# Patient Record
Sex: FEMALE | Race: WHITE | NOT HISPANIC OR LATINO | Employment: OTHER | URBAN - METROPOLITAN AREA
[De-identification: names, ages, dates, MRNs, and addresses within clinical notes are randomized per-mention and may not be internally consistent; named-entity substitution may affect disease eponyms.]

---

## 2017-05-22 ENCOUNTER — HOSPITAL ENCOUNTER (EMERGENCY)
Facility: HOSPITAL | Age: 77
Discharge: HOME | End: 2017-05-22
Attending: EMERGENCY MEDICINE
Payer: COMMERCIAL

## 2017-05-22 VITALS
DIASTOLIC BLOOD PRESSURE: 59 MMHG | TEMPERATURE: 98 F | BODY MASS INDEX: 18.4 KG/M2 | HEART RATE: 87 BPM | SYSTOLIC BLOOD PRESSURE: 136 MMHG | OXYGEN SATURATION: 95 % | HEIGHT: 62 IN | WEIGHT: 100 LBS | RESPIRATION RATE: 20 BRPM

## 2017-05-22 DIAGNOSIS — H54.61 VISION LOSS OF RIGHT EYE: Primary | ICD-10-CM

## 2017-05-22 LAB
ALBUMIN SERPL BCP-MCNC: 3.5 G/DL
ALP SERPL-CCNC: 68 U/L
ALT SERPL W/O P-5'-P-CCNC: 14 U/L
ANION GAP SERPL CALC-SCNC: 8 MMOL/L
AST SERPL-CCNC: 16 U/L
BASOPHILS # BLD AUTO: 0.03 K/UL
BASOPHILS NFR BLD: 0.4 %
BILIRUB SERPL-MCNC: 0.3 MG/DL
BUN SERPL-MCNC: 18 MG/DL
CALCIUM SERPL-MCNC: 9.4 MG/DL
CHLORIDE SERPL-SCNC: 106 MMOL/L
CHOLEST/HDLC SERPL: 3.1 {RATIO}
CO2 SERPL-SCNC: 25 MMOL/L
CREAT SERPL-MCNC: 1.1 MG/DL
CRP SERPL-MCNC: 1.73 MG/L
DIFFERENTIAL METHOD: ABNORMAL
EOSINOPHIL # BLD AUTO: 0.2 K/UL
EOSINOPHIL NFR BLD: 2.3 %
ERYTHROCYTE [DISTWIDTH] IN BLOOD BY AUTOMATED COUNT: 16.3 %
ERYTHROCYTE [SEDIMENTATION RATE] IN BLOOD BY WESTERGREN METHOD: 50 MM/HR
EST. GFR  (AFRICAN AMERICAN): 55.6 ML/MIN/1.73 M^2
EST. GFR  (NON AFRICAN AMERICAN): 48.2 ML/MIN/1.73 M^2
GLUCOSE SERPL-MCNC: 95 MG/DL
HCT VFR BLD AUTO: 30.3 %
HDL/CHOLESTEROL RATIO: 32.4 %
HDLC SERPL-MCNC: 210 MG/DL
HDLC SERPL-MCNC: 68 MG/DL
HGB BLD-MCNC: 9.9 G/DL
INR PPP: 4.7
LDLC SERPL CALC-MCNC: 95.8 MG/DL
LYMPHOCYTES # BLD AUTO: 2.1 K/UL
LYMPHOCYTES NFR BLD: 30.5 %
MCH RBC QN AUTO: 24.4 PG
MCHC RBC AUTO-ENTMCNC: 32.7 %
MCV RBC AUTO: 75 FL
MONOCYTES # BLD AUTO: 0.4 K/UL
MONOCYTES NFR BLD: 6.4 %
NEUTROPHILS # BLD AUTO: 4.2 K/UL
NEUTROPHILS NFR BLD: 60.3 %
NONHDLC SERPL-MCNC: 142 MG/DL
PLATELET # BLD AUTO: 308 K/UL
PMV BLD AUTO: 8.6 FL
POTASSIUM SERPL-SCNC: 5.1 MMOL/L
PROT SERPL-MCNC: 7.2 G/DL
PROTHROMBIN TIME: 48.6 SEC
RBC # BLD AUTO: 4.06 M/UL
SODIUM SERPL-SCNC: 139 MMOL/L
TRIGL SERPL-MCNC: 231 MG/DL
TSH SERPL DL<=0.005 MIU/L-ACNC: 2.35 UIU/ML
WBC # BLD AUTO: 6.92 K/UL

## 2017-05-22 PROCEDURE — 99285 EMERGENCY DEPT VISIT HI MDM: CPT | Mod: 25

## 2017-05-22 PROCEDURE — 93005 ELECTROCARDIOGRAM TRACING: CPT

## 2017-05-22 PROCEDURE — 93010 ELECTROCARDIOGRAM REPORT: CPT | Mod: ,,, | Performed by: INTERNAL MEDICINE

## 2017-05-22 PROCEDURE — 25000003 PHARM REV CODE 250: Performed by: STUDENT IN AN ORGANIZED HEALTH CARE EDUCATION/TRAINING PROGRAM

## 2017-05-22 PROCEDURE — 84443 ASSAY THYROID STIM HORMONE: CPT

## 2017-05-22 PROCEDURE — 85651 RBC SED RATE NONAUTOMATED: CPT

## 2017-05-22 PROCEDURE — 99284 EMERGENCY DEPT VISIT MOD MDM: CPT | Mod: ,,, | Performed by: EMERGENCY MEDICINE

## 2017-05-22 PROCEDURE — 80061 LIPID PANEL: CPT

## 2017-05-22 PROCEDURE — 85025 COMPLETE CBC W/AUTO DIFF WBC: CPT

## 2017-05-22 PROCEDURE — 86141 C-REACTIVE PROTEIN HS: CPT

## 2017-05-22 PROCEDURE — 85610 PROTHROMBIN TIME: CPT

## 2017-05-22 PROCEDURE — 82962 GLUCOSE BLOOD TEST: CPT

## 2017-05-22 PROCEDURE — 80053 COMPREHEN METABOLIC PANEL: CPT

## 2017-05-22 RX ORDER — PROPARACAINE HYDROCHLORIDE 5 MG/ML
1 SOLUTION/ DROPS OPHTHALMIC
Status: COMPLETED | OUTPATIENT
Start: 2017-05-22 | End: 2017-05-22

## 2017-05-22 RX ADMIN — PROPARACAINE HYDROCHLORIDE 1 DROP: 5 SOLUTION/ DROPS OPHTHALMIC at 03:05

## 2017-05-22 NOTE — ED PROVIDER NOTES
Encounter Date: 5/22/2017       History     Chief Complaint   Patient presents with    Loss of Vision     Pt states that she lost vision in her right eye, states that her vision is grey. Pt has slight facial droop on the right side.      Review of patient's allergies indicates:  No Known Allergies  HPI   76y F with h/o brain aneurysm, on coumadin, brought in by EMS for sudden onset vision loss once she woke from her sleep. She does not see black or color, it is all white. 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 She however was able to see blurry images prior to this event tonight. She denies any numbness, tingling, or loss of movement upon awakening. She denies headaches, pain, chest pain or shortness of breath.     Past Medical History:   Diagnosis Date    Anticoagulant long-term use     Apocrine adenocarcinoma     Arthritis     Back pain     lower back    Brain aneurysm     4 times    Chronic kidney disease (CKD), stage III (moderate)     COPD (chronic obstructive pulmonary disease)     Coronary artery disease     Encounter for blood transfusion     Hypertension     Polycystic kidney disease     Polycythemia vera     Polycythemia vera     congential     Past Surgical History:   Procedure Laterality Date    ABDOMINAL SURGERY      APPENDECTOMY      BRAIN SURGERY      relieve hemmorhages/had 4 surgeries    CARDIAC CATHETERIZATION      CORONARY ANGIOPLASTY      FRACTURE SURGERY      GALLBLADDER SURGERY      HYSTERECTOMY      illiac stent      JOINT REPLACEMENT      SKIN BIOPSY      skin repair      apocrine cancer    tibial repair      steel plate inserted    TONSILLECTOMY      TOTAL KNEE ARTHROPLASTY      VEIN BYPASS SURGERY       Family History   Problem Relation Age of Onset    Heart disease Brother      Social History   Substance Use Topics    Smoking status: Current Every Day Smoker     Packs/day: 2.00     Years: 60.00     Smokeless tobacco: Not on file    Alcohol use No     Review of Systems   Constitutional: Negative for activity change, chills and fever.   HENT: Negative for congestion and sore throat.    Eyes: Positive for visual disturbance. Negative for photophobia, pain, discharge, redness and itching.   Genitourinary: Negative for dysuria and flank pain.   Musculoskeletal: Negative for back pain and neck stiffness.   Skin: Negative for rash and wound.   Neurological: Negative for seizures and facial asymmetry.   Psychiatric/Behavioral: Negative for agitation and hallucinations.       Physical Exam     Initial Vitals [05/22/17 0208]   BP Pulse Resp Temp SpO2   (!) 136/59 92 18 98.2 °F (36.8 °C) 98 %     Physical Exam    Nursing note and vitals reviewed.  Constitutional: She appears well-developed and well-nourished. She is not diaphoretic. No distress.   Pleasant Cauc female with a symmetric face and smile. Nasolabial folds present bilaterally.   HENT:   Head: Normocephalic and atraumatic.   Nose: Nose normal.   Mouth/Throat: Oropharynx is clear and moist. No oropharyngeal exudate.   Eyes: Conjunctivae and EOM are normal. Right eye exhibits no chemosis, no discharge and no exudate. Left eye exhibits no chemosis, no discharge and no exudate. Right conjunctiva is not injected. Right conjunctiva has no hemorrhage. Left conjunctiva is not injected. Left conjunctiva has no hemorrhage. No scleral icterus. Right eye exhibits normal extraocular motion and no nystagmus. Left eye exhibits normal extraocular motion and no nystagmus. Right pupil is not reactive.   R eye not reactive to light with no consensual L response. L eye reactive to light with a symmetric L-sided consensual response. Pupils 3mm. IOP 14 bilaterally.   Neck: Normal range of motion. Neck supple. No thyromegaly present. No tracheal deviation present. No JVD present.   Cardiovascular: Normal rate, regular rhythm, normal heart sounds and intact distal pulses. Exam  reveals no gallop and no friction rub.    No murmur heard.  Pulmonary/Chest: Breath sounds normal. No stridor. No respiratory distress. She has no wheezes. She has no rhonchi. She has no rales. She exhibits no tenderness.   Abdominal: Soft. Bowel sounds are normal. She exhibits no distension and no mass. There is no tenderness. There is no rebound and no guarding.   Musculoskeletal: Normal range of motion. She exhibits no edema or tenderness.   Lymphadenopathy:     She has no cervical adenopathy.   Neurological: She is alert and oriented to person, place, and time. She has normal strength. No cranial nerve deficit or sensory deficit.   Current NIH Stroke Score: 0   1a. Level Of Consciousness  0-->Alert: keenly responsive   1b. LOC Questions  0-->Answers both questions correctly   1c. LOC Commands  0-->Performs both tasks correctly   2. Best Gaze  0-->Normal   3. Visual  1-->Partial hemianopia   4. Facial Palsy  0-->Normal symmetrical movements   5a. Motor Arm, Left  0-->No drift: limb holds 90 (or 45) degrees for full   10 secs   5b. Motor Arm, Right  0-->No drift: limb holds 90 (or 45) degrees for full   10 secs   6a. Motor Leg, Left  0-->No drift: leg holds 30 degree position for full 5   secs   6b. Motor Leg, Right  0-->No drift: leg holds 30 degree position for full   5 secs   7. Limb Ataxia  0-->Absent   8. Sensory  0-->Normal: no sensory loss   9. Best Language  0-->No aphasia: normal   10. Dysarthria  0-->Normal    Skin: Skin is warm and dry. No rash and no abscess noted. No erythema. No pallor.   Psychiatric: She has a normal mood and affect. Thought content normal.         ED Course   Procedures  Labs Reviewed   CBC W/ AUTO DIFFERENTIAL - Abnormal; Notable for the following:        Result Value    Hemoglobin 9.9 (*)     Hematocrit 30.3 (*)     MCV 75 (*)     MCH 24.4 (*)     RDW 16.3 (*)     MPV 8.6 (*)     All other components within normal limits   COMPREHENSIVE METABOLIC PANEL - Abnormal; Notable for the  "following:     eGFR if  55.6 (*)     eGFR if non  48.2 (*)     All other components within normal limits   PROTIME-INR - Abnormal; Notable for the following:     Prothrombin Time 48.6 (*)     INR 4.7 (*)     All other components within normal limits   LIPID PANEL - Abnormal; Notable for the following:     Cholesterol 210 (*)     Triglycerides 231 (*)     All other components within normal limits   SEDIMENTATION RATE, MANUAL - Abnormal; Notable for the following:     Sed Rate 50 (*)     All other components within normal limits    Narrative:     added crpus esr orders [id number] per Dr. Luis Murdock    TSH   SEDIMENTATION RATE, MANUAL   HIGH SENSITIVITY CRP   HIGH SENSITIVITY CRP (CARDIAC CRP)    Narrative:     added crpus esr orders [id number] per Dr. Luis Murdock                    APC / Resident Notes:   MDM: 76F with sudden onset unilateral painless vision loss  Initial ddx included but was not limited to: TIA/amaurosis fugax, CRVO, CRAO, optic neuritis, GCA, retinal detachment  Eye exam most consistent with optic nerve pathology. Give h/o of anticoagulant use, cannot rule out cardiovascular etiology.  CT head with no evidence of acute intracranial pathology  CBC wnl  CMP wnl  PT-INR elevated 4.7   Lipid profile reveals elevated cholesterol and HDL and TG  TSH wnl  Ophtho emergently consulted. They suspect NAION and will see the pt in Neuro-ophthalmology clinic this morning at 8am. Will have pt f/u with PCP re: coumadin use. Pt aware of plan.   Ji Orozco MD  PGY-1 [employer] EM               Attending Attestation:   Physician Attestation Statement for Resident:  As the supervising MD   Physician Attestation Statement: I have personally seen and examined this patient.   I agree with the above history. -: No ha, eye pain, temple pain or jaw pain assoc with this.  Vision had been at her usual reduced baseline until tonight when the R eye went "white".  No trauma " to eye/head.  Feels well o/w.     As the supervising MD I agree with the above PE.    As the supervising MD I agree with the above treatment, course, plan, and disposition.                    ED Course     Clinical Impression:   The encounter diagnosis was Vision loss of right eye.          Luis Murdock MD  05/23/17 1210

## 2017-05-22 NOTE — CONSULTS
"CC: decreased vision OD    HPI: Sunita Zimmer is a 76 y.o. female sudden onset of vision decrease in her right eye tonight at midnight. She states her vision is "just white".  He reports a chronic history of poor vision in her right eye, but this visual decrease is new. She denies pain, photophobia, flashes, floaters, curtainlike vision loss, pain with EOM, numbness, tingling, scalp tenderness, jaw claudication, proximal muscle weakness.     PMHx  Polycystic kidney disease  Polycythemia vera  CKD III  "intestinal stenosis?"  HTN  Tobacco abuse 61 years  Subclavian stenosis, right  DVT  COPD  S/p angioplasties x4       POH:  12 eskimo   "legally blind in her right eye since childhood"  Denies Surgieries, trauma  Wore glasses in youth    FMHX  Denies blindness, glaucoma    Allergies bactriom        Past Medical History:   Diagnosis Date    Anticoagulant long-term use     Apocrine adenocarcinoma     Arthritis     Back pain     lower back    Brain aneurysm     4 times    Chronic kidney disease (CKD), stage III (moderate)     COPD (chronic obstructive pulmonary disease)     Coronary artery disease     Encounter for blood transfusion     Hypertension     Polycystic kidney disease     Polycythemia vera     Polycythemia vera     congential         Family History   Problem Relation Age of Onset    Heart disease Brother            Current Facility-Administered Medications:     proparacaine 0.5 % ophthalmic solution 1 drop, 1 drop, Both Eyes, ED 1 Time, Ji Orozco MD    Current Outpatient Prescriptions:     acetaminophen-codeine 300-30mg (TYLENOL-CODEINE #3) 300-30 mg Tab, Take by mouth., Disp: , Rfl:     aspirin (ECOTRIN) 81 MG EC tablet, Take 1 tablet (81 mg total) by mouth once daily., Disp: 30 tablet, Rfl: 5    clonidine (CATAPRES) 0.1 MG tablet, Take 0.1 mg by mouth 2 (two) times daily. , Disp: , Rfl:     cyclobenzaprine (FLEXERIL) 10 MG tablet, Take 10 mg by mouth 3 (three) times daily as " needed for Muscle spasms., Disp: , Rfl:     diazepam (VALIUM) 5 MG tablet, Take 5 mg by mouth every 6 (six) hours as needed for Anxiety., Disp: , Rfl:     hydrocodone-acetaminophen 7.5-500 mg (LORTAB) 7.5-500 mg per tablet, Take 1 tablet by mouth every 6 (six) hours as needed for Pain., Disp: , Rfl:     nitroGLYCERIN (NITROSTAT) 0.4 MG SL tablet, Place 0.4 mg under the tongue every 5 (five) minutes as needed for Chest pain., Disp: , Rfl:     ramipril (ALTACE) 10 MG capsule, Take 10 mg by mouth. , Disp: , Rfl:     terbutaline (BRETHINE) 5 mg Tab, 5 mg 2 (two) times daily. , Disp: , Rfl:     warfarin (COUMADIN) 3 MG tablet, Take 3 mg by mouth. , Disp: , Rfl:       Review of patient's allergies indicates:  No Known Allergies      Social History   Substance Use Topics    Smoking status: Current Every Day Smoker     Packs/day: 2.00     Years: 60.00    Smokeless tobacco: Not on file    Alcohol use No          Not recorded        VA  Sc near card  OD: HM  OS: 20/100    TP 14//15    Pupils:   OD: minimally reactive, +3 APD  OS: sluggish, no APD    CVF  OD: unable  OS: full      SLE:  L/L:, wnl OU  C/S: white and quiet OU  K: endopigment OU  AC: deep and quiet OU  Iris: round and reactive OU  Lens: NSC +3 OU  Vitreous: clear OU     DFE  Nerve: slight pallor? OD //wnl OS  CDR 0.3//0.3  Macula: flat OU, Faint drusen OS  Vessels: severe attenuation OD with slowed venous circulation // notable attenuation with SVP OS  Periphery: wnl OU    CT negative for CVA    Plan   A/P: Sunita Zimmer is a 76 y.o. female    1) Decreased vision OD  - per patient, chronic poor vision in her right eye since childhood but with new onset vision decrease starting at midnight  - patient with multiple vasculopathic risk factors: HTN, DVT's, multiple stents, polycythemia vera, PKD, tobacco abuse. On coumadin and supratherapeutic.  - no GCA symptoms such as jaw claudication, proximal muscle weakness, scalp tenderness, hx of PMR. Will check  ESR/CRP  - Suspect NAION, Will plan to have patient see Neuro-ophthalmology this AM in clinic

## 2017-05-22 NOTE — ED TRIAGE NOTES
76 year old female pt presents to the ed with complaints of loss of vision to the right around midnight.pt denies any chest pain sob or nausea. Pt also denies any headache. Pt is awake alert and oriented x 3. Pt presents to the ed with .

## 2017-05-24 LAB — POCT GLUCOSE: 100 MG/DL (ref 70–110)

## 2017-05-26 ENCOUNTER — OFFICE VISIT (OUTPATIENT)
Dept: OPHTHALMOLOGY | Facility: CLINIC | Age: 77
End: 2017-05-26
Payer: COMMERCIAL

## 2017-05-26 ENCOUNTER — CLINICAL SUPPORT (OUTPATIENT)
Dept: OPHTHALMOLOGY | Facility: CLINIC | Age: 77
End: 2017-05-26
Payer: COMMERCIAL

## 2017-05-26 DIAGNOSIS — H47.011 NAION (NON-ARTERITIC ANTERIOR ISCHEMIC OPTIC NEUROPATHY), RIGHT EYE: ICD-10-CM

## 2017-05-26 DIAGNOSIS — H53.411 CENTRAL SCOTOMA, RIGHT EYE: ICD-10-CM

## 2017-05-26 PROCEDURE — 92083 EXTENDED VISUAL FIELD XM: CPT | Mod: S$GLB,,, | Performed by: OPHTHALMOLOGY

## 2017-05-26 PROCEDURE — 99999 PR PBB SHADOW E&M-EST. PATIENT-LVL III: CPT | Mod: PBBFAC,,, | Performed by: OPHTHALMOLOGY

## 2017-05-26 PROCEDURE — 99999 PR PBB SHADOW E&M-EST. PATIENT-LVL I: CPT | Mod: PBBFAC,,,

## 2017-05-26 PROCEDURE — 92014 COMPRE OPH EXAM EST PT 1/>: CPT | Mod: S$GLB,,, | Performed by: OPHTHALMOLOGY

## 2017-05-26 RX ORDER — ALBUTEROL SULFATE 4 MG/1
TABLET ORAL
Status: ON HOLD | COMMUNITY
Start: 2017-05-23 | End: 2018-05-20 | Stop reason: HOSPADM

## 2017-05-26 RX ORDER — BUDESONIDE AND FORMOTEROL FUMARATE DIHYDRATE 160; 4.5 UG/1; UG/1
2 AEROSOL RESPIRATORY (INHALATION)
Status: ON HOLD | COMMUNITY
End: 2018-05-04

## 2017-05-26 RX ORDER — ALBUTEROL SULFATE 90 UG/1
AEROSOL, METERED RESPIRATORY (INHALATION)
Status: ON HOLD | COMMUNITY
Start: 2017-04-28 | End: 2018-05-20

## 2017-05-26 RX ORDER — PREDNISONE 5 MG/1
TABLET ORAL
COMMUNITY
Start: 2017-05-09 | End: 2017-11-20

## 2017-05-26 RX ORDER — TEMAZEPAM 15 MG/1
CAPSULE ORAL
COMMUNITY
Start: 2017-05-03 | End: 2017-05-26

## 2017-05-26 RX ORDER — POLYETHYLENE GLYCOL 3350 17 G/17G
17 POWDER, FOR SOLUTION ORAL
COMMUNITY

## 2017-05-26 NOTE — PROGRESS NOTES
HPI     Follow-up    Additional comments: RIZWAN           Comments   Patient here for ED follow up NAION.  Pt here w/daughter. Pt states OD loss vision x 4 days ago which states   only see splash of colors or shadows.  Patient feels she seeing better since the ED visit.  No pain.    I have personally interviewed the patient, reviewed the history and   examined the patient and agree with the technician's exam.    CRP and platelet count normal. ESR slightly elevated at 50.        Last edited by Lito Lagunas MD on 5/26/2017 10:26 AM. (History)        ROS     Positive for: Neurological, Genitourinary, Cardiovascular, Eyes,   Heme/Lymph    Negative for: Constitutional, Gastrointestinal, Skin, Musculoskeletal,   HENT, Endocrine, Respiratory, Psychiatric, Allergic/Imm    Last edited by Lito Lagunas MD on 5/26/2017 10:22 AM. (History)        Assessment /Plan     For exam results, see Encounter Report.    Central scotoma, right eye  -     Mendoza Visual Field - OU - Extended - Both Eyes    NAION (non-arteritic anterior ischemic optic neuropathy), right eye      I discussed the Ischemic Optic Neuropathy Decompression Trial in detail. I will repeat her examination in two months.

## 2017-05-26 NOTE — PROGRESS NOTES
Reliable-------Good---OU  Fixation-------Good---OU  Coop----------Good---OU    24-2 SS Done OU--S

## 2017-07-27 ENCOUNTER — OFFICE VISIT (OUTPATIENT)
Dept: OPHTHALMOLOGY | Facility: CLINIC | Age: 77
End: 2017-07-27
Payer: COMMERCIAL

## 2017-07-27 ENCOUNTER — CLINICAL SUPPORT (OUTPATIENT)
Dept: OPHTHALMOLOGY | Facility: CLINIC | Age: 77
End: 2017-07-27
Payer: COMMERCIAL

## 2017-07-27 DIAGNOSIS — H47.011 NAION (NON-ARTERITIC ANTERIOR ISCHEMIC OPTIC NEUROPATHY), RIGHT EYE: ICD-10-CM

## 2017-07-27 DIAGNOSIS — H53.411 CENTRAL SCOTOMA, RIGHT EYE: Primary | ICD-10-CM

## 2017-07-27 PROCEDURE — 92083 EXTENDED VISUAL FIELD XM: CPT | Mod: S$GLB,,, | Performed by: OPHTHALMOLOGY

## 2017-07-27 PROCEDURE — 92012 INTRM OPH EXAM EST PATIENT: CPT | Mod: S$GLB,,, | Performed by: OPHTHALMOLOGY

## 2017-07-27 PROCEDURE — 99999 PR PBB SHADOW E&M-EST. PATIENT-LVL II: CPT | Mod: PBBFAC,,, | Performed by: OPHTHALMOLOGY

## 2017-07-27 RX ORDER — METHYLPREDNISOLONE 4 MG/1
TABLET ORAL
COMMUNITY
Start: 2017-04-28 | End: 2017-11-20

## 2017-07-27 RX ORDER — ISOSORBIDE MONONITRATE 30 MG/1
30 TABLET, EXTENDED RELEASE ORAL
Status: ON HOLD | COMMUNITY
Start: 2017-07-19 | End: 2017-11-21

## 2017-07-27 RX ORDER — ATORVASTATIN CALCIUM 40 MG/1
TABLET, FILM COATED ORAL
COMMUNITY
Start: 2017-07-19 | End: 2017-11-20

## 2017-07-27 RX ORDER — ALPRAZOLAM 0.5 MG/1
TABLET ORAL
Status: ON HOLD | COMMUNITY
Start: 2017-06-19 | End: 2018-05-16

## 2017-07-27 RX ORDER — FUROSEMIDE 20 MG/1
TABLET ORAL
COMMUNITY
Start: 2017-05-21 | End: 2017-11-20

## 2017-07-27 RX ORDER — PANTOPRAZOLE SODIUM 40 MG/1
TABLET, DELAYED RELEASE ORAL
COMMUNITY
Start: 2017-07-19 | End: 2017-11-20

## 2017-07-27 NOTE — PROGRESS NOTES
HPI     Concerns About Ocular Health    Additional comments: NAION           Comments   DLS:05/26/2017 [place]  Patient here for 2 month follow up Central Scotoma OD.  Pt states no change since last visit.  No pain.    I have personally interviewed the patient, reviewed the history and   examined the patient and agree with the technician's exam.       Last edited by Lito Lagunas MD on 7/27/2017 10:45 AM. (History)            Assessment /Plan     For exam results, see Encounter Report.    Central scotoma, right eye  -     Cancel: Mendoza Visual Field - OU - Extended - Both Eyes  -     Mendoza Visual Field - OU - Extended - Both Eyes    NAION (non-arteritic anterior ischemic optic neuropathy), right eye  -     Cancel: Mendoza Visual Field - OU - Extended - Both Eyes  -     Mendoza Visual Field - OU - Extended - Both Eyes      Ms. Dueñas visual function is stable in her right eye. The edema has resolved and she shows the residual features of NAION. I went over risk factors again. She has cataracts which could be the cause of the visual loss in her left eye. She will think about seeing a cataract surgeon. I will repeat her exam in one year.

## 2017-10-03 ENCOUNTER — OFFICE VISIT (OUTPATIENT)
Dept: OPHTHALMOLOGY | Facility: CLINIC | Age: 77
End: 2017-10-03
Payer: COMMERCIAL

## 2017-10-03 DIAGNOSIS — H53.411 CENTRAL SCOTOMA, RIGHT EYE: ICD-10-CM

## 2017-10-03 DIAGNOSIS — H47.011 NAION (NON-ARTERITIC ANTERIOR ISCHEMIC OPTIC NEUROPATHY), RIGHT EYE: ICD-10-CM

## 2017-10-03 DIAGNOSIS — H25.11 NUCLEAR SCLEROTIC CATARACT OF RIGHT EYE: Primary | ICD-10-CM

## 2017-10-03 DIAGNOSIS — H25.12 NUCLEAR SCLEROTIC CATARACT OF LEFT EYE: ICD-10-CM

## 2017-10-03 PROCEDURE — 99999 PR PBB SHADOW E&M-EST. PATIENT-LVL III: CPT | Mod: PBBFAC,,, | Performed by: OPHTHALMOLOGY

## 2017-10-03 PROCEDURE — 92014 COMPRE OPH EXAM EST PT 1/>: CPT | Mod: S$GLB,,, | Performed by: OPHTHALMOLOGY

## 2017-10-03 PROCEDURE — 92136 OPHTHALMIC BIOMETRY: CPT | Mod: RT,S$GLB,, | Performed by: OPHTHALMOLOGY

## 2017-10-03 RX ORDER — PREDNISOLONE ACETATE 10 MG/ML
1 SUSPENSION/ DROPS OPHTHALMIC 3 TIMES DAILY
Qty: 5 ML | Refills: 1 | Status: SHIPPED | OUTPATIENT
Start: 2017-10-03 | End: 2017-11-02

## 2017-10-03 RX ORDER — KETOROLAC TROMETHAMINE 5 MG/ML
1 SOLUTION OPHTHALMIC 3 TIMES DAILY
Qty: 5 ML | Refills: 1 | Status: ON HOLD | OUTPATIENT
Start: 2017-10-03 | End: 2018-05-20 | Stop reason: HOSPADM

## 2017-10-03 RX ORDER — OFLOXACIN 3 MG/ML
1 SOLUTION/ DROPS OPHTHALMIC 3 TIMES DAILY
Qty: 5 ML | Refills: 0 | Status: SHIPPED | OUTPATIENT
Start: 2017-10-03 | End: 2017-10-13

## 2017-10-03 NOTE — PROGRESS NOTES
HPI     Referred by Dr Lagunas    H/o NAION OD    Pt referred by Dr Lagunas for cataract eval.  Pt states blurry VA OU.    Scotoma OD    No eyedrops  No eye surgery     Last edited by Ileana Nascimento MD on 10/3/2017 11:20 AM. (History)            Assessment /Plan     For exam results, see Encounter Report.    Nuclear sclerotic cataract of right eye    Nuclear sclerotic cataract of left eye  -     IOL Master - OD - Right Eye    Central scotoma, right eye    NAION (non-arteritic anterior ischemic optic neuropathy), right eye    Other orders  -     prednisoLONE acetate (PRED FORTE) 1 % DrpS; Place 1 drop into the right eye 3 (three) times daily.  Dispense: 5 mL; Refill: 1  -     ofloxacin (OCUFLOX) 0.3 % ophthalmic solution; Place 1 drop into the right eye 3 (three) times daily.  Dispense: 5 mL; Refill: 0  -     ketorolac 0.5% (ACULAR) 0.5 % Drop; Place 1 drop into the right eye 3 (three) times daily.  Dispense: 5 mL; Refill: 1      Visually significant nuclear sclerotic cataract   - Interfering with activities of daily living.  Pt desires cataract surgery for Va rehabilitation.   - R/B/A discussed and pt agrees to proceed with surgery.   - IOL options discussed according to patient's goals and concomitant ocular pathology; and pt content with monofocal lens.    - Target: plano.    pcboo 18.5 OS  *VB  * anxiety    (pcboo 14.5 OD --> myopic shift with lenticular astig; limited Va potential)    H/o NAION OD    Monocular precautions - full time polycarb lenses to be worn at all times.

## 2017-10-05 ENCOUNTER — PATIENT MESSAGE (OUTPATIENT)
Dept: OPHTHALMOLOGY | Facility: CLINIC | Age: 77
End: 2017-10-05

## 2017-10-09 ENCOUNTER — TELEPHONE (OUTPATIENT)
Dept: OPHTHALMOLOGY | Facility: CLINIC | Age: 77
End: 2017-10-09

## 2017-10-09 NOTE — TELEPHONE ENCOUNTER
----- Message from Anai Ramsey sent at 10/9/2017  9:42 AM CDT -----  Contact: Sunita       ----- Message -----  From: Ileana Nascimento MD  Sent: 10/5/2017  11:00 AM  To: Anai Ramsey, Cheryle Quintana, #    We are planning on doing OS. Not OD. pls let her know    ----- Message -----  From: Shelley Blanco MA  Sent: 10/5/2017  10:37 AM  To: Ileana Nascimento MD    Please advise  ----- Message -----  From: Reina Cheney  Sent: 10/5/2017  10:13 AM  To: [facility] Staff    Pt calling to say that she is blind in her OD and she is unsure as to why she is having to get cataract surgery on her OD instead of her OS.  She would like to make sure that we are taking care of the correct eye. She states that she is also losing her hearing and would like to have any response sent to her by mail where she can read what is going on.  If you need to speak to her please contact her at [phone]

## 2017-10-09 NOTE — TELEPHONE ENCOUNTER
Called pt and assured her that Dr Nascimento is doing surgery on her left eye and she should use the drops in her left eye not her right. Also went over her surgery instructions and explained when to start her drops and how to use them. I told her I would send the instruction sheet and also will send surgery forms to Dr Von Mora in [place].

## 2017-10-18 ENCOUNTER — TELEPHONE (OUTPATIENT)
Dept: OPHTHALMOLOGY | Facility: CLINIC | Age: 77
End: 2017-10-18

## 2017-10-18 NOTE — TELEPHONE ENCOUNTER
----- Message from Lucia Squires sent at 10/18/2017  9:58 AM CDT -----  Contact: Pt  Pt would like to speak with the nurse ASAP regarding her upcoming procedure. She states that the instructions are confusing her because it's speaking of her right eye which is her blind eye. She can be reached at [phone]

## 2017-10-18 NOTE — TELEPHONE ENCOUNTER
Pt called and was nervous and wanted to make sure Dr Nascimento would be performing surgery on her left eye.  Reassured pt that Dr Nascimento would be doing surgery on the left eye and that there was a mistake at the beginning when the right eye was mentioned and that was cleared up and it was clarified per Dr Nascimento that surgery was going to be performed on the left eye. Pt was still nervous and wanted to speak with Dr Nascimento herself and make sure that the lens that was being ordered was for the left eye.  Advised pt that Dr Nascimento would call her as soon as possible.

## 2017-11-02 ENCOUNTER — TELEPHONE (OUTPATIENT)
Dept: OPHTHALMOLOGY | Facility: CLINIC | Age: 77
End: 2017-11-02

## 2017-11-02 DIAGNOSIS — H25.11 NUCLEAR SCLEROTIC CATARACT OF RIGHT EYE: Primary | ICD-10-CM

## 2017-11-18 ENCOUNTER — PATIENT MESSAGE (OUTPATIENT)
Dept: SURGERY | Facility: HOSPITAL | Age: 77
End: 2017-11-18

## 2017-11-20 ENCOUNTER — ANESTHESIA EVENT (OUTPATIENT)
Dept: SURGERY | Facility: HOSPITAL | Age: 77
End: 2017-11-20
Payer: COMMERCIAL

## 2017-11-20 NOTE — H&P
History    Chief complaint:  Painless progressive vision loss    Present Ilness/Diagnosis: Nuclear sclerotic Cataract    Past Medical History:  has a past medical history of Anticoagulant long-term use; Apocrine adenocarcinoma; Arthritis; Back pain; Brain aneurysm; Chronic kidney disease (CKD), stage III (moderate); COPD (chronic obstructive pulmonary disease); Coronary artery disease; Encounter for blood transfusion; Hypertension; Polycystic kidney disease; Polycythemia vera; and Polycythemia vera.    Family History/Social History: refer to chart    Allergies:   Review of patient's allergies indicates:   Allergen Reactions    Doxycycline Hives and Itching    Bacitracin     Iodinated contrast- oral and iv dye     Penicillins Other (See Comments)     Unknown    Sulfamethoxazole-trimethoprim        Current Medications: No current facility-administered medications for this encounter.     Current Outpatient Prescriptions:     acetaminophen-codeine 300-30mg (TYLENOL-CODEINE #3) 300-30 mg Tab, Take by mouth., Disp: , Rfl:     albuterol (PROVENTIL) 4 MG Tab, , Disp: , Rfl:     alprazolam (XANAX) 0.5 MG tablet, , Disp: , Rfl:     atorvastatin (LIPITOR) 40 MG tablet, , Disp: , Rfl:     budesonide-formoterol 160-4.5 mcg (SYMBICORT) 160-4.5 mcg/actuation HFAA, Inhale 2 puffs into the lungs., Disp: , Rfl:     clonidine (CATAPRES) 0.1 MG tablet, Take 0.1 mg by mouth 2 (two) times daily. , Disp: , Rfl:     cyclobenzaprine (FLEXERIL) 10 MG tablet, Take 10 mg by mouth 3 (three) times daily as needed for Muscle spasms., Disp: , Rfl:     diazepam (VALIUM) 5 MG tablet, Take 5 mg by mouth every 6 (six) hours as needed for Anxiety., Disp: , Rfl:     furosemide (LASIX) 20 MG tablet, , Disp: , Rfl:     hydrocodone-acetaminophen 7.5-500 mg (LORTAB) 7.5-500 mg per tablet, Take 1 tablet by mouth every 6 (six) hours as needed for Pain., Disp: , Rfl:     isosorbide mononitrate (IMDUR) 30 MG 24 hr tablet, Take 30 mg by mouth.,  Disp: , Rfl:     ketorolac 0.5% (ACULAR) 0.5 % Drop, Place 1 drop into the right eye 3 (three) times daily., Disp: 5 mL, Rfl: 1    methylPREDNISolone (MEDROL DOSEPACK) 4 mg tablet, , Disp: , Rfl:     nitroGLYCERIN (NITROSTAT) 0.4 MG SL tablet, Place 0.4 mg under the tongue every 5 (five) minutes as needed for Chest pain., Disp: , Rfl:     pantoprazole (PROTONIX) 40 MG tablet, , Disp: , Rfl:     polyethylene glycol (GLYCOLAX) 17 gram/dose powder, Take 17 g by mouth., Disp: , Rfl:     predniSONE (DELTASONE) 5 MG tablet, , Disp: , Rfl:     ramipril (ALTACE) 10 MG capsule, Take 10 mg by mouth. , Disp: , Rfl:     terbutaline (BRETHINE) 5 mg Tab, 5 mg 2 (two) times daily. , Disp: , Rfl:     umeclidinium 62.5 mcg/actuation DsDv, Inhale 1 puff into the lungs., Disp: , Rfl:     VENTOLIN HFA 90 mcg/actuation inhaler, , Disp: , Rfl:     warfarin (COUMADIN) 3 MG tablet, Take 3 mg by mouth. , Disp: , Rfl:     Physical Exam    BP: Vital signs stable  General: No apparent distress  HEENT: nuclear sclerotic cataract  Lungs: adequate respirations  Heart: + pulses  Abdomen: soft  Rectal/pelvic: deferred    Impression: Visually significant Cataract    Plan: Phacoemulsification with implantation of Intraocular lens

## 2017-11-21 ENCOUNTER — ANESTHESIA (OUTPATIENT)
Dept: SURGERY | Facility: HOSPITAL | Age: 77
End: 2017-11-21
Payer: COMMERCIAL

## 2017-11-21 ENCOUNTER — HOSPITAL ENCOUNTER (OUTPATIENT)
Facility: HOSPITAL | Age: 77
Discharge: HOME | End: 2017-11-21
Attending: OPHTHALMOLOGY | Admitting: OPHTHALMOLOGY
Payer: COMMERCIAL

## 2017-11-21 ENCOUNTER — SURGERY (OUTPATIENT)
Age: 77
End: 2017-11-21

## 2017-11-21 VITALS
SYSTOLIC BLOOD PRESSURE: 168 MMHG | BODY MASS INDEX: 16.56 KG/M2 | TEMPERATURE: 98 F | WEIGHT: 90 LBS | RESPIRATION RATE: 18 BRPM | HEIGHT: 62 IN | DIASTOLIC BLOOD PRESSURE: 72 MMHG | OXYGEN SATURATION: 94 % | HEART RATE: 82 BPM

## 2017-11-21 DIAGNOSIS — H25.11 NUCLEAR SCLEROTIC CATARACT OF RIGHT EYE: Primary | ICD-10-CM

## 2017-11-21 DIAGNOSIS — H25.10 SENILE NUCLEAR SCLEROSIS: ICD-10-CM

## 2017-11-21 PROCEDURE — 25000003 PHARM REV CODE 250: Mod: PO | Performed by: OPHTHALMOLOGY

## 2017-11-21 PROCEDURE — D9220A PRA ANESTHESIA: Mod: ANES,,, | Performed by: ANESTHESIOLOGY

## 2017-11-21 PROCEDURE — 37000009 HC ANESTHESIA EA ADD 15 MINS: Mod: PO | Performed by: OPHTHALMOLOGY

## 2017-11-21 PROCEDURE — 63600175 PHARM REV CODE 636 W HCPCS: Mod: PO | Performed by: NURSE ANESTHETIST, CERTIFIED REGISTERED

## 2017-11-21 PROCEDURE — 71000033 HC RECOVERY, INTIAL HOUR: Mod: PO | Performed by: OPHTHALMOLOGY

## 2017-11-21 PROCEDURE — D9220A PRA ANESTHESIA: Mod: CRNA,,, | Performed by: NURSE ANESTHETIST, CERTIFIED REGISTERED

## 2017-11-21 PROCEDURE — 63600175 PHARM REV CODE 636 W HCPCS: Mod: PO | Performed by: OPHTHALMOLOGY

## 2017-11-21 PROCEDURE — V2632 POST CHMBR INTRAOCULAR LENS: HCPCS | Mod: PO | Performed by: OPHTHALMOLOGY

## 2017-11-21 PROCEDURE — 66982 XCAPSL CTRC RMVL CPLX WO ECP: CPT | Mod: LT,,, | Performed by: OPHTHALMOLOGY

## 2017-11-21 PROCEDURE — 36000706: Mod: PO | Performed by: OPHTHALMOLOGY

## 2017-11-21 PROCEDURE — 36000707: Mod: PO | Performed by: OPHTHALMOLOGY

## 2017-11-21 PROCEDURE — C9447 INJ, PHENYLEPHRINE KETOROLAC: HCPCS | Mod: PO | Performed by: OPHTHALMOLOGY

## 2017-11-21 PROCEDURE — 37000008 HC ANESTHESIA 1ST 15 MINUTES: Mod: PO | Performed by: OPHTHALMOLOGY

## 2017-11-21 DEVICE — LENS IOL ITEC PRELOAD 18.5D: Type: IMPLANTABLE DEVICE | Site: EYE | Status: FUNCTIONAL

## 2017-11-21 RX ORDER — OFLOXACIN 3 MG/ML
1 SOLUTION/ DROPS OPHTHALMIC
Status: COMPLETED | OUTPATIENT
Start: 2017-11-21 | End: 2017-11-21

## 2017-11-21 RX ORDER — MOXIFLOXACIN 5 MG/ML
SOLUTION/ DROPS OPHTHALMIC
Status: DISCONTINUED | OUTPATIENT
Start: 2017-11-21 | End: 2017-11-21 | Stop reason: HOSPADM

## 2017-11-21 RX ORDER — PHENYLEPHRINE HYDROCHLORIDE 25 MG/ML
1 SOLUTION/ DROPS OPHTHALMIC
Status: DISCONTINUED | OUTPATIENT
Start: 2017-11-21 | End: 2017-11-21 | Stop reason: HOSPADM

## 2017-11-21 RX ORDER — ONDANSETRON 2 MG/ML
INJECTION INTRAMUSCULAR; INTRAVENOUS
Status: DISCONTINUED | OUTPATIENT
Start: 2017-11-21 | End: 2017-11-21

## 2017-11-21 RX ORDER — KETOROLAC TROMETHAMINE 5 MG/ML
1 SOLUTION OPHTHALMIC ONCE
Status: COMPLETED | OUTPATIENT
Start: 2017-11-21 | End: 2017-11-21

## 2017-11-21 RX ORDER — SODIUM CHLORIDE, SODIUM LACTATE, POTASSIUM CHLORIDE, CALCIUM CHLORIDE 600; 310; 30; 20 MG/100ML; MG/100ML; MG/100ML; MG/100ML
INJECTION, SOLUTION INTRAVENOUS CONTINUOUS
Status: DISCONTINUED | OUTPATIENT
Start: 2017-11-21 | End: 2017-11-21 | Stop reason: HOSPADM

## 2017-11-21 RX ORDER — ACETAMINOPHEN 325 MG/1
650 TABLET ORAL EVERY 4 HOURS PRN
Status: DISCONTINUED | OUTPATIENT
Start: 2017-11-21 | End: 2017-11-21 | Stop reason: HOSPADM

## 2017-11-21 RX ORDER — TROPICAMIDE 10 MG/ML
1 SOLUTION/ DROPS OPHTHALMIC
Status: DISCONTINUED | OUTPATIENT
Start: 2017-11-21 | End: 2017-11-21 | Stop reason: HOSPADM

## 2017-11-21 RX ORDER — MOXIFLOXACIN 5 MG/ML
1 SOLUTION/ DROPS OPHTHALMIC
Status: ACTIVE | OUTPATIENT
Start: 2017-11-21 | End: 2017-11-21

## 2017-11-21 RX ORDER — LIDOCAINE HYDROCHLORIDE 10 MG/ML
INJECTION, SOLUTION EPIDURAL; INFILTRATION; INTRACAUDAL; PERINEURAL
Status: DISCONTINUED | OUTPATIENT
Start: 2017-11-21 | End: 2017-11-21 | Stop reason: HOSPADM

## 2017-11-21 RX ORDER — LIDOCAINE HYDROCHLORIDE 40 MG/ML
1 INJECTION, SOLUTION RETROBULBAR
Status: COMPLETED | OUTPATIENT
Start: 2017-11-21 | End: 2017-11-21

## 2017-11-21 RX ORDER — PROPARACAINE HYDROCHLORIDE 5 MG/ML
1 SOLUTION/ DROPS OPHTHALMIC
Status: DISCONTINUED | OUTPATIENT
Start: 2017-11-21 | End: 2017-11-21 | Stop reason: HOSPADM

## 2017-11-21 RX ORDER — MIDAZOLAM HYDROCHLORIDE 1 MG/ML
INJECTION, SOLUTION INTRAMUSCULAR; INTRAVENOUS
Status: DISCONTINUED | OUTPATIENT
Start: 2017-11-21 | End: 2017-11-21

## 2017-11-21 RX ORDER — LIDOCAINE HYDROCHLORIDE 10 MG/ML
1 INJECTION, SOLUTION EPIDURAL; INFILTRATION; INTRACAUDAL; PERINEURAL ONCE
Status: DISCONTINUED | OUTPATIENT
Start: 2017-11-21 | End: 2017-11-21 | Stop reason: HOSPADM

## 2017-11-21 RX ORDER — PREDNISOLONE ACETATE 10 MG/ML
SUSPENSION/ DROPS OPHTHALMIC
Status: DISCONTINUED | OUTPATIENT
Start: 2017-11-21 | End: 2017-11-21 | Stop reason: HOSPADM

## 2017-11-21 RX ADMIN — OFLOXACIN 1 DROP: 3 SOLUTION/ DROPS OPHTHALMIC at 10:11

## 2017-11-21 RX ADMIN — LIDOCAINE HYDROCHLORIDE 4 MG: 40 INJECTION, SOLUTION RETROBULBAR; TOPICAL at 11:11

## 2017-11-21 RX ADMIN — TROPICAMIDE 1 DROP: 10 SOLUTION/ DROPS OPHTHALMIC at 11:11

## 2017-11-21 RX ADMIN — SODIUM CHLORIDE, SODIUM LACTATE, POTASSIUM CHLORIDE, CALCIUM CHLORIDE: 600; 310; 30; 20 INJECTION, SOLUTION INTRAVENOUS at 10:11

## 2017-11-21 RX ADMIN — PREDNISOLONE ACETATE 1 DROP: 10 SUSPENSION OPHTHALMIC at 10:11

## 2017-11-21 RX ADMIN — PHENYLEPHRINE AND KETOROLAC 1 EACH: 10.16; 2.88 INJECTION, SOLUTION, CONCENTRATE INTRAOCULAR at 10:11

## 2017-11-21 RX ADMIN — OFLOXACIN 1 DROP: 3 SOLUTION/ DROPS OPHTHALMIC at 11:11

## 2017-11-21 RX ADMIN — TROPICAMIDE 1 DROP: 10 SOLUTION/ DROPS OPHTHALMIC at 10:11

## 2017-11-21 RX ADMIN — ONDANSETRON 4 MG: 2 INJECTION, SOLUTION INTRAMUSCULAR; INTRAVENOUS at 11:11

## 2017-11-21 RX ADMIN — MOXIFLOXACIN HYDROCHLORIDE 1 DROP: 5 SOLUTION/ DROPS OPHTHALMIC at 10:11

## 2017-11-21 RX ADMIN — Medication 0.5 ML: at 10:11

## 2017-11-21 RX ADMIN — MIDAZOLAM HYDROCHLORIDE 1 MG: 1 INJECTION, SOLUTION INTRAMUSCULAR; INTRAVENOUS at 11:11

## 2017-11-21 RX ADMIN — PHENYLEPHRINE HYDROCHLORIDE 1 DROP: 25 SOLUTION/ DROPS OPHTHALMIC at 10:11

## 2017-11-21 RX ADMIN — PROPARACAINE HYDROCHLORIDE 1 DROP: 5 SOLUTION/ DROPS OPHTHALMIC at 11:11

## 2017-11-21 RX ADMIN — BALANCED SALT SOLUTION 500 ML: 6.4; .75; .48; .3; 3.9; 1.7 SOLUTION OPHTHALMIC at 10:11

## 2017-11-21 RX ADMIN — PROPARACAINE HYDROCHLORIDE 1 DROP: 5 SOLUTION/ DROPS OPHTHALMIC at 10:11

## 2017-11-21 RX ADMIN — PHENYLEPHRINE HYDROCHLORIDE 1 DROP: 25 SOLUTION/ DROPS OPHTHALMIC at 11:11

## 2017-11-21 RX ADMIN — SODIUM HYALURONATE 10 MG: 10 INJECTION INTRAOCULAR at 10:11

## 2017-11-21 RX ADMIN — KETOROLAC TROMETHAMINE 1 DROP: 5 SOLUTION OPHTHALMIC at 10:11

## 2017-11-21 RX ADMIN — LIDOCAINE HYDROCHLORIDE 1 ML: 10 INJECTION, SOLUTION EPIDURAL; INFILTRATION; INTRACAUDAL; PERINEURAL at 10:11

## 2017-11-21 NOTE — DISCHARGE INSTRUCTIONS
Recovery After Procedural Sedation (Adult)  You have been given medicine by vein to make you sleep during your surgery. This may have included both a pain medicine and sleeping medicine. Most of the effects have worn off. But you may still have some drowsiness for the next 6 to 8 hours.  Home care  Follow these guidelines when you get home:  · For the next 8 hours, you should be watched by a responsible adult. This person should make sure your condition is not getting worse.  · Don't drink any alcohol for the next 24 hours.  · Don't drive, operate dangerous machinery, or make important business or personal decisions during the next 24 hours.  Note: Your healthcare provider may tell you not to take any medicine by mouth for pain or sleep in the next 4 hours. These medicines may react with the medicines you were given in the hospital. This could cause a much stronger response than usual.  Follow-up care  Follow up with your healthcare provider if you are not alert and back to your usual level of activity within 12 hours.  When to seek medical advice  Call your healthcare provider right away if any of these occur:  · Drowsiness gets worse  · Weakness or dizziness gets worse  · Repeated vomiting  · You can't be awakened   Date Last Reviewed: 10/18/2016  © 9566-8159 [employer]. [address] [place] [address], [place] [address]. All rights reserved. This information is not intended as a substitute for professional medical care. Always follow your healthcare professional's instructions.

## 2017-11-21 NOTE — DISCHARGE SUMMARY
BRIEF DISCHARGE NOTE:    Reason for hospitalization -  Cataract surgery     Final Diagnosis - Visually significant Cataract    Procedures and treatment provided - Status post phacoemulsification with placement of intraocular lens     Diet - Advance to regular as tolerated    Activity - as tolerated    Disposition at the end of the case - Good.    Discharge: to home    The patient tolerated the procedure well and knows to follow up with me tomorrow morning in the eye clinic, sooner if needed.    Patient and family instructions (as appropriate) - Given to patient on discharge    Ileana Nascimento MD

## 2017-11-21 NOTE — ANESTHESIA PREPROCEDURE EVALUATION
11/21/2017  Sunita Zimmer is a 77 y.o., female.    Anesthesia Evaluation    I have reviewed the Patient Summary Reports.    I have reviewed the Nursing Notes.      Review of Systems  Anesthesia Hx:  No problems with previous Anesthesia    Cardiovascular:   Hypertension, well controlled CAD asymptomatic     Pulmonary:   COPD, mild        Physical Exam  General:  Well nourished                 Anesthesia Plan  Type of Anesthesia, risks & benefits discussed:  Anesthesia Type:  MAC  Patient's Preference:   Intra-op Monitoring Plan:   Intra-op Monitoring Plan Comments:   Post Op Pain Control Plan:   Post Op Pain Control Plan Comments:   Induction:   IV  Beta Blocker:  Patient is not currently on a Beta-Blocker (No further documentation required).       Informed Consent: Patient understands risks and agrees with Anesthesia plan.  Questions answered. Anesthesia consent signed with patient.  ASA Score: 3     Day of Surgery Review of History & Physical:    H&P update referred to the surgeon.         Ready For Surgery From Anesthesia Perspective.

## 2017-11-21 NOTE — ANESTHESIA POSTPROCEDURE EVALUATION
"Anesthesia Post Evaluation    Patient: Sunita Zimmer    Procedure(s) Performed: Procedure(s) (LRB):  PHACOEMULSIFICATION-ASPIRATION-CATARACT (Left)  INSERTION-INTRAOCULAR LENS (IOL) (Left)    Final Anesthesia Type: MAC  Patient location during evaluation: PACU  Patient participation: Yes- Able to Participate  Level of consciousness: awake and alert  Post-procedure vital signs: reviewed and stable  Pain management: adequate  Airway patency: patent  PONV status at discharge: No PONV  Anesthetic complications: no      Cardiovascular status: blood pressure returned to baseline and hemodynamically stable  Respiratory status: unassisted  Hydration status: euvolemic  Follow-up not needed.        Visit Vitals  BP (!) 161/71 (Patient Position: Lying)   Pulse 80   Temp 36.6 °C (97.8 °F) (Tympanic)   Resp 18   Ht 5' 2" (1.575 m)   Wt 40.8 kg (90 lb)   SpO2 96%   Breastfeeding? No   BMI 16.46 kg/m²       Pain/True Score: Pain Assessment Performed: Yes (11/21/2017 10:49 AM)  Presence of Pain: complains of pain/discomfort (11/21/2017 10:49 AM)      "

## 2017-11-21 NOTE — OP NOTE
DATE OF PROCEDURE: 11/21/2017    SURGEON: ANTOINE BENNETT MD    PREOPERATIVE DIAGNOSIS:  Mature brunescent senile nuclear sclerotic cataract left eye.     POSTOPERATIVE DIAGNOSIS: Mature brunescent senile nuclear sclerotic cataract left eye.     PROCEDURE PERFORMED:  Complex phacoemulsification with placement of intraocular lens, left eye, with trypan blue    IMPLANT:  PCBOO 18.5    ANESTHESIA:  Topical and MAC    COMPLICATIONS: none    ESTIMATED BLOOD LOSS: <1cc    SPECIMENS: none    INDICATIONS FOR PROCEDURE:  This patient presented to the clinic with decreased vision in the left eye and was found to have a cataract.  The risks, benefits, and alternatives were discussed and the patient agreed to proceed with phacoemulsification and implantation of a lens in the left eye.     PROCEDURE IN DETAIL:  The patient was met in the preop holding area.  Consent was confirmed to be signed.  The operative site was marked.  The patient was brought into the operating room by the anesthesia team and placed under monitored anesthesia care.  The left eye was prepped and draped in a sterile ophthalmic fashion.  A Patrick speculum was placed into the left eye.   A paracentesis site was made and 1% preservative-free lidocaine was injected into the anterior chamber.  Trypan blue was then injected and allowed to sit for 1 minute.  Then BSS was used to wash out the trypan blue. Viscoelastic material was injected into the anterior chamber.  A keratome blade was used to make a clear corneal incision.  A cystotome was used to initiate the continuous curvilinear capsulorrhexis which was completed with Utrata forceps.  BSS on a jaeger cannula was used to perform hydrodissection.  The phacoemulsification tip was introduced into the eye and the nucleus was removed in a standard divide-and-conquer fashion.  Remaining cortical material was removed from the eye using irrigation-aspiration.  The capsular bag was filled with viscoelastic material  and the intraocular lens was injected and positioned into place. Remaining viscoelastic material was removed from the eye using irrigation and aspiration.  The corneal wounds were hydrated.  The eye was filled to physiologic pressure. The wounds were found to be watertight. Drops of Vigamox and prednisilone were placed into the eye.  The eye was washed, dried, and shielded.  The patient tolerated the procedure well and knows to follow up with me tomorrow morning, sooner if needed.

## 2017-11-21 NOTE — TRANSFER OF CARE
"Anesthesia Transfer of Care Note    Patient: Sunita Zimmer    Procedure(s) Performed: Procedure(s) (LRB):  PHACOEMULSIFICATION-ASPIRATION-CATARACT (Left)  INSERTION-INTRAOCULAR LENS (IOL) (Left)    Patient location: PACU    Anesthesia Type: MAC    Transport from OR: Transported from OR on room air with adequate spontaneous ventilation    Post pain: adequate analgesia    Post assessment: no apparent anesthetic complications    Post vital signs: stable    Level of consciousness: awake    Nausea/Vomiting: no nausea/vomiting    Transfer of care protocol was followed      Last vitals:   Visit Vitals  /80 (BP Location: Right arm, Patient Position: Lying)   Pulse 84   Temp 36.2 °C (97.2 °F) (Skin)   Resp 18   Ht 5' 2" (1.575 m)   Wt 40.8 kg (90 lb)   SpO2 99%   Breastfeeding? No   BMI 16.46 kg/m²     "

## 2017-11-22 ENCOUNTER — OFFICE VISIT (OUTPATIENT)
Dept: OPHTHALMOLOGY | Facility: CLINIC | Age: 77
End: 2017-11-22
Payer: COMMERCIAL

## 2017-11-22 DIAGNOSIS — Z98.42 STATUS POST CATARACT EXTRACTION AND INSERTION OF INTRAOCULAR LENS, LEFT: Primary | ICD-10-CM

## 2017-11-22 DIAGNOSIS — H47.011 NAION (NON-ARTERITIC ANTERIOR ISCHEMIC OPTIC NEUROPATHY), RIGHT EYE: ICD-10-CM

## 2017-11-22 DIAGNOSIS — Z96.1 STATUS POST CATARACT EXTRACTION AND INSERTION OF INTRAOCULAR LENS, LEFT: Primary | ICD-10-CM

## 2017-11-22 DIAGNOSIS — H25.11 NUCLEAR SCLEROTIC CATARACT OF RIGHT EYE: ICD-10-CM

## 2017-11-22 PROCEDURE — 99024 POSTOP FOLLOW-UP VISIT: CPT | Mod: S$GLB,,, | Performed by: OPHTHALMOLOGY

## 2017-11-22 PROCEDURE — 99999 PR PBB SHADOW E&M-EST. PATIENT-LVL I: CPT | Mod: PBBFAC,,, | Performed by: OPHTHALMOLOGY

## 2017-11-22 RX ORDER — OFLOXACIN 3 MG/ML
1 SOLUTION/ DROPS OPHTHALMIC 4 TIMES DAILY
Status: ON HOLD | COMMUNITY
End: 2018-05-20 | Stop reason: HOSPADM

## 2017-11-22 RX ORDER — PREDNISOLONE ACETATE 10 MG/ML
1 SUSPENSION/ DROPS OPHTHALMIC 3 TIMES DAILY
Status: ON HOLD | COMMUNITY
End: 2018-05-20 | Stop reason: HOSPADM

## 2017-11-22 NOTE — PROGRESS NOTES
"HPI     Referred by Dr Lagunas     1.H/o NAION OD   2.Scotoma OD   3. S/p phaco iol OS 11/20/17    Pt complains of blurred vision-"cannot see" pt states using gtts. Denies   any eye pain.    gtts- ofloxacin, PF, ketorolac TID OD    Last edited by Rebecca Mansfield on 11/22/2017 11:18 AM. (History)            Assessment /Plan     For exam results, see Encounter Report.    Status post cataract extraction and insertion of intraocular lens, left    NAION (non-arteritic anterior ischemic optic neuropathy), right eye    Nuclear sclerotic cataract of right eye      Slit Lamp Exam  L/L - normal  C/s - quiet  Cornea - central edema  A/C - 1+ cell  Lens - PCIOL    POD #1 s/p phaco/IOL  - doing well  - continue the following drops:    vigamox or ocuflox TID x 1 wk then stop  Pred forte or durezol or dexamethasone TID x  4 wks  Ketorolac TID until runs out    Appropriate precautions and post op medications reviewed.  Patient instructed to call or come in if symptoms of redness, decreased vision, or pain are experienced.    -f/up 1-2wks, sooner PRN.                      "

## 2017-11-29 ENCOUNTER — TELEPHONE (OUTPATIENT)
Dept: OPHTHALMOLOGY | Facility: CLINIC | Age: 77
End: 2017-11-29

## 2017-11-29 NOTE — TELEPHONE ENCOUNTER
----- Message from Ileana Nascimento MD sent at 11/22/2017  2:10 PM CST -----  pls call pt end of next wk to see how OS doing. KRISTINE thanks

## 2017-11-29 NOTE — TELEPHONE ENCOUNTER
Called pt per Dr Nascimento request to inquire on the status of her of her left eye post surgery. Pt states still can not see and sometimes feels like something in OS. Has stopped the ofloxacin per Dr Nascimento but continuing the ketorolac, which burns, and the prednisolone. Let pt know I will contact Dr Nascimento and call her back.

## 2017-11-30 ENCOUNTER — PATIENT MESSAGE (OUTPATIENT)
Dept: OPHTHALMOLOGY | Facility: CLINIC | Age: 77
End: 2017-11-30

## 2017-12-01 ENCOUNTER — OFFICE VISIT (OUTPATIENT)
Dept: OPHTHALMOLOGY | Facility: CLINIC | Age: 77
End: 2017-12-01
Payer: COMMERCIAL

## 2017-12-01 DIAGNOSIS — Z98.42 STATUS POST CATARACT EXTRACTION AND INSERTION OF INTRAOCULAR LENS, LEFT: Primary | ICD-10-CM

## 2017-12-01 DIAGNOSIS — Z96.1 STATUS POST CATARACT EXTRACTION AND INSERTION OF INTRAOCULAR LENS, LEFT: Primary | ICD-10-CM

## 2017-12-01 DIAGNOSIS — S05.02XA CORNEA ABRASION, LEFT, INITIAL ENCOUNTER: ICD-10-CM

## 2017-12-01 PROCEDURE — 99999 PR PBB SHADOW E&M-EST. PATIENT-LVL III: CPT | Mod: PBBFAC,,, | Performed by: OPHTHALMOLOGY

## 2017-12-01 PROCEDURE — 99024 POSTOP FOLLOW-UP VISIT: CPT | Mod: S$GLB,,, | Performed by: OPHTHALMOLOGY

## 2017-12-01 RX ORDER — TEMAZEPAM 15 MG/1
15 CAPSULE ORAL
Status: ON HOLD | COMMUNITY
End: 2018-05-16

## 2017-12-01 NOTE — PROGRESS NOTES
HPI     Post-op Evaluation    Additional comments: Phaco w/IOL OS 11/21/2017           Comments   Referred by Dr Lagunas      1.H/o NAION OD   2.Scotoma OD   3. S/P Phaco w/IOL OS 11/21/17    PF TID OS    Patient states she has FB sensation, eye pain (scale 10), and tearing.        Last edited by Jazmin Pradhan, PCT on 12/1/2017  2:38 PM. (History)            Assessment /Plan     For exam results, see Encounter Report.    Status post cataract extraction and insertion of intraocular lens, left    Cornea abrasion, left, initial encounter      S/P Phaco w/IOL OS 11/21/17  - doing well    K abrasion OS  - BCL placed today    Drops as below, will call pt next wk to ensure feeling better. If doing okay, will keep appt on NS for VA OS only and GK to remove BCL    LEFT EYE:    PRED FORTE - SHAKE WELL - 3 TIMES A DAY    OCUFLOX - 3 TIMES A DAY    ARTIFICIAL TEARS - AS NEEDED

## 2017-12-01 NOTE — PATIENT INSTRUCTIONS
LEFT EYE:    PRED FORTE - SHAKE WELL - 3 TIMES A DAY    OCUFLOX - 3 TIMES A DAY    ARTIFICIAL TEARS - AS NEEDED

## 2017-12-02 ENCOUNTER — PATIENT MESSAGE (OUTPATIENT)
Dept: OPHTHALMOLOGY | Facility: CLINIC | Age: 77
End: 2017-12-02

## 2017-12-13 ENCOUNTER — OFFICE VISIT (OUTPATIENT)
Dept: OPHTHALMOLOGY | Facility: CLINIC | Age: 77
End: 2017-12-13
Payer: COMMERCIAL

## 2017-12-13 DIAGNOSIS — Z96.1 STATUS POST CATARACT EXTRACTION AND INSERTION OF INTRAOCULAR LENS, LEFT: ICD-10-CM

## 2017-12-13 DIAGNOSIS — H47.011 NAION (NON-ARTERITIC ANTERIOR ISCHEMIC OPTIC NEUROPATHY), RIGHT EYE: ICD-10-CM

## 2017-12-13 DIAGNOSIS — Z98.42 STATUS POST CATARACT EXTRACTION AND INSERTION OF INTRAOCULAR LENS, LEFT: ICD-10-CM

## 2017-12-13 DIAGNOSIS — S05.02XA CORNEA ABRASION, LEFT, INITIAL ENCOUNTER: Primary | ICD-10-CM

## 2017-12-13 PROCEDURE — 99024 POSTOP FOLLOW-UP VISIT: CPT | Mod: S$GLB,,, | Performed by: OPHTHALMOLOGY

## 2017-12-13 PROCEDURE — 99999 PR PBB SHADOW E&M-EST. PATIENT-LVL II: CPT | Mod: PBBFAC,,, | Performed by: OPHTHALMOLOGY

## 2017-12-13 RX ORDER — OFLOXACIN 3 MG/ML
1 SOLUTION/ DROPS OPHTHALMIC 2 TIMES DAILY
Qty: 5 ML | Refills: 1 | Status: SHIPPED | OUTPATIENT
Start: 2017-12-13 | End: 2017-12-13 | Stop reason: SDUPTHER

## 2017-12-13 RX ORDER — OFLOXACIN 3 MG/ML
1 SOLUTION/ DROPS OPHTHALMIC 2 TIMES DAILY
Qty: 5 ML | Refills: 1 | Status: SHIPPED | OUTPATIENT
Start: 2017-12-13 | End: 2018-01-12

## 2017-12-13 NOTE — PATIENT INSTRUCTIONS
LEFT EYE:    PRED FORTE - SHAKE WELL - 2 TIMES A DAY    OCUFLOX - 2 TIMES A DAY    ARTIFICIAL TEARS - AS NEEDED

## 2017-12-13 NOTE — PROGRESS NOTES
HPI     Referred by Dr Lagunas       1.H/o NAION OD   2.Scotoma OD   3. S/P Phaco w/IOL OS 11/21/17     Pt states eye feels better since bcl, using gtts as directed    gtts-pf,ocuflox TID    Last edited by Rebecca Mansfield on 12/13/2017  3:36 PM. (History)            Assessment /Plan     For exam results, see Encounter Report.    Cornea abrasion, left, initial encounter    Status post cataract extraction and insertion of intraocular lens, left    NAION (non-arteritic anterior ischemic optic neuropathy), right eye    Other orders  -     Discontinue: ofloxacin (OCUFLOX) 0.3 % ophthalmic solution; Place 1 drop into the left eye 2 (two) times daily.  Dispense: 5 mL; Refill: 1  -     Discontinue: ofloxacin (OCUFLOX) 0.3 % ophthalmic solution; Place 1 drop into the left eye 2 (two) times daily.  Dispense: 5 mL; Refill: 1  -     ofloxacin (OCUFLOX) 0.3 % ophthalmic solution; Place 1 drop into the left eye 2 (two) times daily.  Dispense: 5 mL; Refill: 1      BCL removed and new BCL replaced today in light of irreg epithlium - pt very nervous to have recurrent pain assoc with abrasion.    Drops as below, f/up 3 wks, VA OS only. GK to remove BCL.    LEFT EYE:    PRED FORTE - SHAKE WELL - 2 TIMES A DAY    OCUFLOX - 2 TIMES A DAY    ARTIFICIAL TEARS - AS NEEDED

## 2017-12-15 ENCOUNTER — TELEPHONE (OUTPATIENT)
Dept: OPHTHALMOLOGY | Facility: CLINIC | Age: 77
End: 2017-12-15

## 2017-12-15 NOTE — TELEPHONE ENCOUNTER
----- Message from Ileana Nascimento MD sent at 12/14/2017  8:43 AM CST -----  pls call pt Friday to ensure OS feeling okay. KRISTINE thanks

## 2018-01-10 ENCOUNTER — OFFICE VISIT (OUTPATIENT)
Dept: OPHTHALMOLOGY | Facility: CLINIC | Age: 78
End: 2018-01-10
Payer: COMMERCIAL

## 2018-01-10 DIAGNOSIS — H47.011 NAION (NON-ARTERITIC ANTERIOR ISCHEMIC OPTIC NEUROPATHY), RIGHT EYE: ICD-10-CM

## 2018-01-10 DIAGNOSIS — H53.411 CENTRAL SCOTOMA, RIGHT EYE: ICD-10-CM

## 2018-01-10 DIAGNOSIS — Z96.1 STATUS POST CATARACT EXTRACTION AND INSERTION OF INTRAOCULAR LENS, LEFT: Primary | ICD-10-CM

## 2018-01-10 DIAGNOSIS — H54.50 LOW VISION, ONE EYE: ICD-10-CM

## 2018-01-10 DIAGNOSIS — Z98.42 STATUS POST CATARACT EXTRACTION AND INSERTION OF INTRAOCULAR LENS, LEFT: Primary | ICD-10-CM

## 2018-01-10 PROCEDURE — 99024 POSTOP FOLLOW-UP VISIT: CPT | Mod: S$GLB,,, | Performed by: OPHTHALMOLOGY

## 2018-01-10 PROCEDURE — 99999 PR PBB SHADOW E&M-EST. PATIENT-LVL III: CPT | Mod: PBBFAC,,, | Performed by: OPHTHALMOLOGY

## 2018-01-10 NOTE — PROGRESS NOTES
HPI     Referred by Dr Lagunas       1.H/o NAION OD   2.Scotoma OD   3. S/P Phaco w/IOL OS 11/21/17     Pt states eye is feeling better since last visit. Using PF and ocuflox BID   OS and artificial tears prn. Pt complains of some floaters/flashes OU.     Last edited by Rebecca Mansfield on 1/10/2018  1:58 PM. (History)            Assessment /Plan     For exam results, see Encounter Report.    Status post cataract extraction and insertion of intraocular lens, left    NAION (non-arteritic anterior ischemic optic neuropathy), right eye    Central scotoma, right eye    Low vision, one eye      1.H/o NAION OD     2.Scotoma OD     3. S/P Phaco w/IOL OS 11/21/17   - c/b abrasion healed with bcl - epi healed. Okay to d/c bcl  - okay to stop abx / gtts -- start ATs.    CAT OD  - not VS, observe for now.    Monocular precautions - full time polycarb lenses to be worn at all times.

## 2018-01-25 ENCOUNTER — PATIENT MESSAGE (OUTPATIENT)
Dept: OPHTHALMOLOGY | Facility: CLINIC | Age: 78
End: 2018-01-25

## 2018-01-29 ENCOUNTER — TELEPHONE (OUTPATIENT)
Dept: OPHTHALMOLOGY | Facility: CLINIC | Age: 78
End: 2018-01-29

## 2018-01-29 NOTE — TELEPHONE ENCOUNTER
----- Message from Ileana Nascimento MD sent at 1/29/2018  8:31 AM CST -----  I can see her again as PO and can check Mrx and cornea.    ----- Message -----  From: Cheryle Quintana  Sent: 1/26/2018   3:18 PM  To: Ileana Nascimento MD    Ms Zimmer does not want to pay another co-pay as she has too many doctors bills at this time. Very very unhappy with outcome and treatment which I am sure you are aware of. Complained that she had to hold her head up to read and I suggested she ck with optical shop to have gls adjusted. What do you suggest about co-pay for mrx?     ----- Message -----  From: Ilaena Nascimento MD  Sent: 1/26/2018   2:32 PM  To: Cheryle Quintana    Can you make appt for this pt with sangita or molly - mrx.     Thank you - I told daughter via email to expect to hear from you re: appt.

## 2018-05-03 ENCOUNTER — HOSPITAL ENCOUNTER (INPATIENT)
Facility: HOSPITAL | Age: 78
LOS: 7 days | Discharge: NURSING, REHAB OR LONG-TERM CARE | DRG: 040 | End: 2018-05-11
Attending: EMERGENCY MEDICINE | Admitting: NEUROLOGICAL SURGERY
Payer: COMMERCIAL

## 2018-05-03 DIAGNOSIS — M54.9 BACK PAIN: ICD-10-CM

## 2018-05-03 DIAGNOSIS — Z86.718 PERSONAL HISTORY OF DVT (DEEP VEIN THROMBOSIS): ICD-10-CM

## 2018-05-03 DIAGNOSIS — K59.03 DRUG-INDUCED CONSTIPATION: ICD-10-CM

## 2018-05-03 DIAGNOSIS — J43.2 CENTRILOBULAR EMPHYSEMA: ICD-10-CM

## 2018-05-03 DIAGNOSIS — G95.89 INTRADURAL MASS: ICD-10-CM

## 2018-05-03 DIAGNOSIS — G83.4 CAUDA EQUINA SYNDROME: Primary | ICD-10-CM

## 2018-05-03 DIAGNOSIS — I10 ESSENTIAL HYPERTENSION: ICD-10-CM

## 2018-05-03 DIAGNOSIS — R63.6 UNDERWEIGHT: ICD-10-CM

## 2018-05-03 DIAGNOSIS — Z79.01 CURRENT USE OF LONG TERM ANTICOAGULATION: ICD-10-CM

## 2018-05-03 DIAGNOSIS — J96.01 ACUTE RESPIRATORY FAILURE WITH HYPOXIA: ICD-10-CM

## 2018-05-03 PROCEDURE — 99285 EMERGENCY DEPT VISIT HI MDM: CPT | Mod: ,,, | Performed by: EMERGENCY MEDICINE

## 2018-05-03 PROCEDURE — 25500020 PHARM REV CODE 255: Performed by: EMERGENCY MEDICINE

## 2018-05-03 PROCEDURE — A9585 GADOBUTROL INJECTION: HCPCS | Performed by: EMERGENCY MEDICINE

## 2018-05-03 PROCEDURE — 93005 ELECTROCARDIOGRAM TRACING: CPT | Mod: 59

## 2018-05-03 PROCEDURE — 63600175 PHARM REV CODE 636 W HCPCS: Performed by: PHYSICIAN ASSISTANT

## 2018-05-03 PROCEDURE — 96365 THER/PROPH/DIAG IV INF INIT: CPT

## 2018-05-03 PROCEDURE — 96361 HYDRATE IV INFUSION ADD-ON: CPT

## 2018-05-03 PROCEDURE — 99285 EMERGENCY DEPT VISIT HI MDM: CPT | Mod: 25

## 2018-05-03 PROCEDURE — 96375 TX/PRO/DX INJ NEW DRUG ADDON: CPT

## 2018-05-03 RX ORDER — METHYLPREDNISOLONE SOD SUCC 125 MG
125 VIAL (EA) INJECTION
Status: COMPLETED | OUTPATIENT
Start: 2018-05-03 | End: 2018-05-03

## 2018-05-03 RX ORDER — GADOBUTROL 604.72 MG/ML
5 INJECTION INTRAVENOUS
Status: COMPLETED | OUTPATIENT
Start: 2018-05-03 | End: 2018-05-03

## 2018-05-03 RX ORDER — ORPHENADRINE CITRATE 30 MG/ML
30 INJECTION INTRAMUSCULAR; INTRAVENOUS
Status: COMPLETED | OUTPATIENT
Start: 2018-05-03 | End: 2018-05-03

## 2018-05-03 RX ORDER — MORPHINE SULFATE 4 MG/ML
4 INJECTION, SOLUTION INTRAMUSCULAR; INTRAVENOUS
Status: COMPLETED | OUTPATIENT
Start: 2018-05-03 | End: 2018-05-03

## 2018-05-03 RX ADMIN — METHYLPREDNISOLONE SODIUM SUCCINATE 125 MG: 125 INJECTION, POWDER, FOR SOLUTION INTRAMUSCULAR; INTRAVENOUS at 06:05

## 2018-05-03 RX ADMIN — ORPHENADRINE CITRATE 30 MG: 30 INJECTION INTRAMUSCULAR; INTRAVENOUS at 06:05

## 2018-05-03 RX ADMIN — MORPHINE SULFATE 4 MG: 4 INJECTION INTRAVENOUS at 11:05

## 2018-05-03 RX ADMIN — GADOBUTROL 5 ML: 604.72 INJECTION INTRAVENOUS at 10:05

## 2018-05-03 NOTE — ED NOTES
LOC: The patient is awake, alert and aware of environment with an anxious affect, the patient is oriented x 3 and speaking appropriately.  APPEARANCE: Patient is in acute distress, patient is clean and well groomed.  SKIN: The skin is warm and dry, patient has normal skin turgor and moist mucus membranes, skin intact, skin is thin, no breakdown or brusing noted.  MUSKULOSKELETAL: Patient moving all extremities well, pt is having severe lower back pain radiating down the back of both legs. Pt cannot stand erect.   RESPIRATORY: Airway is open and patent, respirations are spontaneous, patient has a normal effort and rate. Breath sounds are clear and equal bilaterally.  CARDIAC: Normal heart sounds. No peripheral edema.  ABDOMEN: Soft and non tender to palpation, no distention noted. Bowel sounds present. Pt reports one episode of urinary incontinence today.   NEURO: No neuro deficits, hand grasp equal, no drift noted, no facial droop noted. Speech is clear.

## 2018-05-03 NOTE — ED PROVIDER NOTES
"Encounter Date: 5/3/2018    SCRIBE #1 NOTE: I, Julissa Davidson, am scribing for, and in the presence of,  Dr. Brock . I have scribed the following portions of the note - the EKG reading.       History     Chief Complaint   Patient presents with    Back Pain     Pt c/o back pain.  Reports hx of back problems. Denies recent injury.  States she has "lumbar fractures" for the past 6yrs.      A 77-year-old female with medical comorbidities significant for HTN, CAD, polycythemia vera, COPD, polycystic kidney disease, CKD stage 3 presents to the ED with a chief complaint of back pain. Pain began 3 days ago after lifting a full gallon of milk. Pt hurt her back while twisting away from the refrigerator.  Patient reports pain that she describes as a fist in the middle of my low back".  Patient has a history of chronic low back pain after a fall resulting in multiple lumbar fractures 6 years ago.  She reports that the pain occasionally radiates down bilateral lower extremities, however this is her baseline and not worse over the past 3 days after this new injury. Today, patient reports an episode of urinary incontinence which is not typical for her.  Patient reports noticing that she had urinated on herself and did not feel any sensation.  She denies any lower extremity weakness, numbness, saddle anesthesia, loss of bowel function.          Review of patient's allergies indicates:   Allergen Reactions    Bacitracin Other (See Comments)     "can not take because of Warfarin"    Bactrim [sulfamethoxazole-trimethoprim] Other (See Comments)     "can not take because of Warfarin"     Past Medical History:   Diagnosis Date    Anticoagulant long-term use     Apocrine adenocarcinoma     Arthritis     Back pain     lower back    Brain aneurysm     4 times    Chronic kidney disease (CKD), stage III (moderate)     COPD (chronic obstructive pulmonary disease)     Coronary artery disease     Encounter for blood transfusion  "    Hypertension     Polycystic kidney disease     Polycythemia vera     Polycythemia vera     congential     Past Surgical History:   Procedure Laterality Date    ABDOMINAL SURGERY      APPENDECTOMY      BRAIN SURGERY      relieve hemmorhages/had 4 surgeries    CARDIAC CATHETERIZATION      CORONARY ANGIOPLASTY      FRACTURE SURGERY      GALLBLADDER SURGERY      HYSTERECTOMY      illiac stent      JOINT REPLACEMENT      SKIN BIOPSY      skin repair      apocrine cancer    tibial repair      steel plate inserted    TONSILLECTOMY      TOTAL KNEE ARTHROPLASTY      VEIN BYPASS SURGERY       Family History   Problem Relation Age of Onset    Heart disease Brother     Blindness Neg Hx     Glaucoma Neg Hx     Macular degeneration Neg Hx     Retinal detachment Neg Hx      Social History   Substance Use Topics    Smoking status: Current Every Day Smoker     Packs/day: 1.00     Years: 60.00    Smokeless tobacco: Never Used    Alcohol use No     Review of Systems   Constitutional: Negative for fever.   HENT: Negative for sore throat.    Respiratory: Negative for shortness of breath.    Cardiovascular: Negative for chest pain.   Gastrointestinal: Negative for nausea.   Genitourinary: Negative for dysuria.        +Urinary incontinence   (-) perineal anesthesia, bowel incontinence   Musculoskeletal: Positive for back pain.   Skin: Negative for rash.   Neurological: Negative for weakness and numbness.   Hematological: Does not bruise/bleed easily.       Physical Exam     Initial Vitals [05/03/18 1629]   BP Pulse Resp Temp SpO2   (!) 171/74 (!) 115 18 98.1 °F (36.7 °C) --      MAP       106.33         Physical Exam    Nursing note and vitals reviewed.  Constitutional: She appears well-developed and well-nourished. She is not diaphoretic.  Non-toxic appearance. She does not appear ill. No distress.   HENT:   Head: Normocephalic and atraumatic.   Neck: Neck supple.   Cardiovascular: Normal rate and regular  rhythm. Exam reveals no gallop and no friction rub.    No murmur heard.  Pulmonary/Chest: Effort normal and breath sounds normal. No accessory muscle usage. No tachypnea. No respiratory distress. She has no decreased breath sounds. She has no wheezes. She has no rhonchi. She has no rales.   Abdominal: She exhibits no distension.   Genitourinary:   Genitourinary Comments: Normal rectal tone. Normal perineal and perianal sensation.   Musculoskeletal: Normal range of motion.   No significant TTP of the low back in area of pt's reported pain. No erythema, rashes, skin lesions.    Neurological: She is alert. She has normal strength. No sensory deficit.   Skin: Skin is warm and dry. No rash noted. No pallor.   Psychiatric: She has a normal mood and affect. Her behavior is normal.         ED Course   Procedures  Labs Reviewed   CBC W/ AUTO DIFFERENTIAL - Abnormal; Notable for the following:        Result Value    MPV 9.1 (*)     Lymph # 0.6 (*)     Mono # 0.1 (*)     Gran% 88.7 (*)     Lymph% 9.1 (*)     Mono% 1.1 (*)     All other components within normal limits   PROTIME-INR - Abnormal; Notable for the following:     Prothrombin Time 13.8 (*)     INR 1.4 (*)     All other components within normal limits   APTT   BASIC METABOLIC PANEL   COMPREHENSIVE METABOLIC PANEL   LIPASE   URINALYSIS, REFLEX TO URINE CULTURE   TYPE & SCREEN        EKG: Sinus tachycardia at 104, no KUNAL's or STD's, non-specific twave pattern, no STEMI       Medical Decision Making:   History:   Old Medical Records: I decided to obtain old medical records.  Differential Diagnosis:   My differential diagnosis includes but is not limited to:  Muscle strain, herniated disc, lumbar fracture, cauda equina syndrome        APC / Resident Notes:   77-year-old female presents for evaluation of back pain and urinary incontinence.  She is tachycardic on exam and appears uncomfortable secondary to pain. Patient has no focal neurologic deficits on exam.  No saddle  anesthesia.  Normal rectal tone. Patient was found to be in urinary retention with > 600 cc noted on bladder scan.  Patient was also unable to immediately urinate.     MRI revealed 1.6 cm intradural cystic lesion at the level of L4 with associated thickening of the cauda equina nerve roots.  There is severe narrowing of the spinal canal at L4-L5 secondary to the above lesion with distention of the urinary bladder consistent with cauda equina type syndrome.  Neurosurgery was consulted emergently.      Prior to MRI results, patient and daughter expressed the desire to leave.  I discussed the dangers of permanent dysfunction if patient was to leave the ER at this time.  Patient ultimately decided to stay.        Scribe Attestation:   Scribe #1: I performed the above scribed service and the documentation accurately describes the services I performed. I attest to the accuracy of the note.    Attending Attestation:     Physician Attestation Statement for NP/PA:   I discussed this assessment and plan of this patient with the NP/PA, but I did not personally examine the patient. The face to face encounter was performed by the NP/PA.                     Clinical Impression:   The encounter diagnosis was Back pain.

## 2018-05-03 NOTE — ED NOTES
Pt is awake, alert and oriented x 4. Respirations are spontaneous with normal rate and effort. Pt continues to have severe, intermittent lower back pain.

## 2018-05-03 NOTE — ED TRIAGE NOTES
"Pt presents with lower back pain described as "hard" that began 3 days ago after lifting a gallon of milk. Pain radiates down the back of both lower extremities described as a shooting pain.  Pt reports one episode of urinary incontinence today.   "

## 2018-05-04 ENCOUNTER — ANESTHESIA (OUTPATIENT)
Dept: SURGERY | Facility: HOSPITAL | Age: 78
DRG: 040 | End: 2018-05-04
Payer: COMMERCIAL

## 2018-05-04 ENCOUNTER — SURGERY (OUTPATIENT)
Age: 78
End: 2018-05-04

## 2018-05-04 ENCOUNTER — ANESTHESIA EVENT (OUTPATIENT)
Dept: SURGERY | Facility: HOSPITAL | Age: 78
DRG: 040 | End: 2018-05-04
Payer: COMMERCIAL

## 2018-05-04 PROBLEM — M54.9 BACK PAIN: Status: ACTIVE | Noted: 2018-05-04

## 2018-05-04 PROBLEM — G83.4 CAUDA EQUINA SYNDROME: Status: ACTIVE | Noted: 2018-05-04

## 2018-05-04 PROBLEM — Z01.818 PREOPERATIVE CLEARANCE: Status: ACTIVE | Noted: 2018-05-04

## 2018-05-04 LAB
ABO + RH BLD: NORMAL
ALBUMIN SERPL BCP-MCNC: 3.7 G/DL
ALP SERPL-CCNC: 108 U/L
ALT SERPL W/O P-5'-P-CCNC: 26 U/L
ANION GAP SERPL CALC-SCNC: 10 MMOL/L
ANION GAP SERPL CALC-SCNC: 16 MMOL/L
ANION GAP SERPL CALC-SCNC: 9 MMOL/L
APTT BLDCRRT: 25.7 SEC
AST SERPL-CCNC: 26 U/L
BASOPHILS # BLD AUTO: 0.01 K/UL
BASOPHILS # BLD AUTO: 0.03 K/UL
BASOPHILS # BLD AUTO: 0.04 K/UL
BASOPHILS NFR BLD: 0.2 %
BASOPHILS NFR BLD: 0.4 %
BASOPHILS NFR BLD: 0.6 %
BILIRUB SERPL-MCNC: 0.6 MG/DL
BILIRUB UR QL STRIP: NEGATIVE
BLD GP AB SCN CELLS X3 SERPL QL: NORMAL
BLD PROD TYP BPU: NORMAL
BLD PROD TYP BPU: NORMAL
BLOOD UNIT EXPIRATION DATE: NORMAL
BLOOD UNIT EXPIRATION DATE: NORMAL
BLOOD UNIT TYPE CODE: 6200
BLOOD UNIT TYPE CODE: 6200
BLOOD UNIT TYPE: NORMAL
BLOOD UNIT TYPE: NORMAL
BUN SERPL-MCNC: 30 MG/DL
BUN SERPL-MCNC: 30 MG/DL
BUN SERPL-MCNC: 32 MG/DL
CALCIUM SERPL-MCNC: 8.1 MG/DL
CALCIUM SERPL-MCNC: 9.3 MG/DL
CALCIUM SERPL-MCNC: 9.5 MG/DL
CHLORIDE SERPL-SCNC: 105 MMOL/L
CHLORIDE SERPL-SCNC: 107 MMOL/L
CHLORIDE SERPL-SCNC: 109 MMOL/L
CLARITY UR REFRACT.AUTO: CLEAR
CO2 SERPL-SCNC: 20 MMOL/L
CO2 SERPL-SCNC: 23 MMOL/L
CO2 SERPL-SCNC: 24 MMOL/L
CODING SYSTEM: NORMAL
CODING SYSTEM: NORMAL
COLOR UR AUTO: YELLOW
CREAT SERPL-MCNC: 1 MG/DL
CREAT SERPL-MCNC: 1.1 MG/DL
CREAT SERPL-MCNC: 1.2 MG/DL
CRP SERPL-MCNC: 54.07 MG/L
DIFFERENTIAL METHOD: ABNORMAL
DISPENSE STATUS: NORMAL
DISPENSE STATUS: NORMAL
EOSINOPHIL # BLD AUTO: 0 K/UL
EOSINOPHIL NFR BLD: 0 %
EOSINOPHIL NFR BLD: 0 %
EOSINOPHIL NFR BLD: 0.6 %
ERYTHROCYTE [DISTWIDTH] IN BLOOD BY AUTOMATED COUNT: 12.9 %
ERYTHROCYTE [DISTWIDTH] IN BLOOD BY AUTOMATED COUNT: 13 %
ERYTHROCYTE [DISTWIDTH] IN BLOOD BY AUTOMATED COUNT: 13.1 %
EST. GFR  (AFRICAN AMERICAN): 49.7 ML/MIN/1.73 M^2
EST. GFR  (AFRICAN AMERICAN): 55.2 ML/MIN/1.73 M^2
EST. GFR  (AFRICAN AMERICAN): >60 ML/MIN/1.73 M^2
EST. GFR  (NON AFRICAN AMERICAN): 43.1 ML/MIN/1.73 M^2
EST. GFR  (NON AFRICAN AMERICAN): 47.9 ML/MIN/1.73 M^2
EST. GFR  (NON AFRICAN AMERICAN): 53.7 ML/MIN/1.73 M^2
GLUCOSE SERPL-MCNC: 116 MG/DL
GLUCOSE SERPL-MCNC: 127 MG/DL
GLUCOSE SERPL-MCNC: 94 MG/DL
GLUCOSE UR QL STRIP: NEGATIVE
HCT VFR BLD AUTO: 27.4 %
HCT VFR BLD AUTO: 36.4 %
HCT VFR BLD AUTO: 39.2 %
HGB BLD-MCNC: 11.9 G/DL
HGB BLD-MCNC: 12.7 G/DL
HGB BLD-MCNC: 9.1 G/DL
HGB UR QL STRIP: NEGATIVE
IMM GRANULOCYTES # BLD AUTO: 0.03 K/UL
IMM GRANULOCYTES # BLD AUTO: 0.03 K/UL
IMM GRANULOCYTES # BLD AUTO: 0.04 K/UL
IMM GRANULOCYTES NFR BLD AUTO: 0.5 %
IMM GRANULOCYTES NFR BLD AUTO: 0.6 %
IMM GRANULOCYTES NFR BLD AUTO: 0.6 %
INR PPP: 1.4
INR PPP: 1.4
KETONES UR QL STRIP: NEGATIVE
LEUKOCYTE ESTERASE UR QL STRIP: NEGATIVE
LIPASE SERPL-CCNC: 13 U/L
LYMPHOCYTES # BLD AUTO: 0.5 K/UL
LYMPHOCYTES # BLD AUTO: 0.6 K/UL
LYMPHOCYTES # BLD AUTO: 1.2 K/UL
LYMPHOCYTES NFR BLD: 10.6 %
LYMPHOCYTES NFR BLD: 17.7 %
LYMPHOCYTES NFR BLD: 9.1 %
MCH RBC QN AUTO: 28.2 PG
MCH RBC QN AUTO: 28.3 PG
MCH RBC QN AUTO: 28.9 PG
MCHC RBC AUTO-ENTMCNC: 32.4 G/DL
MCHC RBC AUTO-ENTMCNC: 32.7 G/DL
MCHC RBC AUTO-ENTMCNC: 33.2 G/DL
MCV RBC AUTO: 87 FL
MONOCYTES # BLD AUTO: 0.1 K/UL
MONOCYTES # BLD AUTO: 0.1 K/UL
MONOCYTES # BLD AUTO: 0.7 K/UL
MONOCYTES NFR BLD: 1.1 %
MONOCYTES NFR BLD: 1.1 %
MONOCYTES NFR BLD: 9.4 %
NEUTROPHILS # BLD AUTO: 4.1 K/UL
NEUTROPHILS # BLD AUTO: 5 K/UL
NEUTROPHILS # BLD AUTO: 5.6 K/UL
NEUTROPHILS NFR BLD: 71.3 %
NEUTROPHILS NFR BLD: 87.5 %
NEUTROPHILS NFR BLD: 88.7 %
NITRITE UR QL STRIP: NEGATIVE
NRBC BLD-RTO: 0 /100 WBC
NUM UNITS TRANS FFP: NORMAL
NUM UNITS TRANS FFP: NORMAL
PH UR STRIP: 6 [PH] (ref 5–8)
PLATELET # BLD AUTO: 201 K/UL
PLATELET # BLD AUTO: 242 K/UL
PLATELET # BLD AUTO: 259 K/UL
PMV BLD AUTO: 8.9 FL
PMV BLD AUTO: 9.1 FL
PMV BLD AUTO: 9.6 FL
POTASSIUM SERPL-SCNC: 3.9 MMOL/L
POTASSIUM SERPL-SCNC: 4.7 MMOL/L
POTASSIUM SERPL-SCNC: 5.1 MMOL/L
PROCALCITONIN SERPL IA-MCNC: 0.17 NG/ML
PROT SERPL-MCNC: 8.1 G/DL
PROT UR QL STRIP: NEGATIVE
PROTHROMBIN TIME: 13.8 SEC
PROTHROMBIN TIME: 13.8 SEC
RBC # BLD AUTO: 3.15 M/UL
RBC # BLD AUTO: 4.2 M/UL
RBC # BLD AUTO: 4.51 M/UL
SODIUM SERPL-SCNC: 140 MMOL/L
SODIUM SERPL-SCNC: 141 MMOL/L
SODIUM SERPL-SCNC: 142 MMOL/L
SP GR UR STRIP: 1.01 (ref 1–1.03)
URN SPEC COLLECT METH UR: NORMAL
UROBILINOGEN UR STRIP-ACNC: NEGATIVE EU/DL
WBC # BLD AUTO: 4.72 K/UL
WBC # BLD AUTO: 6.35 K/UL
WBC # BLD AUTO: 6.94 K/UL

## 2018-05-04 PROCEDURE — 4A1104G MONITORING OF PERIPHERAL NERVOUS ELECTRICAL ACTIVITY, INTRAOPERATIVE, OPEN APPROACH: ICD-10-PCS | Performed by: NEUROLOGICAL SURGERY

## 2018-05-04 PROCEDURE — 01BB0ZZ EXCISION OF LUMBAR NERVE, OPEN APPROACH: ICD-10-PCS | Performed by: NEUROLOGICAL SURGERY

## 2018-05-04 PROCEDURE — 27000221 HC OXYGEN, UP TO 24 HOURS

## 2018-05-04 PROCEDURE — 63600175 PHARM REV CODE 636 W HCPCS: Performed by: NURSE ANESTHETIST, CERTIFIED REGISTERED

## 2018-05-04 PROCEDURE — 80048 BASIC METABOLIC PNL TOTAL CA: CPT

## 2018-05-04 PROCEDURE — 93010 ELECTROCARDIOGRAM REPORT: CPT | Mod: ,,, | Performed by: INTERNAL MEDICINE

## 2018-05-04 PROCEDURE — 63600175 PHARM REV CODE 636 W HCPCS: Performed by: STUDENT IN AN ORGANIZED HEALTH CARE EDUCATION/TRAINING PROGRAM

## 2018-05-04 PROCEDURE — 63282 BX/EXC IDRL SPINE LESN LMBR: CPT | Mod: 62,,, | Performed by: NEUROLOGICAL SURGERY

## 2018-05-04 PROCEDURE — 87040 BLOOD CULTURE FOR BACTERIA: CPT

## 2018-05-04 PROCEDURE — 01NB0ZZ RELEASE LUMBAR NERVE, OPEN APPROACH: ICD-10-PCS | Performed by: NEUROLOGICAL SURGERY

## 2018-05-04 PROCEDURE — P9017 PLASMA 1 DONOR FRZ W/IN 8 HR: HCPCS

## 2018-05-04 PROCEDURE — 11000001 HC ACUTE MED/SURG PRIVATE ROOM

## 2018-05-04 PROCEDURE — 86920 COMPATIBILITY TEST SPIN: CPT

## 2018-05-04 PROCEDURE — 71000033 HC RECOVERY, INTIAL HOUR: Performed by: NEUROLOGICAL SURGERY

## 2018-05-04 PROCEDURE — 25000003 PHARM REV CODE 250: Performed by: NEUROLOGICAL SURGERY

## 2018-05-04 PROCEDURE — 37000009 HC ANESTHESIA EA ADD 15 MINS: Performed by: NEUROLOGICAL SURGERY

## 2018-05-04 PROCEDURE — 69990 MICROSURGERY ADD-ON: CPT | Mod: ,,, | Performed by: NEUROLOGICAL SURGERY

## 2018-05-04 PROCEDURE — 99499 UNLISTED E&M SERVICE: CPT | Mod: ,,, | Performed by: PHYSICIAN ASSISTANT

## 2018-05-04 PROCEDURE — 63600175 PHARM REV CODE 636 W HCPCS: Performed by: ANESTHESIOLOGY

## 2018-05-04 PROCEDURE — 12000002 HC ACUTE/MED SURGE SEMI-PRIVATE ROOM

## 2018-05-04 PROCEDURE — D9220A PRA ANESTHESIA: Mod: ANES,,, | Performed by: ANESTHESIOLOGY

## 2018-05-04 PROCEDURE — 85610 PROTHROMBIN TIME: CPT

## 2018-05-04 PROCEDURE — 88305 TISSUE EXAM BY PATHOLOGIST: CPT | Performed by: PATHOLOGY

## 2018-05-04 PROCEDURE — 25000003 PHARM REV CODE 250: Performed by: PHYSICIAN ASSISTANT

## 2018-05-04 PROCEDURE — 86850 RBC ANTIBODY SCREEN: CPT

## 2018-05-04 PROCEDURE — 27201423 OPTIME MED/SURG SUP & DEVICES STERILE SUPPLY: Performed by: NEUROLOGICAL SURGERY

## 2018-05-04 PROCEDURE — S0028 INJECTION, FAMOTIDINE, 20 MG: HCPCS | Performed by: NURSE ANESTHETIST, CERTIFIED REGISTERED

## 2018-05-04 PROCEDURE — 36000711: Performed by: NEUROLOGICAL SURGERY

## 2018-05-04 PROCEDURE — 83690 ASSAY OF LIPASE: CPT

## 2018-05-04 PROCEDURE — 81003 URINALYSIS AUTO W/O SCOPE: CPT

## 2018-05-04 PROCEDURE — 27201037 HC PRESSURE MONITORING SET UP

## 2018-05-04 PROCEDURE — 88305 TISSUE EXAM BY PATHOLOGIST: CPT | Mod: 26,,, | Performed by: PATHOLOGY

## 2018-05-04 PROCEDURE — 25000003 PHARM REV CODE 250: Performed by: EMERGENCY MEDICINE

## 2018-05-04 PROCEDURE — 25000003 PHARM REV CODE 250: Performed by: STUDENT IN AN ORGANIZED HEALTH CARE EDUCATION/TRAINING PROGRAM

## 2018-05-04 PROCEDURE — 85025 COMPLETE CBC W/AUTO DIFF WBC: CPT | Mod: 91

## 2018-05-04 PROCEDURE — 84145 PROCALCITONIN (PCT): CPT

## 2018-05-04 PROCEDURE — D9220A PRA ANESTHESIA: Mod: CRNA,,, | Performed by: NURSE ANESTHETIST, CERTIFIED REGISTERED

## 2018-05-04 PROCEDURE — 25000003 PHARM REV CODE 250: Performed by: NURSE ANESTHETIST, CERTIFIED REGISTERED

## 2018-05-04 PROCEDURE — 85610 PROTHROMBIN TIME: CPT | Mod: 91

## 2018-05-04 PROCEDURE — C9290 INJ, BUPIVACAINE LIPOSOME: HCPCS | Performed by: NEUROLOGICAL SURGERY

## 2018-05-04 PROCEDURE — 86141 C-REACTIVE PROTEIN HS: CPT

## 2018-05-04 PROCEDURE — 99223 1ST HOSP IP/OBS HIGH 75: CPT | Mod: ,,, | Performed by: NEUROLOGICAL SURGERY

## 2018-05-04 PROCEDURE — 94761 N-INVAS EAR/PLS OXIMETRY MLT: CPT

## 2018-05-04 PROCEDURE — 87086 URINE CULTURE/COLONY COUNT: CPT

## 2018-05-04 PROCEDURE — 37000008 HC ANESTHESIA 1ST 15 MINUTES: Performed by: NEUROLOGICAL SURGERY

## 2018-05-04 PROCEDURE — 85730 THROMBOPLASTIN TIME PARTIAL: CPT

## 2018-05-04 PROCEDURE — 71000039 HC RECOVERY, EACH ADD'L HOUR: Performed by: NEUROLOGICAL SURGERY

## 2018-05-04 PROCEDURE — 99232 SBSQ HOSP IP/OBS MODERATE 35: CPT | Mod: ,,, | Performed by: HOSPITALIST

## 2018-05-04 PROCEDURE — P9021 RED BLOOD CELLS UNIT: HCPCS

## 2018-05-04 PROCEDURE — 80048 BASIC METABOLIC PNL TOTAL CA: CPT | Mod: 91

## 2018-05-04 PROCEDURE — 36000710: Performed by: NEUROLOGICAL SURGERY

## 2018-05-04 PROCEDURE — 80053 COMPREHEN METABOLIC PANEL: CPT

## 2018-05-04 PROCEDURE — 63600175 PHARM REV CODE 636 W HCPCS: Performed by: PHYSICIAN ASSISTANT

## 2018-05-04 PROCEDURE — 63600175 PHARM REV CODE 636 W HCPCS: Performed by: NEUROLOGICAL SURGERY

## 2018-05-04 RX ORDER — DOCUSATE SODIUM 100 MG/1
100 CAPSULE, LIQUID FILLED ORAL 2 TIMES DAILY
Status: DISCONTINUED | OUTPATIENT
Start: 2018-05-04 | End: 2018-05-11 | Stop reason: HOSPADM

## 2018-05-04 RX ORDER — SODIUM CHLORIDE 9 MG/ML
INJECTION, SOLUTION INTRAVENOUS CONTINUOUS PRN
Status: DISCONTINUED | OUTPATIENT
Start: 2018-05-04 | End: 2018-05-04

## 2018-05-04 RX ORDER — SODIUM CHLORIDE 9 MG/ML
INJECTION, SOLUTION INTRAVENOUS CONTINUOUS
Status: DISCONTINUED | OUTPATIENT
Start: 2018-05-04 | End: 2018-05-04

## 2018-05-04 RX ORDER — CEFAZOLIN SODIUM 1 G/3ML
2 INJECTION, POWDER, FOR SOLUTION INTRAMUSCULAR; INTRAVENOUS
Status: COMPLETED | OUTPATIENT
Start: 2018-05-04 | End: 2018-05-05

## 2018-05-04 RX ORDER — ONDANSETRON 8 MG/1
8 TABLET, ORALLY DISINTEGRATING ORAL EVERY 6 HOURS PRN
Status: DISCONTINUED | OUTPATIENT
Start: 2018-05-04 | End: 2018-05-04

## 2018-05-04 RX ORDER — MUPIROCIN 20 MG/G
1 OINTMENT TOPICAL 2 TIMES DAILY
Status: DISCONTINUED | OUTPATIENT
Start: 2018-05-04 | End: 2018-05-09

## 2018-05-04 RX ORDER — DEXAMETHASONE SODIUM PHOSPHATE 4 MG/ML
INJECTION, SOLUTION INTRA-ARTICULAR; INTRALESIONAL; INTRAMUSCULAR; INTRAVENOUS; SOFT TISSUE
Status: DISCONTINUED | OUTPATIENT
Start: 2018-05-04 | End: 2018-05-04

## 2018-05-04 RX ORDER — LIDOCAINE HCL/PF 100 MG/5ML
SYRINGE (ML) INTRAVENOUS
Status: DISCONTINUED | OUTPATIENT
Start: 2018-05-04 | End: 2018-05-04

## 2018-05-04 RX ORDER — SODIUM CHLORIDE 0.9 % (FLUSH) 0.9 %
3 SYRINGE (ML) INJECTION
Status: DISCONTINUED | OUTPATIENT
Start: 2018-05-04 | End: 2018-05-04 | Stop reason: HOSPADM

## 2018-05-04 RX ORDER — NITROGLYCERIN 0.4 MG/1
0.4 TABLET SUBLINGUAL EVERY 5 MIN PRN
Status: DISCONTINUED | OUTPATIENT
Start: 2018-05-04 | End: 2018-05-11 | Stop reason: HOSPADM

## 2018-05-04 RX ORDER — CEFAZOLIN SODIUM 1 G/3ML
INJECTION, POWDER, FOR SOLUTION INTRAMUSCULAR; INTRAVENOUS
Status: DISCONTINUED | OUTPATIENT
Start: 2018-05-04 | End: 2018-05-04

## 2018-05-04 RX ORDER — ROCURONIUM BROMIDE 10 MG/ML
INJECTION, SOLUTION INTRAVENOUS
Status: DISCONTINUED | OUTPATIENT
Start: 2018-05-04 | End: 2018-05-04

## 2018-05-04 RX ORDER — ETOMIDATE 2 MG/ML
10 INJECTION INTRAVENOUS
Status: DISCONTINUED | OUTPATIENT
Start: 2018-05-04 | End: 2018-05-04

## 2018-05-04 RX ORDER — PROPOFOL 10 MG/ML
VIAL (ML) INTRAVENOUS CONTINUOUS PRN
Status: DISCONTINUED | OUTPATIENT
Start: 2018-05-04 | End: 2018-05-04

## 2018-05-04 RX ORDER — INSULIN ASPART 100 [IU]/ML
1-10 INJECTION, SOLUTION INTRAVENOUS; SUBCUTANEOUS
Status: DISCONTINUED | OUTPATIENT
Start: 2018-05-04 | End: 2018-05-09

## 2018-05-04 RX ORDER — AMOXICILLIN 250 MG
2 CAPSULE ORAL NIGHTLY PRN
Status: DISCONTINUED | OUTPATIENT
Start: 2018-05-04 | End: 2018-05-11 | Stop reason: HOSPADM

## 2018-05-04 RX ORDER — VANCOMYCIN HYDROCHLORIDE 1 G/20ML
INJECTION, POWDER, LYOPHILIZED, FOR SOLUTION INTRAVENOUS
Status: DISCONTINUED | OUTPATIENT
Start: 2018-05-04 | End: 2018-05-04 | Stop reason: HOSPADM

## 2018-05-04 RX ORDER — SODIUM CHLORIDE 9 MG/ML
INJECTION, SOLUTION INTRAVENOUS CONTINUOUS
Status: DISCONTINUED | OUTPATIENT
Start: 2018-05-04 | End: 2018-05-05

## 2018-05-04 RX ORDER — MIDAZOLAM HYDROCHLORIDE 1 MG/ML
INJECTION, SOLUTION INTRAMUSCULAR; INTRAVENOUS
Status: DISCONTINUED | OUTPATIENT
Start: 2018-05-04 | End: 2018-05-04

## 2018-05-04 RX ORDER — DIAZEPAM 5 MG/1
5 TABLET ORAL EVERY 6 HOURS PRN
Status: DISCONTINUED | OUTPATIENT
Start: 2018-05-04 | End: 2018-05-06

## 2018-05-04 RX ORDER — HYDROMORPHONE HYDROCHLORIDE 1 MG/ML
0.2 INJECTION, SOLUTION INTRAMUSCULAR; INTRAVENOUS; SUBCUTANEOUS EVERY 5 MIN PRN
Status: DISCONTINUED | OUTPATIENT
Start: 2018-05-04 | End: 2018-05-04 | Stop reason: HOSPADM

## 2018-05-04 RX ORDER — GLUCAGON 1 MG
1 KIT INJECTION
Status: DISCONTINUED | OUTPATIENT
Start: 2018-05-04 | End: 2018-05-11 | Stop reason: HOSPADM

## 2018-05-04 RX ORDER — IBUPROFEN 200 MG
16 TABLET ORAL
Status: DISCONTINUED | OUTPATIENT
Start: 2018-05-04 | End: 2018-05-11 | Stop reason: HOSPADM

## 2018-05-04 RX ORDER — MAG HYDROX/ALUMINUM HYD/SIMETH 200-200-20
30 SUSPENSION, ORAL (FINAL DOSE FORM) ORAL EVERY 4 HOURS PRN
Status: DISCONTINUED | OUTPATIENT
Start: 2018-05-04 | End: 2018-05-11 | Stop reason: HOSPADM

## 2018-05-04 RX ORDER — ACETAMINOPHEN 10 MG/ML
INJECTION, SOLUTION INTRAVENOUS
Status: DISCONTINUED | OUTPATIENT
Start: 2018-05-04 | End: 2018-05-04

## 2018-05-04 RX ORDER — IBUPROFEN 200 MG
24 TABLET ORAL
Status: DISCONTINUED | OUTPATIENT
Start: 2018-05-04 | End: 2018-05-11 | Stop reason: HOSPADM

## 2018-05-04 RX ORDER — OXYCODONE AND ACETAMINOPHEN 7.5; 325 MG/1; MG/1
1 TABLET ORAL EVERY 4 HOURS PRN
Status: DISCONTINUED | OUTPATIENT
Start: 2018-05-04 | End: 2018-05-06

## 2018-05-04 RX ORDER — NEOSTIGMINE METHYLSULFATE 1 MG/ML
INJECTION, SOLUTION INTRAVENOUS
Status: DISCONTINUED | OUTPATIENT
Start: 2018-05-04 | End: 2018-05-04

## 2018-05-04 RX ORDER — GLYCOPYRROLATE 0.2 MG/ML
INJECTION INTRAMUSCULAR; INTRAVENOUS
Status: DISCONTINUED | OUTPATIENT
Start: 2018-05-04 | End: 2018-05-04

## 2018-05-04 RX ORDER — PROPOFOL 10 MG/ML
VIAL (ML) INTRAVENOUS
Status: DISCONTINUED | OUTPATIENT
Start: 2018-05-04 | End: 2018-05-04

## 2018-05-04 RX ORDER — HYDROCODONE BITARTRATE AND ACETAMINOPHEN 500; 5 MG/1; MG/1
TABLET ORAL
Status: DISCONTINUED | OUTPATIENT
Start: 2018-05-04 | End: 2018-05-09

## 2018-05-04 RX ORDER — RAMIPRIL 10 MG/1
10 CAPSULE ORAL DAILY
Status: DISCONTINUED | OUTPATIENT
Start: 2018-05-04 | End: 2018-05-11 | Stop reason: HOSPADM

## 2018-05-04 RX ORDER — FAMOTIDINE 10 MG/ML
INJECTION INTRAVENOUS
Status: DISCONTINUED | OUTPATIENT
Start: 2018-05-04 | End: 2018-05-04

## 2018-05-04 RX ORDER — NALOXONE HYDROCHLORIDE 0.4 MG/ML
0.04 INJECTION, SOLUTION INTRAMUSCULAR; INTRAVENOUS; SUBCUTANEOUS ONCE
Status: COMPLETED | OUTPATIENT
Start: 2018-05-04 | End: 2018-05-04

## 2018-05-04 RX ORDER — BUPIVACAINE HYDROCHLORIDE AND EPINEPHRINE 2.5; 5 MG/ML; UG/ML
INJECTION, SOLUTION EPIDURAL; INFILTRATION; INTRACAUDAL; PERINEURAL
Status: DISCONTINUED | OUTPATIENT
Start: 2018-05-04 | End: 2018-05-04 | Stop reason: HOSPADM

## 2018-05-04 RX ORDER — HYDRALAZINE HYDROCHLORIDE 20 MG/ML
INJECTION INTRAMUSCULAR; INTRAVENOUS
Status: DISCONTINUED | OUTPATIENT
Start: 2018-05-04 | End: 2018-05-04

## 2018-05-04 RX ORDER — BISACODYL 10 MG
10 SUPPOSITORY, RECTAL RECTAL DAILY
Status: DISCONTINUED | OUTPATIENT
Start: 2018-05-05 | End: 2018-05-09

## 2018-05-04 RX ORDER — FENTANYL CITRATE 50 UG/ML
INJECTION, SOLUTION INTRAMUSCULAR; INTRAVENOUS
Status: DISCONTINUED | OUTPATIENT
Start: 2018-05-04 | End: 2018-05-04

## 2018-05-04 RX ORDER — OXYCODONE AND ACETAMINOPHEN 5; 325 MG/1; MG/1
1 TABLET ORAL EVERY 4 HOURS PRN
Status: DISCONTINUED | OUTPATIENT
Start: 2018-05-04 | End: 2018-05-06

## 2018-05-04 RX ORDER — ONDANSETRON 8 MG/1
8 TABLET, ORALLY DISINTEGRATING ORAL EVERY 6 HOURS PRN
Status: DISCONTINUED | OUTPATIENT
Start: 2018-05-04 | End: 2018-05-11 | Stop reason: HOSPADM

## 2018-05-04 RX ORDER — LIDOCAINE HYDROCHLORIDE AND EPINEPHRINE 10; 10 MG/ML; UG/ML
INJECTION, SOLUTION INFILTRATION; PERINEURAL
Status: DISCONTINUED | OUTPATIENT
Start: 2018-05-04 | End: 2018-05-04 | Stop reason: HOSPADM

## 2018-05-04 RX ORDER — CYCLOBENZAPRINE HCL 5 MG
10 TABLET ORAL 3 TIMES DAILY PRN
Status: DISCONTINUED | OUTPATIENT
Start: 2018-05-04 | End: 2018-05-06

## 2018-05-04 RX ORDER — PROPOFOL 10 MG/ML
5 INJECTION, EMULSION INTRAVENOUS CONTINUOUS
Status: DISCONTINUED | OUTPATIENT
Start: 2018-05-04 | End: 2018-05-04

## 2018-05-04 RX ORDER — GABAPENTIN 300 MG/1
300 CAPSULE ORAL 3 TIMES DAILY
Status: DISCONTINUED | OUTPATIENT
Start: 2018-05-04 | End: 2018-05-06

## 2018-05-04 RX ORDER — ROCURONIUM BROMIDE 10 MG/ML
1 INJECTION, SOLUTION INTRAVENOUS ONCE
Status: DISCONTINUED | OUTPATIENT
Start: 2018-05-04 | End: 2018-05-04

## 2018-05-04 RX ADMIN — SODIUM CHLORIDE: 0.9 INJECTION, SOLUTION INTRAVENOUS at 10:05

## 2018-05-04 RX ADMIN — PROPOFOL 50 MG: 10 INJECTION, EMULSION INTRAVENOUS at 01:05

## 2018-05-04 RX ADMIN — SODIUM CHLORIDE: 0.9 INJECTION, SOLUTION INTRAVENOUS at 12:05

## 2018-05-04 RX ADMIN — MIDAZOLAM HYDROCHLORIDE 1.5 MG: 1 INJECTION, SOLUTION INTRAMUSCULAR; INTRAVENOUS at 01:05

## 2018-05-04 RX ADMIN — GABAPENTIN 300 MG: 300 CAPSULE ORAL at 08:05

## 2018-05-04 RX ADMIN — SODIUM CHLORIDE 500 ML: 9 INJECTION, SOLUTION INTRAVENOUS at 01:05

## 2018-05-04 RX ADMIN — FENTANYL CITRATE 25 MCG: 50 INJECTION, SOLUTION INTRAMUSCULAR; INTRAVENOUS at 01:05

## 2018-05-04 RX ADMIN — BUPIVACAINE 20 ML: 13.3 INJECTION, SUSPENSION, LIPOSOMAL INFILTRATION at 04:05

## 2018-05-04 RX ADMIN — RAMIPRIL 10 MG: 10 CAPSULE ORAL at 09:05

## 2018-05-04 RX ADMIN — OXYCODONE HYDROCHLORIDE AND ACETAMINOPHEN 1 TABLET: 5; 325 TABLET ORAL at 07:05

## 2018-05-04 RX ADMIN — SODIUM CHLORIDE: 0.9 INJECTION, SOLUTION INTRAVENOUS at 05:05

## 2018-05-04 RX ADMIN — ROCURONIUM BROMIDE 40 MG: 10 INJECTION, SOLUTION INTRAVENOUS at 01:05

## 2018-05-04 RX ADMIN — REMIFENTANIL HYDROCHLORIDE 0.05 MCG/KG/MIN: 1 INJECTION, POWDER, LYOPHILIZED, FOR SOLUTION INTRAVENOUS at 01:05

## 2018-05-04 RX ADMIN — CEFAZOLIN SODIUM 2 G: 1 INJECTION, POWDER, FOR SOLUTION INTRAMUSCULAR; INTRAVENOUS at 06:05

## 2018-05-04 RX ADMIN — GLYCOPYRROLATE 0.4 MG: 0.2 INJECTION, SOLUTION INTRAMUSCULAR; INTRAVENOUS at 04:05

## 2018-05-04 RX ADMIN — VANCOMYCIN HYDROCHLORIDE 1 G: 1 INJECTION, POWDER, LYOPHILIZED, FOR SOLUTION INTRAVENOUS at 04:05

## 2018-05-04 RX ADMIN — LIDOCAINE HYDROCHLORIDE 50 MG: 20 INJECTION, SOLUTION INTRAVENOUS at 01:05

## 2018-05-04 RX ADMIN — MIDAZOLAM HYDROCHLORIDE 0.5 MG: 1 INJECTION, SOLUTION INTRAMUSCULAR; INTRAVENOUS at 01:05

## 2018-05-04 RX ADMIN — CEFAZOLIN 1 G: 330 INJECTION, POWDER, FOR SOLUTION INTRAMUSCULAR; INTRAVENOUS at 02:05

## 2018-05-04 RX ADMIN — PHYTONADIONE 10 MG: 10 INJECTION, EMULSION INTRAMUSCULAR; INTRAVENOUS; SUBCUTANEOUS at 04:05

## 2018-05-04 RX ADMIN — LIDOCAINE HYDROCHLORIDE AND EPINEPHRINE 10 ML: 10; 10 INJECTION, SOLUTION INFILTRATION; PERINEURAL at 03:05

## 2018-05-04 RX ADMIN — FENTANYL CITRATE 25 MCG: 50 INJECTION, SOLUTION INTRAMUSCULAR; INTRAVENOUS at 04:05

## 2018-05-04 RX ADMIN — NALOXONE HYDROCHLORIDE 0.04 MG: 0.4 INJECTION, SOLUTION INTRAMUSCULAR; INTRAVENOUS; SUBCUTANEOUS at 10:05

## 2018-05-04 RX ADMIN — DEXAMETHASONE SODIUM PHOSPHATE 4 MG: 4 INJECTION, SOLUTION INTRAMUSCULAR; INTRAVENOUS at 04:05

## 2018-05-04 RX ADMIN — MUPIROCIN 1 G: 20 OINTMENT TOPICAL at 08:05

## 2018-05-04 RX ADMIN — SODIUM CHLORIDE 0.25 MCG/KG/MIN: 9 INJECTION, SOLUTION INTRAVENOUS at 03:05

## 2018-05-04 RX ADMIN — THROMBIN, TOPICAL (BOVINE) 5000 UNITS: KIT at 03:05

## 2018-05-04 RX ADMIN — Medication 1 G: at 03:05

## 2018-05-04 RX ADMIN — DOCUSATE SODIUM 100 MG: 100 CAPSULE, LIQUID FILLED ORAL at 09:05

## 2018-05-04 RX ADMIN — Medication 0.2 MG: at 08:05

## 2018-05-04 RX ADMIN — PROPOFOL 50 MCG/KG/MIN: 10 INJECTION, EMULSION INTRAVENOUS at 01:05

## 2018-05-04 RX ADMIN — SODIUM CHLORIDE: 0.9 INJECTION, SOLUTION INTRAVENOUS at 07:05

## 2018-05-04 RX ADMIN — HYDRALAZINE HYDROCHLORIDE 5 MG: 20 INJECTION INTRAMUSCULAR; INTRAVENOUS at 02:05

## 2018-05-04 RX ADMIN — FAMOTIDINE 20 MG: 10 INJECTION, SOLUTION INTRAVENOUS at 04:05

## 2018-05-04 RX ADMIN — CLONIDINE HYDROCHLORIDE 0.3 MG: 0.2 TABLET ORAL at 09:05

## 2018-05-04 RX ADMIN — GELATIN ABSORBABLE SPONGE SIZE 100 1 APPLICATOR: MISC at 03:05

## 2018-05-04 RX ADMIN — BUPIVACAINE HYDROCHLORIDE AND EPINEPHRINE BITARTRATE 20 ML: 2.5; .0091 INJECTION, SOLUTION EPIDURAL; INFILTRATION; INTRACAUDAL; PERINEURAL at 04:05

## 2018-05-04 RX ADMIN — DOCUSATE SODIUM 100 MG: 100 CAPSULE, LIQUID FILLED ORAL at 08:05

## 2018-05-04 RX ADMIN — GABAPENTIN 300 MG: 300 CAPSULE ORAL at 09:05

## 2018-05-04 RX ADMIN — ACETAMINOPHEN 1000 MG: 10 INJECTION, SOLUTION INTRAVENOUS at 04:05

## 2018-05-04 RX ADMIN — OXYCODONE HYDROCHLORIDE AND ACETAMINOPHEN 1 TABLET: 7.5; 325 TABLET ORAL at 08:05

## 2018-05-04 RX ADMIN — SODIUM CHLORIDE, SODIUM GLUCONATE, SODIUM ACETATE, POTASSIUM CHLORIDE, MAGNESIUM CHLORIDE, SODIUM PHOSPHATE, DIBASIC, AND POTASSIUM PHOSPHATE: .53; .5; .37; .037; .03; .012; .00082 INJECTION, SOLUTION INTRAVENOUS at 02:05

## 2018-05-04 RX ADMIN — NEOSTIGMINE METHYLSULFATE 3 MG: 1 INJECTION INTRAVENOUS at 04:05

## 2018-05-04 RX ADMIN — CLONIDINE HYDROCHLORIDE 0.3 MG: 0.2 TABLET ORAL at 08:05

## 2018-05-04 RX ADMIN — SODIUM CHLORIDE: 0.9 INJECTION, SOLUTION INTRAVENOUS at 01:05

## 2018-05-04 NOTE — PLAN OF CARE
Problem: Patient Care Overview  Goal: Plan of Care Review  Outcome: Ongoing (interventions implemented as appropriate)  Pt free from falls and injury. Pt provided with room orientation. Pt stated cane was missing, stated she had cane in ER. This Rn called ER but they did not have, perhaps daughter took it home. Pt in bed low and locked, Call light within reach.

## 2018-05-04 NOTE — PLAN OF CARE
CM met with patient this am to obtain discharge planning assessment. Patient is scheduled to go to surgery today. Planned discharge is home with family - Plan (A) or home with family and home health - Plan (B).    PCP:  Von Mccullough MD     Payor:  Payor: BLUE CROSS BLUE SHIELD / Plan: [facility] FEDERAL / Product Type: PPO /      Pharmacy:    [id number] - [place], [place] - [address]  [address] [address]  Phone: [phone] Fax: [phone]    [id number] - [place], [place] - [address]  [address]  [place] [address]  Phone: [phone] Fax: [phone]    [facility] [place], [place] - [address] [facility]  [address] [place] [address]  Phone: [phone] Fax: [phone]       05/04/18 1325   Discharge Assessment   Assessment Type Discharge Planning Assessment   Confirmed/corrected address and phone number on facesheet? Yes   Assessment information obtained from? Patient   Communicated expected length of stay with patient/caregiver no   Prior to hospitilization cognitive status: Alert/Oriented   Prior to hospitalization functional status: Assistive Equipment   Current cognitive status: Alert/Oriented   Current Functional Status: Assistive Equipment   Lives With spouse   Able to Return to Prior Arrangements yes   Is patient able to care for self after discharge? Yes   Who are your caregiver(s) and their phone number(s)? Sushant Zimmer - [phone]   Patient's perception of discharge disposition home or selfcare   Readmission Within The Last 30 Days no previous admission in last 30 days   Patient currently being followed by outpatient case management? No   Patient currently receives any other outside agency services? No   Equipment Currently Used at Home cane, straight;walker, rolling;wheelchair   Do you have any problems affording any of your prescribed medications? No   Is the patient taking medications as prescribed?  yes   Does the patient have transportation home? Yes   Transportation Available family or friend will provide   Does the patient receive services at the Coumadin Clinic? No  (patient does take Coumadin and is managed by her PCP.)   Discharge Plan A Home with family   Discharge Plan B Home with family;Home Health   Patient/Family In Agreement With Plan yes

## 2018-05-04 NOTE — CONSULTS
"[facility] Hospital Medicine  Consult Note    Patient Name: Sunita Zimmer  MRN: [id number]  Admission Date: 5/3/2018  Hospital Length of Stay: 0 days  Attending Physician: Stanley Prater MD   Primary Care Provider: Von Mccullough MD     [facility] Medicine Team: Networked reference to record PCT  Rayna Joseph MD      Patient information was obtained from patient, past medical records and ER records.     Inpatient consult to Hospital Medicine-General  Consult performed by: RAYNA JOSEPH  Consult ordered by: DEDE LEON  Reason for consult: preop clearance        Subjective:     Principal Problem: <principal problem not specified>    Chief Complaint:   Chief Complaint   Patient presents with    Back Pain     Pt c/o back pain.  Reports hx of back problems. Denies recent injury.  States she has "lumbar fractures" for the past 6yrs.         HPI: Mrs. Zimmer is 77-year-old lady with HTN, CAD, PVD (s/p bilateral subclavian and iliofemoral PCI), polycythemia vera with DVT on chronic warfarin, COPD, polycystic kidney disease, CKD stage 3 who was admitted for caudia equina syndrome. Hospital Medicine consulted for preop clearance for urgent laminectomy. Patient endorses dyspnea with minimal exertion (unable to complete simple household chores). She endorses a 90 pack year tobacco history and currently smokes 1PPD. Patient denies any recent history of CP (at rest or exertion), palpitations, orthopnea, PND.     Past Medical History:   Diagnosis Date    Anticoagulant long-term use     Apocrine adenocarcinoma     Arthritis     Back pain     lower back    Brain aneurysm     4 times    Chronic kidney disease (CKD), stage III (moderate)     COPD (chronic obstructive pulmonary disease)     Coronary artery disease     Encounter for blood transfusion     Hypertension     Polycystic kidney disease     Polycythemia vera     Polycythemia vera     congential       Past Surgical " "History:   Procedure Laterality Date    ABDOMINAL SURGERY      APPENDECTOMY      BRAIN SURGERY      relieve hemmorhages/had 4 surgeries    CARDIAC CATHETERIZATION      CORONARY ANGIOPLASTY      FRACTURE SURGERY      GALLBLADDER SURGERY      HYSTERECTOMY      illiac stent      JOINT REPLACEMENT      SKIN BIOPSY      skin repair      apocrine cancer    tibial repair      steel plate inserted    TONSILLECTOMY      TOTAL KNEE ARTHROPLASTY      VEIN BYPASS SURGERY         Review of patient's allergies indicates:   Allergen Reactions    Bacitracin Other (See Comments)     "can not take because of Warfarin"    Bactrim [sulfamethoxazole-trimethoprim] Other (See Comments)     "can not take because of Warfarin"       No current facility-administered medications on file prior to encounter.      Current Outpatient Prescriptions on File Prior to Encounter   Medication Sig    albuterol (PROVENTIL) 4 MG Tab     alprazolam (XANAX) 0.5 MG tablet     clonidine (CATAPRES) 0.1 MG tablet Take 0.3 mg by mouth 2 (two) times daily.     cyclobenzaprine (FLEXERIL) 10 MG tablet Take 10 mg by mouth 3 (three) times daily as needed for Muscle spasms.    diazepam (VALIUM) 5 MG tablet Take 5 mg by mouth every 6 (six) hours as needed for Anxiety.    hydrocodone-acetaminophen 7.5-500 mg (LORTAB) 7.5-500 mg per tablet Take 1 tablet by mouth every 6 (six) hours as needed for Pain.    ramipril (ALTACE) 10 MG capsule Take 10 mg by mouth.     temazepam (RESTORIL) 15 mg Cap Take 15 mg by mouth.    umeclidinium 62.5 mcg/actuation DsDv Inhale 1 puff into the lungs.    warfarin (COUMADIN) 3 MG tablet Take 2 mg by mouth Daily.     acetaminophen-codeine 300-30mg (TYLENOL-CODEINE #3) 300-30 mg Tab Take by mouth.    budesonide-formoterol 160-4.5 mcg (SYMBICORT) 160-4.5 mcg/actuation HFAA Inhale 2 puffs into the lungs.    FLUZONE HIGH-DOSE 2017-18, PF, 180 mcg/0.5 mL vaccine     ketorolac 0.5% (ACULAR) 0.5 % Drop Place 1 drop into " the right eye 3 (three) times daily.    nitroGLYCERIN (NITROSTAT) 0.4 MG SL tablet Place 0.4 mg under the tongue every 5 (five) minutes as needed for Chest pain.    ofloxacin (OCUFLOX) 0.3 % ophthalmic solution 1 drop 4 (four) times daily.    polyethylene glycol (GLYCOLAX) 17 gram/dose powder Take 17 g by mouth.    prednisoLONE acetate (PRED FORTE) 1 % DrpS 1 drop 3 (three) times daily.    VENTOLIN HFA 90 mcg/actuation inhaler      Family History     Problem Relation (Age of Onset)    Heart disease Brother        Social History Main Topics    Smoking status: Current Every Day Smoker     Packs/day: 1.00     Years: 60.00    Smokeless tobacco: Never Used    Alcohol use No    Drug use: No    Sexual activity: Not on file     Review of Systems   Constitutional: Negative for chills and fever.   HENT: Negative for congestion and sore throat.    Eyes: Negative for photophobia and visual disturbance.   Respiratory: Positive for shortness of breath. Negative for cough, chest tightness and wheezing.    Cardiovascular: Negative for chest pain, palpitations and leg swelling.   Gastrointestinal: Negative for abdominal pain, diarrhea, nausea and vomiting.   Genitourinary: Negative for dysuria and hematuria.   Musculoskeletal: Negative for arthralgias and myalgias.   Skin: Negative for color change and rash.   Neurological: Negative for dizziness, syncope, light-headedness and headaches.     Objective:     Vital Signs (Most Recent):  Temp: 98.4 °F (36.9 °C) (05/04/18 0756)  Pulse: 93 (05/04/18 1030)  Resp: 20 (05/04/18 0756)  BP: 130/84 (05/04/18 0756)  SpO2: 97 % (05/04/18 0756) Vital Signs (24h Range):  Temp:  [98.1 °F (36.7 °C)-98.4 °F (36.9 °C)] 98.4 °F (36.9 °C)  Pulse:  [] 93  Resp:  [18-20] 20  SpO2:  [93 %-100 %] 97 %  BP: (130-179)/(67-84) 130/84     Weight: 47 kg (103 lb 9.9 oz)  Body mass index is 18.95 kg/m².    Physical Exam   Constitutional: She is oriented to person, place, and time. No distress.    Frail appearing elderly lady.    HENT:   Head: Normocephalic and atraumatic.   Mouth/Throat: Oropharynx is clear and moist.   Eyes: Conjunctivae and EOM are normal. Pupils are equal, round, and reactive to light.   Neck: Normal range of motion. Neck supple. No JVD present.   Cardiovascular: Normal rate, regular rhythm and intact distal pulses.    Murmur (Harsh sytolic ejection murmur (III/VI) radiating to carotids. ) heard.  Pulmonary/Chest: Effort normal. No respiratory distress. She has wheezes (faint RLL). She has no rales. She exhibits no tenderness.   Abdominal: Soft. Bowel sounds are normal. She exhibits no distension. There is no tenderness.   Musculoskeletal: Normal range of motion. She exhibits no edema.   Neurological: She is alert and oriented to person, place, and time.   Skin: Skin is warm and dry. Capillary refill takes less than 2 seconds. She is not diaphoretic. No erythema.       Significant Labs:   Recent Results (from the past 24 hour(s))   CBC auto differential    Collection Time: 05/04/18  1:11 AM   Result Value Ref Range    WBC 6.35 3.90 - 12.70 K/uL    RBC 4.51 4.00 - 5.40 M/uL    Hemoglobin 12.7 12.0 - 16.0 g/dL    Hematocrit 39.2 37.0 - 48.5 %    MCV 87 82 - 98 fL    MCH 28.2 27.0 - 31.0 pg    MCHC 32.4 32.0 - 36.0 g/dL    RDW 12.9 11.5 - 14.5 %    Platelets 259 150 - 350 K/uL    MPV 9.1 (L) 9.2 - 12.9 fL    Immature Granulocytes 0.5 0.0 - 0.5 %    Gran # (ANC) 5.6 1.8 - 7.7 K/uL    Immature Grans (Abs) 0.03 0.00 - 0.04 K/uL    Lymph # 0.6 (L) 1.0 - 4.8 K/uL    Mono # 0.1 (L) 0.3 - 1.0 K/uL    Eos # 0.0 0.0 - 0.5 K/uL    Baso # 0.04 0.00 - 0.20 K/uL    nRBC 0 0 /100 WBC    Gran% 88.7 (H) 38.0 - 73.0 %    Lymph% 9.1 (L) 18.0 - 48.0 %    Mono% 1.1 (L) 4.0 - 15.0 %    Eosinophil% 0.0 0.0 - 8.0 %    Basophil% 0.6 0.0 - 1.9 %    Differential Method Automated    Comprehensive metabolic panel    Collection Time: 05/04/18  1:11 AM   Result Value Ref Range    Sodium 141 136 - 145 mmol/L     Potassium 4.7 3.5 - 5.1 mmol/L    Chloride 105 95 - 110 mmol/L    CO2 20 (L) 23 - 29 mmol/L    Glucose 127 (H) 70 - 110 mg/dL    BUN, Bld 30 (H) 8 - 23 mg/dL    Creatinine 1.2 0.5 - 1.4 mg/dL    Calcium 9.3 8.7 - 10.5 mg/dL    Total Protein 8.1 6.0 - 8.4 g/dL    Albumin 3.7 3.5 - 5.2 g/dL    Total Bilirubin 0.6 0.1 - 1.0 mg/dL    Alkaline Phosphatase 108 55 - 135 U/L    AST 26 10 - 40 U/L    ALT 26 10 - 44 U/L    Anion Gap 16 8 - 16 mmol/L    eGFR if African American 49.7 (A) >60 mL/min/1.73 m^2    eGFR if non  43.1 (A) >60 mL/min/1.73 m^2   APTT    Collection Time: 05/04/18  1:11 AM   Result Value Ref Range    aPTT 25.7 21.0 - 32.0 sec   Protime-INR    Collection Time: 05/04/18  1:11 AM   Result Value Ref Range    Prothrombin Time 13.8 (H) 9.0 - 12.5 sec    INR 1.4 (H) 0.8 - 1.2   Lipase    Collection Time: 05/04/18  1:11 AM   Result Value Ref Range    Lipase 13 4 - 60 U/L   Type & Screen    Collection Time: 05/04/18  1:11 AM   Result Value Ref Range    Group & Rh A POS     Indirect Shira NEG    High sensitivity CRP (Cardiac CRP)    Collection Time: 05/04/18  1:11 AM   Result Value Ref Range    CRP, High Sensitivity 54.07 (H) 0.00 - 3.19 mg/L   Procalcitonin    Collection Time: 05/04/18  1:11 AM   Result Value Ref Range    Procalcitonin 0.17 <0.25 ng/mL   Urinalysis, Reflex to Urine Culture Urine, Clean Catch    Collection Time: 05/04/18  1:29 AM   Result Value Ref Range    Specimen UA Urine, Catheterized     Color, UA Yellow Yellow, Straw, Mary    Appearance, UA Clear Clear    pH, UA 6.0 5.0 - 8.0    Specific Gravity, UA 1.010 1.005 - 1.030    Protein, UA Negative Negative    Glucose, UA Negative Negative    Ketones, UA Negative Negative    Bilirubin (UA) Negative Negative    Occult Blood UA Negative Negative    Nitrite, UA Negative Negative    Urobilinogen, UA Negative <2.0 EU/dL    Leukocytes, UA Negative Negative   CBC auto differential    Collection Time: 05/04/18  4:57 AM   Result Value Ref  Range    WBC 4.72 3.90 - 12.70 K/uL    RBC 4.20 4.00 - 5.40 M/uL    Hemoglobin 11.9 (L) 12.0 - 16.0 g/dL    Hematocrit 36.4 (L) 37.0 - 48.5 %    MCV 87 82 - 98 fL    MCH 28.3 27.0 - 31.0 pg    MCHC 32.7 32.0 - 36.0 g/dL    RDW 13.1 11.5 - 14.5 %    Platelets 242 150 - 350 K/uL    MPV 9.6 9.2 - 12.9 fL    Immature Granulocytes 0.6 (H) 0.0 - 0.5 %    Gran # (ANC) 4.1 1.8 - 7.7 K/uL    Immature Grans (Abs) 0.03 0.00 - 0.04 K/uL    Lymph # 0.5 (L) 1.0 - 4.8 K/uL    Mono # 0.1 (L) 0.3 - 1.0 K/uL    Eos # 0.0 0.0 - 0.5 K/uL    Baso # 0.01 0.00 - 0.20 K/uL    nRBC 0 0 /100 WBC    Gran% 87.5 (H) 38.0 - 73.0 %    Lymph% 10.6 (L) 18.0 - 48.0 %    Mono% 1.1 (L) 4.0 - 15.0 %    Eosinophil% 0.0 0.0 - 8.0 %    Basophil% 0.2 0.0 - 1.9 %    Differential Method Automated    Basic metabolic panel    Collection Time: 05/04/18  4:57 AM   Result Value Ref Range    Sodium 140 136 - 145 mmol/L    Potassium 5.1 3.5 - 5.1 mmol/L    Chloride 107 95 - 110 mmol/L    CO2 23 23 - 29 mmol/L    Glucose 116 (H) 70 - 110 mg/dL    BUN, Bld 30 (H) 8 - 23 mg/dL    Creatinine 1.1 0.5 - 1.4 mg/dL    Calcium 9.5 8.7 - 10.5 mg/dL    Anion Gap 10 8 - 16 mmol/L    eGFR if African American 55.2 (A) >60 mL/min/1.73 m^2    eGFR if non  47.9 (A) >60 mL/min/1.73 m^2   Protime-INR    Collection Time: 05/04/18  4:57 AM   Result Value Ref Range    Prothrombin Time 13.8 (H) 9.0 - 12.5 sec    INR 1.4 (H) 0.8 - 1.2         Significant Imaging:   Imaging Results          MRI Lumbar Spine W WO Cont (Final result)  Result time 05/04/18 00:09:44   Procedure changed from MRI Lumbar Spine Without Contrast     Final result by Trent Ponce MD (05/04/18 00:09:44)                 Impression:      1.6 cm intradural periphery enhancing rounded cystic lesion at the level of L4 displacing the nerve roots laterally.  There is associated thickening of the cauda equina nerve roots proximal to the lesion.  Differential considerations include an epidermoid cyst or spinal  arachnoid cyst.  A synovial cyst is thought less likely given the centric location of the lesion.    Severe narrowing of the spinal canal at the L4-L5 level secondary to the intradural lesion with distention of the urinary bladder, consistent of cauda equina type syndrome.  Neurosurgical consultation is recommended.    Acute Schmorl's node at the inferior endplate of L2 and superior endplate of L4 to the right.    Numerous cystic lesions within both kidneys.  Further evaluation with nonemergent ultrasound is suggested.    Findings were discussed with Dr. Olson on behalf of Dr. Ponce at 11:52 p.m on 05/03/2018.    Electronically signed by resident: Russell Moya  Date:    05/03/2018  Time:    22:44    Electronically signed by: Trent Ponce MD  Date:    05/04/2018  Time:    00:09             Narrative:    EXAMINATION:  MRI LUMBAR SPINE W WO CONTRAST    CLINICAL HISTORY:  low back pain, urine retention;  Dorsalgia, unspecified    TECHNIQUE:  Multi sequence multiplanar MRI examination of the lumbar spine obtained before and after the administration of intravenous contrast 5 mL gadolinium.    COMPARISON:  X-rays of the lumbar spine dated 06/16 /15    FINDINGS:  Normal sagittal alignment is seen in the lumbar spine.  There are old Schmorl's node at T12 and L1.  There are more acute Schmorl's node at the inferior endplate of L2 and superior endplate of L4 to the right noting protrusions of the intravertebral disc through the vertebral body endplates with sclerotic changes and peripheral enhancement.  No evidence of acute fractures, osseous destructive process, or subluxations.  Bone marrow signal intensity is normal.    The spinal cord ends in satisfactory position above L2.  There is thickening of the cauda equina nerve roots.  There is prominence of the epidural fat in the lower lumbar spine.  There is an intradural peripherally enhancing 1.3 x 1.3 x 1.6 cm rounded cystic structure within the spinal canal at the level  of L4.  This lesion displaces the nerve roots laterally.  This lesion is notably T1 hypointense and T2/STIR hyperintense.  No evidence of perilesional edema.    T12-L1: No spinal canal stenosis or neural foraminal narrowing.    L1-L2: No spinal canal stenosis or neural foraminal narrowing.    L2-L3: No spinal canal stenosis or neural foraminal narrowing.    L3-L4: No spinal canal stenosis or neural foraminal narrowing.    L4-L5: There is severe narrowing of the spinal canal secondary to the intradural lesion.  The neural foramina is within normal limits.    L5-S1: No spinal canal stenosis or neural foraminal narrowing.    No paraspinous mass is identified. The visualized paraspinous soft tissue structures appear within normal limits.  There are numerous cystic lesions within both kidneys.  The urinary bladder is distended.                                  Assessment/Plan:     Preoperative clearance        Surgical Risk Assessment   Active cardiac issues:  Active decompensated heart failure? No   Unstable angina?  No   Significant uncontrolled arrhythmias? No   Severe valvular heart disease-Aortic or Mitral Stenosis? Echo 2014 was normal. Exam today positive for murmur consistent with aortic stenosis   Recent MI or coronary revascularization < 30 days? No     Cardiac Risk Factors  History of CAD/ischemic heart disease? Yes   History of cerebrovascular disease? No   History of compensated heart failure? No   Type 2 diabetes requiring insulin? No   Serum Creatinine > 2? No   Total cardiac risk factors 1     Functional mets <4  < 4* METs -unable to walk > 2 blocks on level ground without stopping due to symptoms  - eating, dressing, toileting, walking indoors, light housework. POOR   > 4* METs -climbing > 1 flight of stairs without stopping  -walking up hill > 1-2 blocks  -scrubbing floors  -moving furniture  - golf, bowling, dancing or tennis  -running short distance MODERATE to EXCELLENT   * performance of any one of  the activities     Assessment/Plan:   Cardiovascular Risk Assessment:  Emergent Surgery  No active cardiac problems (such as unstable angina, decompensated heart failure, significant uncontrolled arrhythmias or severe valvular disease).  Surgery is Moderate risk  Functional Status: <4 Mets 2/2 dyspnea with minimal exertion    Revised Cardiac Risk Index   1. History of ischemic heart disease   2. History of congestive heart failure   3. History of cerebrovascular disease (stroke or transient ischemic attack)   4. History of diabetes requiring preoperative insulin use 5. Chronic kidney disease (creatinine > 2 mg/dL)   5. Undergoing suprainguinal vascular, intraperitoneal, or intrathoracic surgery Risk for cardiac death, nonfatal myocardial infarction, and nonfatal cardiac arrest     0 predictors = 0.4%, 1 predictor = 0.9%, 2 predictors = 6.6%, ?3 predictors = >11%    RCRI Calculator Class and Risk percentage: 0.9%    Other Issues:       Anticoagulation:  On Chronic coumadin for DVT and polycythemia vera. Coumadin stopped yesterday and Vitamin K given in ED          Recommendation:  1. Proceed to Surgery with caution. Patient is at increased risk for post-op pulmonary complications 2/2 to COPD, active smoking status, and poor functional status with inability to achieve 4 mets 2/2 to dyspnea. Recommend aggressive post-operative pulmonary toilet, duo-nebs, and incentive spirometry. Hospital Medicine with follow along with patient post-operatively.                 VTE Risk Mitigation     None              Thank you for your consult. I will follow-up with patient. Please contact us if you have any additional questions.    Andrade Roman MD  Department of Hospital Medicine   [facility]                      05/04/2018                             STAFF PHYSICIAN NOTE                                   Attending Attestation for Rounds with Resident  I have reviewed and concur with the resident's history,  physical, assessment, and plan.  I have personally interviewed and examined the patient at bedside and agree with the resident's findings.                                  ________________________________________                                     REASON FOR ADMISSION:     Patient is 77 y.o.female    Body mass index is 18.29 kg/m².,

## 2018-05-04 NOTE — ANESTHESIA PREPROCEDURE EVALUATION
Vitals - 1 value per visit 7/22/2014 9/2/2014 9/4/2014 5/22/2017 5/26/2017   SYSTOLIC 174 154 164 136    DIASTOLIC 70 52 74 59    PULSE 98 96 94 87    TEMPERATURE   97.3 98.2      Vitals - 1 value per visit 7/27/2017 10/3/2017 11/21/2017 11/22/2017   SYSTOLIC   168    DIASTOLIC   72    PULSE   82    TEMPERATURE   97.8      Vitals - 1 value per visit 12/1/2017 12/13/2017 1/10/2018 5/4/2018   SYSTOLIC    130   DIASTOLIC    84   PULSE    93   TEMPERATURE    98.4     VASCULAR Ultrasound  IMPRESSION:  1.  There is greater than 70% stenosis of the right subclavian artery at the origin of the vertebral artery.  2.  High grade ostial vertebral artery stenosis associated with partially calcified subclavian artery plaque.  3.  Patent proximal left subclavian artery stent.  4.  Dominant left vertebral artery system.    This document has been electronically    SIGNED BY: Christophe Sheth MD On: 08/05/2014 16:53    CATH: ANGIOGRAPHIC RESULTS:  DIAGNOSTIC:     - Common Carotid Artery:             The right CCA is normal.     - Vertebral Artery:            The right ostial vertebral has a 60% stenosis.     - Right Subclavian Artery:             The ostial RSUBCL has a 60% stenosis.                     Lesion Details:  This is a Type C lesion.  <10 mm gradient               The proximal RSUBCL has a 80% stenosis.                     Lesion Details: Moderate to heavy calcification. > 50 mm gradient       - Right Brachial Artery:             The Right Brachial Artery is normal.       - Right Radial Artery:             The right radial artery is normal.               The right radial artery was not studied. The digital arteries, excluding the first digit are small and atretic, consistent with Buerger's disease.    INTERVENTION:       Proximal RSUBCL:              The lesion was successfully intervened. Post-stenosis of 0%. The vessel was accessed natively.  The following items were used: [id number] Flexstar Stent, Balloon Powerflex  Pro [id number] and Balloon Powerflex Pro [id number].    C. SUMMARY:    1. Successful PTAS.    D. RECOMMENDATIONS:    1. Routine post PCI care.  2. Risk factor reductions.  3. ASA 81mg.  4. Tobacco cessation counseling.  5. I have extensively counseled the patient about the causative relationship between smoking and PAD.      Performing providers present during procedure: Bryan Padilla MD (Primary Physician), Ry Limon MD (Fellow), Sushant Colon  (Fellow)  Past Medical History:   Diagnosis Date    Anticoagulant long-term use     Apocrine adenocarcinoma     Arthritis     Back pain     lower back    Brain aneurysm     4 times    Chronic kidney disease (CKD), stage III (moderate)     COPD (chronic obstructive pulmonary disease)     Coronary artery disease     Encounter for blood transfusion     Hypertension     Polycystic kidney disease     Polycythemia vera     Polycythemia vera     congential     Past Surgical History:   Procedure Laterality Date    ABDOMINAL SURGERY      APPENDECTOMY      BRAIN SURGERY      relieve hemmorhages/had 4 surgeries    CARDIAC CATHETERIZATION      CORONARY ANGIOPLASTY      FRACTURE SURGERY      GALLBLADDER SURGERY      HYSTERECTOMY      illiac stent      JOINT REPLACEMENT      SKIN BIOPSY      skin repair      apocrine cancer    tibial repair      steel plate inserted    TONSILLECTOMY      TOTAL KNEE ARTHROPLASTY      VEIN BYPASS SURGERY       Patient Active Problem List   Diagnosis    Hypertension    Coronary artery disease    Personal history of DVT (deep vein thrombosis)    Peripheral vascular disease    Active smoker    Polycythemia    Polycystic kidney disease    Chronic kidney disease (CKD), stage III (moderate)    Chronic back pain    Claudication    Subclavian artery stenosis, right    Central scotoma, right eye    NAION (non-arteritic anterior ischemic optic neuropathy), right eye    Senile nuclear sclerosis    Back pain        DSE  CONCLUSIONS     1 - Normal left ventricular systolic function (EF 60-65%).     2 - Concentric remodeling.     3 - Left ventricular diastolic dysfunction.     4 - Normal right ventricular systolic function .     5 - Trivial to mild tricuspid regurgitation.     No evidence of stress induced myocardial ischemia by echo despite the abnormal ECG described.     This document has been electronically    SIGNED BY: Arsalan Ghosh MD On: 06/13/2014 11:17      Specimen Collected: 06/13/14 00:00 Last Resulted: 06/13/14 11:24            2D Echo:  No results found for this or any previous visit.    Please See ROS/PMH and Active Problem List above                                                                                                               05/04/2018  Sunita Zimmer is a 77 y.o., female.    Anesthesia Evaluation    I have reviewed the Patient Summary Reports.    I have reviewed the Nursing Notes.   I have reviewed the Medications.     Review of Systems  Anesthesia Hx:  No problems with previous Anesthesia    Social:  Smoker    Cardiovascular:   Exercise tolerance: poor Hypertension, well controlled CAD asymptomatic  PVD    Pulmonary:   COPD, mild        Physical Exam  General:  Well nourished      Dental:  Dental Findings: Upper Dentures, Lower Dentures   Chest/Lungs:  Chest/Lungs Findings: Normal Respiratory Rate, Rhonchi     Heart/Vascular:  Heart Findings: Rate: Normal  Rhythm: Regular Rhythm             Anesthesia Plan  Type of Anesthesia, risks & benefits discussed:  Anesthesia Type:  general  Patient's Preference: gen  Intra-op Monitoring Plan: arterial line and standard ASA monitors  Intra-op Monitoring Plan Comments:   Post Op Pain Control Plan:   Post Op Pain Control Plan Comments: Iv>po  Induction:   IV  Beta Blocker:  Patient is not currently on a Beta-Blocker (No further documentation required).       Informed Consent: Patient understands risks and agrees with Anesthesia plan.  Questions  answered. Anesthesia consent signed with patient.  ASA Score: 3  emergent   Day of Surgery Review of History & Physical:    H&P update referred to the surgeon.         Ready For Surgery From Anesthesia Perspective.

## 2018-05-04 NOTE — ED PROVIDER NOTES
Pt signed out to me by Moraima Urena  Neurosurgery will admit the pt     Simba Golden PA-C  05/04/18 0251

## 2018-05-04 NOTE — PLAN OF CARE
SW following for DC needs. SW in communication with CM.    Plan to be determined.     Ana Maria Gomez, MARILYN  [id number]

## 2018-05-04 NOTE — SUBJECTIVE & OBJECTIVE
"Past Medical History:   Diagnosis Date    Anticoagulant long-term use     Apocrine adenocarcinoma     Arthritis     Back pain     lower back    Brain aneurysm     4 times    Chronic kidney disease (CKD), stage III (moderate)     COPD (chronic obstructive pulmonary disease)     Coronary artery disease     Encounter for blood transfusion     Hypertension     Polycystic kidney disease     Polycythemia vera     Polycythemia vera     congential       Past Surgical History:   Procedure Laterality Date    ABDOMINAL SURGERY      APPENDECTOMY      BRAIN SURGERY      relieve hemmorhages/had 4 surgeries    CARDIAC CATHETERIZATION      CORONARY ANGIOPLASTY      FRACTURE SURGERY      GALLBLADDER SURGERY      HYSTERECTOMY      illiac stent      JOINT REPLACEMENT      SKIN BIOPSY      skin repair      apocrine cancer    tibial repair      steel plate inserted    TONSILLECTOMY      TOTAL KNEE ARTHROPLASTY      VEIN BYPASS SURGERY         Review of patient's allergies indicates:   Allergen Reactions    Bacitracin Other (See Comments)     "can not take because of Warfarin"    Bactrim [sulfamethoxazole-trimethoprim] Other (See Comments)     "can not take because of Warfarin"       No current facility-administered medications on file prior to encounter.      Current Outpatient Prescriptions on File Prior to Encounter   Medication Sig    albuterol (PROVENTIL) 4 MG Tab     alprazolam (XANAX) 0.5 MG tablet     clonidine (CATAPRES) 0.1 MG tablet Take 0.3 mg by mouth 2 (two) times daily.     cyclobenzaprine (FLEXERIL) 10 MG tablet Take 10 mg by mouth 3 (three) times daily as needed for Muscle spasms.    diazepam (VALIUM) 5 MG tablet Take 5 mg by mouth every 6 (six) hours as needed for Anxiety.    hydrocodone-acetaminophen 7.5-500 mg (LORTAB) 7.5-500 mg per tablet Take 1 tablet by mouth every 6 (six) hours as needed for Pain.    ramipril (ALTACE) 10 MG capsule Take 10 mg by mouth.     temazepam " (RESTORIL) 15 mg Cap Take 15 mg by mouth.    umeclidinium 62.5 mcg/actuation DsDv Inhale 1 puff into the lungs.    warfarin (COUMADIN) 3 MG tablet Take 2 mg by mouth Daily.     acetaminophen-codeine 300-30mg (TYLENOL-CODEINE #3) 300-30 mg Tab Take by mouth.    budesonide-formoterol 160-4.5 mcg (SYMBICORT) 160-4.5 mcg/actuation HFAA Inhale 2 puffs into the lungs.    FLUZONE HIGH-DOSE 2017-18, PF, 180 mcg/0.5 mL vaccine     ketorolac 0.5% (ACULAR) 0.5 % Drop Place 1 drop into the right eye 3 (three) times daily.    nitroGLYCERIN (NITROSTAT) 0.4 MG SL tablet Place 0.4 mg under the tongue every 5 (five) minutes as needed for Chest pain.    ofloxacin (OCUFLOX) 0.3 % ophthalmic solution 1 drop 4 (four) times daily.    polyethylene glycol (GLYCOLAX) 17 gram/dose powder Take 17 g by mouth.    prednisoLONE acetate (PRED FORTE) 1 % DrpS 1 drop 3 (three) times daily.    VENTOLIN HFA 90 mcg/actuation inhaler      Family History     Problem Relation (Age of Onset)    Heart disease Brother        Social History Main Topics    Smoking status: Current Every Day Smoker     Packs/day: 1.00     Years: 60.00    Smokeless tobacco: Never Used    Alcohol use No    Drug use: No    Sexual activity: Not on file     Review of Systems   Constitutional: Negative for chills and fever.   HENT: Negative for congestion and sore throat.    Eyes: Negative for photophobia and visual disturbance.   Respiratory: Positive for shortness of breath. Negative for cough, chest tightness and wheezing.    Cardiovascular: Negative for chest pain, palpitations and leg swelling.   Gastrointestinal: Negative for abdominal pain, diarrhea, nausea and vomiting.   Genitourinary: Negative for dysuria and hematuria.   Musculoskeletal: Negative for arthralgias and myalgias.   Skin: Negative for color change and rash.   Neurological: Negative for dizziness, syncope, light-headedness and headaches.     Objective:     Vital Signs (Most Recent):  Temp: 98.4 °F  (36.9 °C) (05/04/18 0756)  Pulse: 93 (05/04/18 1030)  Resp: 20 (05/04/18 0756)  BP: 130/84 (05/04/18 0756)  SpO2: 97 % (05/04/18 0756) Vital Signs (24h Range):  Temp:  [98.1 °F (36.7 °C)-98.4 °F (36.9 °C)] 98.4 °F (36.9 °C)  Pulse:  [] 93  Resp:  [18-20] 20  SpO2:  [93 %-100 %] 97 %  BP: (130-179)/(67-84) 130/84     Weight: 47 kg (103 lb 9.9 oz)  Body mass index is 18.95 kg/m².    Physical Exam   Constitutional: She is oriented to person, place, and time. No distress.   Frail appearing elderly lady.    HENT:   Head: Normocephalic and atraumatic.   Mouth/Throat: Oropharynx is clear and moist.   Eyes: Conjunctivae and EOM are normal. Pupils are equal, round, and reactive to light.   Neck: Normal range of motion. Neck supple. No JVD present.   Cardiovascular: Normal rate, regular rhythm and intact distal pulses.    Murmur (Harsh sytolic ejection murmur (III/VI) radiating to carotids. ) heard.  Pulmonary/Chest: Effort normal. No respiratory distress. She has wheezes (faint RLL). She has no rales. She exhibits no tenderness.   Abdominal: Soft. Bowel sounds are normal. She exhibits no distension. There is no tenderness.   Musculoskeletal: Normal range of motion. She exhibits no edema.   Neurological: She is alert and oriented to person, place, and time.   Skin: Skin is warm and dry. Capillary refill takes less than 2 seconds. She is not diaphoretic. No erythema.       Significant Labs:   Recent Results (from the past 24 hour(s))   CBC auto differential    Collection Time: 05/04/18  1:11 AM   Result Value Ref Range    WBC 6.35 3.90 - 12.70 K/uL    RBC 4.51 4.00 - 5.40 M/uL    Hemoglobin 12.7 12.0 - 16.0 g/dL    Hematocrit 39.2 37.0 - 48.5 %    MCV 87 82 - 98 fL    MCH 28.2 27.0 - 31.0 pg    MCHC 32.4 32.0 - 36.0 g/dL    RDW 12.9 11.5 - 14.5 %    Platelets 259 150 - 350 K/uL    MPV 9.1 (L) 9.2 - 12.9 fL    Immature Granulocytes 0.5 0.0 - 0.5 %    Gran # (ANC) 5.6 1.8 - 7.7 K/uL    Immature Grans (Abs) 0.03 0.00 - 0.04  K/uL    Lymph # 0.6 (L) 1.0 - 4.8 K/uL    Mono # 0.1 (L) 0.3 - 1.0 K/uL    Eos # 0.0 0.0 - 0.5 K/uL    Baso # 0.04 0.00 - 0.20 K/uL    nRBC 0 0 /100 WBC    Gran% 88.7 (H) 38.0 - 73.0 %    Lymph% 9.1 (L) 18.0 - 48.0 %    Mono% 1.1 (L) 4.0 - 15.0 %    Eosinophil% 0.0 0.0 - 8.0 %    Basophil% 0.6 0.0 - 1.9 %    Differential Method Automated    Comprehensive metabolic panel    Collection Time: 05/04/18  1:11 AM   Result Value Ref Range    Sodium 141 136 - 145 mmol/L    Potassium 4.7 3.5 - 5.1 mmol/L    Chloride 105 95 - 110 mmol/L    CO2 20 (L) 23 - 29 mmol/L    Glucose 127 (H) 70 - 110 mg/dL    BUN, Bld 30 (H) 8 - 23 mg/dL    Creatinine 1.2 0.5 - 1.4 mg/dL    Calcium 9.3 8.7 - 10.5 mg/dL    Total Protein 8.1 6.0 - 8.4 g/dL    Albumin 3.7 3.5 - 5.2 g/dL    Total Bilirubin 0.6 0.1 - 1.0 mg/dL    Alkaline Phosphatase 108 55 - 135 U/L    AST 26 10 - 40 U/L    ALT 26 10 - 44 U/L    Anion Gap 16 8 - 16 mmol/L    eGFR if African American 49.7 (A) >60 mL/min/1.73 m^2    eGFR if non  43.1 (A) >60 mL/min/1.73 m^2   APTT    Collection Time: 05/04/18  1:11 AM   Result Value Ref Range    aPTT 25.7 21.0 - 32.0 sec   Protime-INR    Collection Time: 05/04/18  1:11 AM   Result Value Ref Range    Prothrombin Time 13.8 (H) 9.0 - 12.5 sec    INR 1.4 (H) 0.8 - 1.2   Lipase    Collection Time: 05/04/18  1:11 AM   Result Value Ref Range    Lipase 13 4 - 60 U/L   Type & Screen    Collection Time: 05/04/18  1:11 AM   Result Value Ref Range    Group & Rh A POS     Indirect Shira NEG    High sensitivity CRP (Cardiac CRP)    Collection Time: 05/04/18  1:11 AM   Result Value Ref Range    CRP, High Sensitivity 54.07 (H) 0.00 - 3.19 mg/L   Procalcitonin    Collection Time: 05/04/18  1:11 AM   Result Value Ref Range    Procalcitonin 0.17 <0.25 ng/mL   Urinalysis, Reflex to Urine Culture Urine, Clean Catch    Collection Time: 05/04/18  1:29 AM   Result Value Ref Range    Specimen UA Urine, Catheterized     Color, UA Yellow Yellow,  Straw, Mary    Appearance, UA Clear Clear    pH, UA 6.0 5.0 - 8.0    Specific Gravity, UA 1.010 1.005 - 1.030    Protein, UA Negative Negative    Glucose, UA Negative Negative    Ketones, UA Negative Negative    Bilirubin (UA) Negative Negative    Occult Blood UA Negative Negative    Nitrite, UA Negative Negative    Urobilinogen, UA Negative <2.0 EU/dL    Leukocytes, UA Negative Negative   CBC auto differential    Collection Time: 05/04/18  4:57 AM   Result Value Ref Range    WBC 4.72 3.90 - 12.70 K/uL    RBC 4.20 4.00 - 5.40 M/uL    Hemoglobin 11.9 (L) 12.0 - 16.0 g/dL    Hematocrit 36.4 (L) 37.0 - 48.5 %    MCV 87 82 - 98 fL    MCH 28.3 27.0 - 31.0 pg    MCHC 32.7 32.0 - 36.0 g/dL    RDW 13.1 11.5 - 14.5 %    Platelets 242 150 - 350 K/uL    MPV 9.6 9.2 - 12.9 fL    Immature Granulocytes 0.6 (H) 0.0 - 0.5 %    Gran # (ANC) 4.1 1.8 - 7.7 K/uL    Immature Grans (Abs) 0.03 0.00 - 0.04 K/uL    Lymph # 0.5 (L) 1.0 - 4.8 K/uL    Mono # 0.1 (L) 0.3 - 1.0 K/uL    Eos # 0.0 0.0 - 0.5 K/uL    Baso # 0.01 0.00 - 0.20 K/uL    nRBC 0 0 /100 WBC    Gran% 87.5 (H) 38.0 - 73.0 %    Lymph% 10.6 (L) 18.0 - 48.0 %    Mono% 1.1 (L) 4.0 - 15.0 %    Eosinophil% 0.0 0.0 - 8.0 %    Basophil% 0.2 0.0 - 1.9 %    Differential Method Automated    Basic metabolic panel    Collection Time: 05/04/18  4:57 AM   Result Value Ref Range    Sodium 140 136 - 145 mmol/L    Potassium 5.1 3.5 - 5.1 mmol/L    Chloride 107 95 - 110 mmol/L    CO2 23 23 - 29 mmol/L    Glucose 116 (H) 70 - 110 mg/dL    BUN, Bld 30 (H) 8 - 23 mg/dL    Creatinine 1.1 0.5 - 1.4 mg/dL    Calcium 9.5 8.7 - 10.5 mg/dL    Anion Gap 10 8 - 16 mmol/L    eGFR if African American 55.2 (A) >60 mL/min/1.73 m^2    eGFR if non  47.9 (A) >60 mL/min/1.73 m^2   Protime-INR    Collection Time: 05/04/18  4:57 AM   Result Value Ref Range    Prothrombin Time 13.8 (H) 9.0 - 12.5 sec    INR 1.4 (H) 0.8 - 1.2         Significant Imaging:   Imaging Results          MRI Lumbar Spine W  WO Cont (Final result)  Result time 05/04/18 00:09:44   Procedure changed from MRI Lumbar Spine Without Contrast     Final result by Trent Ponce MD (05/04/18 00:09:44)                 Impression:      1.6 cm intradural periphery enhancing rounded cystic lesion at the level of L4 displacing the nerve roots laterally.  There is associated thickening of the cauda equina nerve roots proximal to the lesion.  Differential considerations include an epidermoid cyst or spinal arachnoid cyst.  A synovial cyst is thought less likely given the centric location of the lesion.    Severe narrowing of the spinal canal at the L4-L5 level secondary to the intradural lesion with distention of the urinary bladder, consistent of cauda equina type syndrome.  Neurosurgical consultation is recommended.    Acute Schmorl's node at the inferior endplate of L2 and superior endplate of L4 to the right.    Numerous cystic lesions within both kidneys.  Further evaluation with nonemergent ultrasound is suggested.    Findings were discussed with Dr. Olson on behalf of Dr. Ponce at 11:52 p.m on 05/03/2018.    Electronically signed by resident: Russell Moya  Date:    05/03/2018  Time:    22:44    Electronically signed by: Trent Ponce MD  Date:    05/04/2018  Time:    00:09             Narrative:    EXAMINATION:  MRI LUMBAR SPINE W WO CONTRAST    CLINICAL HISTORY:  low back pain, urine retention;  Dorsalgia, unspecified    TECHNIQUE:  Multi sequence multiplanar MRI examination of the lumbar spine obtained before and after the administration of intravenous contrast 5 mL gadolinium.    COMPARISON:  X-rays of the lumbar spine dated 06/16 /15    FINDINGS:  Normal sagittal alignment is seen in the lumbar spine.  There are old Schmorl's node at T12 and L1.  There are more acute Schmorl's node at the inferior endplate of L2 and superior endplate of L4 to the right noting protrusions of the intravertebral disc through the vertebral body endplates with  sclerotic changes and peripheral enhancement.  No evidence of acute fractures, osseous destructive process, or subluxations.  Bone marrow signal intensity is normal.    The spinal cord ends in satisfactory position above L2.  There is thickening of the cauda equina nerve roots.  There is prominence of the epidural fat in the lower lumbar spine.  There is an intradural peripherally enhancing 1.3 x 1.3 x 1.6 cm rounded cystic structure within the spinal canal at the level of L4.  This lesion displaces the nerve roots laterally.  This lesion is notably T1 hypointense and T2/STIR hyperintense.  No evidence of perilesional edema.    T12-L1: No spinal canal stenosis or neural foraminal narrowing.    L1-L2: No spinal canal stenosis or neural foraminal narrowing.    L2-L3: No spinal canal stenosis or neural foraminal narrowing.    L3-L4: No spinal canal stenosis or neural foraminal narrowing.    L4-L5: There is severe narrowing of the spinal canal secondary to the intradural lesion.  The neural foramina is within normal limits.    L5-S1: No spinal canal stenosis or neural foraminal narrowing.    No paraspinous mass is identified. The visualized paraspinous soft tissue structures appear within normal limits.  There are numerous cystic lesions within both kidneys.  The urinary bladder is distended.

## 2018-05-04 NOTE — PROVIDER PROGRESS NOTES - EMERGENCY DEPT.
Encounter Date: 5/3/2018    ED Physician Progress Notes         SCRIBE NOTE: I, Julissa Davidson, am scribing for, and in the presence of, Dr. Brock.    I received this patient in sign out from Dr. Bai. Patient presents with back pain and urinary incontinence. MRI lumbar spine demonstrates an intradural cyst at L4 displacing the nerve roots with thickening cauda equina nerve roots proximal to the lesion. Concerning for cauda equina syndrome, neurosurgery consult recommendation pending. Labs and urine added. Will place frye catheter for treatment of urinary retention.     Neurosurgery will admit the patient.

## 2018-05-04 NOTE — ED NOTES
Pt's daughter attempting to take patient home due to lengthy stay in ED. Charge nurse , kimani houser notified and at bedside also VIKKI humphrey at bedside .

## 2018-05-04 NOTE — OP NOTE
DATE OF PROCEDURE:  05/04/2018.    PREOPERATIVE DIAGNOSES:  1.  Cauda equina syndrome.  2.  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  3.  Low back pain with bilateral S1 radiculopathy.    POSTOPERATIVE DIAGNOSES:  1.  Cauda equina syndrome.  2.  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  3.  Low back pain with bilateral S1 radiculopathy.    OPERATIONS PERFORMED:  1.  L3-L5 left unilateral approach for bilateral laminectomies.  2.  Intradural resection of nerve root tumor.  3.  Use of intraoperative fluoroscopy.  4.  Use of intraoperative microscope with microscopic dissection.  5.  Use of intraoperative neuromonitoring.    SURGEON:  Jude Ramos D.O.    CO-SURGEON:  Placido Rob M.D.    ASSISTANT:  EMILIANO Steward    INDICATIONS FOR PHYSICIAN ASSISTANT:  There were no residents available to   Scrub for the case.  The PA scrubbed for the beginning opening and closing portions of the case.  The assistance of a P.A. helps greatly in patient positioning and   assistance with simple maneuvers during the surgery which leads to shorter case   length and greater safety.    INDICATIONS FOR CO-SURGEON:  Co-surgeon was indicated for assistance in   resection of intradural tumor to provide greater safety and to expedite the   procedure, which results in shorter case length and better   patient safety.    INDICATIONS FOR SURGERY:  A 77-year-old female with a history of low back pain,   who presented with acute exacerbation of low back pain and bilateral S1   radiculopathies as well as episodes of urinary incontinence.  The patient was   found to have an intradural rim-enhancing spherical mass of unclear etiology   involved with the cauda equina nerve roots behind the L4 vertebral body.  Given   the patient's neurologic exam and lack of diagnosis, decision was made to take   the patient for intradural resection of the tumor to establish a diagnosis and   to alleviate pressure on the nerve roots.  Consents were obtained.  All risks,    benefits, and alternatives were discussed with the patient.  The patient and her   family members were in agreement.    PROCEDURE IN DETAIL:  The patient was correctly identified and taken to the   Operating Room where the Anesthesia team administered general endotracheal   anesthesia.  The Neuromonitoring Tech placed neuromonitoring electrodes in   various muscle groups to be used for intraoperative neuromonitoring.  A Corbett   catheter was placed.  Prophylactic IV antibiotics were given and the patient was   flipped into the prone position on a Duane spine table and all pressure   points were padded.  Using fluoroscopy, the incision was planned from L3-L5.    The incision was then prepped and draped in typical sterile fashion.    Incision was infiltrated with local anesthetic and incised with a 10-blade   scalpel, followed by dissection using Bovie cautery down to the spinous process   and the left-sided lamina, exposing the L3, L4, and L5 laminae and medial facet   joints.  This was confirmed using intraoperative fluoroscopy.  Next, using a   high-speed drill, laminectomies were performed from L3-L5.  The spinous   processes were undercut and lateralized without disrupting the posterior tension   band and paraspinal musculature on the right side.  The laminectomies were   completed in piecemeal fashion using Kerrison rongeurs.  The ligamentum flavum   was also removed using Kerrison rongeurs, exposing the underlying thecal sac.    Hemostasis was obtained with FloSeal.  Next, the microscope was brought into the   field and the dura was incised using a 15-blade scalpel, exposing the cauda   equina nerve roots with a grayish pink mass attached to one of the nerve roots.    It was clear that the nerve root was entering the tumor and it was heavily   invested in the tumor, entering from the top and exiting from the bottom.  Next,   using microdissection and microdissector instruments, the nerve roots were   freed  from the tumor, isolating it to its attached nerve root.  Attempts were   made to free the tumor from the nerve root, which was impossible as it was   blending in with the tumor.  Therefore, decision was made to cut the nerve root   at its attachment points.  Using neuromonitoring Prass probe, it was tested and   produced some signals in the left lateral vastus medialis and calf, but there   were no EMG changes or sustained EMG or neuromonitoring changes.  Hemostasis was   obtained with bipolar cautery and the area was irrigated with copious amounts   of solution.  Next, the dura was then closed with a running 4-0 Nurolon in   watertight fashion.  A Valsalva maneuver was performed which did not reveal any   leakage of spinal fluid.  Next, the incision was covered with DuraSeal and then   closed in layered fashion, first with 0 Vicryls in the musculofascial layer,   followed by a running 1-0 Stratafix in the fascial layer.  Next, the dermal   layer was closed with 2-0 Vicryls and finally a subcuticular 4-0 Stratafix   suture was used, followed by Dermabond on the skin.  The patient was extubated   and gently rolled back into the supine position on the hospital bed without incident.    COMPLICATIONS:  None.    ESTIMATED BLOOD LOSS:  50 mL.    DRAINS:  None.    SPECIMENS:  Permanent intradural tumor.    INCISION:  Lumbar.    WOUND CLASSIFICATION:  Clean.    CONDITION:  Stable.    PROGNOSIS:  Good.      CESAR  dd: 05/04/2018 17:30:25 (CD)  td: 05/04/2018 18:42:00 ([facility])  Doc ID   #[id number]  Job ID #[id number]    CC:

## 2018-05-04 NOTE — PLAN OF CARE
Problem: Fall Risk (Adult)  Goal: Identify Related Risk Factors and Signs and Symptoms  Related risk factors and signs and symptoms are identified upon initiation of Human Response Clinical Practice Guideline (CPG)   Outcome: Ongoing (interventions implemented as appropriate)   05/04/18 1242   Fall Risk   Related Risk Factors (Fall Risk) age-related changes;fear of falling;inadequate lighting;slipper/uneven surfaces;objects hard to reach;environment unfamiliar   Signs and Symptoms (Fall Risk) presence of risk factors

## 2018-05-04 NOTE — HPI
Mrs. 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 Hospital Medicine was consulted for preop clearance for urgent laminectomy. Patient endorses dyspnea with minimal exertion (unable to complete simple household chores). She endorses a 90 pack year tobacco history and currently smokes 1PPD. Patient denies any recent history of CP (at rest or exertion), palpitations, orthopnea, PND.

## 2018-05-04 NOTE — PLAN OF CARE
Problem: Patient Care Overview  Goal: Plan of Care Review  Outcome: Ongoing (interventions implemented as appropriate)   05/04/18 8075   Coping/Psychosocial   Plan Of Care Reviewed With patient

## 2018-05-04 NOTE — PLAN OF CARE
Pt denies any metal implants, body piercings or jewelry, dentures, contacts or hearing aids. Preop questions answered. Instructed pt to call with any questions. Call light within reach. Family at bedside. Projected surgery start time given to pt. Pt. Verbalized understanding.

## 2018-05-04 NOTE — ASSESSMENT & PLAN NOTE
Surgical Risk Assessment   Active cardiac issues:  Active decompensated heart failure? No   Unstable angina?  No   Significant uncontrolled arrhythmias? No   Severe valvular heart disease-Aortic or Mitral Stenosis? Echo 2014 was normal. Exam today positive for murmur consistent with aortic stenosis   Recent MI or coronary revascularization < 30 days? No     Cardiac Risk Factors  History of CAD/ischemic heart disease? Yes   History of cerebrovascular disease? No   History of compensated heart failure? No   Type 2 diabetes requiring insulin? No   Serum Creatinine > 2? No   Total cardiac risk factors 1     Functional mets <4  < 4* METs -unable to walk > 2 blocks on level ground without stopping due to symptoms  - eating, dressing, toileting, walking indoors, light housework. POOR   > 4* METs -climbing > 1 flight of stairs without stopping  -walking up hill > 1-2 blocks  -scrubbing floors  -moving furniture  - golf, bowling, dancing or tennis  -running short distance MODERATE to EXCELLENT   * performance of any one of the activities     Assessment/Plan:   Cardiovascular Risk Assessment:  Emergent Surgery  No active cardiac problems (such as unstable angina, decompensated heart failure, significant uncontrolled arrhythmias or severe valvular disease).  Surgery is Moderate risk  Functional Status: <4 Mets 2/2 dyspnea with minimal exertion    Revised Cardiac Risk Index   1. History of ischemic heart disease   2. History of congestive heart failure   3. History of cerebrovascular disease (stroke or transient ischemic attack)   4. History of diabetes requiring preoperative insulin use 5. Chronic kidney disease (creatinine > 2 mg/dL)   5. Undergoing suprainguinal vascular, intraperitoneal, or intrathoracic surgery Risk for cardiac death, nonfatal myocardial infarction, and nonfatal cardiac arrest     0 predictors = 0.4%, 1 predictor = 0.9%, 2 predictors = 6.6%, ?3 predictors = >11%    RCRI Calculator Class and Risk  percentage: 0.9%    Other Issues:       Anticoagulation:  On Chronic coumadin for DVT and polycythemia vera. Coumadin stopped yesterday and Vitamin K given in ED          Recommendation:  1. Proceed to Surgery with caution. Patient is at increased risk for post-op pulmonary complications 2/2 to COPD, active smoking status, and poor functional status with inability to achieve 4 mets 2/2 to dyspnea. Recommend aggressive post-operative pulmonary toilet, duo-nebs, and incentive spirometry. Hospital Medicine with follow along with patient post-operatively.

## 2018-05-04 NOTE — CONSULTS
"Consult Note  Neurosurgery    Admit Date: 5/3/2018  LOS: 0    Code Status: Full Code     CC: <principal problem not specified>    SUBJECTIVE:     History of Present Illness: A 77-year-old female with medical comorbidities significant for HTN, CAD, polycythemia vera, COPD, polycystic kidney disease, CKD stage 3 presents to the ED with a chief complaint of back pain. Pain began 3 days ago after lifting a full gallon of milk. Pt hurt her back while twisting away from the refrigerator.  Patient reports pain that she describes as a fist in the middle of my low back".  Patient has a history of chronic low back pain after a fall resulting in multiple lumbar fractures 6 years ago.  She reports that the pain occasionally radiates down bilateral lower extremities, however this is her baseline and not worse over the past 3 days after this new injury. Today, patient reports an episode of urinary incontinence which is not typical for her.  Patient reports noticing that she had urinated on herself and did not feel any sensation.  She denies any lower extremity weakness, numbness, saddle anesthesia, loss of bowel function.    MRI shows ring enhancing intradural lesion at level of cauda equina.    On exam pt with bilateral S1 dermatome radiculopathy. Otherwise intact. Rectal tone present. Corbett catheter in place.           OBJECTIVE:   Vital Signs (Most Recent):   Temp: 98.1 °F (36.7 °C) (05/03/18 1629)  Pulse: 96 (05/04/18 0301)  Resp: 20 (05/04/18 0301)  BP: (!) 163/72 (05/04/18 0301)  SpO2: (!) 93 % (05/04/18 0301)    Vital Signs (24h Range):   Temp:  [98.1 °F (36.7 °C)] 98.1 °F (36.7 °C)  Pulse:  [] 96  Resp:  [18-20] 20  SpO2:  [93 %-100 %] 93 %  BP: (158-179)/(67-81) 163/72      I & O (Last 24h):  No intake or output data in the 24 hours ending 05/04/18 0321    Physical Exam:  General: well developed, well nourished, no distress.   Head: normocephalic, atraumatic  Cervical Spine: No midline tenderness to " palpation.  Thoracolumbosacral Spine: No midline tenderness to palpation.  GCS: Motor: 6/Verbal: 5/Eyes: 4 GCS Total: 15  Mental Status: Awake, Alert, Oriented x 4  Language: No aphasia  Speech: No dysarthria  Facial Droop: None   Cranial nerves: CN III-XII grossly intact.  Visual Fields: Intact.   Eyes: Pupils equal and reactive to light. Intact Conjugate horizontal and vertical pursuit. No nystagmus. No gaze deviation.   Pulmonary: No distress.  Sensory: Bilateral S1 radiculopathy.  Propioception: No deficit in 1st digit of toe bilaterally.  Rectal Tone: Present.  Drift: None.  Upper Extremity Ataxia: No Dysmetria Bilaterally  Lower Extremity Ataxia: Not tested.  Dysdiadochokinesia: Not tested.  Reflexes: 2+ patellar bilaterally.  Vázquez: Absent  Clonus: Absent  Babinski: Absent  Romberg: Not Tested  Pulses: Brisk and symmetric radial, DP and tibial pulses.  Motor Strength:    Strength  Shoulder Abduction Elbow Extension Elbow Flexion Wrist Extension Wrist Flexion Finger Opposition Finger Add Finger Abd   Upper: R 5/5 5/5 5/5 5/5 5/5 5/5 5/5 5/5    L 5/5 5/5 5/5 5/5 5/5 5/5 5/5 5/5     Hip Flexion Knee Extension Knee  Flexion Ankle Dflexion Ankle Pflexion EHL     Lower: R 5/5 5/5 5/5 5/5 5/5 5/5      L 5/5 5/5 5/5 5/5 5/5 5/5             Lines/Drains/Airway:              Urethral Catheter 05/04/18 0111 Double-lumen 16 Fr. (Active)     Nutrition/Tube Feeds:   Current Diet Order   Procedures    Diet NPO       Labs:  ABG: No results for input(s): PH, PO2, PCO2, HCO3, POCSATURATED, BE in the last 24 hours.  BMP:  Recent Labs  Lab 05/04/18  0111      K 4.7      CO2 20*   BUN 30*   CREATININE 1.2   *     LFT: Lab Results   Component Value Date    AST 26 05/04/2018    ALT 26 05/04/2018    ALKPHOS 108 05/04/2018    BILITOT 0.6 05/04/2018    ALBUMIN 3.7 05/04/2018    PROT 8.1 05/04/2018     CBC:   Lab Results   Component Value Date    WBC 6.35 05/04/2018    HGB 12.7 05/04/2018    HCT 39.2 05/04/2018     MCV 87 05/04/2018     05/04/2018     Microbiology x 7d:   Microbiology Results (last 7 days)     ** No results found for the last 168 hours. **            ASSESSMENT/PLAN:   76 yo female with pmh of DVT on warfarin now with symptomatic intradural ring enhancing lesion at cauda equina. Neurologically stable on exam.    --No acute neurosurgical intervention required.  --Admit to neurosurgery under floor status.  --Begin q4 neurochecks.  --Begin q4 vital checks.  --Please keep pt NPO.  --Will administer 10mg of vitamin K, please hold home anticoagulant therapy.  --Will begin infectious workup.  --We will continue to monitor closely, please contact us with any questions or concerns.    Dallas Wiseman

## 2018-05-04 NOTE — PROGRESS NOTES
Certification of Assistant at Surgery       Surgery Date: 5/4/2018     Participating Surgeons:  Surgeon(s) and Role:     * Jude Ramos DO - Primary     * Placido Rob MD - Assisting     * Adelita Benedict PA-C - Resident - Assisting    Procedures:  Procedure(s) (LRB):  LAMINECTOMY-LUMBAR-DECOMPRESSION and RESECTION OF TUMOR L3-5 (N/A)    Assistant Surgeon's Certification of Necessity:  I understand that section 1842 (b) (6) (d) of the Social Security Act generally prohibits Medicare Part B reasonable charge payment for the services of assistants at surgery in teaching hospitals when qualified residents are available to furnish such services. I certify that the services for which payment is claimed were medically necessary, and that no qualified resident was available to perform the services. I further understand that these services are subject to post-payment review by the Medicare carrier.      Adelita Benedict PA-C    05/04/2018  5:01 PM

## 2018-05-04 NOTE — TRANSFER OF CARE
"Anesthesia Transfer of Care Note    Patient: Sunita Zimmer    Procedure(s) Performed: Procedure(s) (LRB):  LAMINECTOMY-LUMBAR-DECOMPRESSION and RESECTION OF TUMOR L3-5 (N/A)    Patient location: PACU    Anesthesia Type: general    Transport from OR: Transported from OR on 6-10 L/min O2 by face mask with adequate spontaneous ventilation    Post pain: adequate analgesia    Post assessment: no apparent anesthetic complications and tolerated procedure well    Post vital signs: stable    Level of consciousness: responds to stimulation (sleeping)    Nausea/Vomiting: no nausea/vomiting    Complications: none    Transfer of care protocol was followed      Last vitals:   Visit Vitals  BP (!) 92/44 (BP Location: Right arm, Patient Position: Lying)   Pulse 108   Temp 36.2 °C (97.2 °F) (Temporal)   Resp 16   Ht 5' 2" (1.575 m)   Wt 45.4 kg (100 lb)   SpO2 100%   Breastfeeding? No   BMI 18.29 kg/m²     "

## 2018-05-04 NOTE — PLAN OF CARE
Problem: Infection, Risk/Actual (Adult)  Goal: Identify Related Risk Factors and Signs and Symptoms  Related risk factors and signs and symptoms are identified upon initiation of Human Response Clinical Practice Guideline (CPG)   Outcome: Ongoing (interventions implemented as appropriate)   05/04/18 1242   Infection, Risk/Actual   Related Risk Factors (Infection, Risk/Actual) intimate contact;immunization status;medication effects

## 2018-05-05 PROBLEM — G95.89 INTRADURAL MASS: Status: ACTIVE | Noted: 2018-05-05

## 2018-05-05 PROBLEM — J44.9 COPD (CHRONIC OBSTRUCTIVE PULMONARY DISEASE): Status: ACTIVE | Noted: 2018-05-05

## 2018-05-05 LAB
ANION GAP SERPL CALC-SCNC: 12 MMOL/L
BACTERIA UR CULT: NO GROWTH
BASOPHILS # BLD AUTO: 0.01 K/UL
BASOPHILS NFR BLD: 0.1 %
BUN SERPL-MCNC: 28 MG/DL
CALCIUM SERPL-MCNC: 8.5 MG/DL
CHLORIDE SERPL-SCNC: 110 MMOL/L
CO2 SERPL-SCNC: 22 MMOL/L
CREAT SERPL-MCNC: 0.9 MG/DL
DIFFERENTIAL METHOD: ABNORMAL
EOSINOPHIL # BLD AUTO: 0 K/UL
EOSINOPHIL NFR BLD: 0 %
ERYTHROCYTE [DISTWIDTH] IN BLOOD BY AUTOMATED COUNT: 13.1 %
EST. GFR  (AFRICAN AMERICAN): >60 ML/MIN/1.73 M^2
EST. GFR  (NON AFRICAN AMERICAN): >60 ML/MIN/1.73 M^2
GLUCOSE SERPL-MCNC: 85 MG/DL
HCT VFR BLD AUTO: 33.5 %
HGB BLD-MCNC: 10.6 G/DL
IMM GRANULOCYTES # BLD AUTO: 0.06 K/UL
IMM GRANULOCYTES NFR BLD AUTO: 0.7 %
INR PPP: 1
LYMPHOCYTES # BLD AUTO: 0.8 K/UL
LYMPHOCYTES NFR BLD: 9.3 %
MCH RBC QN AUTO: 28.1 PG
MCHC RBC AUTO-ENTMCNC: 31.6 G/DL
MCV RBC AUTO: 89 FL
MONOCYTES # BLD AUTO: 0.6 K/UL
MONOCYTES NFR BLD: 7.3 %
NEUTROPHILS # BLD AUTO: 7.1 K/UL
NEUTROPHILS NFR BLD: 82.6 %
NRBC BLD-RTO: 0 /100 WBC
PLATELET # BLD AUTO: 217 K/UL
PMV BLD AUTO: 9.3 FL
POTASSIUM SERPL-SCNC: 4.1 MMOL/L
PROTHROMBIN TIME: 10.2 SEC
RBC # BLD AUTO: 3.77 M/UL
SODIUM SERPL-SCNC: 144 MMOL/L
WBC # BLD AUTO: 8.54 K/UL

## 2018-05-05 PROCEDURE — 94640 AIRWAY INHALATION TREATMENT: CPT

## 2018-05-05 PROCEDURE — 25000242 PHARM REV CODE 250 ALT 637 W/ HCPCS: Performed by: STUDENT IN AN ORGANIZED HEALTH CARE EDUCATION/TRAINING PROGRAM

## 2018-05-05 PROCEDURE — 11000001 HC ACUTE MED/SURG PRIVATE ROOM

## 2018-05-05 PROCEDURE — 85610 PROTHROMBIN TIME: CPT

## 2018-05-05 PROCEDURE — 36415 COLL VENOUS BLD VENIPUNCTURE: CPT

## 2018-05-05 PROCEDURE — 25000003 PHARM REV CODE 250: Performed by: PHYSICIAN ASSISTANT

## 2018-05-05 PROCEDURE — 94761 N-INVAS EAR/PLS OXIMETRY MLT: CPT

## 2018-05-05 PROCEDURE — 99232 SBSQ HOSP IP/OBS MODERATE 35: CPT | Mod: ,,, | Performed by: HOSPITALIST

## 2018-05-05 PROCEDURE — 63600175 PHARM REV CODE 636 W HCPCS: Performed by: PHYSICIAN ASSISTANT

## 2018-05-05 PROCEDURE — 85025 COMPLETE CBC W/AUTO DIFF WBC: CPT

## 2018-05-05 PROCEDURE — 25000003 PHARM REV CODE 250: Performed by: STUDENT IN AN ORGANIZED HEALTH CARE EDUCATION/TRAINING PROGRAM

## 2018-05-05 PROCEDURE — 80048 BASIC METABOLIC PNL TOTAL CA: CPT

## 2018-05-05 RX ORDER — TIOTROPIUM BROMIDE 18 UG/1
1 CAPSULE ORAL; RESPIRATORY (INHALATION) DAILY
Status: DISCONTINUED | OUTPATIENT
Start: 2018-05-05 | End: 2018-05-11 | Stop reason: HOSPADM

## 2018-05-05 RX ORDER — FLUTICASONE FUROATE AND VILANTEROL 100; 25 UG/1; UG/1
1 POWDER RESPIRATORY (INHALATION) DAILY
Status: DISCONTINUED | OUTPATIENT
Start: 2018-05-05 | End: 2018-05-11 | Stop reason: HOSPADM

## 2018-05-05 RX ORDER — IPRATROPIUM BROMIDE AND ALBUTEROL SULFATE 2.5; .5 MG/3ML; MG/3ML
3 SOLUTION RESPIRATORY (INHALATION) EVERY 4 HOURS PRN
Status: DISCONTINUED | OUTPATIENT
Start: 2018-05-05 | End: 2018-05-05

## 2018-05-05 RX ORDER — TIOTROPIUM BROMIDE 18 UG/1
1 CAPSULE ORAL; RESPIRATORY (INHALATION) DAILY
Status: DISCONTINUED | OUTPATIENT
Start: 2018-05-06 | End: 2018-05-05

## 2018-05-05 RX ORDER — IPRATROPIUM BROMIDE AND ALBUTEROL SULFATE 2.5; .5 MG/3ML; MG/3ML
3 SOLUTION RESPIRATORY (INHALATION)
Status: DISCONTINUED | OUTPATIENT
Start: 2018-05-05 | End: 2018-05-11 | Stop reason: HOSPADM

## 2018-05-05 RX ADMIN — GABAPENTIN 300 MG: 300 CAPSULE ORAL at 09:05

## 2018-05-05 RX ADMIN — DIAZEPAM 5 MG: 5 TABLET ORAL at 06:05

## 2018-05-05 RX ADMIN — SODIUM CHLORIDE: 0.9 INJECTION, SOLUTION INTRAVENOUS at 07:05

## 2018-05-05 RX ADMIN — ONDANSETRON 8 MG: 8 TABLET, ORALLY DISINTEGRATING ORAL at 09:05

## 2018-05-05 RX ADMIN — MUPIROCIN 1 G: 20 OINTMENT TOPICAL at 10:05

## 2018-05-05 RX ADMIN — TIOTROPIUM BROMIDE 18 MCG: 18 CAPSULE ORAL; RESPIRATORY (INHALATION) at 04:05

## 2018-05-05 RX ADMIN — DIAZEPAM 5 MG: 5 TABLET ORAL at 12:05

## 2018-05-05 RX ADMIN — DOCUSATE SODIUM 100 MG: 100 CAPSULE, LIQUID FILLED ORAL at 11:05

## 2018-05-05 RX ADMIN — RAMIPRIL 10 MG: 10 CAPSULE ORAL at 12:05

## 2018-05-05 RX ADMIN — CEFAZOLIN SODIUM 2 G: 1 INJECTION, POWDER, FOR SOLUTION INTRAMUSCULAR; INTRAVENOUS at 07:05

## 2018-05-05 RX ADMIN — CYCLOBENZAPRINE HYDROCHLORIDE 10 MG: 5 TABLET, FILM COATED ORAL at 12:05

## 2018-05-05 RX ADMIN — CLONIDINE HYDROCHLORIDE 0.3 MG: 0.2 TABLET ORAL at 12:05

## 2018-05-05 RX ADMIN — GABAPENTIN 300 MG: 300 CAPSULE ORAL at 11:05

## 2018-05-05 RX ADMIN — ONDANSETRON 8 MG: 8 TABLET, ORALLY DISINTEGRATING ORAL at 12:05

## 2018-05-05 RX ADMIN — MUPIROCIN 1 G: 20 OINTMENT TOPICAL at 09:05

## 2018-05-05 RX ADMIN — OXYCODONE HYDROCHLORIDE AND ACETAMINOPHEN 1 TABLET: 7.5; 325 TABLET ORAL at 12:05

## 2018-05-05 RX ADMIN — OXYCODONE HYDROCHLORIDE AND ACETAMINOPHEN 1 TABLET: 7.5; 325 TABLET ORAL at 09:05

## 2018-05-05 RX ADMIN — IPRATROPIUM BROMIDE AND ALBUTEROL SULFATE 3 ML: .5; 3 SOLUTION RESPIRATORY (INHALATION) at 07:05

## 2018-05-05 RX ADMIN — BISACODYL 10 MG: 10 SUPPOSITORY RECTAL at 11:05

## 2018-05-05 RX ADMIN — OXYCODONE HYDROCHLORIDE AND ACETAMINOPHEN 1 TABLET: 7.5; 325 TABLET ORAL at 05:05

## 2018-05-05 RX ADMIN — GABAPENTIN 300 MG: 300 CAPSULE ORAL at 03:05

## 2018-05-05 RX ADMIN — CLONIDINE HYDROCHLORIDE 0.3 MG: 0.2 TABLET ORAL at 09:05

## 2018-05-05 RX ADMIN — DOCUSATE SODIUM 100 MG: 100 CAPSULE, LIQUID FILLED ORAL at 09:05

## 2018-05-05 RX ADMIN — CYCLOBENZAPRINE HYDROCHLORIDE 10 MG: 5 TABLET, FILM COATED ORAL at 06:05

## 2018-05-05 RX ADMIN — OXYCODONE HYDROCHLORIDE AND ACETAMINOPHEN 1 TABLET: 7.5; 325 TABLET ORAL at 08:05

## 2018-05-05 NOTE — ASSESSMENT & PLAN NOTE
- Patient stable post-operatively from a pulmonary standpoint. No wheezing or SOB.  - Will resume home inhalers.  - Supplemental oxygen nasal cannula to keep SpO2 88-92%  - Duo-nebs Q4 PRN for wheezing/SOB.   - Incentive spirometry

## 2018-05-05 NOTE — PROGRESS NOTES
"[facility]  Neurosurgery  Progress Note    Subjective:     History of Present Illness: A 77-year-old female with medical comorbidities significant for HTN, CAD, polycythemia vera, COPD, polycystic kidney disease, CKD stage 3 presents to the ED with a chief complaint of back pain. Pain began 3 days ago after lifting a full gallon of milk. Pt hurt her back while twisting away from the refrigerator.  Patient reports pain that she describes as a fist in the middle of my low back".  Patient has a history of chronic low back pain after a fall resulting in multiple lumbar fractures 6 years ago.  She reports that the pain occasionally radiates down bilateral lower extremities, however this is her baseline and not worse over the past 3 days after this new injury. Today, patient reports an episode of urinary incontinence which is not typical for her.  Patient reports noticing that she had urinated on herself and did not feel any sensation.  She denies any lower extremity weakness, numbness, saddle anesthesia, loss of bowel function.     MRI shows ring enhancing intradural lesion at level of cauda equina.     On exam pt with bilateral S1 dermatome radiculopathy. Otherwise intact. Rectal tone present. Corbett catheter in place.    Post-Op Info:  Procedure(s) (LRB):  LAMINECTOMY-LUMBAR-DECOMPRESSION and RESECTION OF TUMOR L3-5 (N/A)   1 Day Post-Op     Interval History: POD1 s/p laminectomy for intradural tumor resection at L4 level. Flat in bed overnight, given narcan x1 after too much pain medication in PACU.     Medications:  Continuous Infusions:   sodium chloride 0.9% 75 mL/hr at 05/05/18 0708    sodium chloride 0.9%       Scheduled Meds:   bisacodyl  10 mg Rectal Daily    cloNIDine  0.3 mg Oral BID    docusate sodium  100 mg Oral BID    fluticasone-vilanterol  1 puff Inhalation Daily    gabapentin  300 mg Oral TID    mupirocin  1 g Nasal BID    ramipril  10 mg Oral Daily    tiotropium  1 " "capsule Inhalation Daily     PRN Meds:sodium chloride, sodium chloride, albuterol-ipratropium 2.5mg-0.5mg/3mL, aluminum-magnesium hydroxide-simethicone, cyclobenzaprine, dextrose 50%, dextrose 50%, diazePAM, glucagon (human recombinant), glucose, glucose, glucose, insulin aspart U-100, nitroGLYCERIN, ondansetron, oxyCODONE-acetaminophen, oxyCODONE-acetaminophen, promethazine (PHENERGAN) IVPB, senna-docusate 8.6-50 mg     Review of Systems  Objective:     Weight: 49.7 kg (109 lb 9.1 oz)  Body mass index is 20.04 kg/m².  Vital Signs (Most Recent):  Temp: 98.2 °F (36.8 °C) (05/05/18 1703)  Pulse: (!) 116 (05/05/18 1703)  Resp: 18 (05/05/18 1703)  BP: 129/69 (05/05/18 1704)  SpO2: (!) 86 % (05/05/18 1703) Vital Signs (24h Range):  Temp:  [96.5 °F (35.8 °C)-98.8 °F (37.1 °C)] 98.2 °F (36.8 °C)  Pulse:  [] 116  Resp:  [15-20] 18  SpO2:  [86 %-100 %] 86 %  BP: (124-193)/() 129/69       Date 05/05/18 0700 - 05/06/18 0659   Shift 6658-9136 4288-8951 0705-3962 24 Hour Total   I  N  T  A  K  E   I.V.  (mL/kg) 600  (12.1)   600  (12.1)    Shift Total  (mL/kg) 600  (12.1)   600  (12.1)   O  U  T  P  U  T   Shift Total  (mL/kg)       Weight (kg) 49.7 49.7 49.7 49.7                        Urethral Catheter 05/04/18 0111 Double-lumen 16 Fr. (Active)   Site Assessment Clean;Intact 5/5/2018  8:03 AM   Collection Container Urimeter 5/5/2018  8:03 AM   Securement Method secured to top of thigh w/ adhesive device 5/5/2018  8:03 AM   Catheter Care Performed yes 5/5/2018  8:03 AM   Reason for Continuing Urinary Catheterization Post operative 5/5/2018  8:03 AM   CAUTI Prevention Bundle StatLock in place w 1" slack;Intact seal between catheter & drainage tubing;Drainage bag off the floor;Green sheeting clip in use;No dependent loops or kinks;Drainage bag not overfilled (<2/3 full);Drainage bag below bladder 5/5/2018  8:03 AM   Output (mL) 1150 mL 5/5/2018  6:00 AM       Neurosurgery Physical Exam   -Alert and oriented " x4  -PERRL, EOMI, face symmetrical, tongue midline, facial sensation symmetric, shoulder shrug full strength and symmetric  -Motor: 5/5 throughout the upper and lower extremites bilaterally  -sensation intact to light touch throughout  -reflexes 2+ in patella and brachioradialis bilaterally  -no clonus, no Vázquez's  -coordination intact to finger to nose bilaterally      Significant Labs:    Recent Labs  Lab 05/04/18 0457 05/04/18 1718 05/05/18  0415   * 94 85    142 144   K 5.1 3.9 4.1    109 110   CO2 23 24 22*   BUN 30* 32* 28*   CREATININE 1.1 1.0 0.9   CALCIUM 9.5 8.1* 8.5*       Recent Labs  Lab 05/04/18 0457 05/04/18 1718 05/05/18 0415   WBC 4.72 6.94 8.54   HGB 11.9* 9.1* 10.6*   HCT 36.4* 27.4* 33.5*    201 217       Recent Labs  Lab 05/04/18  0111 05/04/18 0457 05/05/18  0839   INR 1.4* 1.4* 1.0   APTT 25.7  --   --      Microbiology Results (last 7 days)     Procedure Component Value Units Date/Time    Urine culture [[id number]] Collected:  05/04/18 0129    Order Status:  Completed Specimen:  Urine from Catheterized Updated:  05/05/18 0728     Urine Culture, Routine No growth    Narrative:       add on per Dr Bai order #[id number] 05/04/2018  04:02 cxurn    Blood culture [[id number]] Collected:  05/04/18 0457    Order Status:  Completed Specimen:  Blood from Peripheral, Hand, Left Updated:  05/05/18 0612     Blood Culture, Routine No Growth to date     Blood Culture, Routine No Growth to date    Urine culture [[id number]]     Order Status:  Completed Specimen:  Urine from Urine, Catheterized     Urine culture [[id number]]     Order Status:  Canceled Specimen:  Urine         All pertinent labs from the last 24 hours have been reviewed.    Significant Diagnostics:  I have reviewed all pertinent imaging results/findings within the past 24 hours.    Assessment/Plan:     Intradural mass    77F with tnilateral radiculopathy, ring enhancing lesion in cauda equina behind L4 vertebral  body. Now s/p laminectomy for mass resection    -Pain control with oxy 5  -wound stable  -ok to increase head of bed today around noon,   -PT/OT/OOB once bed restrictions finished  -holding home coumadin  -home COPD inhalers and duo nebs q4 per medicine recs  -regular diet  -SSI  -remove frye  -stop IVFs            Jude Mckeon MD  Neurosurgery  [facility]

## 2018-05-05 NOTE — SUBJECTIVE & OBJECTIVE
Interval History: Patient POD 1 s/p laminectomy for cauda equina syndrome. Patient doing well. She denies any fevers, chills, CP, SOB, or cough.     Review of Systems   Constitutional: Negative for chills and fever.   HENT: Negative for congestion and sore throat.    Eyes: Negative for photophobia and visual disturbance.   Respiratory: Negative for cough, chest tightness, shortness of breath and wheezing.    Cardiovascular: Negative for chest pain, palpitations and leg swelling.   Gastrointestinal: Negative for abdominal pain, diarrhea, nausea and vomiting.   Genitourinary: Negative for dysuria and hematuria.   Musculoskeletal: Negative for arthralgias and myalgias.   Skin: Negative for color change and rash.   Neurological: Negative for dizziness, syncope, light-headedness and headaches.     Objective:     Vital Signs (Most Recent):  Temp: 98.8 °F (37.1 °C) (05/05/18 1224)  Pulse: (!) 117 (05/05/18 1224)  Resp: 16 (05/05/18 1224)  BP: (!) 135/97 (05/05/18 1224)  SpO2: 98 % (05/05/18 1224) Vital Signs (24h Range):  Temp:  [96.5 °F (35.8 °C)-98.8 °F (37.1 °C)] 98.8 °F (37.1 °C)  Pulse:  [] 117  Resp:  [15-20] 16  SpO2:  [93 %-100 %] 98 %  BP: ()/() 135/97     Weight: 49.7 kg (109 lb 9.1 oz)  Body mass index is 20.04 kg/m².    Intake/Output Summary (Last 24 hours) at 05/05/18 1310  Last data filed at 05/05/18 0708   Gross per 24 hour   Intake             2668 ml   Output             1670 ml   Net              998 ml      Physical Exam   Constitutional: She is oriented to person, place, and time. No distress.   Frail appearing elderly lady.    HENT:   Head: Normocephalic and atraumatic.   Mouth/Throat: Oropharynx is clear and moist.   Eyes: Conjunctivae and EOM are normal. Pupils are equal, round, and reactive to light.   Neck: Normal range of motion. Neck supple. No JVD present.   Cardiovascular: Normal rate, regular rhythm and intact distal pulses.    Murmur (Harsh sytolic ejection murmur (III/VI)  radiating to carotids. ) heard.  Pulmonary/Chest: Effort normal. No respiratory distress. She has no wheezes. She has no rales. She exhibits no tenderness.   Abdominal: Soft. Bowel sounds are normal. She exhibits no distension. There is no tenderness.   Musculoskeletal: Normal range of motion. She exhibits no edema.   Neurological: She is alert and oriented to person, place, and time.   Skin: Skin is warm and dry. Capillary refill takes less than 2 seconds. She is not diaphoretic. No erythema.       Significant Labs:   Recent Results (from the past 24 hour(s))   CBC auto differential    Collection Time: 05/04/18  5:18 PM   Result Value Ref Range    WBC 6.94 3.90 - 12.70 K/uL    RBC 3.15 (L) 4.00 - 5.40 M/uL    Hemoglobin 9.1 (L) 12.0 - 16.0 g/dL    Hematocrit 27.4 (L) 37.0 - 48.5 %    MCV 87 82 - 98 fL    MCH 28.9 27.0 - 31.0 pg    MCHC 33.2 32.0 - 36.0 g/dL    RDW 13.0 11.5 - 14.5 %    Platelets 201 150 - 350 K/uL    MPV 8.9 (L) 9.2 - 12.9 fL    Immature Granulocytes 0.6 (H) 0.0 - 0.5 %    Gran # (ANC) 5.0 1.8 - 7.7 K/uL    Immature Grans (Abs) 0.04 0.00 - 0.04 K/uL    Lymph # 1.2 1.0 - 4.8 K/uL    Mono # 0.7 0.3 - 1.0 K/uL    Eos # 0.0 0.0 - 0.5 K/uL    Baso # 0.03 0.00 - 0.20 K/uL    nRBC 0 0 /100 WBC    Gran% 71.3 38.0 - 73.0 %    Lymph% 17.7 (L) 18.0 - 48.0 %    Mono% 9.4 4.0 - 15.0 %    Eosinophil% 0.6 0.0 - 8.0 %    Basophil% 0.4 0.0 - 1.9 %    Differential Method Automated    Basic metabolic panel    Collection Time: 05/04/18  5:18 PM   Result Value Ref Range    Sodium 142 136 - 145 mmol/L    Potassium 3.9 3.5 - 5.1 mmol/L    Chloride 109 95 - 110 mmol/L    CO2 24 23 - 29 mmol/L    Glucose 94 70 - 110 mg/dL    BUN, Bld 32 (H) 8 - 23 mg/dL    Creatinine 1.0 0.5 - 1.4 mg/dL    Calcium 8.1 (L) 8.7 - 10.5 mg/dL    Anion Gap 9 8 - 16 mmol/L    eGFR if African American >60.0 >60 mL/min/1.73 m^2    eGFR if non  53.7 (A) >60 mL/min/1.73 m^2   CBC auto differential    Collection Time: 05/05/18  4:15  AM   Result Value Ref Range    WBC 8.54 3.90 - 12.70 K/uL    RBC 3.77 (L) 4.00 - 5.40 M/uL    Hemoglobin 10.6 (L) 12.0 - 16.0 g/dL    Hematocrit 33.5 (L) 37.0 - 48.5 %    MCV 89 82 - 98 fL    MCH 28.1 27.0 - 31.0 pg    MCHC 31.6 (L) 32.0 - 36.0 g/dL    RDW 13.1 11.5 - 14.5 %    Platelets 217 150 - 350 K/uL    MPV 9.3 9.2 - 12.9 fL    Immature Granulocytes 0.7 (H) 0.0 - 0.5 %    Gran # (ANC) 7.1 1.8 - 7.7 K/uL    Immature Grans (Abs) 0.06 (H) 0.00 - 0.04 K/uL    Lymph # 0.8 (L) 1.0 - 4.8 K/uL    Mono # 0.6 0.3 - 1.0 K/uL    Eos # 0.0 0.0 - 0.5 K/uL    Baso # 0.01 0.00 - 0.20 K/uL    nRBC 0 0 /100 WBC    Gran% 82.6 (H) 38.0 - 73.0 %    Lymph% 9.3 (L) 18.0 - 48.0 %    Mono% 7.3 4.0 - 15.0 %    Eosinophil% 0.0 0.0 - 8.0 %    Basophil% 0.1 0.0 - 1.9 %    Differential Method Automated    Basic metabolic panel    Collection Time: 05/05/18  4:15 AM   Result Value Ref Range    Sodium 144 136 - 145 mmol/L    Potassium 4.1 3.5 - 5.1 mmol/L    Chloride 110 95 - 110 mmol/L    CO2 22 (L) 23 - 29 mmol/L    Glucose 85 70 - 110 mg/dL    BUN, Bld 28 (H) 8 - 23 mg/dL    Creatinine 0.9 0.5 - 1.4 mg/dL    Calcium 8.5 (L) 8.7 - 10.5 mg/dL    Anion Gap 12 8 - 16 mmol/L    eGFR if African American >60.0 >60 mL/min/1.73 m^2    eGFR if non African American >60.0 >60 mL/min/1.73 m^2   Protime-INR    Collection Time: 05/05/18  8:39 AM   Result Value Ref Range    Prothrombin Time 10.2 9.0 - 12.5 sec    INR 1.0 0.8 - 1.2

## 2018-05-05 NOTE — ASSESSMENT & PLAN NOTE
Polycythemia  - Patient with polycythemia vera and history of DVT on chronic coumadin.  - Recommend resuming home warfarin when deemed appropriate by neurosurgery.

## 2018-05-05 NOTE — ASSESSMENT & PLAN NOTE
77F with tnilateral radiculopathy, ring enhancing lesion in cauda equina behind L4 vertebral body. Now s/p laminectomy for mass resection    -Pain control with oxy 5  -wound stable  -ok to increase head of bed today around noon,   -PT/OT/OOB once bed restrictions finished  -holding home coumadin  -home COPD inhalers and duo nebs q4 per medicine recs  -regular diet  -SSI  -remove frye  -stop IVFs

## 2018-05-05 NOTE — SUBJECTIVE & OBJECTIVE
Interval History: POD1 s/p laminectomy for intradural tumor resection at L4 level. Flat in bed overnight, given narcan x1 after too much pain medication in PACU.     Medications:  Continuous Infusions:   sodium chloride 0.9% 75 mL/hr at 05/05/18 0708    sodium chloride 0.9%       Scheduled Meds:   bisacodyl  10 mg Rectal Daily    cloNIDine  0.3 mg Oral BID    docusate sodium  100 mg Oral BID    fluticasone-vilanterol  1 puff Inhalation Daily    gabapentin  300 mg Oral TID    mupirocin  1 g Nasal BID    ramipril  10 mg Oral Daily    tiotropium  1 capsule Inhalation Daily     PRN Meds:sodium chloride, sodium chloride, albuterol-ipratropium 2.5mg-0.5mg/3mL, aluminum-magnesium hydroxide-simethicone, cyclobenzaprine, dextrose 50%, dextrose 50%, diazePAM, glucagon (human recombinant), glucose, glucose, glucose, insulin aspart U-100, nitroGLYCERIN, ondansetron, oxyCODONE-acetaminophen, oxyCODONE-acetaminophen, promethazine (PHENERGAN) IVPB, senna-docusate 8.6-50 mg     Review of Systems  Objective:     Weight: 49.7 kg (109 lb 9.1 oz)  Body mass index is 20.04 kg/m².  Vital Signs (Most Recent):  Temp: 98.2 °F (36.8 °C) (05/05/18 1703)  Pulse: (!) 116 (05/05/18 1703)  Resp: 18 (05/05/18 1703)  BP: 129/69 (05/05/18 1704)  SpO2: (!) 86 % (05/05/18 1703) Vital Signs (24h Range):  Temp:  [96.5 °F (35.8 °C)-98.8 °F (37.1 °C)] 98.2 °F (36.8 °C)  Pulse:  [] 116  Resp:  [15-20] 18  SpO2:  [86 %-100 %] 86 %  BP: (124-193)/() 129/69       Date 05/05/18 0700 - 05/06/18 0659   Shift 1857-5071 7149-9628 2754-1028 24 Hour Total   I  N  T  A  K  E   I.V.  (mL/kg) 600  (12.1)   600  (12.1)    Shift Total  (mL/kg) 600  (12.1)   600  (12.1)   O  U  T  P  U  T   Shift Total  (mL/kg)       Weight (kg) 49.7 49.7 49.7 49.7                        Urethral Catheter 05/04/18 0111 Double-lumen 16 Fr. (Active)   Site Assessment Clean;Intact 5/5/2018  8:03 AM   Collection Container Urimeter 5/5/2018  8:03 AM   Securement Method  "secured to top of thigh w/ adhesive device 5/5/2018  8:03 AM   Catheter Care Performed yes 5/5/2018  8:03 AM   Reason for Continuing Urinary Catheterization Post operative 5/5/2018  8:03 AM   CAUTI Prevention Bundle StatLock in place w 1" slack;Intact seal between catheter & drainage tubing;Drainage bag off the floor;Green sheeting clip in use;No dependent loops or kinks;Drainage bag not overfilled (<2/3 full);Drainage bag below bladder 5/5/2018  8:03 AM   Output (mL) 1150 mL 5/5/2018  6:00 AM       Neurosurgery Physical Exam   -Alert and oriented x4  -PERRL, EOMI, face symmetrical, tongue midline, facial sensation symmetric, shoulder shrug full strength and symmetric  -Motor: 5/5 throughout the upper and lower extremites bilaterally  -sensation intact to light touch throughout  -reflexes 2+ in patella and brachioradialis bilaterally  -no clonus, no Vázquez's  -coordination intact to finger to nose bilaterally      Significant Labs:    Recent Labs  Lab 05/04/18 0457 05/04/18 1718 05/05/18  0415   * 94 85    142 144   K 5.1 3.9 4.1    109 110   CO2 23 24 22*   BUN 30* 32* 28*   CREATININE 1.1 1.0 0.9   CALCIUM 9.5 8.1* 8.5*       Recent Labs  Lab 05/04/18 0457 05/04/18 1718 05/05/18  0415   WBC 4.72 6.94 8.54   HGB 11.9* 9.1* 10.6*   HCT 36.4* 27.4* 33.5*    201 217       Recent Labs  Lab 05/04/18  0111 05/04/18 0457 05/05/18  0839   INR 1.4* 1.4* 1.0   APTT 25.7  --   --      Microbiology Results (last 7 days)     Procedure Component Value Units Date/Time    Urine culture [[id number]] Collected:  05/04/18 0129    Order Status:  Completed Specimen:  Urine from Catheterized Updated:  05/05/18 0728     Urine Culture, Routine No growth    Narrative:       add on per Dr Bai order #[id number] 05/04/2018  04:02 cxurn    Blood culture [[id number]] Collected:  05/04/18 0457    Order Status:  Completed Specimen:  Blood from Peripheral, Hand, Left Updated:  05/05/18 0612     Blood Culture, Routine " No Growth to date     Blood Culture, Routine No Growth to date    Urine culture [[id number]]     Order Status:  Completed Specimen:  Urine from Urine, Catheterized     Urine culture [[id number]]     Order Status:  Canceled Specimen:  Urine         All pertinent labs from the last 24 hours have been reviewed.    Significant Diagnostics:  I have reviewed all pertinent imaging results/findings within the past 24 hours.

## 2018-05-05 NOTE — NURSING TRANSFER
Nursing Transfer Note      5/4/2018     Transfer to 92    Transfer via stretcher    Transfer with tele    Transported by RNx2    Medicines sent: IVF    Chart send with patient: yes    Notified: daughter in room    Patient reassessed at: 2255, 05/4/2018

## 2018-05-05 NOTE — ANESTHESIA RELEASE NOTE
"Anesthesia Release from PACU Note    Patient: Sunita Zimmer    Procedure(s) Performed: Procedure(s) (LRB):  LAMINECTOMY-LUMBAR-DECOMPRESSION and RESECTION OF TUMOR L3-5 (N/A)    Anesthesia type: general    Post pain: Adequate analgesia    Post assessment: no apparent anesthetic complications    Last Vitals:   Visit Vitals  /64   Pulse 86   Temp 36.7 °C (98 °F) (Oral)   Resp 18   Ht 5' 2" (1.575 m)   Wt 45.4 kg (100 lb)   SpO2 (!) 93%   Breastfeeding? No   BMI 18.29 kg/m²       Post vital signs: stable    Level of consciousness: awake, alert  and oriented    Nausea/Vomiting: no nausea/no vomiting    Complications: none    Airway Patency: patent    Respiratory: unassisted    Cardiovascular: stable and blood pressure at baseline    Hydration: euvolemic  "

## 2018-05-05 NOTE — HOSPITAL COURSE
5/5: POD1 s/p laminectomy for intradural mass at L4 level. Flat overnight. Denies headaches, no leaking on dressing  5/6: POD2, flat bed parameters stopped, denies headaches. Pain improved.  5/7: NAEON. Pending SNF. Restarted coumadin 2mg daily.  5/8: NAEON. Pt uncooperative with therapy toady. Delirium precautions. Pending SNF.  5/9: Unwitnessed fall overnight. Delirium precautions. CXR/KUB ordered. Insurance only covers HH, so will need more therapy here.   5/10: NAEON. Medicine following. Small bm s/p enema. Insurance may be covering 30 days SNF.   5/11: NAEON. Stable for dc to [facility].

## 2018-05-06 LAB
ANION GAP SERPL CALC-SCNC: 14 MMOL/L
BASOPHILS # BLD AUTO: 0.04 K/UL
BASOPHILS NFR BLD: 0.4 %
BUN SERPL-MCNC: 26 MG/DL
CALCIUM SERPL-MCNC: 8.4 MG/DL
CHLORIDE SERPL-SCNC: 109 MMOL/L
CO2 SERPL-SCNC: 18 MMOL/L
CREAT SERPL-MCNC: 0.9 MG/DL
DIFFERENTIAL METHOD: ABNORMAL
EOSINOPHIL # BLD AUTO: 0 K/UL
EOSINOPHIL NFR BLD: 0.2 %
ERYTHROCYTE [DISTWIDTH] IN BLOOD BY AUTOMATED COUNT: 13.2 %
EST. GFR  (AFRICAN AMERICAN): >60 ML/MIN/1.73 M^2
EST. GFR  (NON AFRICAN AMERICAN): >60 ML/MIN/1.73 M^2
GLUCOSE SERPL-MCNC: 59 MG/DL
HCT VFR BLD AUTO: 30.7 %
HGB BLD-MCNC: 9.9 G/DL
IMM GRANULOCYTES # BLD AUTO: 0.05 K/UL
IMM GRANULOCYTES NFR BLD AUTO: 0.5 %
LYMPHOCYTES # BLD AUTO: 1 K/UL
LYMPHOCYTES NFR BLD: 9.5 %
MCH RBC QN AUTO: 28.9 PG
MCHC RBC AUTO-ENTMCNC: 32.2 G/DL
MCV RBC AUTO: 90 FL
MONOCYTES # BLD AUTO: 0.8 K/UL
MONOCYTES NFR BLD: 7.5 %
NEUTROPHILS # BLD AUTO: 8.8 K/UL
NEUTROPHILS NFR BLD: 81.9 %
NRBC BLD-RTO: 0 /100 WBC
PLATELET # BLD AUTO: 207 K/UL
PMV BLD AUTO: 9.6 FL
POTASSIUM SERPL-SCNC: 4.1 MMOL/L
RBC # BLD AUTO: 3.43 M/UL
SODIUM SERPL-SCNC: 141 MMOL/L
WBC # BLD AUTO: 10.73 K/UL

## 2018-05-06 PROCEDURE — 27000221 HC OXYGEN, UP TO 24 HOURS

## 2018-05-06 PROCEDURE — 99900035 HC TECH TIME PER 15 MIN (STAT)

## 2018-05-06 PROCEDURE — 97530 THERAPEUTIC ACTIVITIES: CPT

## 2018-05-06 PROCEDURE — 36415 COLL VENOUS BLD VENIPUNCTURE: CPT

## 2018-05-06 PROCEDURE — 25000242 PHARM REV CODE 250 ALT 637 W/ HCPCS: Performed by: STUDENT IN AN ORGANIZED HEALTH CARE EDUCATION/TRAINING PROGRAM

## 2018-05-06 PROCEDURE — 25000003 PHARM REV CODE 250: Performed by: NEUROLOGICAL SURGERY

## 2018-05-06 PROCEDURE — S4991 NICOTINE PATCH NONLEGEND: HCPCS | Performed by: NEUROLOGICAL SURGERY

## 2018-05-06 PROCEDURE — 94761 N-INVAS EAR/PLS OXIMETRY MLT: CPT

## 2018-05-06 PROCEDURE — 85025 COMPLETE CBC W/AUTO DIFF WBC: CPT

## 2018-05-06 PROCEDURE — 94640 AIRWAY INHALATION TREATMENT: CPT

## 2018-05-06 PROCEDURE — 11000001 HC ACUTE MED/SURG PRIVATE ROOM

## 2018-05-06 PROCEDURE — 99233 SBSQ HOSP IP/OBS HIGH 50: CPT | Mod: ,,, | Performed by: NEUROLOGICAL SURGERY

## 2018-05-06 PROCEDURE — 80048 BASIC METABOLIC PNL TOTAL CA: CPT

## 2018-05-06 PROCEDURE — 25000003 PHARM REV CODE 250: Performed by: STUDENT IN AN ORGANIZED HEALTH CARE EDUCATION/TRAINING PROGRAM

## 2018-05-06 PROCEDURE — 25000003 PHARM REV CODE 250: Performed by: PHYSICIAN ASSISTANT

## 2018-05-06 PROCEDURE — 97165 OT EVAL LOW COMPLEX 30 MIN: CPT

## 2018-05-06 PROCEDURE — 97163 PT EVAL HIGH COMPLEX 45 MIN: CPT

## 2018-05-06 RX ORDER — HYDROCODONE BITARTRATE AND ACETAMINOPHEN 5; 325 MG/1; MG/1
1 TABLET ORAL EVERY 4 HOURS PRN
Status: DISCONTINUED | OUTPATIENT
Start: 2018-05-07 | End: 2018-05-09

## 2018-05-06 RX ORDER — CYCLOBENZAPRINE HCL 5 MG
5 TABLET ORAL 3 TIMES DAILY PRN
Status: DISCONTINUED | OUTPATIENT
Start: 2018-05-07 | End: 2018-05-11 | Stop reason: HOSPADM

## 2018-05-06 RX ORDER — ACETAMINOPHEN 325 MG/1
650 TABLET ORAL EVERY 6 HOURS PRN
Status: DISCONTINUED | OUTPATIENT
Start: 2018-05-06 | End: 2018-05-11 | Stop reason: HOSPADM

## 2018-05-06 RX ORDER — ENOXAPARIN SODIUM 100 MG/ML
40 INJECTION SUBCUTANEOUS EVERY 24 HOURS
Status: DISCONTINUED | OUTPATIENT
Start: 2018-05-07 | End: 2018-05-11 | Stop reason: HOSPADM

## 2018-05-06 RX ORDER — IBUPROFEN 200 MG
1 TABLET ORAL DAILY
Status: DISCONTINUED | OUTPATIENT
Start: 2018-05-06 | End: 2018-05-09

## 2018-05-06 RX ORDER — IBUPROFEN 200 MG
1 TABLET ORAL DAILY
Status: DISCONTINUED | OUTPATIENT
Start: 2018-05-07 | End: 2018-05-06

## 2018-05-06 RX ADMIN — IPRATROPIUM BROMIDE AND ALBUTEROL SULFATE 3 ML: .5; 3 SOLUTION RESPIRATORY (INHALATION) at 08:05

## 2018-05-06 RX ADMIN — IPRATROPIUM BROMIDE AND ALBUTEROL SULFATE 3 ML: .5; 3 SOLUTION RESPIRATORY (INHALATION) at 12:05

## 2018-05-06 RX ADMIN — TIOTROPIUM BROMIDE 18 MCG: 18 CAPSULE ORAL; RESPIRATORY (INHALATION) at 09:05

## 2018-05-06 RX ADMIN — NICOTINE 1 PATCH: 21 PATCH, EXTENDED RELEASE TRANSDERMAL at 09:05

## 2018-05-06 RX ADMIN — GABAPENTIN 300 MG: 300 CAPSULE ORAL at 02:05

## 2018-05-06 RX ADMIN — CLONIDINE HYDROCHLORIDE 0.3 MG: 0.2 TABLET ORAL at 09:05

## 2018-05-06 RX ADMIN — DOCUSATE SODIUM 100 MG: 100 CAPSULE, LIQUID FILLED ORAL at 09:05

## 2018-05-06 RX ADMIN — RAMIPRIL 10 MG: 10 CAPSULE ORAL at 09:05

## 2018-05-06 RX ADMIN — IPRATROPIUM BROMIDE AND ALBUTEROL SULFATE 3 ML: .5; 3 SOLUTION RESPIRATORY (INHALATION) at 04:05

## 2018-05-06 RX ADMIN — MUPIROCIN 1 G: 20 OINTMENT TOPICAL at 09:05

## 2018-05-06 RX ADMIN — BISACODYL 10 MG: 10 SUPPOSITORY RECTAL at 08:05

## 2018-05-06 RX ADMIN — FLUTICASONE FUROATE AND VILANTEROL TRIFENATATE 1 PUFF: 100; 25 POWDER RESPIRATORY (INHALATION) at 08:05

## 2018-05-06 RX ADMIN — CLONIDINE HYDROCHLORIDE 0.3 MG: 0.2 TABLET ORAL at 08:05

## 2018-05-06 RX ADMIN — CYCLOBENZAPRINE HYDROCHLORIDE 10 MG: 5 TABLET, FILM COATED ORAL at 03:05

## 2018-05-06 RX ADMIN — DIAZEPAM 5 MG: 5 TABLET ORAL at 03:05

## 2018-05-06 RX ADMIN — OXYCODONE HYDROCHLORIDE AND ACETAMINOPHEN 1 TABLET: 7.5; 325 TABLET ORAL at 09:05

## 2018-05-06 RX ADMIN — DOCUSATE SODIUM 100 MG: 100 CAPSULE, LIQUID FILLED ORAL at 08:05

## 2018-05-06 RX ADMIN — MUPIROCIN 1 G: 20 OINTMENT TOPICAL at 08:05

## 2018-05-06 RX ADMIN — OXYCODONE HYDROCHLORIDE AND ACETAMINOPHEN 1 TABLET: 7.5; 325 TABLET ORAL at 05:05

## 2018-05-06 RX ADMIN — GABAPENTIN 300 MG: 300 CAPSULE ORAL at 08:05

## 2018-05-06 RX ADMIN — ACETAMINOPHEN 650 MG: 325 TABLET ORAL at 09:05

## 2018-05-06 RX ADMIN — ONDANSETRON 8 MG: 8 TABLET, ORALLY DISINTEGRATING ORAL at 09:05

## 2018-05-06 NOTE — NURSING
Patient's daughter came to desk wanting a walker for her Mom to go to the BR. Explained to her that I did not wanted her to get up at this time because of the altered mental status and oxygen sat level. Called to room by telemetry room that patient was off the monitor. Entered patient's room and the dtr and son had patient up in BR placing her on commode. She was very stiff and not following directions; assisted back to bed and placed O2  Back on and telemetry box reconnected.

## 2018-05-06 NOTE — NURSING
Patient more confused and disoriented to time, place and situation. O2 sat 84%, Dr Diaz notified, O2 increased to 5LPM N/C and saturation came up to 98%; will continue to monitor.

## 2018-05-06 NOTE — PT/OT/SLP EVAL
Occupational Therapy   Evaluation and Treatment    Name: Sunita Zimmer  MRN: [id number]  Admitting Diagnosis:  <principal problem not specified> 2 Days Post-Op    Recommendations:     Discharge Recommendations: nursing facility, skilled  Discharge Equipment Recommendations:  none  Barriers to discharge:  None    History:     Occupational Profile:  Living Environment: Pt lives with  in a 1SH with no KUNAL, has a tub/shower combo  Previous level of function: (I)  Roles and Routines: wife, mother, friend  Equipment Owned:  cane, straight, walker, rolling, wheelchair  Assistance upon Discharge: available    Past Medical History:   Diagnosis Date    Anticoagulant long-term use     Apocrine adenocarcinoma     Arthritis     Back pain     lower back    Brain aneurysm     4 times    Chronic kidney disease (CKD), stage III (moderate)     COPD (chronic obstructive pulmonary disease)     Coronary artery disease     Encounter for blood transfusion     Hypertension     Polycystic kidney disease     Polycythemia vera     Polycythemia vera     congential       Past Surgical History:   Procedure Laterality Date    ABDOMINAL SURGERY      APPENDECTOMY      BRAIN SURGERY      relieve hemmorhages/had 4 surgeries    CARDIAC CATHETERIZATION      CORONARY ANGIOPLASTY      FRACTURE SURGERY      GALLBLADDER SURGERY      HYSTERECTOMY      illiac stent      JOINT REPLACEMENT      SKIN BIOPSY      skin repair      apocrine cancer    tibial repair      steel plate inserted    TONSILLECTOMY      TOTAL KNEE ARTHROPLASTY      VEIN BYPASS SURGERY         Subjective     Chief Complaint: weakness  Patient/Family stated goals: get better  Communicated with: RN prior to session.  Pain/Comfort:  · Pain Rating 1: 0/10  · Pain Rating Post-Intervention 1: 0/10    Patients cultural, spiritual, Buddhism conflicts given the current situation: none stated    Objective:     Patient found with: telemetry, oxygen    General  Precautions: Standard, fall   Orthopedic Precautions:spinal precautions   Braces: N/A     Occupational Performance:    Bed Mobility:    · NT, found and left UIC    Functional Mobility/Transfers:  · Patient completed Sit <> Stand Transfer with minimum assistance  with  no assistive device   · Functional Mobility: Min A pivot    Activities of Daily Living:  · Feeding:  Setup A    · UB Dressing: maximal assistance gown as robe  · LB Dressing: maximal assistance socks    Cognitive/Visual Perceptual:  Cognitive/Psychosocial Skills:     -       Oriented to: Person and Situation   -       Follows Commands/attention:Follows two-step commands  -       Communication: clear/fluent  -       Memory: No Deficits noted  -       Safety awareness/insight to disability: intact   -       Mood/Affect/Coping skills/emotional control: Appropriate to situation  Visual/Perceptual:      -R eye blind; L eye blurry vision    Physical Exam:  Balance:    -       Fair  Postural examination/scapula alignment:    -       No postural abnormalities identified  Dominant hand:    -       R  Upper Extremity Range of Motion:     -       Right Upper Extremity: WFL  -       Left Upper Extremity: WFL  Upper Extremity Strength:    -       Right Upper Extremity: WFL  -       Left Upper Extremity: WFL   Strength:    -       Right Upper Extremity: WFL  -       Left Upper Extremity: WFL  Fine Motor Coordination:    -       Intact  Gross motor coordination:   impaired    Patient left up in chair with all lines intact, call button in reach and son present    AMPA 6 Click:  AMPAC Total Score: 14    Treatment & Education:  Pt ed re OT role and POC. Pt performed strength and ROM testing. Pt requiring Max A for dressing tasks. Pt requiring Min A to stand and pivot with no AD. Pt ed re therex and importance of sitting UIC. Pt able to perform leg extensions and marches in the chair with SBA, x10 reps.  Education:    Assessment:     Sunita Belen Zimmer is a 77 y.o.  "female with a medical diagnosis of post-op lumbar mass removal.  She presents with the following performance deficits affecting function: weakness, impaired endurance, impaired self care skills, impaired functional mobilty, gait instability, impaired balance, decreased lower extremity function, decreased safety awareness.  Pt participates well and is motivated to regain functional independence. Pt requiring increased assistance for self care and mobility and would benefit from a SNF stay to improve function re ADLs and safe ambulation.    Rehab Prognosis:  Good; patient would benefit from acute skilled OT services to address these deficits and reach maximum level of function.         Clinical Decision Makin.  OT Low:  "Pt evaluation falls under low complexity for evaluation coding due to performance deficits noted in 1-3 areas as stated above and 0 co-morbities affecting current functional status. Data obtained from problem focused assessments. No modifications or assistance was required for completion of evaluation. Only brief occupational profile and history review completed."     Plan:     Patient to be seen 3 x/week to address the above listed problems via self-care/home management, therapeutic activities, therapeutic exercises  · Plan of Care Expires: 18  · Plan of Care Reviewed with: patient    This Plan of care has been discussed with the patient who was involved in its development and understands and is in agreement with the identified goals and treatment plan    GOALS:    Occupational Therapy Goals        Problem: Occupational Therapy Goal    Goal Priority Disciplines Outcome Interventions   Occupational Therapy Goal     OT, PT/OT Ongoing (interventions implemented as appropriate)    Description:  Goals to be met by: 18     Patient will increase functional independence with ADLs by performing:    UE Dressing with Stand-by Assistance.  LE Dressing with Stand-by Assistance.  Grooming while " standing at sink with Stand-by Assistance.  Toileting from toilet with Stand-by Assistance for hygiene and clothing management.   Stand pivot transfers with Stand-by Assistance.                      Time Tracking:     OT Date of Treatment: 05/06/18  OT Start Time: 1330  OT Stop Time: 1348  OT Total Time (min): 18 min    Billable Minutes:Evaluation 10 minutes  Therapeutic Activity 8 minutes    WHIT Montes  5/6/2018  Pager: [phone]

## 2018-05-06 NOTE — PLAN OF CARE
Problem: Fall Risk (Adult)  Goal: Absence of Falls  Patient will demonstrate the desired outcomes by discharge/transition of care.   Outcome: Ongoing (interventions implemented as appropriate)   05/05/18 1908   Fall Risk (Adult)   Absence of Falls making progress toward outcome       Problem: Patient Care Overview  Goal: Plan of Care Review  Outcome: Ongoing (interventions implemented as appropriate)  Questions answered and concerns addressed.   05/05/18 1908   Coping/Psychosocial   Plan Of Care Reviewed With patient;daughter      05/05/18 1908   Coping/Psychosocial   Plan Of Care Reviewed With patient;daughter       Problem: Pressure Ulcer Risk (George Scale) (Adult,Obstetrics,Pediatric)  Goal: Skin Integrity  Patient will demonstrate the desired outcomes by discharge/transition of care.   Outcome: Ongoing (interventions implemented as appropriate)   05/05/18 1908   Pressure Ulcer Risk (George Scale) (Adult,Obstetrics,Pediatric)   Skin Integrity making progress toward outcome

## 2018-05-06 NOTE — PT/OT/SLP EVAL
"Physical Therapy Evaluation    Patient Name:  Sunita Zimmer   MRN:  [id number]    Recommendations:     Discharge Recommendations:  nursing facility, skilled   Discharge Equipment Recommendations: none   Barriers to discharge: Decreased caregiver support    Assessment:     Sunita Zimmer is a 77 y.o. female admitted with a medical diagnosis of <principal problem not specified>.  She presents with the following impairments/functional limitations:  weakness, impaired endurance, gait instability, decreased coordination, impaired cognition, decreased safety awareness, impaired cardiopulmonary response to activity, impaired balance, decreased lower extremity function, impaired self care skills. Pt with incr lower back pain with incr gait distance incr risk of fall during today's evaluation. Heavy reliance on RW demonstrated with son reporting  unable to assist upon discharge. Due to these deficits, pt appropriate for discharge to SNF to maximize improvement with mobility.     If family prefers discharge to home environment, pt would benefit from home health physical therapy, RW, and aide for assistance.    Rehab Prognosis:  Good; patient would benefit from acute skilled PT services to address these deficits and reach maximum level of function.      Recent Surgery: Procedure(s) (LRB):  LAMINECTOMY-LUMBAR-DECOMPRESSION and RESECTION OF TUMOR L3-5 (N/A) 2 Days Post-Op    Plan:     During this hospitalization, patient to be seen 4 x/week to address the above listed problems via therapeutic activities, therapeutic exercises, gait training  · Plan of Care Expires:  06/05/18   Plan of Care Reviewed with: patient, son    Subjective     Communicated with nsg prior to session.  Patient found supine in bed upon PT entry to room, agreeable to evaluation.      Chief Complaint: pain in back   Patient comments/goals: " I just didn't understand what you meant " per pt during session.    Pain/Comfort:  · Pain Rating 1:  (did " not rate)  · Location - Side 1: Bilateral  · Location - Orientation 1: lower  · Location 1: back  · Pain Addressed 1: Reposition, Distraction  · Pain Rating Post-Intervention 1: 0/10    Patients cultural, spiritual, Evangelical conflicts given the current situation:      Living Environment:  Pt lives with  in one story home c/ no KUNAL. Per pt report, pt ambulatory with rollator HH distances. Per son report,  unable to assist post discharge. Pt not working or driving either.     Objective:     Patient found with: oxygen, telemetry     General Precautions: Standard, fall   Orthopedic Precautions:spinal precautions   Braces: N/A       Exams:  Cognitive Exam  Patient is oriented to Person, Time and Situation ( pt aware she is hospital; unable to state name. Pt aware of year but reported April for month)  and follows 50% of one-step commands; incr impulsivity with movements with difficulty following commands while in extensive pain   Fine Motor Coordination    -       Impaired  RLE heel shin mild and LLE heel shin mild   Postural Exam Patient presented with the following abnormalities:    -       Rounded shoulders  -       Forward head  -       Kyphosis   Sensation    -       Intact  light/touch (B) LE   Skin Integrity/Edema     -       Skin integrity: Thin and Dry  -       Edema: None noted in (B) LE   R LE ROM WFL   R LE Strength  4/5 hip flexion, knee ext/flex, and ankle DF   L LE ROM WFL   L LE Strength  4/5 hip flexion, knee ext/flex, and ankle DF       Functional Mobility  Bed Mobility  Scooting: minimum assistance  Supine to Sit: minimum assistance      Transfers Sit to Stand:  minimum assistance with rolling walker for 2 trials     Gait Gait Distance: 20 ft with RW  Assistance Level: contact guard assistance and moderate assistance  Description: Initially pt req CGA with incr cueing provided for proper gait steppage and keeping RW within base of support. After 10 ft, pt began ambulating with incr  trunk flexion with standing in onto walker for 1 minute reporting muscle spasms occurring. Pt unable to come into trunk extension and req seated break ending gait trial         Balance   Static Sitting contact guard assistance   Dynamic Sitting contact guard assistance   Static Standing minimum assistance   Dynamic Standing minimum assistance         AM-PAC 6 CLICK MOBILITY  Total Score:15       Therapeutic Activities and Exercises:   PT educated pt on the following  - role of PT  - PT POC (including frequency and duration while in hospital)  - discharge recommendation (SNF) and equipment needs (RW)  - level of assistance currently req (1 person for transfers only)and safety precautions with ns staff   All questions and concerns answered and addressed. White board updated with pertinent information. Nsg notified.       Patient left up in chair with all lines intact, call button in reach and son present.    GOALS:    Physical Therapy Goals        Problem: Physical Therapy Goal    Goal Priority Disciplines Outcome Goal Variances Interventions   Physical Therapy Goal     PT/OT, PT Ongoing (interventions implemented as appropriate)     Description:  Goals to be met by: 10 days ( )    Patient will increase functional independence with mobility by performin. Supine to sit with Stand-by Assistance  2. Sit to supine with Stand-by Assistance  3. Sit to stand transfer with Stand-by Assistance using RW  4. Gait  x 100 feet with Contact Guard Assistance using Rolling Walker.   5. Lower extremity exercise program x30 reps per handout, with independence to improve muscular strength and endurance.                         History:     Past Medical History:   Diagnosis Date    Anticoagulant long-term use     Apocrine adenocarcinoma     Arthritis     Back pain     lower back    Brain aneurysm     4 times    Chronic kidney disease (CKD), stage III (moderate)     COPD (chronic obstructive pulmonary disease)      Coronary artery disease     Encounter for blood transfusion     Hypertension     Polycystic kidney disease     Polycythemia vera     Polycythemia vera     congential       Past Surgical History:   Procedure Laterality Date    ABDOMINAL SURGERY      APPENDECTOMY      BRAIN SURGERY      relieve hemmorhages/had 4 surgeries    CARDIAC CATHETERIZATION      CORONARY ANGIOPLASTY      FRACTURE SURGERY      GALLBLADDER SURGERY      HYSTERECTOMY      illiac stent      JOINT REPLACEMENT      SKIN BIOPSY      skin repair      apocrine cancer    tibial repair      steel plate inserted    TONSILLECTOMY      TOTAL KNEE ARTHROPLASTY      VEIN BYPASS SURGERY         Clinical Decision Making:     History  Co-morbidities and personal factors that may impact the plan of care Examination  Body Structures and Functions, activity limitations and participation restrictions that may impact the plan of care Clinical Presentation   Decision Making/ Complexity Score   Co-morbidities:   [x] Time since onset of injury / illness / exacerbation  [x] Status of current condition  [x]Patient's cognitive status and safety concerns    [x] Multiple Medical Problems (see med hx)  Personal Factors:   [x] Patient's age  [x] Prior Level of function   [x] Patient's home situation (environment and family support)  [] Patient's level of motivation  [] Expected progression of patient      HISTORY:(criteria)    [] 52738 - no personal factors/history    [] 13836 - has 1-2 personal factor/comorbidity     [x] 23378 - has >3 personal factor/comorbidity     Body Regions:  [] Objective examination findings  [] Head     []  Neck  [x] Trunk   [] Upper Extremity  [x] Lower Extremity    Body Systems:  [x] For communication ability, affect, cognition, language, and learning style: the assessment of the ability to make needs known, consciousness, orientation (person, place, and time), expected emotional /behavioral responses, and learning preferences  (eg, learning barriers, education  needs)  [x] For the neuromuscular system: a general assessment of gross coordinated movement (eg, balance, gait, locomotion, transfers, and transitions) and motor function  (motor control and motor learning)  [] For the musculoskeletal system: the assessment of gross symmetry, gross range of motion, gross strength, height, and weight  [x] For the integumentary system: the assessment of pliability(texture), presence of scar formation, skin color, and skin integrity  [] For cardiovascular/pulmonary system: the assessment of heart rate, respiratory rate, blood pressure, and edema     Activity limitations:    [x] Patient's cognitive status and saf ety concerns          [x] Status of current condition      [] Weight bearing restriction  [] Cardiopulmunary Restriction    Participation Restrictions:   [] Goals and goal agreement with the patient     [] Rehab potential (prognosis) and probable outcome      Examination of Body System: (criteria)    [] 37969 - addressing 1-2 elements    [] 13370 - addressing a total of 3 or more elements     [x] 31302 -  Addressing a total of 4 or more elements         Clinical Presentation: (criteria)  Unstable - 76464     On examination of body system using standardized tests and measures patient presents with 4 or more elements from any of the following: body structures and functions, activity limitations, and/or participation restrictions.  Leading to a clinical presentation that is considered unstable with unpredictable characteristics                              Clinical Decision Making  (Eval Complexity):  High - 63484     Time Tracking:     PT Received On: 05/06/18  PT Start Time: 1215     PT Stop Time: 1235  PT Total Time (min):20 min     Billable Minutes:  Evaluation 12, Therapeutic Activity 98      Naya Lazar, PT, DPT  05/06/2018

## 2018-05-06 NOTE — NURSING
Neurological and neurovascular checks WNL today. Patient continues to be confused but overall behavior is much improved, she was thrashing around bed earlier in day and screaming that someone yanked her arm. Son at bedside. Lungs with crackles bilaterally in bases and HR has been tachy all day ,  notified; will continue to monitor.

## 2018-05-06 NOTE — PLAN OF CARE
Problem: Physical Therapy Goal  Goal: Physical Therapy Goal  Goals to be met by: 10 days ( )    Patient will increase functional independence with mobility by performin. Supine to sit with Stand-by Assistance  2. Sit to supine with Stand-by Assistance  3. Sit to stand transfer with Stand-by Assistance using RW  4. Gait  x 100 feet with Contact Guard Assistance using Rolling Walker.   5. Lower extremity exercise program x30 reps per handout, with independence to improve muscular strength and endurance.       Outcome: Ongoing (interventions implemented as appropriate)  PT evaluation completed. POC initiated.    Naya Lazar, PT , DPT  2018

## 2018-05-07 ENCOUNTER — PATIENT MESSAGE (OUTPATIENT)
Dept: NEUROSURGERY | Facility: CLINIC | Age: 78
End: 2018-05-07

## 2018-05-07 PROBLEM — J44.9 COPD (CHRONIC OBSTRUCTIVE PULMONARY DISEASE): Status: ACTIVE | Noted: 2018-05-07

## 2018-05-07 PROBLEM — G95.89 INTRADURAL MASS: Status: ACTIVE | Noted: 2018-05-07

## 2018-05-07 LAB
INR PPP: 1
PROTHROMBIN TIME: 10.2 SEC

## 2018-05-07 PROCEDURE — S4991 NICOTINE PATCH NONLEGEND: HCPCS | Performed by: NEUROLOGICAL SURGERY

## 2018-05-07 PROCEDURE — 99900035 HC TECH TIME PER 15 MIN (STAT)

## 2018-05-07 PROCEDURE — 94640 AIRWAY INHALATION TREATMENT: CPT

## 2018-05-07 PROCEDURE — 97110 THERAPEUTIC EXERCISES: CPT

## 2018-05-07 PROCEDURE — 85610 PROTHROMBIN TIME: CPT

## 2018-05-07 PROCEDURE — 97530 THERAPEUTIC ACTIVITIES: CPT

## 2018-05-07 PROCEDURE — 25000003 PHARM REV CODE 250: Performed by: NEUROLOGICAL SURGERY

## 2018-05-07 PROCEDURE — 25000242 PHARM REV CODE 250 ALT 637 W/ HCPCS: Performed by: STUDENT IN AN ORGANIZED HEALTH CARE EDUCATION/TRAINING PROGRAM

## 2018-05-07 PROCEDURE — 27000221 HC OXYGEN, UP TO 24 HOURS

## 2018-05-07 PROCEDURE — 11000001 HC ACUTE MED/SURG PRIVATE ROOM

## 2018-05-07 PROCEDURE — 63600175 PHARM REV CODE 636 W HCPCS: Performed by: NEUROLOGICAL SURGERY

## 2018-05-07 PROCEDURE — 25000003 PHARM REV CODE 250: Performed by: PHYSICIAN ASSISTANT

## 2018-05-07 PROCEDURE — 36415 COLL VENOUS BLD VENIPUNCTURE: CPT

## 2018-05-07 PROCEDURE — 94761 N-INVAS EAR/PLS OXIMETRY MLT: CPT

## 2018-05-07 PROCEDURE — 25000003 PHARM REV CODE 250: Performed by: STUDENT IN AN ORGANIZED HEALTH CARE EDUCATION/TRAINING PROGRAM

## 2018-05-07 RX ORDER — WARFARIN 2 MG/1
2 TABLET ORAL DAILY
Status: DISCONTINUED | OUTPATIENT
Start: 2018-05-07 | End: 2018-05-11 | Stop reason: HOSPADM

## 2018-05-07 RX ADMIN — HYDROCODONE BITARTRATE AND ACETAMINOPHEN 1 TABLET: 5; 325 TABLET ORAL at 05:05

## 2018-05-07 RX ADMIN — RAMIPRIL 10 MG: 10 CAPSULE ORAL at 09:05

## 2018-05-07 RX ADMIN — CYCLOBENZAPRINE HYDROCHLORIDE 5 MG: 5 TABLET, FILM COATED ORAL at 05:05

## 2018-05-07 RX ADMIN — ENOXAPARIN SODIUM 40 MG: 100 INJECTION SUBCUTANEOUS at 07:05

## 2018-05-07 RX ADMIN — IPRATROPIUM BROMIDE AND ALBUTEROL SULFATE 3 ML: .5; 3 SOLUTION RESPIRATORY (INHALATION) at 07:05

## 2018-05-07 RX ADMIN — CLONIDINE HYDROCHLORIDE 0.3 MG: 0.2 TABLET ORAL at 08:05

## 2018-05-07 RX ADMIN — DOCUSATE SODIUM 100 MG: 100 CAPSULE, LIQUID FILLED ORAL at 09:05

## 2018-05-07 RX ADMIN — TIOTROPIUM BROMIDE 18 MCG: 18 CAPSULE ORAL; RESPIRATORY (INHALATION) at 10:05

## 2018-05-07 RX ADMIN — DOCUSATE SODIUM 100 MG: 100 CAPSULE, LIQUID FILLED ORAL at 08:05

## 2018-05-07 RX ADMIN — HYDROCODONE BITARTRATE AND ACETAMINOPHEN 1 TABLET: 5; 325 TABLET ORAL at 01:05

## 2018-05-07 RX ADMIN — IPRATROPIUM BROMIDE AND ALBUTEROL SULFATE 3 ML: .5; 3 SOLUTION RESPIRATORY (INHALATION) at 01:05

## 2018-05-07 RX ADMIN — MUPIROCIN 1 G: 20 OINTMENT TOPICAL at 10:05

## 2018-05-07 RX ADMIN — NICOTINE 1 PATCH: 21 PATCH, EXTENDED RELEASE TRANSDERMAL at 10:05

## 2018-05-07 RX ADMIN — CLONIDINE HYDROCHLORIDE 0.3 MG: 0.2 TABLET ORAL at 09:05

## 2018-05-07 RX ADMIN — WARFARIN SODIUM 2 MG: 2 TABLET ORAL at 07:05

## 2018-05-07 RX ADMIN — FLUTICASONE FUROATE AND VILANTEROL TRIFENATATE 1 PUFF: 100; 25 POWDER RESPIRATORY (INHALATION) at 10:05

## 2018-05-07 RX ADMIN — IPRATROPIUM BROMIDE AND ALBUTEROL SULFATE 3 ML: .5; 3 SOLUTION RESPIRATORY (INHALATION) at 04:05

## 2018-05-07 NOTE — ASSESSMENT & PLAN NOTE
77F with tnilateral radiculopathy, ring enhancing lesion in cauda equina behind L4 vertebral body. Now s/p laminectomy for mass resection.    -Pain control with oxycodone 5mg, patient extremely sensitive to pain medications.  -wound stable  -no HOB restrictions, OOB as much as possible  -PT/OT/OOB  - Restart coumadin 2mg daily.  -home COPD inhalers and duo nebs q4 per medicine recs  -regular diet  -SSI  - Bowel regimen

## 2018-05-07 NOTE — PLAN OF CARE
SW following for DC needs. SW in communication with CM.    Ana Maria Gomez, [facility] [place]  [id number]

## 2018-05-07 NOTE — PLAN OF CARE
Planned discharge is Skilled Nursing - Plan (A) or home with family and home health - Plan (B).     05/07/18 1556   Discharge Reassessment   Assessment Type Discharge Planning Reassessment   Provided patient/caregiver education on the expected discharge date and the discharge plan No   Do you have any problems affording any of your prescribed medications? No   Discharge Plan A Skilled Nursing Facility   Discharge Plan B Home with family;Home Health   Patient choice form signed by patient/caregiver N/A   Can the patient answer the patient profile reliably? Yes, cognitively intact   How does the patient rate their overall health at the present time? Fair   Describe the patient's ability to walk at the present time. Minor restrictions or changes   How often would a person be available to care for the patient? Whenever needed   Number of comorbid conditions (as recorded on the chart) Five or more   During the past month, has the patient often been bothered by feeling down, depressed or hopeless? Yes   During the past month, has the patient often been bothered by little interest or pleasure in doing things? No

## 2018-05-07 NOTE — PLAN OF CARE
Problem: Physical Therapy Goal  Goal: Physical Therapy Goal  Goals to be met by: 10 days ( )    Patient will increase functional independence with mobility by performin. Supine to sit with Stand-by Assistance  2. Sit to supine with Stand-by Assistance  3. Sit to stand transfer with Stand-by Assistance using RW  4. Gait  x 100 feet with Contact Guard Assistance using Rolling Walker.   5. Lower extremity exercise program x30 reps per handout, with independence to improve muscular strength and endurance.        Goals remain appropriate at time. Continue with PT POC as indicated.

## 2018-05-07 NOTE — PROGRESS NOTES
"[facility]  Neurosurgery  Progress Note    Subjective:     History of Present Illness: A 77-year-old female with medical comorbidities significant for HTN, CAD, polycythemia vera, COPD, polycystic kidney disease, CKD stage 3 presents to the ED with a chief complaint of back pain. Pain began 3 days ago after lifting a full gallon of milk. Pt hurt her back while twisting away from the refrigerator.  Patient reports pain that she describes as a fist in the middle of my low back".  Patient has a history of chronic low back pain after a fall resulting in multiple lumbar fractures 6 years ago.  She reports that the pain occasionally radiates down bilateral lower extremities, however this is her baseline and not worse over the past 3 days after this new injury. Today, patient reports an episode of urinary incontinence which is not typical for her.  Patient reports noticing that she had urinated on herself and did not feel any sensation.  She denies any lower extremity weakness, numbness, saddle anesthesia, loss of bowel function.     MRI shows ring enhancing intradural lesion at level of cauda equina.     On exam pt with bilateral S1 dermatome radiculopathy. Otherwise intact. Rectal tone present. Corbett catheter in place.    Post-Op Info:  Procedure(s) (LRB):  LAMINECTOMY-LUMBAR-DECOMPRESSION and RESECTION OF TUMOR L3-5 (N/A)   3 Days Post-Op     Interval History: NAEON. Pain improved. Pending SNF. Restarted coumadin 2mg daily.      Medications:  Continuous Infusions:  Scheduled Meds:   albuterol-ipratropium 2.5mg-0.5mg/3mL  3 mL Nebulization Q4H WAKE    bisacodyl  10 mg Rectal Daily    cloNIDine  0.3 mg Oral BID    docusate sodium  100 mg Oral BID    enoxaparin  40 mg Subcutaneous Daily    fluticasone-vilanterol  1 puff Inhalation Daily    mupirocin  1 g Nasal BID    nicotine  1 patch Transdermal Daily    ramipril  10 mg Oral Daily    tiotropium  1 capsule Inhalation Daily     PRN Meds:sodium " chloride, sodium chloride, acetaminophen, aluminum-magnesium hydroxide-simethicone, cyclobenzaprine, dextrose 50%, dextrose 50%, glucagon (human recombinant), glucose, glucose, glucose, hydrocodone-acetaminophen 5-325mg, insulin aspart U-100, nitroGLYCERIN, ondansetron, senna-docusate 8.6-50 mg     Review of Systems    Objective:     Weight: 49.7 kg (109 lb 9.1 oz)  Body mass index is 20.04 kg/m².  Vital Signs (Most Recent):  Temp: 98.5 °F (36.9 °C) (05/07/18 1213)  Pulse: (!) 141 (05/07/18 1521)  Resp: 18 (05/07/18 1335)  BP: (!) 169/71 (05/07/18 1213)  SpO2: 98 % (05/07/18 1335) Vital Signs (24h Range):  Temp:  [97.6 °F (36.4 °C)-98.9 °F (37.2 °C)] 98.5 °F (36.9 °C)  Pulse:  [] 141  Resp:  [18-20] 18  SpO2:  [92 %-99 %] 98 %  BP: (113-169)/() 169/71                           Neurosurgery Physical Exam    General: well developed, well nourished, no distress.   Head: normocephalic, atraumatic  Neurologic: Alert and oriented. Thought content appropriate.  GCS: Motor: 6/Verbal: 5/Eyes: 4 GCS Total: 15  Mental Status: Awake, Alert, Oriented x 4  Language: No aphasia  Speech: No dysarthria  Cranial nerves: face symmetric, tongue midline, CN II-XII grossly intact.   Eyes: pupils equal, round, reactive to light with accomodation, EOMI.  Pulmonary: normal respirations, no signs of respiratory distress  Abdomen: soft, non-distended, not tender to palpation  Sensory: intact to light touch throughout    Motor Strength: Moves all extremities spontaneously with good tone.  Full strength upper and lower extremities. No abnormal movements seen.     DTR's: 2 + and symmetric in UE and LE  Pronator Drift: no drift noted  Finger-to-nose: Intact bilaterally  Vázquez: absent  Clonus: absent  Babinski: absent  Pulses: 2+ and symmetric radial and dorsalis pedis.  Skin: Skin is warm, dry and intact.    Incision c/d/i      Significant Labs:    Recent Labs  Lab 05/06/18  0435   GLU 59*      K 4.1      CO2 18*   BUN  26*   CREATININE 0.9   CALCIUM 8.4*       Recent Labs  Lab 05/06/18  0435   WBC 10.73   HGB 9.9*   HCT 30.7*        No results for input(s): LABPT, INR, APTT in the last 48 hours.  Microbiology Results (last 7 days)     Procedure Component Value Units Date/Time    Blood culture [[id number]] Collected:  05/04/18 0457    Order Status:  Completed Specimen:  Blood from Peripheral, Hand, Left Updated:  05/07/18 0612     Blood Culture, Routine No Growth to date     Blood Culture, Routine No Growth to date     Blood Culture, Routine No Growth to date     Blood Culture, Routine No Growth to date    Urine culture [[id number]] Collected:  05/04/18 0129    Order Status:  Completed Specimen:  Urine from Catheterized Updated:  05/05/18 0728     Urine Culture, Routine No growth    Narrative:       add on per Dr Bai order #[id number] 05/04/2018  04:02 cxurn    Urine culture [[id number]]     Order Status:  Completed Specimen:  Urine from Urine, Catheterized     Urine culture [[id number]]     Order Status:  Canceled Specimen:  Urine         All pertinent labs from the last 24 hours have been reviewed.    Significant Diagnostics:  I have reviewed and interpreted all pertinent imaging results/findings within the past 24 hours.    Assessment/Plan:     Intradural mass    77F with tnilateral radiculopathy, ring enhancing lesion in cauda equina behind L4 vertebral body. Now s/p laminectomy for mass resection.    -Pain control with oxycodone 5mg, patient extremely sensitive to pain medications.  -wound stable  -no HOB restrictions, OOB as much as possible  -PT/OT/OOB  - Restart coumadin 2mg daily.  -home COPD inhalers and duo nebs q4 per medicine recs  -regular diet  -SSI  - Bowel regimen            Adelita Benedict PA-C  Neurosurgery  [facility]

## 2018-05-07 NOTE — SUBJECTIVE & OBJECTIVE
Interval History: POD2 s/p laminectomy for intradural tumor resection at L4 level.patient slightly sedated on pain medications, will decrease today      Medications:  Continuous Infusions:  Scheduled Meds:   albuterol-ipratropium 2.5mg-0.5mg/3mL  3 mL Nebulization Q4H WAKE    bisacodyl  10 mg Rectal Daily    cloNIDine  0.3 mg Oral BID    docusate sodium  100 mg Oral BID    enoxaparin  40 mg Subcutaneous Daily    fluticasone-vilanterol  1 puff Inhalation Daily    mupirocin  1 g Nasal BID    nicotine  1 patch Transdermal Daily    ramipril  10 mg Oral Daily    tiotropium  1 capsule Inhalation Daily     PRN Meds:sodium chloride, sodium chloride, acetaminophen, aluminum-magnesium hydroxide-simethicone, cyclobenzaprine, dextrose 50%, dextrose 50%, glucagon (human recombinant), glucose, glucose, glucose, hydrocodone-acetaminophen 5-325mg, insulin aspart U-100, nitroGLYCERIN, ondansetron, senna-docusate 8.6-50 mg     Review of Systems  Objective:     Weight: 49.7 kg (109 lb 9.1 oz)  Body mass index is 20.04 kg/m².  Vital Signs (Most Recent):  Temp: 98 °F (36.7 °C) (05/07/18 0459)  Pulse: 96 (05/07/18 0752)  Resp: 20 (05/07/18 0459)  BP: (!) 154/100 (05/07/18 0459)  SpO2: 95 % (05/07/18 0459) Vital Signs (24h Range):  Temp:  [97.2 °F (36.2 °C)-98.9 °F (37.2 °C)] 98 °F (36.7 °C)  Pulse:  [] 96  Resp:  [16-20] 20  SpO2:  [93 %-99 %] 95 %  BP: (113-185)/() 154/100                           Neurosurgery Physical Exam  -Awake, alert, oriented x4  -PERRL, EOMI, face symmetrical, tongue midline, facial sensation symmetric, shoulder shrug full strength and symmetric  -Motor: 5/5 throughout the upper and lower extremites bilaterally  -sensation intact to light touch throughout  -reflexes 2+ in patella and brachioradialis bilaterally  -no clonus, no Vázquez's  -coordination intact to finger to nose bilaterally      Significant Labs:    Recent Labs  Lab 05/06/18  0435   GLU 59*      K 4.1      CO2 18*    BUN 26*   CREATININE 0.9   CALCIUM 8.4*       Recent Labs  Lab 05/06/18  0435   WBC 10.73   HGB 9.9*   HCT 30.7*          Recent Labs  Lab 05/05/18  0839   INR 1.0     Microbiology Results (last 7 days)     Procedure Component Value Units Date/Time    Blood culture [[id number]] Collected:  05/04/18 0457    Order Status:  Completed Specimen:  Blood from Peripheral, Hand, Left Updated:  05/07/18 0612     Blood Culture, Routine No Growth to date     Blood Culture, Routine No Growth to date     Blood Culture, Routine No Growth to date     Blood Culture, Routine No Growth to date    Urine culture [[id number]] Collected:  05/04/18 0129    Order Status:  Completed Specimen:  Urine from Catheterized Updated:  05/05/18 0728     Urine Culture, Routine No growth    Narrative:       add on per Dr Bai order #[id number] 05/04/2018  04:02 cxurn    Urine culture [[id number]]     Order Status:  Completed Specimen:  Urine from Urine, Catheterized     Urine culture [[id number]]     Order Status:  Canceled Specimen:  Urine         All pertinent labs from the last 24 hours have been reviewed.    Significant Diagnostics:  I have reviewed and interpreted all pertinent imaging results/findings within the past 24 hours.

## 2018-05-07 NOTE — PT/OT/SLP PROGRESS
Physical Therapy Treatment    Patient Name:  Sunita Zimmer   MRN:  [id number]    Recommendations:     Discharge Recommendations:  nursing facility, skilled   Discharge Equipment Recommendations: none   Barriers to discharge: Decreased caregiver support    Assessment:     Sunita Zimmer is a 77 y.o. female admitted with a medical diagnosis of <principal problem not specified>.  She presents with the following impairments/functional limitations:  weakness, impaired endurance, impaired self care skills, impaired functional mobilty, gait instability, impaired balance, decreased lower extremity function, decreased safety awareness. Pt fair to treatment session, and will continue to benefit from skilled PT services at this time. Continue with PT POC as indicated.     Rehab Prognosis:  good; patient would benefit from acute skilled PT services to address these deficits and reach maximum level of function.      Recent Surgery: Procedure(s) (LRB):  LAMINECTOMY-LUMBAR-DECOMPRESSION and RESECTION OF TUMOR L3-5 (N/A) 3 Days Post-Op    Plan:     During this hospitalization, patient to be seen 4 x/week to address the above listed problems via gait training, therapeutic exercises, therapeutic activities  · Plan of Care Expires:  06/05/18   Plan of Care Reviewed with: patient, daughter    Subjective     Communicated with nursing prior to session.  Patient found supine upon PT entry to room, agreeable to treatment.      Chief Complaint: none stated  Patient comments/goals: none stated  Pain/Comfort:  · Pain Rating 1:  (Pt did not rate. )  · Location 1:  (buttox)  · Pain Addressed 1: Reposition, Distraction  · Pain Rating Post-Intervention 1:  (Pt did not rate. )    Patients cultural, spiritual, Gnosticist conflicts given the current situation: none stated    Objective:     Patient found with:  (all lines intact)     General Precautions: Standard, fall   Orthopedic Precautions:spinal precautions   Braces: N/A     Functional  Mobility:  · Bed Mobility:  Scooting: moderate assistance  · Supine to Sit: moderate assistance  · Transfers:  Sit to Stand:  moderate assistance with rolling walker  · Bed to Chair: moderate assistance with  rolling walker  using  Stand Pivot  · Gait: Pt took 6 steps with min/mod A using RW. Distance limited 2* to pt safety.       AM-PAC 6 CLICK MOBILITY  Turning over in bed (including adjusting bedclothes, sheets and blankets)?: 3  Sitting down on and standing up from a chair with arms (e.g., wheelchair, bedside commode, etc.): 3  Moving from lying on back to sitting on the side of the bed?: 3  Moving to and from a bed to a chair (including a wheelchair)?: 3  Need to walk in hospital room?: 2  Climbing 3-5 steps with a railing?: 1  Total Score: 15       Therapeutic Activities and Exercises:   -B LE therex x15 reps with assistance as needed: AP, LAQ, and Hip Flexion    Patient left up in chair with all lines intact, call button in reach, nursing notified and daughter present..    GOALS:    Physical Therapy Goals        Problem: Physical Therapy Goal    Goal Priority Disciplines Outcome Goal Variances Interventions   Physical Therapy Goal     PT/OT, PT Ongoing (interventions implemented as appropriate)     Description:  Goals to be met by: 10 days ( )    Patient will increase functional independence with mobility by performin. Supine to sit with Stand-by Assistance  2. Sit to supine with Stand-by Assistance  3. Sit to stand transfer with Stand-by Assistance using RW  4. Gait  x 100 feet with Contact Guard Assistance using Rolling Walker.   5. Lower extremity exercise program x30 reps per handout, with independence to improve muscular strength and endurance.                         Time Tracking:     PT Received On: 18  PT Start Time: 1400     PT Stop Time: 1428  PT Total Time (min): 28 min     Billable Minutes: Therapeutic Activity 20 and Therapeutic Exercise 8    Treatment Type: Treatment  PT/PTA:  PTA     PTA Visit Number: 1     Chanell Cisse, PTA  05/07/2018

## 2018-05-07 NOTE — SUBJECTIVE & OBJECTIVE
Interval History: NAEON. Pain improved. Pending SNF. Restarted coumadin 2mg daily.      Medications:  Continuous Infusions:  Scheduled Meds:   albuterol-ipratropium 2.5mg-0.5mg/3mL  3 mL Nebulization Q4H WAKE    bisacodyl  10 mg Rectal Daily    cloNIDine  0.3 mg Oral BID    docusate sodium  100 mg Oral BID    enoxaparin  40 mg Subcutaneous Daily    fluticasone-vilanterol  1 puff Inhalation Daily    mupirocin  1 g Nasal BID    nicotine  1 patch Transdermal Daily    ramipril  10 mg Oral Daily    tiotropium  1 capsule Inhalation Daily     PRN Meds:sodium chloride, sodium chloride, acetaminophen, aluminum-magnesium hydroxide-simethicone, cyclobenzaprine, dextrose 50%, dextrose 50%, glucagon (human recombinant), glucose, glucose, glucose, hydrocodone-acetaminophen 5-325mg, insulin aspart U-100, nitroGLYCERIN, ondansetron, senna-docusate 8.6-50 mg     Review of Systems    Objective:     Weight: 49.7 kg (109 lb 9.1 oz)  Body mass index is 20.04 kg/m².  Vital Signs (Most Recent):  Temp: 98.5 °F (36.9 °C) (05/07/18 1213)  Pulse: (!) 141 (05/07/18 1521)  Resp: 18 (05/07/18 1335)  BP: (!) 169/71 (05/07/18 1213)  SpO2: 98 % (05/07/18 1335) Vital Signs (24h Range):  Temp:  [97.6 °F (36.4 °C)-98.9 °F (37.2 °C)] 98.5 °F (36.9 °C)  Pulse:  [] 141  Resp:  [18-20] 18  SpO2:  [92 %-99 %] 98 %  BP: (113-169)/() 169/71                           Neurosurgery Physical Exam    General: well developed, well nourished, no distress.   Head: normocephalic, atraumatic  Neurologic: Alert and oriented. Thought content appropriate.  GCS: Motor: 6/Verbal: 5/Eyes: 4 GCS Total: 15  Mental Status: Awake, Alert, Oriented x 4  Language: No aphasia  Speech: No dysarthria  Cranial nerves: face symmetric, tongue midline, CN II-XII grossly intact.   Eyes: pupils equal, round, reactive to light with accomodation, EOMI.  Pulmonary: normal respirations, no signs of respiratory distress  Abdomen: soft, non-distended, not tender to  palpation  Sensory: intact to light touch throughout    Motor Strength: Moves all extremities spontaneously with good tone.  Full strength upper and lower extremities. No abnormal movements seen.     DTR's: 2 + and symmetric in UE and LE  Pronator Drift: no drift noted  Finger-to-nose: Intact bilaterally  Vázquez: absent  Clonus: absent  Babinski: absent  Pulses: 2+ and symmetric radial and dorsalis pedis.  Skin: Skin is warm, dry and intact.    Incision c/d/i      Significant Labs:    Recent Labs  Lab 05/06/18  0435   GLU 59*      K 4.1      CO2 18*   BUN 26*   CREATININE 0.9   CALCIUM 8.4*       Recent Labs  Lab 05/06/18  0435   WBC 10.73   HGB 9.9*   HCT 30.7*        No results for input(s): LABPT, INR, APTT in the last 48 hours.  Microbiology Results (last 7 days)     Procedure Component Value Units Date/Time    Blood culture [[id number]] Collected:  05/04/18 0457    Order Status:  Completed Specimen:  Blood from Peripheral, Hand, Left Updated:  05/07/18 0612     Blood Culture, Routine No Growth to date     Blood Culture, Routine No Growth to date     Blood Culture, Routine No Growth to date     Blood Culture, Routine No Growth to date    Urine culture [[id number]] Collected:  05/04/18 0129    Order Status:  Completed Specimen:  Urine from Catheterized Updated:  05/05/18 0728     Urine Culture, Routine No growth    Narrative:       add on per Dr Bai order #[id number] 05/04/2018  04:02 cxurn    Urine culture [[id number]]     Order Status:  Completed Specimen:  Urine from Urine, Catheterized     Urine culture [[id number]]     Order Status:  Canceled Specimen:  Urine         All pertinent labs from the last 24 hours have been reviewed.    Significant Diagnostics:  I have reviewed and interpreted all pertinent imaging results/findings within the past 24 hours.

## 2018-05-07 NOTE — PROGRESS NOTES
"[facility]  Neurosurgery  Progress Note    Subjective:     History of Present Illness: A 77-year-old female with medical comorbidities significant for HTN, CAD, polycythemia vera, COPD, polycystic kidney disease, CKD stage 3 presents to the ED with a chief complaint of back pain. Pain began 3 days ago after lifting a full gallon of milk. Pt hurt her back while twisting away from the refrigerator.  Patient reports pain that she describes as a fist in the middle of my low back".  Patient has a history of chronic low back pain after a fall resulting in multiple lumbar fractures 6 years ago.  She reports that the pain occasionally radiates down bilateral lower extremities, however this is her baseline and not worse over the past 3 days after this new injury. Today, patient reports an episode of urinary incontinence which is not typical for her.  Patient reports noticing that she had urinated on herself and did not feel any sensation.  She denies any lower extremity weakness, numbness, saddle anesthesia, loss of bowel function.     MRI shows ring enhancing intradural lesion at level of cauda equina.     On exam pt with bilateral S1 dermatome radiculopathy. Otherwise intact. Rectal tone present. Corbett catheter in place.    Post-Op Info:  Procedure(s) (LRB):  LAMINECTOMY-LUMBAR-DECOMPRESSION and RESECTION OF TUMOR L3-5 (N/A)   3 Days Post-Op     Interval History: POD2 s/p laminectomy for intradural tumor resection at L4 level.patient slightly sedated on pain medications, will decrease today      Medications:  Continuous Infusions:  Scheduled Meds:   albuterol-ipratropium 2.5mg-0.5mg/3mL  3 mL Nebulization Q4H WAKE    bisacodyl  10 mg Rectal Daily    cloNIDine  0.3 mg Oral BID    docusate sodium  100 mg Oral BID    enoxaparin  40 mg Subcutaneous Daily    fluticasone-vilanterol  1 puff Inhalation Daily    mupirocin  1 g Nasal BID    nicotine  1 patch Transdermal Daily    ramipril  10 mg Oral " Daily    tiotropium  1 capsule Inhalation Daily     PRN Meds:sodium chloride, sodium chloride, acetaminophen, aluminum-magnesium hydroxide-simethicone, cyclobenzaprine, dextrose 50%, dextrose 50%, glucagon (human recombinant), glucose, glucose, glucose, hydrocodone-acetaminophen 5-325mg, insulin aspart U-100, nitroGLYCERIN, ondansetron, senna-docusate 8.6-50 mg     Review of Systems  Objective:     Weight: 49.7 kg (109 lb 9.1 oz)  Body mass index is 20.04 kg/m².  Vital Signs (Most Recent):  Temp: 98 °F (36.7 °C) (05/07/18 0459)  Pulse: 96 (05/07/18 0752)  Resp: 20 (05/07/18 0459)  BP: (!) 154/100 (05/07/18 0459)  SpO2: 95 % (05/07/18 0459) Vital Signs (24h Range):  Temp:  [97.2 °F (36.2 °C)-98.9 °F (37.2 °C)] 98 °F (36.7 °C)  Pulse:  [] 96  Resp:  [16-20] 20  SpO2:  [93 %-99 %] 95 %  BP: (113-185)/() 154/100                           Neurosurgery Physical Exam  -Awake, alert, oriented x4  -PERRL, EOMI, face symmetrical, tongue midline, facial sensation symmetric, shoulder shrug full strength and symmetric  -Motor: 5/5 throughout the upper and lower extremites bilaterally  -sensation intact to light touch throughout  -reflexes 2+ in patella and brachioradialis bilaterally  -no clonus, no Vázquez's  -coordination intact to finger to nose bilaterally      Significant Labs:    Recent Labs  Lab 05/06/18 0435   GLU 59*      K 4.1      CO2 18*   BUN 26*   CREATININE 0.9   CALCIUM 8.4*       Recent Labs  Lab 05/06/18 0435   WBC 10.73   HGB 9.9*   HCT 30.7*          Recent Labs  Lab 05/05/18  0839   INR 1.0     Microbiology Results (last 7 days)     Procedure Component Value Units Date/Time    Blood culture [[id number]] Collected:  05/04/18 0457    Order Status:  Completed Specimen:  Blood from Peripheral, Hand, Left Updated:  05/07/18 0612     Blood Culture, Routine No Growth to date     Blood Culture, Routine No Growth to date     Blood Culture, Routine No Growth to date     Blood Culture,  Routine No Growth to date    Urine culture [[id number]] Collected:  05/04/18 0129    Order Status:  Completed Specimen:  Urine from Catheterized Updated:  05/05/18 0728     Urine Culture, Routine No growth    Narrative:       add on per Dr Bai order #[id number] 05/04/2018  04:02 cxurn    Urine culture [[id number]]     Order Status:  Completed Specimen:  Urine from Urine, Catheterized     Urine culture [[id number]]     Order Status:  Canceled Specimen:  Urine         All pertinent labs from the last 24 hours have been reviewed.    Significant Diagnostics:  I have reviewed and interpreted all pertinent imaging results/findings within the past 24 hours.    Assessment/Plan:     Intradural mass    77F with tnilateral radiculopathy, ring enhancing lesion in cauda equina behind L4 vertebral body. Now s/p laminectomy for mass resection    -Pain control with oxy 5 only, patient extremely sensitive to pain medications  -wound stable  -no HOB restrictions, OOB as much as possible  -PT/OT/OOB  -holding home coumadin  -home COPD inhalers and duo nebs q4 per medicine recs  -regular diet  -SSI  -remove frye  -stop IVFs            Jude Mckeon MD  Neurosurgery  [facility]

## 2018-05-08 LAB
BLD PROD TYP BPU: NORMAL
BLD PROD TYP BPU: NORMAL
BLOOD UNIT EXPIRATION DATE: NORMAL
BLOOD UNIT EXPIRATION DATE: NORMAL
BLOOD UNIT TYPE CODE: 6200
BLOOD UNIT TYPE CODE: 6200
BLOOD UNIT TYPE: NORMAL
BLOOD UNIT TYPE: NORMAL
CODING SYSTEM: NORMAL
CODING SYSTEM: NORMAL
DISPENSE STATUS: NORMAL
DISPENSE STATUS: NORMAL
TRANS ERYTHROCYTES VOL PATIENT: NORMAL ML
TRANS ERYTHROCYTES VOL PATIENT: NORMAL ML

## 2018-05-08 PROCEDURE — 25000242 PHARM REV CODE 250 ALT 637 W/ HCPCS: Performed by: STUDENT IN AN ORGANIZED HEALTH CARE EDUCATION/TRAINING PROGRAM

## 2018-05-08 PROCEDURE — 94640 AIRWAY INHALATION TREATMENT: CPT

## 2018-05-08 PROCEDURE — 25000003 PHARM REV CODE 250: Performed by: NEUROLOGICAL SURGERY

## 2018-05-08 PROCEDURE — 97535 SELF CARE MNGMENT TRAINING: CPT

## 2018-05-08 PROCEDURE — 27000221 HC OXYGEN, UP TO 24 HOURS

## 2018-05-08 PROCEDURE — 25000003 PHARM REV CODE 250: Performed by: PHYSICIAN ASSISTANT

## 2018-05-08 PROCEDURE — 63600175 PHARM REV CODE 636 W HCPCS: Performed by: NEUROLOGICAL SURGERY

## 2018-05-08 PROCEDURE — 11000001 HC ACUTE MED/SURG PRIVATE ROOM

## 2018-05-08 PROCEDURE — 99024 POSTOP FOLLOW-UP VISIT: CPT | Mod: ,,, | Performed by: PHYSICIAN ASSISTANT

## 2018-05-08 PROCEDURE — 25000003 PHARM REV CODE 250: Performed by: STUDENT IN AN ORGANIZED HEALTH CARE EDUCATION/TRAINING PROGRAM

## 2018-05-08 PROCEDURE — 94761 N-INVAS EAR/PLS OXIMETRY MLT: CPT

## 2018-05-08 PROCEDURE — S4991 NICOTINE PATCH NONLEGEND: HCPCS | Performed by: NEUROLOGICAL SURGERY

## 2018-05-08 RX ORDER — NICOTINE 7MG/24HR
1 PATCH, TRANSDERMAL 24 HOURS TRANSDERMAL DAILY
Status: DISCONTINUED | OUTPATIENT
Start: 2018-05-08 | End: 2018-05-08

## 2018-05-08 RX ADMIN — ENOXAPARIN SODIUM 40 MG: 100 INJECTION SUBCUTANEOUS at 06:05

## 2018-05-08 RX ADMIN — FLUTICASONE FUROATE AND VILANTEROL TRIFENATATE 1 PUFF: 100; 25 POWDER RESPIRATORY (INHALATION) at 09:05

## 2018-05-08 RX ADMIN — WARFARIN SODIUM 2 MG: 2 TABLET ORAL at 06:05

## 2018-05-08 RX ADMIN — ACETAMINOPHEN 650 MG: 325 TABLET ORAL at 04:05

## 2018-05-08 RX ADMIN — IPRATROPIUM BROMIDE AND ALBUTEROL SULFATE 3 ML: .5; 3 SOLUTION RESPIRATORY (INHALATION) at 12:05

## 2018-05-08 RX ADMIN — TIOTROPIUM BROMIDE 18 MCG: 18 CAPSULE ORAL; RESPIRATORY (INHALATION) at 09:05

## 2018-05-08 RX ADMIN — MUPIROCIN 1 G: 20 OINTMENT TOPICAL at 09:05

## 2018-05-08 RX ADMIN — CLONIDINE HYDROCHLORIDE 0.3 MG: 0.2 TABLET ORAL at 09:05

## 2018-05-08 RX ADMIN — HYDROCODONE BITARTRATE AND ACETAMINOPHEN 1 TABLET: 5; 325 TABLET ORAL at 03:05

## 2018-05-08 RX ADMIN — RAMIPRIL 10 MG: 10 CAPSULE ORAL at 09:05

## 2018-05-08 RX ADMIN — NICOTINE 1 PATCH: 21 PATCH, EXTENDED RELEASE TRANSDERMAL at 09:05

## 2018-05-08 RX ADMIN — DOCUSATE SODIUM 100 MG: 100 CAPSULE, LIQUID FILLED ORAL at 09:05

## 2018-05-08 RX ADMIN — IPRATROPIUM BROMIDE AND ALBUTEROL SULFATE 3 ML: .5; 3 SOLUTION RESPIRATORY (INHALATION) at 08:05

## 2018-05-08 RX ADMIN — HYDROCODONE BITARTRATE AND ACETAMINOPHEN 1 TABLET: 5; 325 TABLET ORAL at 06:05

## 2018-05-08 NOTE — NURSING
Patient irritable this morning hitting and kicking staff HR fluctuated from 1 teens to 140's while irritable, patient had low grade fever in which she was able to take tylenol when settled. The patient son says she has not been sleeping at night and her routine at home is sleeping during the day and staying awake all night.

## 2018-05-08 NOTE — PROGRESS NOTES
"[facility]  Neurosurgery  Progress Note    Subjective:     History of Present Illness: A 77-year-old female with medical comorbidities significant for HTN, CAD, polycythemia vera, COPD, polycystic kidney disease, CKD stage 3 presents to the ED with a chief complaint of back pain. Pain began 3 days ago after lifting a full gallon of milk. Pt hurt her back while twisting away from the refrigerator.  Patient reports pain that she describes as a fist in the middle of my low back".  Patient has a history of chronic low back pain after a fall resulting in multiple lumbar fractures 6 years ago.  She reports that the pain occasionally radiates down bilateral lower extremities, however this is her baseline and not worse over the past 3 days after this new injury. Today, patient reports an episode of urinary incontinence which is not typical for her.  Patient reports noticing that she had urinated on herself and did not feel any sensation.  She denies any lower extremity weakness, numbness, saddle anesthesia, loss of bowel function.     MRI shows ring enhancing intradural lesion at level of cauda equina.     On exam pt with bilateral S1 dermatome radiculopathy. Otherwise intact. Rectal tone present. Corbett catheter in place.    Post-Op Info:  Procedure(s) (LRB):  LAMINECTOMY-LUMBAR-DECOMPRESSION and RESECTION OF TUMOR L3-5 (N/A)   4 Days Post-Op     Interval History: NAEON. Pt reports pain is well controlled. Denies n/v, numbness/tingling, or increased weakness. Pt's  says she has not been eating very much, but this is consistent over past year. Pt unwilling to participate with physical therapy today, yelling "I'm cold" and wanting to stay covered in bed. Denies fever/chills.     Medications:  Continuous Infusions:  Scheduled Meds:   albuterol-ipratropium 2.5mg-0.5mg/3mL  3 mL Nebulization Q4H WAKE    bisacodyl  10 mg Rectal Daily    cloNIDine  0.3 mg Oral BID    docusate sodium  100 mg Oral BID "    enoxaparin  40 mg Subcutaneous Daily    fluticasone-vilanterol  1 puff Inhalation Daily    mupirocin  1 g Nasal BID    nicotine  1 patch Transdermal Daily    ramipril  10 mg Oral Daily    tiotropium  1 capsule Inhalation Daily    warfarin  2 mg Oral Daily     PRN Meds:sodium chloride, sodium chloride, acetaminophen, aluminum-magnesium hydroxide-simethicone, cyclobenzaprine, dextrose 50%, dextrose 50%, glucagon (human recombinant), glucose, glucose, glucose, hydrocodone-acetaminophen 5-325mg, insulin aspart U-100, nitroGLYCERIN, ondansetron, senna-docusate 8.6-50 mg     Review of Systems  Objective:     Weight: 49.7 kg (109 lb 9.1 oz)  Body mass index is 20.04 kg/m².  Vital Signs (Most Recent):  Temp: 97.8 °F (36.6 °C) (05/08/18 1119)  Pulse: 98 (05/08/18 1243)  Resp: 16 (05/08/18 1243)  BP: (!) 118/56 (05/08/18 1119)  SpO2: 99 % (05/08/18 1243) Vital Signs (24h Range):  Temp:  [97.5 °F (36.4 °C)-100.9 °F (38.3 °C)] 97.8 °F (36.6 °C)  Pulse:  [] 98  Resp:  [16-23] 16  SpO2:  [95 %-100 %] 99 %  BP: (118-165)/(56-69) 118/56                 Oxygen Concentration (%):  [36] 36         Neurosurgery Physical Exam  General: well developed, well nourished, no distress.   Head: normocephalic, atraumatic  Neurologic: Alert and oriented. Thought content appropriate.  GCS: Motor: 6/Verbal: 5/Eyes: 4 GCS Total: 15  Mental Status: Awake, Alert, Oriented x 4  Language: No aphasia  Speech: No dysarthria  Cranial nerves: face symmetric, tongue midline, CN II-XII grossly intact.   Eyes: pupils equal, round, reactive to light with accomodation, EOMI.  Pulmonary: normal respirations, no signs of respiratory distress  Abdomen: soft, non-distended, not tender to palpation  Sensory: intact to light touch throughout     Motor Strength: Moves all extremities spontaneously with good tone.  Full strength upper and lower extremities. No abnormal movements seen.      DTR's: 2 + and symmetric in UE and LE  Pronator Drift: no drift  noted  Finger-to-nose: Intact bilaterally  Vázquez: absent  Clonus: absent  Babinski: absent  Pulses: 2+ and symmetric radial and dorsalis pedis.  Skin: Skin is warm, dry and intact.  Incision c/d/i    Significant Labs:  No results for input(s): GLU, NA, K, CL, CO2, BUN, CREATININE, CALCIUM, MG in the last 48 hours.  No results for input(s): WBC, HGB, HCT, PLT in the last 48 hours.    Recent Labs  Lab 05/07/18  1637   INR 1.0     Microbiology Results (last 7 days)     Procedure Component Value Units Date/Time    Blood culture [[id number]] Collected:  05/04/18 0457    Order Status:  Completed Specimen:  Blood from Peripheral, Hand, Left Updated:  05/08/18 0612     Blood Culture, Routine No Growth to date     Blood Culture, Routine No Growth to date     Blood Culture, Routine No Growth to date     Blood Culture, Routine No Growth to date     Blood Culture, Routine No Growth to date    Urine culture [[id number]] Collected:  05/04/18 0129    Order Status:  Completed Specimen:  Urine from Catheterized Updated:  05/05/18 0728     Urine Culture, Routine No growth    Narrative:       add on per Dr Bai order #[id number] 05/04/2018  04:02 cxurn    Urine culture [[id number]]     Order Status:  Completed Specimen:  Urine from Urine, Catheterized     Urine culture [[id number]]     Order Status:  Canceled Specimen:  Urine         Recent Lab Results       05/07/18  1637      Coumadin Monitoring INR 1.0  Comment:  Coumadin Therapy:  2.0 - 3.0 for INR for all indicators except mechanical heart valves  and antiphospholipid syndromes which should use 2.5 - 3.5.       Protime 10.2         All pertinent labs from the last 24 hours have been reviewed.    Significant Diagnostics:  I have reviewed all pertinent imaging results/findings within the past 24 hours.    Assessment/Plan:     Intradural mass    77F with tnilateral radiculopathy, ring enhancing lesion in cauda equina behind L4 vertebral body. Now s/p laminectomy for mass resection  5/4.    -Pain control with tylenol 650 q6h prn as first choice, oxycodone 5mg prn, patient extremely sensitive to pain medications.  -wound stable  -no HOB restrictions, OOB as much as possible. Pt to be OOB atleast 6 hours per day.  -PT/OT/OOB  -Continue coumadin 2mg daily. Restarted 5/7.   -home COPD inhalers and duo nebs q4 per medicine recs  -regular diet  -SSI  -Bowel regimen  -Delirium precautions  -Pending SNF            BRAD CotoC  Neurosurgery  [facility]

## 2018-05-08 NOTE — PLAN OF CARE
MEET following for DC needs. MEET in communication with CM.    MEET sent referral to [facility] via [facility].     Ana Maria Gomez, MARILYN  [id number]

## 2018-05-08 NOTE — CARE UPDATE
Chart check completed, abnormal VS noted, bedside RN contacted rapid response RN, concerns of altered mental status verbalized at this time. Patient tachycardic, febrile and refusing to take tylenol. Patient having productive cough, oxygen saturations are 91%. Patient redirected and taking tylenol. Creating calm environment to then administer breathing treatment per RT. Instructed to call [phone] for further concerns or assistance.    Vitals:    05/08/18 0515   BP:    Pulse: (!) 122   Resp:    Temp:

## 2018-05-08 NOTE — PT/OT/SLP PROGRESS
Occupational Therapy   Treatment    Name: Sunita Zimmer  MRN: [id number]  Admitting Diagnosis:  <principal problem not specified>  4 Days Post-Op    Recommendations:     Discharge Recommendations: nursing facility, skilled  Discharge Equipment Recommendations:  none  Barriers to discharge:  None    Subjective     Communicated with: AMEYA Cottrell prior to session. Pt okay to work with therapy.   Pain/Comfort:  · Pain Rating 1: other (see comments) (pt did not rate when asked, some s/s of pain with movement but appears tolerable.)    Patients cultural, spiritual, Tenriism conflicts given the current situation: none    Objective:     Patient found with:  (external jugular PIV, not attached)    General Precautions: Standard, fall   Orthopedic Precautions:N/A   Braces: N/A     Occupational Performance:    Bed Mobility:    · Patient completed Rolling/Turning to Left with  moderate assistance  · Patient completed Scooting/Bridging with minimum assistance  · Patient completed Supine to Sit with maximal assistance     Functional Mobility/Transfers:  · Patient completed Sit <> Stand Transfer with moderate assistance  with  rolling walker   · Patient completed Bed <> Chair Transfer using Stand Pivot technique with moderate assistance with rolling walker  · Functional Mobility:  Pt completed transfer taking about 5-6 steps to chair during stand pivot transfer. She needs constant cueing for correct use of walker and for standing up straight.   · Pt stood from [facility] with mod-max A. She was able to march in place with CGA. Pt able to take steps forward with min A. When she tried to go back she had a lot of difficulty. Max A for safety back to chair due to pt flexing hips leaning forward.     Activities of Daily Living:  · Feeding:  stand by assistance pt eating upon arrival, only needs a little assist from spouse   · Grooming: minimum assistance for cleaning dentures in basin  · Bathing: DNT    · UB Dressing: DNT    · LB Dressing: pt  is able to silverio/doff R sock, unable to manage L sock or threading underwear over L leg without breaking spinal precuations. LHR or assist is needed.     · Toileting: pt is in diapers. She is not safe to ambulate to restroom at this time without assist x 2. Requested BSC be ordered for pt room      Patient left up in chair with call button in reach and family present.    [facility] 6 Click:  [facility] Total Score: 14    Treatment & Education:  · Educated on role of OT, continued goals.  · Discussed safety with transfers and need to BSC.  · Pt not safe for ambulation to restroom on this date.   Education:    Assessment:     Sunita Zimmer is a 77 y.o. female with a pre-op diagnosis of back pain.  Pt is s/p LAMINECTOMY-LUMBAR-DECOMPRESSION and RESECTION OF TUMOR L3-5 She presents with decline in ADLs and functional mobility.   Pt would benefit from skilled OT services in order to maximize independence with ADLs and facilitate safe discharge.  Performance deficits affecting function are weakness, impaired self care skills, impaired sensation, impaired functional mobilty, gait instability, impaired balance, impaired cognition, decreased upper extremity function, decreased lower extremity function, pain, decreased safety awareness, impaired cardiopulmonary response to activity, decreased ROM, orthopedic precautions.      Rehab Prognosis:  Good; patient would benefit from acute skilled OT services to address these deficits and reach maximum level of function.       Plan:     Patient to be seen 3 x/week to address the above listed problems via self-care/home management, therapeutic activities, therapeutic exercises, neuromuscular re-education, sensory integration  · Plan of Care Expires: 06/06/18  · Plan of Care Reviewed with: patient, family    This Plan of care has been discussed with the patient who was involved in its development and understands and is in agreement with the identified goals and treatment plan    GOALS:     Occupational Therapy Goals        Problem: Occupational Therapy Goal    Goal Priority Disciplines Outcome Interventions   Occupational Therapy Goal     OT, PT/OT Ongoing (interventions implemented as appropriate)    Description:  Goals to be met by: 5/30/18     Patient will increase functional independence with ADLs by performing:    UE Dressing with Stand-by Assistance.  LE Dressing with Stand-by Assistance.  Grooming while standing at sink with Stand-by Assistance.  Toileting from toilet with Stand-by Assistance for hygiene and clothing management.   Stand pivot transfers with Stand-by Assistance.                      Time Tracking:     OT Date of Treatment: 05/08/18  OT Start Time: 1030  OT Stop Time: 1109  OT Total Time (min): 39 min    Billable Minutes:Self Care/Home Management 39    WHIT Cash  5/8/2018

## 2018-05-08 NOTE — PLAN OF CARE
Problem: Occupational Therapy Goal  Goal: Occupational Therapy Goal  Goals to be met by: 5/30/18     Patient will increase functional independence with ADLs by performing:    UE Dressing with Stand-by Assistance.  LE Dressing with Stand-by Assistance.  Grooming while standing at sink with Stand-by Assistance.  Toileting from toilet with Stand-by Assistance for hygiene and clothing management.   Stand pivot transfers with Stand-by Assistance.     Outcome: Ongoing (interventions implemented as appropriate)  Goals remain appropriate, continue with OT POC.    WHIT Henry  5/8/2018  Rehab Services

## 2018-05-08 NOTE — PT/OT/SLP PROGRESS
"Physical Therapy      Patient Name:  Sunita Zimmer   MRN:  [id number]    Patient not seen today secondary to pt refusal. PT attempted tx visit in PM- pt reported feeling cold and in pain. Pt irritable, stating, "just leave me alone." RN present.  Will follow up as POC allows.     Gabrielle Olson, PT, DPT   5/8/2018    "

## 2018-05-08 NOTE — SUBJECTIVE & OBJECTIVE
"Interval History: NAEON. Pt reports pain is well controlled. Denies n/v, numbness/tingling, or increased weakness. Pt's  says she has not been eating very much, but this is consistent over past year. Pt unwilling to participate with physical therapy today, yelling "I'm cold" and wanting to stay covered in bed. Denies fever/chills.     Medications:  Continuous Infusions:  Scheduled Meds:   albuterol-ipratropium 2.5mg-0.5mg/3mL  3 mL Nebulization Q4H WAKE    bisacodyl  10 mg Rectal Daily    cloNIDine  0.3 mg Oral BID    docusate sodium  100 mg Oral BID    enoxaparin  40 mg Subcutaneous Daily    fluticasone-vilanterol  1 puff Inhalation Daily    mupirocin  1 g Nasal BID    nicotine  1 patch Transdermal Daily    ramipril  10 mg Oral Daily    tiotropium  1 capsule Inhalation Daily    warfarin  2 mg Oral Daily     PRN Meds:sodium chloride, sodium chloride, acetaminophen, aluminum-magnesium hydroxide-simethicone, cyclobenzaprine, dextrose 50%, dextrose 50%, glucagon (human recombinant), glucose, glucose, glucose, hydrocodone-acetaminophen 5-325mg, insulin aspart U-100, nitroGLYCERIN, ondansetron, senna-docusate 8.6-50 mg     Review of Systems  Objective:     Weight: 49.7 kg (109 lb 9.1 oz)  Body mass index is 20.04 kg/m².  Vital Signs (Most Recent):  Temp: 97.8 °F (36.6 °C) (05/08/18 1119)  Pulse: 98 (05/08/18 1243)  Resp: 16 (05/08/18 1243)  BP: (!) 118/56 (05/08/18 1119)  SpO2: 99 % (05/08/18 1243) Vital Signs (24h Range):  Temp:  [97.5 °F (36.4 °C)-100.9 °F (38.3 °C)] 97.8 °F (36.6 °C)  Pulse:  [] 98  Resp:  [16-23] 16  SpO2:  [95 %-100 %] 99 %  BP: (118-165)/(56-69) 118/56                 Oxygen Concentration (%):  [36] 36         Neurosurgery Physical Exam  General: well developed, well nourished, no distress.   Head: normocephalic, atraumatic  Neurologic: Alert and oriented. Thought content appropriate.  GCS: Motor: 6/Verbal: 5/Eyes: 4 GCS Total: 15  Mental Status: Awake, Alert, Oriented x " 4  Language: No aphasia  Speech: No dysarthria  Cranial nerves: face symmetric, tongue midline, CN II-XII grossly intact.   Eyes: pupils equal, round, reactive to light with accomodation, EOMI.  Pulmonary: normal respirations, no signs of respiratory distress  Abdomen: soft, non-distended, not tender to palpation  Sensory: intact to light touch throughout     Motor Strength: Moves all extremities spontaneously with good tone.  Full strength upper and lower extremities. No abnormal movements seen.      DTR's: 2 + and symmetric in UE and LE  Pronator Drift: no drift noted  Finger-to-nose: Intact bilaterally  Vázquez: absent  Clonus: absent  Babinski: absent  Pulses: 2+ and symmetric radial and dorsalis pedis.  Skin: Skin is warm, dry and intact.  Incision c/d/i    Significant Labs:  No results for input(s): GLU, NA, K, CL, CO2, BUN, CREATININE, CALCIUM, MG in the last 48 hours.  No results for input(s): WBC, HGB, HCT, PLT in the last 48 hours.    Recent Labs  Lab 05/07/18  163   INR 1.0     Microbiology Results (last 7 days)     Procedure Component Value Units Date/Time    Blood culture [[id number]] Collected:  05/04/18 0457    Order Status:  Completed Specimen:  Blood from Peripheral, Hand, Left Updated:  05/08/18 0612     Blood Culture, Routine No Growth to date     Blood Culture, Routine No Growth to date     Blood Culture, Routine No Growth to date     Blood Culture, Routine No Growth to date     Blood Culture, Routine No Growth to date    Urine culture [[id number]] Collected:  05/04/18 0129    Order Status:  Completed Specimen:  Urine from Catheterized Updated:  05/05/18 0728     Urine Culture, Routine No growth    Narrative:       add on per Dr Bai order #[id number] 05/04/2018  04:02 cxurn    Urine culture [[id number]]     Order Status:  Completed Specimen:  Urine from Urine, Catheterized     Urine culture [[id number]]     Order Status:  Canceled Specimen:  Urine         Recent Lab Results       05/07/18  4876       Coumadin Monitoring INR 1.0  Comment:  Coumadin Therapy:  2.0 - 3.0 for INR for all indicators except mechanical heart valves  and antiphospholipid syndromes which should use 2.5 - 3.5.       Protime 10.2         All pertinent labs from the last 24 hours have been reviewed.    Significant Diagnostics:  I have reviewed all pertinent imaging results/findings within the past 24 hours.

## 2018-05-08 NOTE — ANESTHESIA POSTPROCEDURE EVALUATION
"Anesthesia Post Evaluation    Patient: Sunita Zimmer    Procedure(s) Performed: Procedure(s) (LRB):  LAMINECTOMY-LUMBAR-DECOMPRESSION and RESECTION OF TUMOR L3-5 (N/A)    Final Anesthesia Type: general  Patient location during evaluation: PACU  Patient participation: Yes- Able to Participate  Level of consciousness: awake and alert and oriented  Post-procedure vital signs: reviewed and stable  Pain management: adequate  Airway patency: patent  PONV status at discharge: No PONV  Anesthetic complications: no      Cardiovascular status: hemodynamically stable  Respiratory status: unassisted and spontaneous ventilation  Hydration status: euvolemic  Follow-up not needed.        Visit Vitals  BP (!) 154/62 (BP Location: Right arm, Patient Position: Lying)   Pulse 88   Temp 37.1 °C (98.7 °F) (Axillary)   Resp 16   Ht 5' 2" (1.575 m)   Wt 49.7 kg (109 lb 9.1 oz)   SpO2 99%   Breastfeeding? No   BMI 20.04 kg/m²       Pain/True Score: Pain Assessment Performed: Yes (5/8/2018  6:00 AM)  Presence of Pain: non-verbal indicators absent (5/8/2018  6:00 AM)  Pain Rating Prior to Med Admin: 3 (5/8/2018  4:29 AM)  Pain Rating Post Med Admin: 0 (5/8/2018  5:29 AM)      "

## 2018-05-09 ENCOUNTER — PATIENT MESSAGE (OUTPATIENT)
Dept: NEUROSURGERY | Facility: CLINIC | Age: 78
End: 2018-05-09

## 2018-05-09 PROBLEM — R63.6 UNDERWEIGHT: Status: ACTIVE | Noted: 2017-01-24

## 2018-05-09 PROBLEM — J43.2 CENTRILOBULAR EMPHYSEMA: Status: ACTIVE | Noted: 2018-05-07

## 2018-05-09 PROBLEM — J96.01 ACUTE RESPIRATORY FAILURE WITH HYPOXIA: Status: ACTIVE | Noted: 2018-05-09

## 2018-05-09 PROBLEM — Z79.01 CURRENT USE OF LONG TERM ANTICOAGULATION: Status: ACTIVE | Noted: 2018-05-09

## 2018-05-09 PROBLEM — Z01.818 PREOPERATIVE CLEARANCE: Status: RESOLVED | Noted: 2018-05-04 | Resolved: 2018-05-09

## 2018-05-09 PROBLEM — K59.03 DRUG-INDUCED CONSTIPATION: Status: ACTIVE | Noted: 2018-05-09

## 2018-05-09 LAB
ALBUMIN SERPL BCP-MCNC: 2.3 G/DL
ALP SERPL-CCNC: 95 U/L
ALT SERPL W/O P-5'-P-CCNC: 37 U/L
ANION GAP SERPL CALC-SCNC: 8 MMOL/L
AST SERPL-CCNC: 36 U/L
BACTERIA BLD CULT: NORMAL
BASOPHILS # BLD AUTO: 0.02 K/UL
BASOPHILS NFR BLD: 0.3 %
BILIRUB SERPL-MCNC: 0.3 MG/DL
BUN SERPL-MCNC: 22 MG/DL
CALCIUM SERPL-MCNC: 9.2 MG/DL
CHLORIDE SERPL-SCNC: 108 MMOL/L
CO2 SERPL-SCNC: 26 MMOL/L
CREAT SERPL-MCNC: 0.8 MG/DL
DIFFERENTIAL METHOD: ABNORMAL
EOSINOPHIL # BLD AUTO: 0.1 K/UL
EOSINOPHIL NFR BLD: 1.3 %
ERYTHROCYTE [DISTWIDTH] IN BLOOD BY AUTOMATED COUNT: 13.3 %
EST. GFR  (AFRICAN AMERICAN): >60 ML/MIN/1.73 M^2
EST. GFR  (NON AFRICAN AMERICAN): >60 ML/MIN/1.73 M^2
GLUCOSE SERPL-MCNC: 173 MG/DL
HCT VFR BLD AUTO: 28.6 %
HGB BLD-MCNC: 9.1 G/DL
IMM GRANULOCYTES # BLD AUTO: 0.02 K/UL
IMM GRANULOCYTES NFR BLD AUTO: 0.3 %
LYMPHOCYTES # BLD AUTO: 0.5 K/UL
LYMPHOCYTES NFR BLD: 7.6 %
MCH RBC QN AUTO: 28 PG
MCHC RBC AUTO-ENTMCNC: 31.8 G/DL
MCV RBC AUTO: 88 FL
MONOCYTES # BLD AUTO: 0.8 K/UL
MONOCYTES NFR BLD: 11 %
NEUTROPHILS # BLD AUTO: 5.5 K/UL
NEUTROPHILS NFR BLD: 79.5 %
NRBC BLD-RTO: 0 /100 WBC
PLATELET # BLD AUTO: 258 K/UL
PMV BLD AUTO: 9.1 FL
POTASSIUM SERPL-SCNC: 3.7 MMOL/L
PROT SERPL-MCNC: 6.2 G/DL
RBC # BLD AUTO: 3.25 M/UL
SODIUM SERPL-SCNC: 142 MMOL/L
WBC # BLD AUTO: 6.93 K/UL

## 2018-05-09 PROCEDURE — 25000003 PHARM REV CODE 250: Performed by: STUDENT IN AN ORGANIZED HEALTH CARE EDUCATION/TRAINING PROGRAM

## 2018-05-09 PROCEDURE — 85025 COMPLETE CBC W/AUTO DIFF WBC: CPT

## 2018-05-09 PROCEDURE — 99232 SBSQ HOSP IP/OBS MODERATE 35: CPT | Mod: ,,, | Performed by: INTERNAL MEDICINE

## 2018-05-09 PROCEDURE — 11000001 HC ACUTE MED/SURG PRIVATE ROOM

## 2018-05-09 PROCEDURE — 99024 POSTOP FOLLOW-UP VISIT: CPT | Mod: ,,, | Performed by: PHYSICIAN ASSISTANT

## 2018-05-09 PROCEDURE — S4991 NICOTINE PATCH NONLEGEND: HCPCS | Performed by: PHYSICIAN ASSISTANT

## 2018-05-09 PROCEDURE — 80053 COMPREHEN METABOLIC PANEL: CPT

## 2018-05-09 PROCEDURE — 25000003 PHARM REV CODE 250: Performed by: PHYSICIAN ASSISTANT

## 2018-05-09 PROCEDURE — 25000242 PHARM REV CODE 250 ALT 637 W/ HCPCS: Performed by: STUDENT IN AN ORGANIZED HEALTH CARE EDUCATION/TRAINING PROGRAM

## 2018-05-09 PROCEDURE — 94668 MNPJ CHEST WALL SBSQ: CPT

## 2018-05-09 PROCEDURE — 36415 COLL VENOUS BLD VENIPUNCTURE: CPT

## 2018-05-09 PROCEDURE — 63600175 PHARM REV CODE 636 W HCPCS: Performed by: NEUROLOGICAL SURGERY

## 2018-05-09 PROCEDURE — 97530 THERAPEUTIC ACTIVITIES: CPT

## 2018-05-09 PROCEDURE — 27000221 HC OXYGEN, UP TO 24 HOURS

## 2018-05-09 PROCEDURE — 94640 AIRWAY INHALATION TREATMENT: CPT

## 2018-05-09 PROCEDURE — 25000003 PHARM REV CODE 250: Performed by: INTERNAL MEDICINE

## 2018-05-09 PROCEDURE — 99900035 HC TECH TIME PER 15 MIN (STAT)

## 2018-05-09 RX ORDER — TRAMADOL HYDROCHLORIDE 50 MG/1
50 TABLET ORAL EVERY 6 HOURS PRN
Status: DISCONTINUED | OUTPATIENT
Start: 2018-05-09 | End: 2018-05-11 | Stop reason: HOSPADM

## 2018-05-09 RX ORDER — HYDRALAZINE HYDROCHLORIDE 25 MG/1
25 TABLET, FILM COATED ORAL EVERY 8 HOURS PRN
Status: DISCONTINUED | OUTPATIENT
Start: 2018-05-09 | End: 2018-05-11 | Stop reason: HOSPADM

## 2018-05-09 RX ORDER — HYDROCODONE BITARTRATE AND ACETAMINOPHEN 5; 325 MG/1; MG/1
1 TABLET ORAL EVERY 4 HOURS PRN
Status: DISCONTINUED | OUTPATIENT
Start: 2018-05-09 | End: 2018-05-11 | Stop reason: HOSPADM

## 2018-05-09 RX ORDER — LABETALOL HYDROCHLORIDE 5 MG/ML
10 INJECTION, SOLUTION INTRAVENOUS EVERY 4 HOURS PRN
Status: DISCONTINUED | OUTPATIENT
Start: 2018-05-09 | End: 2018-05-11 | Stop reason: HOSPADM

## 2018-05-09 RX ORDER — POLYETHYLENE GLYCOL 3350 17 G/17G
17 POWDER, FOR SOLUTION ORAL DAILY
Status: DISCONTINUED | OUTPATIENT
Start: 2018-05-09 | End: 2018-05-11 | Stop reason: HOSPADM

## 2018-05-09 RX ORDER — IBUPROFEN 200 MG
1 TABLET ORAL DAILY
Status: DISCONTINUED | OUTPATIENT
Start: 2018-05-09 | End: 2018-05-10

## 2018-05-09 RX ORDER — HYDRALAZINE HYDROCHLORIDE 20 MG/ML
10 INJECTION INTRAMUSCULAR; INTRAVENOUS EVERY 8 HOURS PRN
Status: DISCONTINUED | OUTPATIENT
Start: 2018-05-09 | End: 2018-05-09

## 2018-05-09 RX ADMIN — IPRATROPIUM BROMIDE AND ALBUTEROL SULFATE 3 ML: .5; 3 SOLUTION RESPIRATORY (INHALATION) at 08:05

## 2018-05-09 RX ADMIN — RAMIPRIL 10 MG: 10 CAPSULE ORAL at 08:05

## 2018-05-09 RX ADMIN — CLONIDINE HYDROCHLORIDE 0.3 MG: 0.2 TABLET ORAL at 10:05

## 2018-05-09 RX ADMIN — NICOTINE 1 PATCH: 21 PATCH, EXTENDED RELEASE TRANSDERMAL at 12:05

## 2018-05-09 RX ADMIN — SODIUM PHOSPHATE, DIBASIC AND SODIUM PHOSPHATE, MONOBASIC 1 ENEMA: 7; 19 ENEMA RECTAL at 10:05

## 2018-05-09 RX ADMIN — IPRATROPIUM BROMIDE AND ALBUTEROL SULFATE 3 ML: .5; 3 SOLUTION RESPIRATORY (INHALATION) at 04:05

## 2018-05-09 RX ADMIN — ENOXAPARIN SODIUM 40 MG: 100 INJECTION SUBCUTANEOUS at 07:05

## 2018-05-09 RX ADMIN — DOCUSATE SODIUM 100 MG: 100 CAPSULE, LIQUID FILLED ORAL at 08:05

## 2018-05-09 RX ADMIN — CLONIDINE HYDROCHLORIDE 0.3 MG: 0.2 TABLET ORAL at 08:05

## 2018-05-09 RX ADMIN — DOCUSATE SODIUM 100 MG: 100 CAPSULE, LIQUID FILLED ORAL at 10:05

## 2018-05-09 RX ADMIN — BISACODYL 10 MG: 10 SUPPOSITORY RECTAL at 03:05

## 2018-05-09 RX ADMIN — MUPIROCIN 1 G: 20 OINTMENT TOPICAL at 11:05

## 2018-05-09 RX ADMIN — POLYETHYLENE GLYCOL 3350 17 G: 17 POWDER, FOR SOLUTION ORAL at 03:05

## 2018-05-09 RX ADMIN — WARFARIN SODIUM 2 MG: 2 TABLET ORAL at 07:05

## 2018-05-09 NOTE — ASSESSMENT & PLAN NOTE
· Patient's blood pressure is controlled here in the hospital over past 24 hours but has had several high readings in past several days and likely related to back pain.   · Goal for blood pressure is SBP < 150 and DBP < 90 as patient > or = 60 years of age with no diabetes or advanced kidney disease based on JNC 8 guidelines.   · Recommend to continue current treatment regimen of Clonidine 0.3 mg po BID + Ramipril 10 mg po daily.  · Monitor patient's blood pressure routinely while patient is hospitalized.

## 2018-05-09 NOTE — NURSING
The patient had an unwitnessed fall. She was found sitting on the floor resting her back on the toilet crying. It is unclear how she was able to get out of the bed. She was picked up and put back into the bed. Her blood pressure was a little high and MD was notified.

## 2018-05-09 NOTE — NURSING
Pt unable to do walk test due to limited ability and weakness; patient needs two people assistance when getting OOB.

## 2018-05-09 NOTE — SUBJECTIVE & OBJECTIVE
Interval History: Patient complaining of feeling bloated and constipated but denies any SOB or cough or chest pain. Patient rates her back pain as 2/10 at rest but 8/10 with movement.     Review of Systems   Constitutional: Positive for appetite change (Poor appetite). Negative for chills and fever.   HENT: Negative for congestion.    Respiratory: Negative for cough, shortness of breath and wheezing.    Cardiovascular: Negative for chest pain and leg swelling.   Gastrointestinal: Positive for abdominal pain (Diffuse, crampy) and constipation. Negative for nausea.   Musculoskeletal: Positive for back pain.   Skin: Negative for rash.   Neurological: Positive for weakness (Generalized). Negative for dizziness and light-headedness.   Psychiatric/Behavioral: Negative for confusion and hallucinations.     Objective:     Vital Signs (Most Recent):  Temp: 98.3 °F (36.8 °C) (05/09/18 1212)  Pulse: 104 (05/09/18 1641)  Resp: 16 (05/09/18 1641)  BP: (!) 158/70 (05/09/18 1212)  SpO2: 96 % (05/09/18 1641) Vital Signs (24h Range):  Temp:  [96.7 °F (35.9 °C)-98.9 °F (37.2 °C)] 98.3 °F (36.8 °C)  Pulse:  [] 104  Resp:  [16-20] 16  SpO2:  [90 %-98 %] 96 %  BP: (132-190)/(59-78) 158/70     Weight: 49.7 kg (109 lb 9.1 oz)  Body mass index is 20.04 kg/m².  No intake or output data in the 24 hours ending 05/09/18 1654   Physical Exam   Constitutional: She is oriented to person, place, and time. She appears cachectic. No distress.   HENT:   Mouth/Throat: Oropharynx is clear and moist.   Eyes: Conjunctivae are normal.   Neck: No JVD present.   Cardiovascular: Normal rate, regular rhythm and normal heart sounds.  Exam reveals no gallop and no friction rub.    No murmur heard.  Pulmonary/Chest: Effort normal and breath sounds normal. No respiratory distress. She has no wheezes. She has no rhonchi. She has no rales.   Abdominal: Soft. Bowel sounds are normal. She exhibits no distension. There is no tenderness. There is no rebound and  no guarding.   Musculoskeletal: She exhibits no edema.   Neurological: She is alert and oriented to person, place, and time.   Skin: Skin is warm. Capillary refill takes less than 2 seconds. No erythema.   Psychiatric: She has a normal mood and affect. Her behavior is normal.   Nursing note and vitals reviewed.      Significant Labs:   CBC:   Recent Labs  Lab 05/09/18  1429   WBC 6.93   HGB 9.1*   HCT 28.6*        CMP:   Recent Labs  Lab 05/09/18  1429      K 3.7      CO2 26   *   BUN 22   CREATININE 0.8   CALCIUM 9.2   PROT 6.2   ALBUMIN 2.3*   BILITOT 0.3   ALKPHOS 95   AST 36   ALT 37   ANIONGAP 8   EGFRNONAA >60.0     Significant Imaging: I have reviewed all pertinent imaging results/findings within the past 24 hours.

## 2018-05-09 NOTE — CONSULTS
Please see consult note from myself today. This is a re consult. Initial consult done by Dr. Linares for medicine on 5/4/2018.    GLEN EDWARDS MD  Attending Staff Physician   Department of [facility] Medicine, [facility] on [facility]  Pager: [phone]  Spectralink: [phone]

## 2018-05-09 NOTE — PLAN OF CARE
MEET following for DC needs. MEET in communication with CM.    10:15AM - MEET notified by Jessica at [facility] that [facility] does not have skilled benefits.     2:31PM - MEET sent sitter list and HH list to patient's son at [email or web address].    Ana Maria Gomez LMSW  [id number]

## 2018-05-09 NOTE — PT/OT/SLP PROGRESS
"Physical Therapy Treatment    Patient Name:  Sunita Zimmer   MRN:  [id number]    Recommendations:     Discharge Recommendations:  nursing facility, skilled (stort stay SNF)   Discharge Equipment Recommendations: none   Barriers to discharge: Decreased caregiver support    Assessment:     Sunita Zimmer is a 77 y.o. female admitted with a medical diagnosis of Intradural mass.  She presents with the following impairments/functional limitations:  weakness, impaired endurance, gait instability, impaired functional mobilty, impaired self care skills, impaired balance, impaired cognition, decreased safety awareness, pain, impaired cardiopulmonary response to activity. Pt limited with progression of mobility 2/2 pain and poor endurance; participated in trials of bed<>chair transfer with maximum assistance. Pt would benefit from continued PT intervention to address below listed deficits and maximize return to PLOF.     Rehab Prognosis:  good; patient would benefit from acute skilled PT services to address these deficits and reach maximum level of function.      Recent Surgery: Procedure(s) (LRB):  LAMINECTOMY-LUMBAR-DECOMPRESSION and RESECTION OF TUMOR L3-5 (N/A) 5 Days Post-Op    Plan:     During this hospitalization, patient to be seen 4 x/week to address the above listed problems via gait training, therapeutic activities, therapeutic exercises, neuromuscular re-education  · Plan of Care Expires:  06/05/18   Plan of Care Reviewed with: patient, spouse, son    Subjective     Communicated with RN prior to session.  Patient found supine upon PT entry to room. Pt agreeable to treatment with encouragement; pt initially irritable during session, and resistant to education and assistance provided by therapist- pt pushing therapist away with hands during 1st attempt at supine>sit stating, "I'm going to do this my way." However, at end of session pt stating "thank you so much, I couldn't have done it without you."     Pt's " " expressed concern that pt has not been eating any food for the past few days- RN notified of family's concern.     Chief Complaint: pain   Patient comments/goals: "It hurts! Let me try on my own"   Pain/Comfort:  · Pain Rating 1:  (Pt did not rate pain on numeric scale)  · Location - Orientation 1: generalized  · Location 1: abdomen  · Pain Addressed 1: Reposition, Distraction, Nurse notified    Patients cultural, spiritual, Orthodoxy conflicts given the current situation: no conflicts    Objective:     Patient found with: telemetry, oxygen, bed alarm     General Precautions: Standard, fall   Orthopedic Precautions:N/A   Braces: N/A     Functional Mobility:       Bed Mobility    · Supine to sit: CGA with increased cueing and time   · Sit to supine: minimum assistance      Transfers · Sit <> Stand: moderate assistance from EOB x 1 trial; maximum assistance from bedside commode x 1 trial     · Bed <> Chair: maximum assistance for stand pivot transfer with no AD x 2 trials (performed from bed>bedside commode>bed)      Gait  Pt able to perform small side step during stand pivot transfer, but unable to further ambulate 2/2 BLE weakness, pain and fatigue        Balance  - Static Sitting: able to maintain with SBA   - Dynamic Sitting: able to maintain with CGA   - Static Standing: poor; required mod A to maintain   - Dynamic Standing: poor     Therapeutic Activities and Exercises:  Therapeutic activities aimed to:  - increase pt's independence, safety, and efficiency with bed mobility and functional transfers. See above for assistance levels.   - increase pt's tolerance for OOB activity; pt refused sitting up in chair this visit- encouraged to trial sitting up in chair with RN assistance later if pain is more managable at that time.   - educate pt on HEP including ankle pumps, heel slides, LAQ and marches. Pt verbalized understanding.       Patient left HOB elevated with all lines intact, call button in reach, bed " alarm on, RN notified and family present.       AM-PAC 6 CLICK MOBILITY  Turning over in bed (including adjusting bedclothes, sheets and blankets)?: 3  Sitting down on and standing up from a chair with arms (e.g., wheelchair, bedside commode, etc.): 2  Moving from lying on back to sitting on the side of the bed?: 2  Moving to and from a bed to a chair (including a wheelchair)?: 2  Need to walk in hospital room?: 1  Climbing 3-5 steps with a railing?: 1  Total Score: 11     GOALS:    Physical Therapy Goals        Problem: Physical Therapy Goal    Goal Priority Disciplines Outcome Goal Variances Interventions   Physical Therapy Goal     PT/OT, PT Ongoing (interventions implemented as appropriate)     Description:  Goals to be met by: 10 days ( )    Patient will increase functional independence with mobility by performin. Supine to sit with Stand-by Assistance  2. Sit to supine with Stand-by Assistance  3. Sit to stand transfer with Stand-by Assistance using RW  4. Gait  x 100 feet with Contact Guard Assistance using Rolling Walker.   5. Lower extremity exercise program x30 reps per handout, with independence to improve muscular strength and endurance.                         Time Tracking:     PT Received On: 18  PT Start Time: 1002     PT Stop Time: 1028  PT Total Time (min): 26 min     Billable Minutes: Therapeutic Activity 26 min    Treatment Type: Treatment  PT/PTA: PT     PTA Visit Number: 0     Gabrielle Olson PT, DPT   2018  Pager: [phone]

## 2018-05-09 NOTE — ASSESSMENT & PLAN NOTE
Patient currently on 3 liters of oxygen with sats of 94-98%. Patient does not use oxygen at home and has chronic lung disease so goal is to wean oxygen with goal oxygen sats 88-92%.   · Recommend to wean oxygen. Encourage IS use and OOB to chair to help with oxygenation.   · No indication for steroids or antibiotics as no signs of COPD excerebration on exam and CXR not consistent with pneumonia.

## 2018-05-09 NOTE — SUBJECTIVE & OBJECTIVE
Interval History: Pt has unwitnessed fall last night. She does not remember incident. She is more alert and cooperative today. Denies pain, weakness, or paresthesias. Pt's son and  at bedside. They say she is still not eating most of her meals. Worked with PT earlier today. Pt has also not had BM and c/o abdominal pain.     Medications:  Continuous Infusions:  Scheduled Meds:   albuterol-ipratropium 2.5mg-0.5mg/3mL  3 mL Nebulization Q4H WAKE    bisacodyl  10 mg Rectal Daily    cloNIDine  0.3 mg Oral BID    docusate sodium  100 mg Oral BID    enoxaparin  40 mg Subcutaneous Daily    fluticasone-vilanterol  1 puff Inhalation Daily    nicotine  1 patch Transdermal Daily    polyethylene glycol  17 g Oral Daily    ramipril  10 mg Oral Daily    tiotropium  1 capsule Inhalation Daily    warfarin  2 mg Oral Daily     PRN Meds:sodium chloride, sodium chloride, acetaminophen, aluminum-magnesium hydroxide-simethicone, cyclobenzaprine, dextrose 50%, dextrose 50%, glucagon (human recombinant), glucose, glucose, glucose, hydrALAZINE, hydrocodone-acetaminophen 5-325mg, insulin aspart U-100, labetalol, nitroGLYCERIN, ondansetron, senna-docusate 8.6-50 mg, traMADol     Review of Systems  Objective:     Weight: 49.7 kg (109 lb 9.1 oz)  Body mass index is 20.04 kg/m².  Vital Signs (Most Recent):  Temp: 98.3 °F (36.8 °C) (05/09/18 1212)  Pulse: 95 (05/09/18 1555)  Resp: 20 (05/09/18 1212)  BP: (!) 158/70 (05/09/18 1212)  SpO2: (!) 90 % (05/09/18 1212) Vital Signs (24h Range):  Temp:  [96.7 °F (35.9 °C)-98.9 °F (37.2 °C)] 98.3 °F (36.8 °C)  Pulse:  [] 95  Resp:  [16-20] 20  SpO2:  [90 %-98 %] 90 %  BP: (132-190)/(59-78) 158/70                 Oxygen Concentration (%):  [36] 36         Neurosurgery Physical Exam  General: well developed, well nourished, no distress. Generalized deconditioning.   Head: normocephalic, atraumatic  Neurologic: Alert and oriented. Thought content appropriate. Will answer questions  appropriately after some redirection.   GCS: Motor: 6/Verbal: 5/Eyes: 4 GCS Total: 15  Mental Status: Awake, Alert, Oriented x 4  Language: No aphasia  Speech: No dysarthria  Cranial nerves: face symmetric, tongue midline, CN II-XII grossly intact.   Eyes: pupils equal, round, reactive to light with accomodation, EOMI.  Pulmonary: normal respirations, no signs of respiratory distress  Abdomen: soft, non-distended, not tender to palpation  Sensory: intact to light touch throughout  Motor Strength: Moves all extremities spontaneously with good tone.  Full strength upper and lower extremities. No abnormal movements seen.   DTR's: 2 + and symmetric in UE and LE  Pronator Drift: no drift noted  Finger-to-nose: Intact bilaterally  Vázquez: absent  Clonus: absent  Babinski: absent  Pulses: 2+ and symmetric radial and dorsalis pedis.  Skin: Skin is warm, dry and intact.  Incision c/d/i with no surrounding erythema, edema, or drainage.      Significant Labs:    Recent Labs  Lab 05/09/18  1429   *      K 3.7      CO2 26   BUN 22   CREATININE 0.8   CALCIUM 9.2       Recent Labs  Lab 05/09/18  1429   WBC 6.93   HGB 9.1*   HCT 28.6*          Recent Labs  Lab 05/07/18  1637   INR 1.0     Microbiology Results (last 7 days)     Procedure Component Value Units Date/Time    Blood culture [[id number]] Collected:  05/04/18 0457    Order Status:  Completed Specimen:  Blood from Peripheral, Hand, Left Updated:  05/09/18 0612     Blood Culture, Routine No growth after 5 days.    Urine culture [[id number]] Collected:  05/04/18 0129    Order Status:  Completed Specimen:  Urine from Catheterized Updated:  05/05/18 0728     Urine Culture, Routine No growth    Narrative:       add on per Dr Bai order #[id number] 05/04/2018  04:02 cxurn    Urine culture [[id number]]     Order Status:  Completed Specimen:  Urine from Urine, Catheterized     Urine culture [[id number]]     Order Status:  Canceled Specimen:  Urine          Recent Lab Results       05/09/18  1429      Immature Granulocytes 0.3     Immature Grans (Abs) 0.02  Comment:  Mild elevation in immature granulocytes is non specific and   can be seen in a variety of conditions including stress response,   acute inflammation, trauma and pregnancy. Correlation with other   laboratory and clinical findings is essential.       Albumin 2.3(L)     Alkaline Phosphatase 95     ALT 37     Anion Gap 8     AST 36     Baso # 0.02     Basophil% 0.3     Total Bilirubin 0.3  Comment:  For infants and newborns, interpretation of results should be based  on gestational age, weight and in agreement with clinical  observations.  Premature Infant recommended reference ranges:  Up to 24 hours.............<8.0 mg/dL  Up to 48 hours............<12.0 mg/dL  3-5 days..................<15.0 mg/dL  6-29 days.................<15.0 mg/dL       BUN, Bld 22     Calcium 9.2     Chloride 108     CO2 26     Creatinine 0.8     Differential Method Automated     eGFR if African American >60.0     eGFR if non  >60.0  Comment:  Calculation used to obtain the estimated glomerular filtration  rate (eGFR) is the CKD-EPI equation.        Eos # 0.1     Eosinophil% 1.3     Glucose 173(H)     Gran # (ANC) 5.5     Gran% 79.5(H)     Hematocrit 28.6(L)     Hemoglobin 9.1(L)     Lymph # 0.5(L)     Lymph% 7.6(L)     MCH 28.0     MCHC 31.8(L)     MCV 88     Mono # 0.8     Mono% 11.0     MPV 9.1(L)     nRBC 0     Platelets 258     Potassium 3.7     Total Protein 6.2     RBC 3.25(L)     RDW 13.3     Sodium 142     WBC 6.93         All pertinent labs from the last 24 hours have been reviewed.    Significant Diagnostics:  CXR 5/9/18: Heart size is normal.  There is aortic plaque and there are stents.  There is mild bibasilar edema and pleural fluid unchanged.    XR KUB 5/9/18: There is a right iliac stent.  There is constipation.  No perforation seen.    I have reviewed all pertinent imaging results/findings within  the past 24 hours.

## 2018-05-09 NOTE — ASSESSMENT & PLAN NOTE
Patient with significant constipation on KUB today, 5/9.   Recommend to continue Docusate 100 mg po BID + Miralax 17 grams po daily to treat.  Recommend Fleet's enema x 1 today (ordered). If no success with Fleet's enema then will plan on Glassy Pro tomorrow.   KUB with no signs of obstruction or ileus.   Relief of constipation should help with appetite.

## 2018-05-09 NOTE — ASSESSMENT & PLAN NOTE
- Patient stable post-operatively from a pulmonary standpoint. No wheezing or SOB.  - Recommend to continue Breo inhaler 1 puff daily + Spiriva inhaler 1 puff daily to treat chronic COPD.  - Continue Duo-nebs Q4 while awake.   - Patient counseled on need for smoking cessation and recommend to continue Nicotine patch daily while patient in hospital to help with Nicotine addiction/withdrawal.

## 2018-05-09 NOTE — HOSPITAL COURSE
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 Medicine signed off post-op on POD # 1 as just consulted for pre-op evaluation. Patient since surgery has been having issues with constipation and no BM since surgery. Patient also not eating well according to family since surgery with poor oral intake. Patient reports pain with any movement in low back area and has been slow progressing with PT/OT and patient has been recommended for SNF placement that primary team is working on. Son reports over past 2 days patient has been having on and off confusion but states today, 5/9 has been her best day and has been up and talking and communicating with family. Patient did have unwitnessed fall in bathroom last night on 5/8 and patient reports she was trying to get up and have BM. Patient has been on oxygen since surgery and currently on 3 liters of oxygen with sats of 90%. Neurosurgery has been trying to wean oxygen without success. Medicine has been reconsulted to address constipation, hypoxia and poor oral intake as well as assist in Coumadin monitoring.     5/10- Patient hasn't been using IS- encouraged her and daughter to use on every commercial break/every hour. INR 1.2. Discussed possibly switching over to a NOAC. Patient needs to be up and out of bed sitting up for at least 3 hours. Wean oxygen.   5/11- oxygen weaned to 1 L. Intermittent delirium. Has not had another BM yet and complaining of constipation. INR 1.3.

## 2018-05-09 NOTE — PROGRESS NOTES
[facility] Hospital Medicine Consult Note  Progress Note    Patient Name: Sunita Zimmer  MRN: [id number]  Patient Class: IP- Inpatient   Admission Date: 5/3/2018  Length of Stay: 5 days  Attending Physician: Jude Ramos DO  Primary Care Provider: Von Mccullough MD    Hospital Medicine Team: Networked reference to record PCT  Ashley Restreop MD    Subjective:     Principal Problem:Intradural mass    HPI:  Mrs. 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 Hospital Medicine was consulted for preop clearance for urgent laminectomy. Patient endorses dyspnea with minimal exertion (unable to complete simple household chores). She endorses a 90 pack year tobacco history and currently smokes 1PPD. Patient denies any recent history of CP (at rest or exertion), palpitations, orthopnea, PND.     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 Medicine signed off post-op on POD # 1 as just consulted for pre-op evaluation. Patient since surgery has been having issues with constipation and no BM since surgery. Patient also not eating well according to family since surgery with poor oral intake. Patient reports pain with any movement in low back area and has been slow progressing with PT/OT and patient has been recommended for SNF placement that primary team is working on. Son reports over past 2 days patient has been having on and off confusion but states today, 5/9 has been her best day and has been up and talking and communicating with family. Patient did have unwitnessed fall in bathroom last night on 5/8 and patient reports she was trying to get up and have BM.  Patient has been on oxygen since surgery and currently on 3 liters of oxygen with sats of 90%. Neurosurgery has been trying to wean oxygen without success. Medicine has been reconsulted to address constipation, hypoxia and poor oral intake as well as assist in Coumadin monitoring.     Interval History: Patient complaining of feeling bloated and constipated but denies any SOB or cough or chest pain. Patient rates her back pain as 2/10 at rest but 8/10 with movement.     Review of Systems   Constitutional: Positive for appetite change (Poor appetite). Negative for chills and fever.   HENT: Negative for congestion.    Respiratory: Negative for cough, shortness of breath and wheezing.    Cardiovascular: Negative for chest pain and leg swelling.   Gastrointestinal: Positive for abdominal pain (Diffuse, crampy) and constipation. Negative for nausea.   Musculoskeletal: Positive for back pain.   Skin: Negative for rash.   Neurological: Positive for weakness (Generalized). Negative for dizziness and light-headedness.   Psychiatric/Behavioral: Negative for confusion and hallucinations.     Objective:     Vital Signs (Most Recent):  Temp: 98.3 °F (36.8 °C) (05/09/18 1212)  Pulse: 104 (05/09/18 1641)  Resp: 16 (05/09/18 1641)  BP: (!) 158/70 (05/09/18 1212)  SpO2: 96 % (05/09/18 1641) Vital Signs (24h Range):  Temp:  [96.7 °F (35.9 °C)-98.9 °F (37.2 °C)] 98.3 °F (36.8 °C)  Pulse:  [] 104  Resp:  [16-20] 16  SpO2:  [90 %-98 %] 96 %  BP: (132-190)/(59-78) 158/70     Weight: 49.7 kg (109 lb 9.1 oz)  Body mass index is 20.04 kg/m².  No intake or output data in the 24 hours ending 05/09/18 1654   Physical Exam   Constitutional: She is oriented to person, place, and time. She appears cachectic. No distress.   HENT:   Mouth/Throat: Oropharynx is clear and moist.   Eyes: Conjunctivae are normal.   Neck: No JVD present.   Cardiovascular: Normal rate, regular rhythm and normal heart sounds.  Exam reveals no gallop and no friction  rub.    No murmur heard.  Pulmonary/Chest: Effort normal and breath sounds normal. No respiratory distress. She has no wheezes. She has no rhonchi. She has no rales.   Abdominal: Soft. Bowel sounds are normal. She exhibits no distension. There is no tenderness. There is no rebound and no guarding.   Musculoskeletal: She exhibits no edema.   Neurological: She is alert and oriented to person, place, and time.   Skin: Skin is warm. Capillary refill takes less than 2 seconds. No erythema.   Psychiatric: She has a normal mood and affect. Her behavior is normal.   Nursing note and vitals reviewed.      Significant Labs:   CBC:   Recent Labs  Lab 05/09/18  1429   WBC 6.93   HGB 9.1*   HCT 28.6*        CMP:   Recent Labs  Lab 05/09/18  1429      K 3.7      CO2 26   *   BUN 22   CREATININE 0.8   CALCIUM 9.2   PROT 6.2   ALBUMIN 2.3*   BILITOT 0.3   ALKPHOS 95   AST 36   ALT 37   ANIONGAP 8   EGFRNONAA >60.0     Significant Imaging: I have reviewed all pertinent imaging results/findings within the past 24 hours.    Assessment/Plan:      Acute respiratory failure with hypoxia    Patient currently on 3 liters of oxygen with sats of 94-98%. Patient does not use oxygen at home and has chronic lung disease so goal is to wean oxygen with goal oxygen sats 88-92%.   · Recommend to wean oxygen. Encourage IS use and OOB to chair to help with oxygenation.   · No indication for steroids or antibiotics as no signs of COPD excerebration on exam and CXR not consistent with pneumonia.           Centrilobular emphysema    - Patient stable post-operatively from a pulmonary standpoint. No wheezing or SOB.  - Recommend to continue Breo inhaler 1 puff daily + Spiriva inhaler 1 puff daily to treat chronic COPD.  - Continue Duo-nebs Q4 while awake.   - Patient counseled on need for smoking cessation and recommend to continue Nicotine patch daily while patient in hospital to help with Nicotine addiction/withdrawal.            Underweight    Encourage oral intake with patient and family. Continue Boost supplements to help with nutrition.           Drug-induced constipation    Patient with significant constipation on KUB today, 5/9.   Recommend to continue Docusate 100 mg po BID + Miralax 17 grams po daily to treat.  Recommend Fleet's enema x 1 today (ordered). If no success with Fleet's enema then will plan on Brown bomb tomorrow.   KUB with no signs of obstruction or ileus.   Relief of constipation should help with appetite.           Personal history of DVT (deep vein thrombosis)    Current use of long term anticoagulation  - Patient with polycythemia vera and history of DVT on chronic coumadin.  - Coumadin resumed at home dosing of 2 mg po daily by Neurosurgery on 5/7 and INR on 5/7 was 1. No INR has been checked since so will order INR for the am and should have daily INR monitoring while hospitalized with goal INR 2-3.           Essential hypertension    · Patient's blood pressure is controlled here in the hospital over past 24 hours but has had several high readings in past several days and likely related to back pain.   · Goal for blood pressure is SBP < 150 and DBP < 90 as patient > or = 60 years of age with no diabetes or advanced kidney disease based on JNC 8 guidelines.   · Recommend to continue current treatment regimen of Clonidine 0.3 mg po BID + Ramipril 10 mg po daily.  · Monitor patient's blood pressure routinely while patient is hospitalized.           VTE Risk Mitigation         Ordered     enoxaparin injection 40 mg  Daily      05/06/18 8532     warfarin (COUMADIN) tablet 2 mg  Daily      05/07/18 8446          Thank you for consult and will follow-up with patient tomorrow.     Ashley Restrepo MD  Department of Hospital Medicine   [facility]

## 2018-05-09 NOTE — PROGRESS NOTES
"[facility]  Neurosurgery  Progress Note    Subjective:     History of Present Illness: A 77-year-old female with medical comorbidities significant for HTN, CAD, polycythemia vera, COPD, polycystic kidney disease, CKD stage 3 presents to the ED with a chief complaint of back pain. Pain began 3 days ago after lifting a full gallon of milk. Pt hurt her back while twisting away from the refrigerator.  Patient reports pain that she describes as a fist in the middle of my low back".  Patient has a history of chronic low back pain after a fall resulting in multiple lumbar fractures 6 years ago.  She reports that the pain occasionally radiates down bilateral lower extremities, however this is her baseline and not worse over the past 3 days after this new injury. Today, patient reports an episode of urinary incontinence which is not typical for her.  Patient reports noticing that she had urinated on herself and did not feel any sensation.  She denies any lower extremity weakness, numbness, saddle anesthesia, loss of bowel function.     MRI shows ring enhancing intradural lesion at level of cauda equina.     On exam pt with bilateral S1 dermatome radiculopathy. Otherwise intact. Rectal tone present. Corbett catheter in place.    Post-Op Info:  Procedure(s) (LRB):  LAMINECTOMY-LUMBAR-DECOMPRESSION and RESECTION OF TUMOR L3-5 (N/A)   5 Days Post-Op     Interval History: Pt has unwitnessed fall last night. She does not remember incident. She is more alert and cooperative today. Denies pain, weakness, or paresthesias. Pt's son and  at bedside. They say she is still not eating most of her meals. Worked with PT earlier today. Pt has also not had BM and c/o abdominal pain.     Medications:  Continuous Infusions:  Scheduled Meds:   albuterol-ipratropium 2.5mg-0.5mg/3mL  3 mL Nebulization Q4H WAKE    bisacodyl  10 mg Rectal Daily    cloNIDine  0.3 mg Oral BID    docusate sodium  100 mg Oral BID    " enoxaparin  40 mg Subcutaneous Daily    fluticasone-vilanterol  1 puff Inhalation Daily    nicotine  1 patch Transdermal Daily    polyethylene glycol  17 g Oral Daily    ramipril  10 mg Oral Daily    tiotropium  1 capsule Inhalation Daily    warfarin  2 mg Oral Daily     PRN Meds:sodium chloride, sodium chloride, acetaminophen, aluminum-magnesium hydroxide-simethicone, cyclobenzaprine, dextrose 50%, dextrose 50%, glucagon (human recombinant), glucose, glucose, glucose, hydrALAZINE, hydrocodone-acetaminophen 5-325mg, insulin aspart U-100, labetalol, nitroGLYCERIN, ondansetron, senna-docusate 8.6-50 mg, traMADol     Review of Systems  Objective:     Weight: 49.7 kg (109 lb 9.1 oz)  Body mass index is 20.04 kg/m².  Vital Signs (Most Recent):  Temp: 98.3 °F (36.8 °C) (05/09/18 1212)  Pulse: 95 (05/09/18 1555)  Resp: 20 (05/09/18 1212)  BP: (!) 158/70 (05/09/18 1212)  SpO2: (!) 90 % (05/09/18 1212) Vital Signs (24h Range):  Temp:  [96.7 °F (35.9 °C)-98.9 °F (37.2 °C)] 98.3 °F (36.8 °C)  Pulse:  [] 95  Resp:  [16-20] 20  SpO2:  [90 %-98 %] 90 %  BP: (132-190)/(59-78) 158/70                 Oxygen Concentration (%):  [36] 36         Neurosurgery Physical Exam  General: well developed, well nourished, no distress. Generalized deconditioning.   Head: normocephalic, atraumatic  Neurologic: Alert and oriented. Thought content appropriate. Will answer questions appropriately after some redirection.   GCS: Motor: 6/Verbal: 5/Eyes: 4 GCS Total: 15  Mental Status: Awake, Alert, Oriented x 4  Language: No aphasia  Speech: No dysarthria  Cranial nerves: face symmetric, tongue midline, CN II-XII grossly intact.   Eyes: pupils equal, round, reactive to light with accomodation, EOMI.  Pulmonary: normal respirations, no signs of respiratory distress  Abdomen: soft, non-distended, not tender to palpation  Sensory: intact to light touch throughout  Motor Strength: Moves all extremities spontaneously with good tone.  Full  strength upper and lower extremities. No abnormal movements seen.   DTR's: 2 + and symmetric in UE and LE  Pronator Drift: no drift noted  Finger-to-nose: Intact bilaterally  Vázquez: absent  Clonus: absent  Babinski: absent  Pulses: 2+ and symmetric radial and dorsalis pedis.  Skin: Skin is warm, dry and intact.  Incision c/d/i with no surrounding erythema, edema, or drainage.      Significant Labs:    Recent Labs  Lab 05/09/18  1429   *      K 3.7      CO2 26   BUN 22   CREATININE 0.8   CALCIUM 9.2       Recent Labs  Lab 05/09/18  1429   WBC 6.93   HGB 9.1*   HCT 28.6*          Recent Labs  Lab 05/07/18  1637   INR 1.0     Microbiology Results (last 7 days)     Procedure Component Value Units Date/Time    Blood culture [[id number]] Collected:  05/04/18 0457    Order Status:  Completed Specimen:  Blood from Peripheral, Hand, Left Updated:  05/09/18 0612     Blood Culture, Routine No growth after 5 days.    Urine culture [[id number]] Collected:  05/04/18 0129    Order Status:  Completed Specimen:  Urine from Catheterized Updated:  05/05/18 0728     Urine Culture, Routine No growth    Narrative:       add on per Dr Bai order #[id number] 05/04/2018  04:02 cxurn    Urine culture [[id number]]     Order Status:  Completed Specimen:  Urine from Urine, Catheterized     Urine culture [[id number]]     Order Status:  Canceled Specimen:  Urine         Recent Lab Results       05/09/18  1429      Immature Granulocytes 0.3     Immature Grans (Abs) 0.02  Comment:  Mild elevation in immature granulocytes is non specific and   can be seen in a variety of conditions including stress response,   acute inflammation, trauma and pregnancy. Correlation with other   laboratory and clinical findings is essential.       Albumin 2.3(L)     Alkaline Phosphatase 95     ALT 37     Anion Gap 8     AST 36     Baso # 0.02     Basophil% 0.3     Total Bilirubin 0.3  Comment:  For infants and newborns, interpretation of results  should be based  on gestational age, weight and in agreement with clinical  observations.  Premature Infant recommended reference ranges:  Up to 24 hours.............<8.0 mg/dL  Up to 48 hours............<12.0 mg/dL  3-5 days..................<15.0 mg/dL  6-29 days.................<15.0 mg/dL       BUN, Bld 22     Calcium 9.2     Chloride 108     CO2 26     Creatinine 0.8     Differential Method Automated     eGFR if African American >60.0     eGFR if non  >60.0  Comment:  Calculation used to obtain the estimated glomerular filtration  rate (eGFR) is the CKD-EPI equation.        Eos # 0.1     Eosinophil% 1.3     Glucose 173(H)     Gran # (ANC) 5.5     Gran% 79.5(H)     Hematocrit 28.6(L)     Hemoglobin 9.1(L)     Lymph # 0.5(L)     Lymph% 7.6(L)     MCH 28.0     MCHC 31.8(L)     MCV 88     Mono # 0.8     Mono% 11.0     MPV 9.1(L)     nRBC 0     Platelets 258     Potassium 3.7     Total Protein 6.2     RBC 3.25(L)     RDW 13.3     Sodium 142     WBC 6.93         All pertinent labs from the last 24 hours have been reviewed.    Significant Diagnostics:  CXR 5/9/18: Heart size is normal.  There is aortic plaque and there are stents.  There is mild bibasilar edema and pleural fluid unchanged.    XR KUB 5/9/18: There is a right iliac stent.  There is constipation.  No perforation seen.    I have reviewed all pertinent imaging results/findings within the past 24 hours.    Assessment/Plan:     * Intradural mass    77F with tnilateral radiculopathy, ring enhancing lesion in cauda equina behind L4 vertebral body. Now s/p laminectomy for mass resection 5/4.    -Pt neurologically stable  -Pain control with tylenol 650 q6h prn as first choice, oxycodone 5mg prn. Patient extremely sensitive to pain medications.  -Wound stable  -No HOB restrictions, OOB as much as possible. Pt to be OOB atleast 6 hours per day.  -Continue coumadin 2mg daily. Restarted 5/7.   -Tmax of 100.9 and tachycardia overnight. CMP and CBC  wnl. Afebrile today. Medicine consulted, appreciate recs.   -Pt is chronic smoker. CXR 5/9 showed mild bibasilar edema and pleural fluid. Home COPD inhalers and duo nebs q4 per medicine recs. Nicotine patch. Pt on 4L nasal cannula. Will attempt to wean, but maintain sats between 88-92%.   -Nutrition consulted for decreased appetite.  Ordered Boost TID.   -SSI  -TEDs/SCDs for dvt prophylaxis  -Bowel regimen. KUB 5/9 reveals constipation. Medicine consulted, appreciate recs.   -Delirium precautions  -PT/OT recommending SNF, but pt's insurance will only cover HH. Pt not stable for discharge home at this time, will continue to monitor and utilize therapy while inpatient.   -Plan explained to pt's  and son at bedside.    Discussed with Dr. Ramos.             Elsa Jenkins PA-C  Neurosurgery  [facility]

## 2018-05-09 NOTE — ASSESSMENT & PLAN NOTE
Encourage oral intake with patient and family. Continue Boost supplements to help with nutrition.

## 2018-05-09 NOTE — NURSING
"Son put patient back into bed, stating "she felt frustrated". Nurse explained to son and patient's spouse about the need for patient to get up and sit in chair. Will continue to monitor.  "

## 2018-05-09 NOTE — ASSESSMENT & PLAN NOTE
Current use of long term anticoagulation  - Patient with polycythemia vera and history of DVT on chronic coumadin.  - Coumadin resumed at home dosing of 2 mg po daily by Neurosurgery on 5/7 and INR on 5/7 was 1. No INR has been checked since so will order INR for the am and should have daily INR monitoring while hospitalized with goal INR 2-3.

## 2018-05-09 NOTE — PLAN OF CARE
Problem: Physical Therapy Goal  Goal: Physical Therapy Goal  Goals to be met by: 10 days ( )    Patient will increase functional independence with mobility by performin. Supine to sit with Stand-by Assistance  2. Sit to supine with Stand-by Assistance  3. Sit to stand transfer with Stand-by Assistance using RW  4. Gait  x 100 feet with Contact Guard Assistance using Rolling Walker.   5. Lower extremity exercise program x30 reps per handout, with independence to improve muscular strength and endurance.        Outcome: Ongoing (interventions implemented as appropriate)  Goals remain appropriate; continue current POC.     Gabrielle Olson PT, DPT   2018  Pager: [phone]

## 2018-05-09 NOTE — ASSESSMENT & PLAN NOTE
77F with tnilateral radiculopathy, ring enhancing lesion in cauda equina behind L4 vertebral body. Now s/p laminectomy for mass resection 5/4.    -Pt neurologically stable  -Pain control with tylenol 650 q6h prn as first choice, oxycodone 5mg prn. Patient extremely sensitive to pain medications.  -Wound stable  -No HOB restrictions, OOB as much as possible. Pt to be OOB atleast 6 hours per day.  -Continue coumadin 2mg daily. Restarted 5/7.   -Pt is chronic smoker. CXR 5/9 showed mild bibasilar edema and pleural fluid. Home COPD inhalers and duo nebs q4 per medicine recs. Nicotine patch. Pt on 4L nasal cannula. Will attempt to wean, but maintain sats between 88-92%.   -Nutrition consulted for decreased appetite.  Ordered Boost TID.   -SSI  -TEDs/SCDs for dvt prophylaxis  -Bowel regimen. KUB 5/9 reveals constipation. Medicine consulted, appreciate recs.   -Delirium precautions  -PT/OT recommending SNF, but pt's insurance will only cover HH. Pt not stable for discharge home at this time, will continue to monitor and utilize therapy while inpatient.   -Plan explained to pt's  and son at bedside.    Discussed with Dr. Ramos.

## 2018-05-10 LAB
ALBUMIN SERPL BCP-MCNC: 2.3 G/DL
ALP SERPL-CCNC: 90 U/L
ALT SERPL W/O P-5'-P-CCNC: 36 U/L
ANION GAP SERPL CALC-SCNC: 10 MMOL/L
AST SERPL-CCNC: 34 U/L
BILIRUB SERPL-MCNC: 0.4 MG/DL
BUN SERPL-MCNC: 17 MG/DL
CALCIUM SERPL-MCNC: 8.9 MG/DL
CHLORIDE SERPL-SCNC: 108 MMOL/L
CO2 SERPL-SCNC: 24 MMOL/L
CREAT SERPL-MCNC: 0.7 MG/DL
EST. GFR  (AFRICAN AMERICAN): >60 ML/MIN/1.73 M^2
EST. GFR  (NON AFRICAN AMERICAN): >60 ML/MIN/1.73 M^2
GLUCOSE SERPL-MCNC: 112 MG/DL
INR PPP: 1.2
POCT GLUCOSE: 97 MG/DL (ref 70–110)
POTASSIUM SERPL-SCNC: 3.6 MMOL/L
PROT SERPL-MCNC: 5.9 G/DL
PROTHROMBIN TIME: 12.4 SEC
SODIUM SERPL-SCNC: 142 MMOL/L

## 2018-05-10 PROCEDURE — 99900035 HC TECH TIME PER 15 MIN (STAT)

## 2018-05-10 PROCEDURE — 80053 COMPREHEN METABOLIC PANEL: CPT

## 2018-05-10 PROCEDURE — 85610 PROTHROMBIN TIME: CPT

## 2018-05-10 PROCEDURE — 63600175 PHARM REV CODE 636 W HCPCS: Performed by: NEUROLOGICAL SURGERY

## 2018-05-10 PROCEDURE — 99232 SBSQ HOSP IP/OBS MODERATE 35: CPT | Mod: ,,, | Performed by: INTERNAL MEDICINE

## 2018-05-10 PROCEDURE — 25000003 PHARM REV CODE 250: Performed by: STUDENT IN AN ORGANIZED HEALTH CARE EDUCATION/TRAINING PROGRAM

## 2018-05-10 PROCEDURE — 25000003 PHARM REV CODE 250: Performed by: INTERNAL MEDICINE

## 2018-05-10 PROCEDURE — 99024 POSTOP FOLLOW-UP VISIT: CPT | Mod: ,,, | Performed by: PHYSICIAN ASSISTANT

## 2018-05-10 PROCEDURE — 36415 COLL VENOUS BLD VENIPUNCTURE: CPT

## 2018-05-10 PROCEDURE — 97802 MEDICAL NUTRITION INDIV IN: CPT

## 2018-05-10 PROCEDURE — S4991 NICOTINE PATCH NONLEGEND: HCPCS | Performed by: INTERNAL MEDICINE

## 2018-05-10 PROCEDURE — 25000242 PHARM REV CODE 250 ALT 637 W/ HCPCS: Performed by: STUDENT IN AN ORGANIZED HEALTH CARE EDUCATION/TRAINING PROGRAM

## 2018-05-10 PROCEDURE — 11000001 HC ACUTE MED/SURG PRIVATE ROOM

## 2018-05-10 PROCEDURE — 25000003 PHARM REV CODE 250: Performed by: PHYSICIAN ASSISTANT

## 2018-05-10 PROCEDURE — 94640 AIRWAY INHALATION TREATMENT: CPT

## 2018-05-10 RX ORDER — IBUPROFEN 200 MG
1 TABLET ORAL DAILY
Status: DISCONTINUED | OUTPATIENT
Start: 2018-05-10 | End: 2018-05-11 | Stop reason: HOSPADM

## 2018-05-10 RX ORDER — PSEUDOEPHEDRINE/ACETAMINOPHEN 30MG-500MG
100 TABLET ORAL
Status: COMPLETED | OUTPATIENT
Start: 2018-05-10 | End: 2018-05-10

## 2018-05-10 RX ORDER — GUAIFENESIN/DEXTROMETHORPHAN 100-10MG/5
10 SYRUP ORAL EVERY 6 HOURS PRN
Status: DISCONTINUED | OUTPATIENT
Start: 2018-05-10 | End: 2018-05-11 | Stop reason: HOSPADM

## 2018-05-10 RX ORDER — SYRING-NEEDL,DISP,INSUL,0.3 ML 29 G X1/2"
296 SYRINGE, EMPTY DISPOSABLE MISCELLANEOUS
Status: COMPLETED | OUTPATIENT
Start: 2018-05-10 | End: 2018-05-10

## 2018-05-10 RX ADMIN — FLUTICASONE FUROATE AND VILANTEROL TRIFENATATE 1 PUFF: 100; 25 POWDER RESPIRATORY (INHALATION) at 09:05

## 2018-05-10 RX ADMIN — WARFARIN SODIUM 2 MG: 2 TABLET ORAL at 04:05

## 2018-05-10 RX ADMIN — ENOXAPARIN SODIUM 40 MG: 100 INJECTION SUBCUTANEOUS at 04:05

## 2018-05-10 RX ADMIN — ONDANSETRON 8 MG: 8 TABLET, ORALLY DISINTEGRATING ORAL at 02:05

## 2018-05-10 RX ADMIN — RAMIPRIL 10 MG: 10 CAPSULE ORAL at 09:05

## 2018-05-10 RX ADMIN — SODIUM CHLORIDE 500 ML: 9 INJECTION, SOLUTION INTRAVENOUS at 12:05

## 2018-05-10 RX ADMIN — MAGESIUM CITRATE 296 ML: 1.75 LIQUID ORAL at 12:05

## 2018-05-10 RX ADMIN — GUAIFENESIN AND DEXTROMETHORPHAN 10 ML: 100; 10 SYRUP ORAL at 03:05

## 2018-05-10 RX ADMIN — POLYETHYLENE GLYCOL 3350 17 G: 17 POWDER, FOR SOLUTION ORAL at 09:05

## 2018-05-10 RX ADMIN — CLONIDINE HYDROCHLORIDE 0.3 MG: 0.2 TABLET ORAL at 09:05

## 2018-05-10 RX ADMIN — DOCUSATE SODIUM 100 MG: 100 CAPSULE, LIQUID FILLED ORAL at 09:05

## 2018-05-10 RX ADMIN — Medication 100 ML: at 12:05

## 2018-05-10 RX ADMIN — TIOTROPIUM BROMIDE 18 MCG: 18 CAPSULE ORAL; RESPIRATORY (INHALATION) at 09:05

## 2018-05-10 RX ADMIN — IPRATROPIUM BROMIDE AND ALBUTEROL SULFATE 3 ML: .5; 3 SOLUTION RESPIRATORY (INHALATION) at 08:05

## 2018-05-10 RX ADMIN — NICOTINE 1 PATCH: 14 PATCH, EXTENDED RELEASE TRANSDERMAL at 09:05

## 2018-05-10 RX ADMIN — DOCUSATE SODIUM 100 MG: 100 CAPSULE, LIQUID FILLED ORAL at 11:05

## 2018-05-10 NOTE — PLAN OF CARE
Problem: Patient Care Overview  Goal: Plan of Care Review  Outcome: Ongoing (interventions implemented as appropriate)  Pt had poor intake - ate only turnip greens for evening meal 5/9/18, ate nothing during night shift.  Pt had low urinary output (1 diaper during night shift), reported straining to urinate.  Extreme weakness - much unsteadiness getting onto bedside commode, difficulty getting back into bed.  Orientation varied throughout evening - sometimes oriented to place and situation, sometimes not.  Fleet enema administered in evening; pt had several mucousy stools throughout night.  At first only clear, later mixed with small amounts brown feces.  Pt had no falls, no injuries.

## 2018-05-10 NOTE — SUBJECTIVE & OBJECTIVE
Interval History: NAEON. Pt reports small BM s/p fleet enema yesterday, but still c/o constipation. Denies n/v,abdominla pain, back pain, numbness/tingling, or weakness. Continued decreased appetite, but consuming Boost. More alert and talkative today on exam.     Medications:  Continuous Infusions:  Scheduled Meds:   albuterol-ipratropium 2.5mg-0.5mg/3mL  3 mL Nebulization Q4H WAKE    cloNIDine  0.3 mg Oral BID    docusate sodium  100 mg Oral BID    enoxaparin  40 mg Subcutaneous Daily    fluticasone-vilanterol  1 puff Inhalation Daily    nicotine  1 patch Transdermal Daily    polyethylene glycol  17 g Oral Daily    ramipril  10 mg Oral Daily    tiotropium  1 capsule Inhalation Daily    warfarin  2 mg Oral Daily     PRN Meds:acetaminophen, aluminum-magnesium hydroxide-simethicone, cyclobenzaprine, dextromethorphan-guaifenesin  mg/5 ml, dextrose 50%, dextrose 50%, glucagon (human recombinant), glucose, glucose, glucose, hydrALAZINE, hydrocodone-acetaminophen 5-325mg, labetalol, nitroGLYCERIN, ondansetron, senna-docusate 8.6-50 mg, traMADol     Review of Systems  Objective:     Weight: 49.7 kg (109 lb 9.1 oz)  Body mass index is 20.04 kg/m².  Vital Signs (Most Recent):  Temp: 98.2 °F (36.8 °C) (05/10/18 1150)  Pulse: 91 (05/10/18 1150)  Resp: 20 (05/10/18 1150)  BP: (!) 142/65 (05/10/18 1150)  SpO2: 98 % (05/10/18 1150) Vital Signs (24h Range):  Temp:  [97.7 °F (36.5 °C)-98.4 °F (36.9 °C)] 98.2 °F (36.8 °C)  Pulse:  [] 91  Resp:  [16-20] 20  SpO2:  [96 %-100 %] 98 %  BP: (121-151)/(62-73) 142/65                           Neurosurgery Physical Exam  General: well developed, well nourished, no distress. Generalized deconditioning.   Head: normocephalic, atraumatic  Neurologic: Alert and oriented. Thought content appropriate.   GCS: Motor: 6/Verbal: 5/Eyes: 4 GCS Total: 15  Mental Status: Awake, Alert, Oriented x 4  Language: No aphasia  Speech: No dysarthria  Cranial nerves: face symmetric, tongue  midline, CN II-XII grossly intact.   Eyes: pupils equal, round, reactive to light with accomodation, EOMI.  Pulmonary: normal respirations, no signs of respiratory distress  Abdomen: soft, non-distended, not tender to palpation  Sensory: intact to light touch throughout  Motor Strength: Moves all extremities spontaneously with good tone.  Full strength upper and lower extremities. No abnormal movements seen.   DTR's: 2 + and symmetric in UE and LE  Pronator Drift: no drift noted  Finger-to-nose: Intact bilaterally  Vázquez: absent  Clonus: absent  Babinski: absent  Pulses: 2+ and symmetric radial and dorsalis pedis.  Skin: Skin is warm, dry and intact.  Incision c/d/i with no surrounding erythema, edema, or drainage.     Significant Labs:    Recent Labs  Lab 05/09/18  1429 05/10/18  0443   * 112*    142   K 3.7 3.6    108   CO2 26 24   BUN 22 17   CREATININE 0.8 0.7   CALCIUM 9.2 8.9       Recent Labs  Lab 05/09/18  1429   WBC 6.93   HGB 9.1*   HCT 28.6*          Recent Labs  Lab 05/10/18  0443   INR 1.2     Microbiology Results (last 7 days)     Procedure Component Value Units Date/Time    Blood culture [[id number]] Collected:  05/04/18 0457    Order Status:  Completed Specimen:  Blood from Peripheral, Hand, Left Updated:  05/09/18 0612     Blood Culture, Routine No growth after 5 days.    Urine culture [[id number]] Collected:  05/04/18 0129    Order Status:  Completed Specimen:  Urine from Catheterized Updated:  05/05/18 0728     Urine Culture, Routine No growth    Narrative:       add on per Dr Bai order #[id number] 05/04/2018  04:02 cxurn    Urine culture [[id number]]     Order Status:  Completed Specimen:  Urine from Urine, Catheterized     Urine culture [[id number]]     Order Status:  Canceled Specimen:  Urine         Recent Lab Results       05/10/18  0809 05/10/18  0443 05/09/18  1429      Immature Granulocytes   0.3     Immature Grans (Abs)   0.02  Comment:  Mild elevation in immature  granulocytes is non specific and   can be seen in a variety of conditions including stress response,   acute inflammation, trauma and pregnancy. Correlation with other   laboratory and clinical findings is essential.       Albumin  2.3(L) 2.3(L)     Alkaline Phosphatase  90 95     ALT  36 37     Anion Gap  10 8     AST  34 36     Baso #   0.02     Basophil%   0.3     Total Bilirubin  0.4  Comment:  For infants and newborns, interpretation of results should be based  on gestational age, weight and in agreement with clinical  observations.  Premature Infant recommended reference ranges:  Up to 24 hours.............<8.0 mg/dL  Up to 48 hours............<12.0 mg/dL  3-5 days..................<15.0 mg/dL  6-29 days.................<15.0 mg/dL   0.3  Comment:  For infants and newborns, interpretation of results should be based  on gestational age, weight and in agreement with clinical  observations.  Premature Infant recommended reference ranges:  Up to 24 hours.............<8.0 mg/dL  Up to 48 hours............<12.0 mg/dL  3-5 days..................<15.0 mg/dL  6-29 days.................<15.0 mg/dL       BUN, Bld  17 22     Calcium  8.9 9.2     Chloride  108 108     CO2  24 26     Creatinine  0.7 0.8     Differential Method   Automated     eGFR if   >60.0 >60.0     eGFR if non   >60.0  Comment:  Calculation used to obtain the estimated glomerular filtration  rate (eGFR) is the CKD-EPI equation.    >60.0  Comment:  Calculation used to obtain the estimated glomerular filtration  rate (eGFR) is the CKD-EPI equation.        Eos #   0.1     Eosinophil%   1.3     Glucose  112(H) 173(H)     Gran # (ANC)   5.5     Gran%   79.5(H)     Hematocrit   28.6(L)     Hemoglobin   9.1(L)     Coumadin Monitoring INR  1.2  Comment:  Coumadin Therapy:  2.0 - 3.0 for INR for all indicators except mechanical heart valves  and antiphospholipid syndromes which should use 2.5 - 3.5.        Lymph #   0.5(L)      Lymph%   7.6(L)     MCH   28.0     MCHC   31.8(L)     MCV   88     Mono #   0.8     Mono%   11.0     MPV   9.1(L)     nRBC   0     Platelets   258     POCT Glucose 97       Potassium  3.6 3.7     Total Protein  5.9(L) 6.2     Protime  12.4      RBC   3.25(L)     RDW   13.3     Sodium  142 142     WBC   6.93         All pertinent labs from the last 24 hours have been reviewed.    Significant Diagnostics:  I have reviewed all pertinent imaging results/findings within the past 24 hours.

## 2018-05-10 NOTE — PROGRESS NOTES
[facility] Hospital Medicine  Progress Note    Patient Name: Sunita Zimmer  MRN: [id number]  Patient Class: IP- Inpatient   Admission Date: 5/3/2018  Length of Stay: 6 days  Attending Physician: Jude Ramos DO  Primary Care Provider: Von Mccullough MD    [facility] Medicine Team: Networked reference to record PCT  Shi Ny MD    Subjective:     Principal Problem:Intradural mass    HPI:  Mrs. 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 Hospital Medicine was consulted for preop clearance for urgent laminectomy. Patient endorses dyspnea with minimal exertion (unable to complete simple household chores). She endorses a 90 pack year tobacco history and currently smokes 1PPD. Patient denies any recent history of CP (at rest or exertion), palpitations, orthopnea, PND.     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 Medicine signed off post-op on POD # 1 as just consulted for pre-op evaluation. Patient since surgery has been having issues with constipation and no BM since surgery. Patient also not eating well according to family since surgery with poor oral intake. Patient reports pain with any movement in low back area and has been slow progressing with PT/OT and patient has been recommended for SNF placement that primary team is working on. Son reports over past 2 days patient has been having on and off confusion but states today, 5/9 has been her best day and has been up and talking and communicating with family. Patient did have unwitnessed fall in bathroom last night on 5/8 and patient reports she was trying to get up and have BM. Patient has been on  oxygen since surgery and currently on 3 liters of oxygen with sats of 90%. Neurosurgery has been trying to wean oxygen without success. Medicine has been reconsulted to address constipation, hypoxia and poor oral intake as well as assist in Coumadin monitoring.     5/10- Patient hasn't been using IS- encouraged her and daughter to use on every commercial break/every hour. INR 1.2. Discussed possibly switching over to a NOAC. Patient needs to be up and out of bed sitting up for at least 3 hours. Wean oxygen.     Interval History:  Patient hasn't been using IS- encouraged her and daughter to use on every commercial break/every hour. INR 1.2. Discussed possibly switching over to a NOAC. Patient needs to be up and out of bed sitting up for at least 3 hours. Wean oxygen.     Review of Systems   Constitutional: Negative for appetite change.   HENT: Negative for congestion.    Respiratory: Negative for shortness of breath.    Cardiovascular: Negative for chest pain.   Gastrointestinal: Positive for constipation. Negative for abdominal distention, abdominal pain and nausea.   Genitourinary: Negative for dysuria.   Musculoskeletal: Positive for back pain and gait problem.   Psychiatric/Behavioral: Negative for confusion and decreased concentration.     Objective:     Vital Signs (Most Recent):  Temp: 98.4 °F (36.9 °C) (05/10/18 0720)  Pulse: 93 (05/10/18 1056)  Resp: 16 (05/10/18 0946)  BP: (!) 151/65 (05/10/18 0720)  SpO2: 96 % (05/10/18 0946) Vital Signs (24h Range):  Temp:  [97.7 °F (36.5 °C)-98.4 °F (36.9 °C)] 98.4 °F (36.9 °C)  Pulse:  [] 93  Resp:  [16-20] 16  SpO2:  [90 %-100 %] 96 %  BP: (121-158)/(62-73) 151/65     Weight: 49.7 kg (109 lb 9.1 oz)  Body mass index is 20.04 kg/m².    Intake/Output Summary (Last 24 hours) at 05/10/18 1109  Last data filed at 05/10/18 0600   Gross per 24 hour   Intake              120 ml   Output                1 ml   Net              119 ml      Physical Exam   Constitutional:  She is oriented to person, place, and time. She appears cachectic. No distress.   HENT:   Mouth/Throat: Oropharynx is clear and moist.   Eyes: Conjunctivae are normal.   Neck: No JVD present.   Cardiovascular: Normal rate, regular rhythm and normal heart sounds.  Exam reveals no gallop and no friction rub.    No murmur heard.  Pulmonary/Chest: Effort normal and breath sounds normal. No respiratory distress. She has no wheezes. She has no rhonchi. She has no rales.   Abdominal: Soft. Bowel sounds are normal. She exhibits no distension. There is no tenderness. There is no rebound and no guarding.   Bowel sounds hyperactive   Musculoskeletal: She exhibits no edema.   Neurological: She is alert and oriented to person, place, and time.   Skin: Skin is warm. Capillary refill takes less than 2 seconds. No erythema.   Psychiatric: She has a normal mood and affect. Her behavior is normal.   Nursing note and vitals reviewed.      Significant Labs: All pertinent labs within the past 24 hours have been reviewed.    Significant Imaging: I have reviewed all pertinent imaging results/findings within the past 24 hours.    Assessment/Plan:      Acute respiratory failure with hypoxia    Patient currently on 3 liters of oxygen with sats of 94-98%. Patient does not use oxygen at home and has chronic lung disease so goal is to wean oxygen with goal oxygen sats 88-92%.   · Recommend to wean oxygen. Encourage IS use and OOB to chair to help with oxygenation.   · No indication for steroids or antibiotics as no signs of COPD excerebration on exam and CXR not consistent with pneumonia.           Underweight    Encourage oral intake with patient and family. Continue Boost supplements to help with nutrition.           Drug-induced constipation    Patient with significant constipation on KUB today, 5/9.   Recommend to continue Docusate 100 mg po BID + Miralax 17 grams po daily to treat.  - s/p fleets enema with 1 small BM. Patient would like to  hold off further enema today and consider tomorrow  KUB with no signs of obstruction or ileus.   Relief of constipation should help with appetite.           Centrilobular emphysema    - Patient stable post-operatively from a pulmonary standpoint. No wheezing or SOB.  - Recommend to continue Breo inhaler 1 puff daily + Spiriva inhaler 1 puff daily to treat chronic COPD.  - Continue Duo-nebs Q4 while awake.   - Patient counseled on need for smoking cessation and recommend to continue Nicotine patch daily while patient in hospital to help with Nicotine addiction/withdrawal.           Personal history of DVT (deep vein thrombosis)    Current use of long term anticoagulation  - Patient with polycythemia vera and history of DVT on chronic coumadin.  - Coumadin resumed at home dosing of 2 mg po daily by Neurosurgery on 5/7 and INR on 5/7 was 1. Should have daily INR monitoring while hospitalized with goal INR 2-3.   - INR currently subtherapeutic  - discussed with patient and daughter regarding possibly starting a DOAC in lieu of coumadin. Will attempt to find resources for family regarding this          Essential hypertension    · Patient's blood pressure is controlled here in the hospital over past 24 hours but has had several high readings in past several days and likely related to back pain.   · Goal for blood pressure is SBP < 150 and DBP < 90 as patient > or = 60 years of age with no diabetes or advanced kidney disease based on JNC 8 guidelines.   · Recommend to continue current treatment regimen of Clonidine 0.3 mg po BID + Ramipril 10 mg po daily.  · Monitor patient's blood pressure routinely while patient is hospitalized.           VTE Risk Mitigation         Ordered     enoxaparin injection 40 mg  Daily      05/06/18 5101     warfarin (COUMADIN) tablet 2 mg  Daily      05/07/18 1118              Shi Ny MD  Department of Hospital Medicine   [facility]

## 2018-05-10 NOTE — PLAN OF CARE
MEET following for DC needs. MEET in communication with CM.    SW received message from patient's son, Sushant [phone]. MEET called back; no answer and no voicemail.     MEET followed up with Jessica with [facility] in regards to the patient's SNF benefits. Jessica stated she will let SW know when the benefits are verified.     Ana Maria Gomez, MEET  [id number]

## 2018-05-10 NOTE — SUBJECTIVE & OBJECTIVE
Interval History:  Patient hasn't been using IS- encouraged her and daughter to use on every commercial break/every hour. INR 1.2. Discussed possibly switching over to a NOAC. Patient needs to be up and out of bed sitting up for at least 3 hours. Wean oxygen.     Review of Systems   Constitutional: Negative for appetite change.   HENT: Negative for congestion.    Respiratory: Negative for shortness of breath.    Cardiovascular: Negative for chest pain.   Gastrointestinal: Positive for constipation. Negative for abdominal distention, abdominal pain and nausea.   Genitourinary: Negative for dysuria.   Musculoskeletal: Positive for back pain and gait problem.   Psychiatric/Behavioral: Negative for confusion and decreased concentration.     Objective:     Vital Signs (Most Recent):  Temp: 98.4 °F (36.9 °C) (05/10/18 0720)  Pulse: 93 (05/10/18 1056)  Resp: 16 (05/10/18 0946)  BP: (!) 151/65 (05/10/18 0720)  SpO2: 96 % (05/10/18 0946) Vital Signs (24h Range):  Temp:  [97.7 °F (36.5 °C)-98.4 °F (36.9 °C)] 98.4 °F (36.9 °C)  Pulse:  [] 93  Resp:  [16-20] 16  SpO2:  [90 %-100 %] 96 %  BP: (121-158)/(62-73) 151/65     Weight: 49.7 kg (109 lb 9.1 oz)  Body mass index is 20.04 kg/m².    Intake/Output Summary (Last 24 hours) at 05/10/18 1109  Last data filed at 05/10/18 0600   Gross per 24 hour   Intake              120 ml   Output                1 ml   Net              119 ml      Physical Exam   Constitutional: She is oriented to person, place, and time. She appears cachectic. No distress.   HENT:   Mouth/Throat: Oropharynx is clear and moist.   Eyes: Conjunctivae are normal.   Neck: No JVD present.   Cardiovascular: Normal rate, regular rhythm and normal heart sounds.  Exam reveals no gallop and no friction rub.    No murmur heard.  Pulmonary/Chest: Effort normal and breath sounds normal. No respiratory distress. She has no wheezes. She has no rhonchi. She has no rales.   Abdominal: Soft. Bowel sounds are normal. She  exhibits no distension. There is no tenderness. There is no rebound and no guarding.   Bowel sounds hyperactive   Musculoskeletal: She exhibits no edema.   Neurological: She is alert and oriented to person, place, and time.   Skin: Skin is warm. Capillary refill takes less than 2 seconds. No erythema.   Psychiatric: She has a normal mood and affect. Her behavior is normal.   Nursing note and vitals reviewed.      Significant Labs: All pertinent labs within the past 24 hours have been reviewed.    Significant Imaging: I have reviewed all pertinent imaging results/findings within the past 24 hours.

## 2018-05-10 NOTE — ASSESSMENT & PLAN NOTE
77F with tnilateral radiculopathy, ring enhancing lesion in cauda equina behind L4 vertebral body. Now s/p laminectomy for mass resection 5/4.    -Pt neurologically stable  -Pain control with tylenol 650 q6h prn as first choice, oxycodone 5mg prn. Patient extremely sensitive to pain medications.  -Wound stable  -No HOB restrictions, OOB as much as possible. Pt to be OOB atleast 6 hours per day.  -Continue coumadin 2mg daily. Restarted 5/7. Daily INR checks. INR subtherapeutic, medicine following. Appreciate recs.   -Pt is chronic smoker. CXR 5/9 showed mild bibasilar edema and pleural fluid. Home COPD inhalers and duo nebs q4 per medicine recs. Continue nicotine patch. Pt on 3L nasal cannula. Will attempt to wean, but maintain sats between 88-92%. Continue IS 10x per hour.   -Nutrition consulted for decreased appetite.  Ordered Boost TID.   -SSI  -TEDs/SCDs for dvt prophylaxis  -HTN: continue home meds  -Bowel regimen. KUB 5/9 reveals constipation with no signs of obstruction or post-op ileus. Small BM s/p fleet enema given yesterday. Pt still c/o constipation and would like to move forward with brown bomb. Medicine following, appreciate recs.   -Delirium precautions  -PT/OT recommending SNF, insurance may cover 30 day stay. SW pursuing. Pt improving, will continue to monitor and utilize therapy while inpatient.     Discussed with Dr. Ramos.

## 2018-05-10 NOTE — ASSESSMENT & PLAN NOTE
Current use of long term anticoagulation  - Patient with polycythemia vera and history of DVT on chronic coumadin.  - Coumadin resumed at home dosing of 2 mg po daily by Neurosurgery on 5/7 and INR on 5/7 was 1. Should have daily INR monitoring while hospitalized with goal INR 2-3.   - INR currently subtherapeutic  - discussed with patient and daughter regarding possibly starting a DOAC in lieu of coumadin. Will attempt to find resources for family regarding this

## 2018-05-10 NOTE — PROGRESS NOTES
"[facility]  Neurosurgery  Progress Note    Subjective:     History of Present Illness: A 77-year-old female with medical comorbidities significant for HTN, CAD, polycythemia vera, COPD, polycystic kidney disease, CKD stage 3 presents to the ED with a chief complaint of back pain. Pain began 3 days ago after lifting a full gallon of milk. Pt hurt her back while twisting away from the refrigerator.  Patient reports pain that she describes as a fist in the middle of my low back".  Patient has a history of chronic low back pain after a fall resulting in multiple lumbar fractures 6 years ago.  She reports that the pain occasionally radiates down bilateral lower extremities, however this is her baseline and not worse over the past 3 days after this new injury. Today, patient reports an episode of urinary incontinence which is not typical for her.  Patient reports noticing that she had urinated on herself and did not feel any sensation.  She denies any lower extremity weakness, numbness, saddle anesthesia, loss of bowel function.     MRI shows ring enhancing intradural lesion at level of cauda equina.     On exam pt with bilateral S1 dermatome radiculopathy. Otherwise intact. Rectal tone present. Corbett catheter in place.    Post-Op Info:  Procedure(s) (LRB):  LAMINECTOMY-LUMBAR-DECOMPRESSION and RESECTION OF TUMOR L3-5 (N/A)   6 Days Post-Op     Interval History: NAEON. Pt reports small BM s/p fleet enema yesterday, but still c/o constipation. Denies n/v,abdominla pain, back pain, numbness/tingling, or weakness. Continued decreased appetite, but consuming Boost. More alert and talkative today on exam.     Medications:  Continuous Infusions:  Scheduled Meds:   albuterol-ipratropium 2.5mg-0.5mg/3mL  3 mL Nebulization Q4H WAKE    cloNIDine  0.3 mg Oral BID    docusate sodium  100 mg Oral BID    enoxaparin  40 mg Subcutaneous Daily    fluticasone-vilanterol  1 puff Inhalation Daily    nicotine  1 " patch Transdermal Daily    polyethylene glycol  17 g Oral Daily    ramipril  10 mg Oral Daily    tiotropium  1 capsule Inhalation Daily    warfarin  2 mg Oral Daily     PRN Meds:acetaminophen, aluminum-magnesium hydroxide-simethicone, cyclobenzaprine, dextromethorphan-guaifenesin  mg/5 ml, dextrose 50%, dextrose 50%, glucagon (human recombinant), glucose, glucose, glucose, hydrALAZINE, hydrocodone-acetaminophen 5-325mg, labetalol, nitroGLYCERIN, ondansetron, senna-docusate 8.6-50 mg, traMADol     Review of Systems  Objective:     Weight: 49.7 kg (109 lb 9.1 oz)  Body mass index is 20.04 kg/m².  Vital Signs (Most Recent):  Temp: 98.2 °F (36.8 °C) (05/10/18 1150)  Pulse: 91 (05/10/18 1150)  Resp: 20 (05/10/18 1150)  BP: (!) 142/65 (05/10/18 1150)  SpO2: 98 % (05/10/18 1150) Vital Signs (24h Range):  Temp:  [97.7 °F (36.5 °C)-98.4 °F (36.9 °C)] 98.2 °F (36.8 °C)  Pulse:  [] 91  Resp:  [16-20] 20  SpO2:  [96 %-100 %] 98 %  BP: (121-151)/(62-73) 142/65                           Neurosurgery Physical Exam  General: well developed, well nourished, no distress. Generalized deconditioning.   Head: normocephalic, atraumatic  Neurologic: Alert and oriented. Thought content appropriate.   GCS: Motor: 6/Verbal: 5/Eyes: 4 GCS Total: 15  Mental Status: Awake, Alert, Oriented x 4  Language: No aphasia  Speech: No dysarthria  Cranial nerves: face symmetric, tongue midline, CN II-XII grossly intact.   Eyes: pupils equal, round, reactive to light with accomodation, EOMI.  Pulmonary: normal respirations, no signs of respiratory distress  Abdomen: soft, non-distended, not tender to palpation  Sensory: intact to light touch throughout  Motor Strength: Moves all extremities spontaneously with good tone.  Full strength upper and lower extremities. No abnormal movements seen.   DTR's: 2 + and symmetric in UE and LE  Pronator Drift: no drift noted  Finger-to-nose: Intact bilaterally  Vázquez: absent  Clonus: absent  Babinski:  absent  Pulses: 2+ and symmetric radial and dorsalis pedis.  Skin: Skin is warm, dry and intact.  Incision c/d/i with no surrounding erythema, edema, or drainage.     Significant Labs:    Recent Labs  Lab 05/09/18  1429 05/10/18  0443   * 112*    142   K 3.7 3.6    108   CO2 26 24   BUN 22 17   CREATININE 0.8 0.7   CALCIUM 9.2 8.9       Recent Labs  Lab 05/09/18  1429   WBC 6.93   HGB 9.1*   HCT 28.6*          Recent Labs  Lab 05/10/18  0443   INR 1.2     Microbiology Results (last 7 days)     Procedure Component Value Units Date/Time    Blood culture [[id number]] Collected:  05/04/18 0457    Order Status:  Completed Specimen:  Blood from Peripheral, Hand, Left Updated:  05/09/18 0612     Blood Culture, Routine No growth after 5 days.    Urine culture [[id number]] Collected:  05/04/18 0129    Order Status:  Completed Specimen:  Urine from Catheterized Updated:  05/05/18 0728     Urine Culture, Routine No growth    Narrative:       add on per Dr Bai order #[id number] 05/04/2018  04:02 cxurn    Urine culture [[id number]]     Order Status:  Completed Specimen:  Urine from Urine, Catheterized     Urine culture [[id number]]     Order Status:  Canceled Specimen:  Urine         Recent Lab Results       05/10/18  0809 05/10/18  0443 05/09/18  1429      Immature Granulocytes   0.3     Immature Grans (Abs)   0.02  Comment:  Mild elevation in immature granulocytes is non specific and   can be seen in a variety of conditions including stress response,   acute inflammation, trauma and pregnancy. Correlation with other   laboratory and clinical findings is essential.       Albumin  2.3(L) 2.3(L)     Alkaline Phosphatase  90 95     ALT  36 37     Anion Gap  10 8     AST  34 36     Baso #   0.02     Basophil%   0.3     Total Bilirubin  0.4  Comment:  For infants and newborns, interpretation of results should be based  on gestational age, weight and in agreement with clinical  observations.  Premature Infant  recommended reference ranges:  Up to 24 hours.............<8.0 mg/dL  Up to 48 hours............<12.0 mg/dL  3-5 days..................<15.0 mg/dL  6-29 days.................<15.0 mg/dL   0.3  Comment:  For infants and newborns, interpretation of results should be based  on gestational age, weight and in agreement with clinical  observations.  Premature Infant recommended reference ranges:  Up to 24 hours.............<8.0 mg/dL  Up to 48 hours............<12.0 mg/dL  3-5 days..................<15.0 mg/dL  6-29 days.................<15.0 mg/dL       BUN, Bld  17 22     Calcium  8.9 9.2     Chloride  108 108     CO2  24 26     Creatinine  0.7 0.8     Differential Method   Automated     eGFR if   >60.0 >60.0     eGFR if non   >60.0  Comment:  Calculation used to obtain the estimated glomerular filtration  rate (eGFR) is the CKD-EPI equation.    >60.0  Comment:  Calculation used to obtain the estimated glomerular filtration  rate (eGFR) is the CKD-EPI equation.        Eos #   0.1     Eosinophil%   1.3     Glucose  112(H) 173(H)     Gran # (ANC)   5.5     Gran%   79.5(H)     Hematocrit   28.6(L)     Hemoglobin   9.1(L)     Coumadin Monitoring INR  1.2  Comment:  Coumadin Therapy:  2.0 - 3.0 for INR for all indicators except mechanical heart valves  and antiphospholipid syndromes which should use 2.5 - 3.5.        Lymph #   0.5(L)     Lymph%   7.6(L)     MCH   28.0     MCHC   31.8(L)     MCV   88     Mono #   0.8     Mono%   11.0     MPV   9.1(L)     nRBC   0     Platelets   258     POCT Glucose 97       Potassium  3.6 3.7     Total Protein  5.9(L) 6.2     Protime  12.4      RBC   3.25(L)     RDW   13.3     Sodium  142 142     WBC   6.93         All pertinent labs from the last 24 hours have been reviewed.    Significant Diagnostics:  I have reviewed all pertinent imaging results/findings within the past 24 hours.    Assessment/Plan:     * Intradural mass    77F with tnilateral  radiculopathy, ring enhancing lesion in cauda equina behind L4 vertebral body. Now s/p laminectomy for mass resection 5/4.    -Pt neurologically stable  -Pain control with tylenol 650 q6h prn as first choice, oxycodone 5mg prn. Patient extremely sensitive to pain medications.  -Wound stable  -No HOB restrictions, OOB as much as possible. Pt to be OOB atleast 6 hours per day.  -Continue coumadin 2mg daily. Restarted 5/7. Daily INR checks. INR subtherapeutic, medicine following. Appreciate recs.   -Pt is chronic smoker. CXR 5/9 showed mild bibasilar edema and pleural fluid. Home COPD inhalers and duo nebs q4 per medicine recs. Continue nicotine patch. Pt on 3L nasal cannula. Will attempt to wean, but maintain sats between 88-92%. Continue IS 10x per hour.   -Nutrition consulted for decreased appetite.  Ordered Boost TID.   -SSI  -TEDs/SCDs for dvt prophylaxis  -HTN: continue home meds  -Bowel regimen. KUB 5/9 reveals constipation with no signs of obstruction or post-op ileus. Small BM s/p fleet enema given yesterday. Pt still c/o constipation and would like to move forward with brown bomb. Medicine following, appreciate recs.   -Delirium precautions  -PT/OT recommending SNF, insurance may cover 30 day stay. SW pursuing. Pt improving, will continue to monitor and utilize therapy while inpatient.     Discussed with Dr. Ramos.             Elsa Jenkins PAAkinC  Neurosurgery  [facility]

## 2018-05-10 NOTE — CONSULTS
Pt states she is consuming Boost. PO intake of solid food poor, but pt claims to never have consumed a lot. Per chart review wt has been stable for several years with 1 small drop and back to 100lbs.  Encourage pt to continue consuming boost TID and try to include solid foods. Slow increase solid food and decrease Boost.

## 2018-05-10 NOTE — ASSESSMENT & PLAN NOTE
Patient with significant constipation on KUB today, 5/9.   Recommend to continue Docusate 100 mg po BID + Miralax 17 grams po daily to treat.  - s/p fleets enema with 1 small BM. Patient would like to hold off further enema today and consider tomorrow  KUB with no signs of obstruction or ileus.   Relief of constipation should help with appetite.

## 2018-05-11 ENCOUNTER — PATIENT MESSAGE (OUTPATIENT)
Dept: NEUROSURGERY | Facility: CLINIC | Age: 78
End: 2018-05-11

## 2018-05-11 ENCOUNTER — HOSPITAL ENCOUNTER (INPATIENT)
Facility: HOSPITAL | Age: 78
LOS: 1 days | Discharge: HOSPICE | DRG: 064 | End: 2018-05-13
Attending: EMERGENCY MEDICINE | Admitting: PSYCHIATRY & NEUROLOGY
Payer: COMMERCIAL

## 2018-05-11 VITALS
DIASTOLIC BLOOD PRESSURE: 95 MMHG | HEART RATE: 108 BPM | TEMPERATURE: 99 F | WEIGHT: 109.56 LBS | SYSTOLIC BLOOD PRESSURE: 144 MMHG | OXYGEN SATURATION: 99 % | RESPIRATION RATE: 18 BRPM | BODY MASS INDEX: 20.16 KG/M2 | HEIGHT: 62 IN

## 2018-05-11 DIAGNOSIS — I10 HYPERTENSION, UNSPECIFIED TYPE: ICD-10-CM

## 2018-05-11 DIAGNOSIS — R41.82 ALTERED MENTAL STATE: ICD-10-CM

## 2018-05-11 DIAGNOSIS — I61.5 INTRAVENTRICULAR HEMORRHAGE: Primary | ICD-10-CM

## 2018-05-11 DIAGNOSIS — R79.89 ELEVATED TROPONIN: ICD-10-CM

## 2018-05-11 DIAGNOSIS — I63.9 STROKE: ICD-10-CM

## 2018-05-11 DIAGNOSIS — J90 PLEURAL EFFUSION, BILATERAL: ICD-10-CM

## 2018-05-11 LAB
ALBUMIN SERPL BCP-MCNC: 2.7 G/DL
ALP SERPL-CCNC: 103 U/L
ALT SERPL W/O P-5'-P-CCNC: 43 U/L
ANION GAP SERPL CALC-SCNC: 11 MMOL/L
AST SERPL-CCNC: 38 U/L
BACTERIA #/AREA URNS AUTO: NORMAL /HPF
BASOPHILS # BLD AUTO: 0.02 K/UL
BASOPHILS NFR BLD: 0.2 %
BILIRUB SERPL-MCNC: 0.5 MG/DL
BILIRUB UR QL STRIP: NEGATIVE
BNP SERPL-MCNC: 836 PG/ML
BUN SERPL-MCNC: 23 MG/DL
CALCIUM SERPL-MCNC: 9.4 MG/DL
CHLORIDE SERPL-SCNC: 105 MMOL/L
CLARITY UR REFRACT.AUTO: CLEAR
CO2 SERPL-SCNC: 30 MMOL/L
COLOR UR AUTO: YELLOW
CREAT SERPL-MCNC: 0.9 MG/DL
DIFFERENTIAL METHOD: ABNORMAL
EOSINOPHIL # BLD AUTO: 0.1 K/UL
EOSINOPHIL NFR BLD: 0.8 %
ERYTHROCYTE [DISTWIDTH] IN BLOOD BY AUTOMATED COUNT: 13.2 %
EST. GFR  (AFRICAN AMERICAN): >60 ML/MIN/1.73 M^2
EST. GFR  (NON AFRICAN AMERICAN): >60 ML/MIN/1.73 M^2
GLUCOSE SERPL-MCNC: 128 MG/DL
GLUCOSE UR QL STRIP: NEGATIVE
HCT VFR BLD AUTO: 34.2 %
HGB BLD-MCNC: 11 G/DL
HGB UR QL STRIP: NEGATIVE
HYALINE CASTS UR QL AUTO: 0 /LPF
IMM GRANULOCYTES # BLD AUTO: 0.06 K/UL
IMM GRANULOCYTES NFR BLD AUTO: 0.6 %
INR PPP: 1.3
INR PPP: 1.4
KETONES UR QL STRIP: ABNORMAL
LACTATE SERPL-SCNC: 1.3 MMOL/L
LEUKOCYTE ESTERASE UR QL STRIP: NEGATIVE
LIPASE SERPL-CCNC: 59 U/L
LYMPHOCYTES # BLD AUTO: 1.2 K/UL
LYMPHOCYTES NFR BLD: 12.7 %
MAGNESIUM SERPL-MCNC: 1.9 MG/DL
MCH RBC QN AUTO: 28.6 PG
MCHC RBC AUTO-ENTMCNC: 32.2 G/DL
MCV RBC AUTO: 89 FL
MICROSCOPIC COMMENT: NORMAL
MONOCYTES # BLD AUTO: 0.9 K/UL
MONOCYTES NFR BLD: 9.5 %
NEUTROPHILS # BLD AUTO: 7.2 K/UL
NEUTROPHILS NFR BLD: 76.2 %
NITRITE UR QL STRIP: NEGATIVE
NRBC BLD-RTO: 0 /100 WBC
PH UR STRIP: 6 [PH] (ref 5–8)
PHOSPHATE SERPL-MCNC: 3.2 MG/DL
PLATELET # BLD AUTO: 280 K/UL
PMV BLD AUTO: 10 FL
POTASSIUM SERPL-SCNC: 3.6 MMOL/L
PROT SERPL-MCNC: 6.9 G/DL
PROT UR QL STRIP: ABNORMAL
PROTHROMBIN TIME: 13.3 SEC
PROTHROMBIN TIME: 14.4 SEC
RBC # BLD AUTO: 3.85 M/UL
RBC #/AREA URNS AUTO: 0 /HPF (ref 0–4)
SODIUM SERPL-SCNC: 146 MMOL/L
SP GR UR STRIP: 1.01 (ref 1–1.03)
TROPONIN I SERPL DL<=0.01 NG/ML-MCNC: 0.04 NG/ML
URN SPEC COLLECT METH UR: ABNORMAL
UROBILINOGEN UR STRIP-ACNC: NEGATIVE EU/DL
WBC # BLD AUTO: 9.43 K/UL
WBC #/AREA URNS AUTO: 0 /HPF (ref 0–5)

## 2018-05-11 PROCEDURE — 25000003 PHARM REV CODE 250: Performed by: PHYSICIAN ASSISTANT

## 2018-05-11 PROCEDURE — 85025 COMPLETE CBC W/AUTO DIFF WBC: CPT

## 2018-05-11 PROCEDURE — 97530 THERAPEUTIC ACTIVITIES: CPT

## 2018-05-11 PROCEDURE — 63600175 PHARM REV CODE 636 W HCPCS: Performed by: NEUROLOGICAL SURGERY

## 2018-05-11 PROCEDURE — 25000242 PHARM REV CODE 250 ALT 637 W/ HCPCS: Performed by: STUDENT IN AN ORGANIZED HEALTH CARE EDUCATION/TRAINING PROGRAM

## 2018-05-11 PROCEDURE — 12000002 HC ACUTE/MED SURGE SEMI-PRIVATE ROOM

## 2018-05-11 PROCEDURE — 84100 ASSAY OF PHOSPHORUS: CPT

## 2018-05-11 PROCEDURE — 80053 COMPREHEN METABOLIC PANEL: CPT

## 2018-05-11 PROCEDURE — 96365 THER/PROPH/DIAG IV INF INIT: CPT

## 2018-05-11 PROCEDURE — 99232 SBSQ HOSP IP/OBS MODERATE 35: CPT | Mod: ,,, | Performed by: INTERNAL MEDICINE

## 2018-05-11 PROCEDURE — 84443 ASSAY THYROID STIM HORMONE: CPT

## 2018-05-11 PROCEDURE — 96361 HYDRATE IV INFUSION ADD-ON: CPT

## 2018-05-11 PROCEDURE — 81001 URINALYSIS AUTO W/SCOPE: CPT

## 2018-05-11 PROCEDURE — 99024 POSTOP FOLLOW-UP VISIT: CPT | Mod: ,,, | Performed by: PHYSICIAN ASSISTANT

## 2018-05-11 PROCEDURE — 63600175 PHARM REV CODE 636 W HCPCS: Performed by: PHYSICIAN ASSISTANT

## 2018-05-11 PROCEDURE — 93010 ELECTROCARDIOGRAM REPORT: CPT | Mod: ,,, | Performed by: INTERNAL MEDICINE

## 2018-05-11 PROCEDURE — 94640 AIRWAY INHALATION TREATMENT: CPT

## 2018-05-11 PROCEDURE — 83605 ASSAY OF LACTIC ACID: CPT

## 2018-05-11 PROCEDURE — 83690 ASSAY OF LIPASE: CPT

## 2018-05-11 PROCEDURE — 85610 PROTHROMBIN TIME: CPT

## 2018-05-11 PROCEDURE — 96375 TX/PRO/DX INJ NEW DRUG ADDON: CPT

## 2018-05-11 PROCEDURE — 82962 GLUCOSE BLOOD TEST: CPT

## 2018-05-11 PROCEDURE — 84484 ASSAY OF TROPONIN QUANT: CPT

## 2018-05-11 PROCEDURE — 83735 ASSAY OF MAGNESIUM: CPT

## 2018-05-11 PROCEDURE — 94761 N-INVAS EAR/PLS OXIMETRY MLT: CPT

## 2018-05-11 PROCEDURE — 99285 EMERGENCY DEPT VISIT HI MDM: CPT | Mod: 25

## 2018-05-11 PROCEDURE — 25000003 PHARM REV CODE 250: Performed by: STUDENT IN AN ORGANIZED HEALTH CARE EDUCATION/TRAINING PROGRAM

## 2018-05-11 PROCEDURE — 27000221 HC OXYGEN, UP TO 24 HOURS

## 2018-05-11 PROCEDURE — 83880 ASSAY OF NATRIURETIC PEPTIDE: CPT

## 2018-05-11 PROCEDURE — 85610 PROTHROMBIN TIME: CPT | Mod: 91

## 2018-05-11 PROCEDURE — 87040 BLOOD CULTURE FOR BACTERIA: CPT

## 2018-05-11 PROCEDURE — 25000003 PHARM REV CODE 250: Performed by: INTERNAL MEDICINE

## 2018-05-11 PROCEDURE — 36415 COLL VENOUS BLD VENIPUNCTURE: CPT

## 2018-05-11 PROCEDURE — 97116 GAIT TRAINING THERAPY: CPT

## 2018-05-11 PROCEDURE — S4991 NICOTINE PATCH NONLEGEND: HCPCS | Performed by: INTERNAL MEDICINE

## 2018-05-11 RX ORDER — ACETAMINOPHEN 325 MG/1
650 TABLET ORAL EVERY 6 HOURS PRN
Status: CANCELLED | OUTPATIENT
Start: 2018-05-11

## 2018-05-11 RX ORDER — METOPROLOL TARTRATE 1 MG/ML
5 INJECTION, SOLUTION INTRAVENOUS
Status: COMPLETED | OUTPATIENT
Start: 2018-05-11 | End: 2018-05-11

## 2018-05-11 RX ORDER — IPRATROPIUM BROMIDE AND ALBUTEROL SULFATE 2.5; .5 MG/3ML; MG/3ML
3 SOLUTION RESPIRATORY (INHALATION)
Status: CANCELLED | OUTPATIENT
Start: 2018-05-11

## 2018-05-11 RX ORDER — WARFARIN 2 MG/1
2 TABLET ORAL DAILY
Status: CANCELLED | OUTPATIENT
Start: 2018-05-11

## 2018-05-11 RX ORDER — IBUPROFEN 200 MG
16 TABLET ORAL
Status: CANCELLED | OUTPATIENT
Start: 2018-05-11

## 2018-05-11 RX ORDER — ONDANSETRON 8 MG/1
8 TABLET, ORALLY DISINTEGRATING ORAL EVERY 6 HOURS PRN
Status: CANCELLED | OUTPATIENT
Start: 2018-05-11

## 2018-05-11 RX ORDER — HYDROCODONE BITARTRATE AND ACETAMINOPHEN 5; 325 MG/1; MG/1
1 TABLET ORAL EVERY 4 HOURS PRN
Status: CANCELLED | OUTPATIENT
Start: 2018-05-11

## 2018-05-11 RX ORDER — TIOTROPIUM BROMIDE 18 UG/1
1 CAPSULE ORAL; RESPIRATORY (INHALATION) DAILY
Status: CANCELLED | OUTPATIENT
Start: 2018-05-12

## 2018-05-11 RX ORDER — FLUTICASONE FUROATE AND VILANTEROL 100; 25 UG/1; UG/1
1 POWDER RESPIRATORY (INHALATION) DAILY
Status: CANCELLED | OUTPATIENT
Start: 2018-05-12

## 2018-05-11 RX ORDER — TRAMADOL HYDROCHLORIDE 50 MG/1
50 TABLET ORAL EVERY 6 HOURS PRN
Status: CANCELLED | OUTPATIENT
Start: 2018-05-11

## 2018-05-11 RX ORDER — RAMELTEON 8 MG/1
8 TABLET ORAL NIGHTLY PRN
Status: CANCELLED | OUTPATIENT
Start: 2018-05-11

## 2018-05-11 RX ORDER — GUAIFENESIN/DEXTROMETHORPHAN 100-10MG/5
10 SYRUP ORAL EVERY 6 HOURS PRN
Status: CANCELLED | OUTPATIENT
Start: 2018-05-11

## 2018-05-11 RX ORDER — IBUPROFEN 200 MG
1 TABLET ORAL DAILY
Status: CANCELLED | OUTPATIENT
Start: 2018-05-12

## 2018-05-11 RX ORDER — POLYETHYLENE GLYCOL 3350 17 G/17G
17 POWDER, FOR SOLUTION ORAL DAILY
Status: CANCELLED | OUTPATIENT
Start: 2018-05-12

## 2018-05-11 RX ORDER — NITROGLYCERIN 0.4 MG/1
0.4 TABLET SUBLINGUAL EVERY 5 MIN PRN
Status: CANCELLED | OUTPATIENT
Start: 2018-05-11

## 2018-05-11 RX ORDER — MAG HYDROX/ALUMINUM HYD/SIMETH 200-200-20
30 SUSPENSION, ORAL (FINAL DOSE FORM) ORAL EVERY 4 HOURS PRN
Status: CANCELLED | OUTPATIENT
Start: 2018-05-11

## 2018-05-11 RX ORDER — HYDRALAZINE HYDROCHLORIDE 25 MG/1
25 TABLET, FILM COATED ORAL EVERY 8 HOURS PRN
Status: CANCELLED | OUTPATIENT
Start: 2018-05-11

## 2018-05-11 RX ORDER — SYRING-NEEDL,DISP,INSUL,0.3 ML 29 G X1/2"
296 SYRINGE, EMPTY DISPOSABLE MISCELLANEOUS
Status: COMPLETED | OUTPATIENT
Start: 2018-05-11 | End: 2018-05-11

## 2018-05-11 RX ORDER — DOCUSATE SODIUM 100 MG/1
100 CAPSULE, LIQUID FILLED ORAL 2 TIMES DAILY
Status: CANCELLED | OUTPATIENT
Start: 2018-05-11

## 2018-05-11 RX ORDER — LABETALOL HYDROCHLORIDE 5 MG/ML
10 INJECTION, SOLUTION INTRAVENOUS EVERY 4 HOURS PRN
Status: CANCELLED | OUTPATIENT
Start: 2018-05-11

## 2018-05-11 RX ORDER — LORAZEPAM 2 MG/ML
0.5 INJECTION INTRAMUSCULAR
Status: COMPLETED | OUTPATIENT
Start: 2018-05-11 | End: 2018-05-11

## 2018-05-11 RX ORDER — AMOXICILLIN 250 MG
2 CAPSULE ORAL NIGHTLY PRN
Status: CANCELLED | OUTPATIENT
Start: 2018-05-11

## 2018-05-11 RX ORDER — RAMIPRIL 10 MG/1
10 CAPSULE ORAL DAILY
Status: CANCELLED | OUTPATIENT
Start: 2018-05-12

## 2018-05-11 RX ORDER — ENOXAPARIN SODIUM 100 MG/ML
40 INJECTION SUBCUTANEOUS EVERY 24 HOURS
Status: CANCELLED | OUTPATIENT
Start: 2018-05-11

## 2018-05-11 RX ORDER — PSEUDOEPHEDRINE/ACETAMINOPHEN 30MG-500MG
100 TABLET ORAL
Status: COMPLETED | OUTPATIENT
Start: 2018-05-11 | End: 2018-05-11

## 2018-05-11 RX ORDER — GLUCAGON 1 MG
1 KIT INJECTION
Status: CANCELLED | OUTPATIENT
Start: 2018-05-11

## 2018-05-11 RX ORDER — IBUPROFEN 200 MG
24 TABLET ORAL
Status: CANCELLED | OUTPATIENT
Start: 2018-05-11

## 2018-05-11 RX ORDER — CYCLOBENZAPRINE HCL 5 MG
5 TABLET ORAL 3 TIMES DAILY PRN
Status: CANCELLED | OUTPATIENT
Start: 2018-05-11

## 2018-05-11 RX ADMIN — Medication 100 ML: at 01:05

## 2018-05-11 RX ADMIN — NICOTINE 1 PATCH: 14 PATCH, EXTENDED RELEASE TRANSDERMAL at 08:05

## 2018-05-11 RX ADMIN — DOCUSATE SODIUM 100 MG: 100 CAPSULE, LIQUID FILLED ORAL at 08:05

## 2018-05-11 RX ADMIN — SODIUM CHLORIDE 500 ML: 0.9 INJECTION, SOLUTION INTRAVENOUS at 11:05

## 2018-05-11 RX ADMIN — IPRATROPIUM BROMIDE AND ALBUTEROL SULFATE 3 ML: .5; 3 SOLUTION RESPIRATORY (INHALATION) at 10:05

## 2018-05-11 RX ADMIN — ENOXAPARIN SODIUM 40 MG: 100 INJECTION SUBCUTANEOUS at 04:05

## 2018-05-11 RX ADMIN — RAMIPRIL 10 MG: 10 CAPSULE ORAL at 08:05

## 2018-05-11 RX ADMIN — MAGESIUM CITRATE 296 ML: 1.75 LIQUID ORAL at 01:05

## 2018-05-11 RX ADMIN — LORAZEPAM 0.5 MG: 2 INJECTION INTRAMUSCULAR; INTRAVENOUS at 11:05

## 2018-05-11 RX ADMIN — IPRATROPIUM BROMIDE AND ALBUTEROL SULFATE 3 ML: .5; 3 SOLUTION RESPIRATORY (INHALATION) at 01:05

## 2018-05-11 RX ADMIN — TIOTROPIUM BROMIDE 18 MCG: 18 CAPSULE ORAL; RESPIRATORY (INHALATION) at 08:05

## 2018-05-11 RX ADMIN — POLYETHYLENE GLYCOL 3350 17 G: 17 POWDER, FOR SOLUTION ORAL at 08:05

## 2018-05-11 RX ADMIN — SODIUM CHLORIDE, PRESERVATIVE FREE 1000 ML: 5 INJECTION INTRAVENOUS at 08:05

## 2018-05-11 RX ADMIN — FLUTICASONE FUROATE AND VILANTEROL TRIFENATATE 1 PUFF: 100; 25 POWDER RESPIRATORY (INHALATION) at 08:05

## 2018-05-11 RX ADMIN — CLONIDINE HYDROCHLORIDE 0.3 MG: 0.2 TABLET ORAL at 03:05

## 2018-05-11 RX ADMIN — WARFARIN SODIUM 2 MG: 2 TABLET ORAL at 04:05

## 2018-05-11 RX ADMIN — METOPROLOL TARTRATE 5 MG: 1 INJECTION, SOLUTION INTRAVENOUS at 10:05

## 2018-05-11 RX ADMIN — CLONIDINE HYDROCHLORIDE 0.3 MG: 0.2 TABLET ORAL at 08:05

## 2018-05-11 NOTE — PLAN OF CARE
Problem: Physical Therapy Goal  Goal: Physical Therapy Goal  Goals to be met by: 10 days ( )    Patient will increase functional independence with mobility by performin. Supine to sit with Stand-by Assistance  2. Sit to supine with Stand-by Assistance  3. Sit to stand transfer with Stand-by Assistance using RW  4. Gait  x 100 feet with Contact Guard Assistance using Rolling Walker.   5. Lower extremity exercise program x30 reps per handout, with independence to improve muscular strength and endurance.        Outcome: Ongoing (interventions implemented as appropriate)  Goals remain appropriate; continue current POC.     Gabrielle Olson PT, DPT   2018  Pager: [phone]

## 2018-05-11 NOTE — PROGRESS NOTES
[facility] Hospital Medicine  Progress Note    Patient Name: Sunita Zimmer  MRN: [id number]  Patient Class: IP- Inpatient   Admission Date: 5/3/2018  Length of Stay: 7 days  Attending Physician: Jude Ramos DO  Primary Care Provider: Von Mccullough MD    [facility] Medicine Team: Networked reference to record PCT  Shi Ny MD    Subjective:     Principal Problem:Intradural mass    HPI:  Mrs. 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 Hospital Medicine was consulted for preop clearance for urgent laminectomy. Patient endorses dyspnea with minimal exertion (unable to complete simple household chores). She endorses a 90 pack year tobacco history and currently smokes 1PPD. Patient denies any recent history of CP (at rest or exertion), palpitations, orthopnea, PND.     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 Medicine signed off post-op on POD # 1 as just consulted for pre-op evaluation. Patient since surgery has been having issues with constipation and no BM since surgery. Patient also not eating well according to family since surgery with poor oral intake. Patient reports pain with any movement in low back area and has been slow progressing with PT/OT and patient has been recommended for SNF placement that primary team is working on. Son reports over past 2 days patient has been having on and off confusion but states today, 5/9 has been her best day and has been up and talking and communicating with family. Patient did have unwitnessed fall in bathroom last night on 5/8 and patient reports she was trying to get up and have BM. Patient has been on  oxygen since surgery and currently on 3 liters of oxygen with sats of 90%. Neurosurgery has been trying to wean oxygen without success. Medicine has been reconsulted to address constipation, hypoxia and poor oral intake as well as assist in Coumadin monitoring.     5/10- Patient hasn't been using IS- encouraged her and daughter to use on every commercial break/every hour. INR 1.2. Discussed possibly switching over to a NOAC. Patient needs to be up and out of bed sitting up for at least 3 hours. Wean oxygen.   5/11- oxygen weaned to 1 L. Intermittent delirium. Has not had another BM yet and complaining of constipation. INR 1.3.     Interval History:   Oxygen weaned to 1 L. Intermittent delirium. Has not had another BM yet and complaining of constipation. INR 1.3.     Review of Systems   Constitutional: Negative for appetite change.   HENT: Negative for congestion.    Respiratory: Negative for shortness of breath.    Cardiovascular: Negative for chest pain.   Gastrointestinal: Positive for constipation. Negative for abdominal distention, abdominal pain and nausea.   Genitourinary: Negative for dysuria.   Musculoskeletal: Positive for back pain and gait problem.   Psychiatric/Behavioral: Negative for confusion and decreased concentration.     Objective:     Vital Signs (Most Recent):  Temp: 98 °F (36.7 °C) (05/11/18 0739)  Pulse: 89 (05/11/18 1019)  Resp: (!) 26 (05/11/18 1019)  BP: (!) 186/77 (05/11/18 0739)  SpO2: (!) 88 % (05/11/18 1019) Vital Signs (24h Range):  Temp:  [97.5 °F (36.4 °C)-98.2 °F (36.8 °C)] 98 °F (36.7 °C)  Pulse:  [] 89  Resp:  [18-26] 26  SpO2:  [88 %-98 %] 88 %  BP: (142-186)/(65-77) 186/77     Weight: 49.7 kg (109 lb 9.1 oz)  Body mass index is 20.04 kg/m².  No intake or output data in the 24 hours ending 05/11/18 1048   Physical Exam   Constitutional: She appears cachectic. No distress.   HENT:   Mouth/Throat: Oropharynx is clear and moist.   Eyes: Conjunctivae are normal.   Neck: No JVD  present.   Cardiovascular: Normal rate, regular rhythm and normal heart sounds.  Exam reveals no gallop and no friction rub.    No murmur heard.  Pulmonary/Chest: Effort normal and breath sounds normal. No respiratory distress. She has no wheezes. She has no rhonchi. She has no rales.   Abdominal: Soft. Bowel sounds are normal. She exhibits no distension. There is no tenderness. There is no rebound and no guarding.   Bowel sounds hypoactive   Musculoskeletal: She exhibits no edema.   Neurological: She is alert.   delirious   Skin: Skin is warm. Capillary refill takes less than 2 seconds. No erythema.   Psychiatric: She has a normal mood and affect. Her behavior is normal.   Nursing note and vitals reviewed.      Significant Labs: All pertinent labs within the past 24 hours have been reviewed.    Significant Imaging: I have reviewed all pertinent imaging results/findings within the past 24 hours.    Assessment/Plan:      Acute respiratory failure with hypoxia    Patient currently on 3 liters of oxygen with sats of 94-98%. Patient does not use oxygen at home and has chronic lung disease so goal is to wean oxygen with goal oxygen sats 88-92%.   · Recommend to wean oxygen. Encourage IS use and OOB to chair to help with oxygenation.   · No indication for steroids or antibiotics as no signs of COPD excerebration on exam and CXR not consistent with pneumonia.           Underweight    Encourage oral intake with patient and family. Continue Boost supplements to help with nutrition.           Drug-induced constipation    - Patient with significant constipation on KUB, 5/9.   - Recommend to continue Docusate 100 mg po BID + Miralax 17 grams po daily to treat.  - s/p fleets enema with 1 small BM  - will give brown bomb today  - Relief of constipation should help with appetite.           Centrilobular emphysema    - Patient stable post-operatively from a pulmonary standpoint. No wheezing or SOB.  - Recommend to continue Breo  inhaler 1 puff daily + Spiriva inhaler 1 puff daily to treat chronic COPD.  - Continue Duo-nebs Q4 while awake.   - Patient counseled on need for smoking cessation and recommend to continue Nicotine patch daily while patient in hospital to help with Nicotine addiction/withdrawal.           Personal history of DVT (deep vein thrombosis)    Current use of long term anticoagulation  - Patient with polycythemia vera and history of DVT on chronic coumadin.  - Coumadin resumed at home dosing of 2 mg po daily by Neurosurgery on 5/7 and INR on 5/7 was 1. Should have daily INR monitoring while hospitalized with goal INR 2-3.   - INR currently subtherapeutic  - discussed with patient and daughter regarding possibly starting a DOAC in lieu of coumadin. Resources provided- will continue to discuss          Essential hypertension    · Patient's blood pressure is controlled here in the hospital over past 24 hours but has had several high readings in past several days and likely related to back pain.   · Goal for blood pressure is SBP < 150 and DBP < 90 as patient > or = 60 years of age with no diabetes or advanced kidney disease based on JNC 8 guidelines.   · Will increase clonidine to 0.3 mg TID  · Continue ramipril 10 mg   · Monitor patient's blood pressure routinely while patient is hospitalized.           VTE Risk Mitigation         Ordered     enoxaparin injection 40 mg  Daily      05/06/18 2251     warfarin (COUMADIN) tablet 2 mg  Daily      05/07/18 4838              Shi Ny MD  Department of Hospital Medicine   [facility]

## 2018-05-11 NOTE — PROGRESS NOTES
Pt D/C'ed to [facility]. IV removed by Lor, charge nurse, catheter tip intact. Pt and daughter verbalized understanding of D/C to SNF for therapy. Pt ambulated to wheelchair with [facility] transport.

## 2018-05-11 NOTE — PT/OT/SLP PROGRESS
"Physical Therapy Treatment    Patient Name:  Sunita Zimmer   MRN:  [id number]    Recommendations:     Discharge Recommendations:  nursing facility, skilled (short stay SNF)   Discharge Equipment Recommendations: none   Barriers to discharge: Decreased caregiver support    Assessment:     Sunita Zimmer is a 77 y.o. female admitted with a medical diagnosis of Intradural mass.  She presents with the following impairments/functional limitations:  weakness, impaired endurance, impaired self care skills, impaired functional mobilty, gait instability, impaired balance, impaired cognition, decreased safety awareness, impaired cardiopulmonary response to activity. Pt demonstrated improvement in participation, and tolerated progression of gait activity this visit. Continues to display impaired dynamic standing balance and decreased safety awareness 2/2 cognitive deficits. Pt remains a good candidate for SNF upon discharge and would benefit from continued PT intervention to address listed deficits and maximize return to PLOF.     Rehab Prognosis: good; patient would benefit from acute skilled PT services to address these deficits and reach maximum level of function.      Recent Surgery: Procedure(s) (LRB):  LAMINECTOMY-LUMBAR-DECOMPRESSION and RESECTION OF TUMOR L3-5 (N/A) 7 Days Post-Op    Plan:     During this hospitalization, patient to be seen 4 x/week to address the above listed problems via gait training, therapeutic activities, therapeutic exercises, neuromuscular re-education  · Plan of Care Expires:  06/05/18   Plan of Care Reviewed with: patient, spouse, son    Subjective     Communicated with RN prior to session.  Patient found supine upon PT entry to room, agreeable to treatment.  Per pt's family, pt sat EOB this morning with their assistance.     Chief Complaint: "I did a lot this morning, I'm exhausted"   Pain/Comfort:  · Pain Rating 1: 0/10  · Pain Rating Post-Intervention 1: 0/10    Patients cultural, " spiritual, Denominational conflicts given the current situation: no conflicts    Objective:     Patient found with: telemetry, oxygen     General Precautions: Standard, fall   Orthopedic Precautions:N/A   Braces: N/A     Functional Mobility:       Bed Mobility    · Supine to sit: CGA with HOB elevated  · Sit to supine: pt with decreased safety awareness, leading sit>supine transfer head first and raising RLE onto bed, then rolling into supine position requiring mod A to safely return to supine.        Transfers · Sit <> Stand: minimum assistance from EOB x 2 trials with RW; increased cueing required for safety with hand placement and RW management      Gait  · Distance: 6 ft. + 15 ft   · Assistance level: RW and minimum assistance for balance, safety and RW management    · Gait Deviations: decreased step length, forward flexed posture, decreased izabella, poor RW management           Therapeutic Activities and Exercises:  Therapeutic activities aimed to:  - increase pt's independence, safety, and efficiency with bed mobility and functional transfers. See above for assistance levels.   - increase functional BLE strength and endurance through performance of above activities and therex. Pt demonstrated understanding of LAQ, marches and ankle pumps x 10 reps bilaterally in sitting without back support at EOB.   - improve static and dynamic standing balance. Pt performed grooming at sink in standing x 5 minute interval with mod cueing required to improve posture.     Therapist facilitated progression of gait training to improve gait stability, endurance, and independence with functional ambulation. Cueing provided to improve RW management, increase step length and navigate obstacles. Pt with decreased safety awareness with RW use, requiring mod A to maintain RW close to body and navigate obstacles.     Patient left HOB elevated with all lines intact, call button in reach, bed alarm on, RN notified and family present.      AM-PAC 6 CLICK MOBILITY  Turning over in bed (including adjusting bedclothes, sheets and blankets)?: 3  Sitting down on and standing up from a chair with arms (e.g., wheelchair, bedside commode, etc.): 3  Moving from lying on back to sitting on the side of the bed?: 3  Moving to and from a bed to a chair (including a wheelchair)?: 3  Need to walk in hospital room?: 2  Climbing 3-5 steps with a railing?: 1  Total Score: 15       GOALS:    Physical Therapy Goals        Problem: Physical Therapy Goal    Goal Priority Disciplines Outcome Goal Variances Interventions   Physical Therapy Goal     PT/OT, PT Ongoing (interventions implemented as appropriate)     Description:  Goals to be met by: 10 days ( )    Patient will increase functional independence with mobility by performin. Supine to sit with Stand-by Assistance  2. Sit to supine with Stand-by Assistance  3. Sit to stand transfer with Stand-by Assistance using RW  4. Gait  x 100 feet with Contact Guard Assistance using Rolling Walker.   5. Lower extremity exercise program x30 reps per handout, with independence to improve muscular strength and endurance.                         Time Tracking:     PT Received On: 18  PT Start Time: 838     PT Stop Time: 908  PT Total Time (min): 30 min     Billable Minutes: Gait Training 15 min and Therapeutic Activity 15 min    Treatment Type: Treatment  PT/PTA: PT     PTA Visit Number: 0     Gabrielle Olson PT, DPT   2018  Pager: [phone]

## 2018-05-11 NOTE — CARE UPDATE
Patient cleared to go to SNF from a medicine standpoint.    Shi Ny MD, PGY3  Pager [phone]  Discussed with Dr. Restrepo

## 2018-05-11 NOTE — DISCHARGE SUMMARY
"[facility]  Neurosurgery  Discharge Summary      Patient Name: Sunita Zimmer  MRN: [id number]  Admission Date: 5/3/2018  Hospital Length of Stay: 7 days  Discharge Date and Time:  05/11/2018 12:30 PM  Attending Physician: Jude Ramos DO   Discharging Provider: Elsa Jenkins PA-C  Primary Care Provider: Von Mccullough MD    HPI:   A 77-year-old female with medical comorbidities significant for HTN, CAD, polycythemia vera, COPD, polycystic kidney disease, CKD stage 3 presents to the ED with a chief complaint of back pain. Pain began 3 days ago after lifting a full gallon of milk. Pt hurt her back while twisting away from the refrigerator.  Patient reports pain that she describes as a fist in the middle of my low back".  Patient has a history of chronic low back pain after a fall resulting in multiple lumbar fractures 6 years ago.  She reports that the pain occasionally radiates down bilateral lower extremities, however this is her baseline and not worse over the past 3 days after this new injury. Today, patient reports an episode of urinary incontinence which is not typical for her.  Patient reports noticing that she had urinated on herself and did not feel any sensation.  She denies any lower extremity weakness, numbness, saddle anesthesia, loss of bowel function.     MRI shows ring enhancing intradural lesion at level of cauda equina.     On exam pt with bilateral S1 dermatome radiculopathy. Otherwise intact. Rectal tone present. Corbett catheter in place.    Procedure(s) (LRB):  LAMINECTOMY-LUMBAR-DECOMPRESSION and RESECTION OF TUMOR L3-5 (N/A)     Hospital Course: 5/5: POD1 s/p laminectomy for intradural mass at L4 level. Flat overnight. Denies headaches, no leaking on dressing  5/6: POD2, flat bed parameters stopped, denies headaches. Pain improved.  5/7: NAEON. Pending SNF. Restarted coumadin 2mg daily.  5/8: NAEON. Pt uncooperative with therapy toady. Delirium precautions. Pending " SNF.  5/9: Unwitnessed fall overnight. Delirium precautions. CXR/KUB ordered. Insurance only covers HH, so will need more therapy here.   5/10: NAEON. Medicine following. Small bm s/p enema. Insurance may be covering 30 days SNF.   5/11: NAEON. Stable for dc to [facility].    Consults:   Consults         Status Ordering Provider     Inpatient consult to [facility]  Once     Provider:  (Not yet assigned)    Completed DEDE LEON     [facility]     Provider:  (Not yet assigned)    Completed PATRICIA CUEVAS     Inpatient consult to Neurosurgery  Once     Provider:  (Not yet assigned)    Completed SANTANA RODRIGUEZ     Inpatient consult to Registered Dietitian/Nutritionist  Once     Provider:  (Not yet assigned)    Completed DEDE LEON     Inpatient consult to [facility]  Once     Provider:  (Not yet assigned)    Completed JO ANN GONZALEZ     Inpatient consult to [facility]  Once     Provider:  (Not yet assigned)    Completed ANGELICA MCNAIR          Significant Diagnostic Studies: Labs:   BMP:   Recent Labs  Lab 05/09/18  1429 05/10/18  0443   * 112*    142   K 3.7 3.6    108   CO2 26 24   BUN 22 17   CREATININE 0.8 0.7   CALCIUM 9.2 8.9   , INR   Lab Results   Component Value Date    INR 1.3 (H) 05/11/2018    INR 1.2 05/10/2018    INR 1.0 05/07/2018    and All labs within the past 24 hours have been reviewed    Pending Diagnostic Studies:     None        Final Active Diagnoses:    Diagnosis Date Noted POA    PRINCIPAL PROBLEM:  Intradural mass [G95.9] 05/07/2018 Yes    Drug-induced constipation [K59.03] 05/09/2018 No    Current use of long term anticoagulation [Z79.01] 05/09/2018 Not Applicable    Acute respiratory failure with hypoxia [J96.01] 05/09/2018 No    Centrilobular emphysema [J43.2] 05/07/2018 Yes    Back pain [M54.9] 05/04/2018 Yes    Cauda equina syndrome [G83.4] 05/04/2018 Yes    Underweight [R63.6]  01/24/2017 Yes    Polycythemia vera [D45] 06/04/2014 Yes    Personal history of DVT (deep vein thrombosis) [Z86.718] 06/04/2014 Not Applicable    Essential hypertension [I10] 06/04/2014 Yes      Problems Resolved During this Admission:    Diagnosis Date Noted Date Resolved POA      Discharged Condition: good    Disposition: Skilled Nursing Facility    Follow Up: Will schedule 2 wk wound check and 6 wk follow up with Dr. Ramos.    Medications:  Transfer Medications (for Discharge Readmit only):   Current Facility-Administered Medications   Medication Dose Route Frequency Provider Last Rate Last Dose    acetaminophen tablet 650 mg  650 mg Oral Q6H PRN Jude Ramos DO   650 mg at 05/08/18 0429    albuterol-ipratropium 2.5mg-0.5mg/3mL nebulizer solution 3 mL  3 mL Nebulization Q4H WAKE Jude Mckeon MD   3 mL at 05/11/18 1019    aluminum-magnesium hydroxide-simethicone 200-200-20 mg/5 mL suspension 30 mL  30 mL Oral Q4H PRN Adelita Benedict PA-C        cloNIDine tablet 0.3 mg  0.3 mg Oral TID Shi Ny MD   0.3 mg at 05/11/18 0828    cyclobenzaprine tablet 5 mg  5 mg Oral TID PRN Jude Ramos DO   5 mg at 05/07/18 0516    dextromethorphan-guaifenesin  mg/5 ml liquid 10 mL  10 mL Oral Q6H PRN Shi Ny MD   10 mL at 05/10/18 1509    dextrose 50% injection 12.5 g  12.5 g Intravenous PRN Dallas Wiseman MD        dextrose 50% injection 25 g  25 g Intravenous PRN Dallas Wiseman MD        docusate sodium capsule 100 mg  100 mg Oral BID Dallas Wiseman MD   100 mg at 05/11/18 0829    enoxaparin injection 40 mg  40 mg Subcutaneous Daily Jude Ramos DO   40 mg at 05/10/18 1647    fluticasone-vilanterol 100-25 mcg/dose diskus inhaler 1 puff  1 puff Inhalation Daily Andrade Roman MD   1 puff at 05/11/18 0833    glucagon (human recombinant) injection 1 mg  1 mg Intramuscular PRN Dallas Wiseman MD        glucose chewable tablet 16 g  16 g Oral PRN Dallas Wiseman MD         glucose chewable tablet 16 g  16 g Oral PRN Dallas Wiseman MD        glucose chewable tablet 24 g  24 g Oral PRN Dallas Wiseman MD        glycerin 99.5% topical solution 100 mL  100 mL Rectal ED 1 Time Shi Ny MD        And    magnesium citrate solution 296 mL  296 mL Rectal ED 1 Time Shi Ny MD        And    sodium chloride 0.9% bolus 500 mL  500 mL Rectal ED 1 Time Shi Ny MD        hydrALAZINE tablet 25 mg  25 mg Oral Q8H PRN Jerome Michelle PA-C        hydrocodone-acetaminophen 5-325mg per tablet 1 tablet  1 tablet Oral Q4H PRN Jude Ramos,         labetalol injection 10 mg  10 mg Intravenous Q4H PRN Jerome Michelle PA-C        nicotine 14 mg/24 hr 1 patch  1 patch Transdermal Daily Shi Ny MD   1 patch at 05/11/18 0828    nitroGLYCERIN SL tablet 0.4 mg  0.4 mg Sublingual Q5 Min PRN Dallas Wiseman MD        ondansetron disintegrating tablet 8 mg  8 mg Oral Q6H PRN Adelita Benedict PA-C   8 mg at 05/10/18 1411    polyethylene glycol packet 17 g  17 g Oral Daily Elsa Jenkins PA-C   17 g at 05/11/18 0829    ramipril capsule 10 mg  10 mg Oral Daily Dallas Wiseman MD   10 mg at 05/11/18 0828    senna-docusate 8.6-50 mg per tablet 2 tablet  2 tablet Oral Nightly PRN Adelita Benedict PA-C        tiotropium inhalation capsule 18 mcg  1 capsule Inhalation Daily Andrade Roman MD   18 mcg at 05/11/18 0833    traMADol tablet 50 mg  50 mg Oral Q6H PRN Jude Ramos,         warfarin (COUMADIN) tablet 2 mg  2 mg Oral Daily Adelita Benedict PA-C   2 mg at 05/10/18 1647       Elsa Jenkins PA-C  Neurosurgery  [facility]

## 2018-05-11 NOTE — ASSESSMENT & PLAN NOTE
77F with tnilateral radiculopathy, ring enhancing lesion in cauda equina behind L4 vertebral body. Now s/p laminectomy for mass resection 5/4.    -Pt neurologically stable  -Pain control with tylenol 650 q6h prn as first choice, oxycodone 5mg prn. Patient extremely sensitive to pain medications.  -Wound stable  -No HOB restrictions, OOB as much as possible. Pt to be OOB atleast 6 hours per day.  -Continue coumadin 2mg daily. Restarted 5/7. Daily INR checks. INR goal 2-3. INR subtherapeutic, medicine following. Appreciate recs.   -Pt is chronic smoker. CXR 5/9 showed mild bibasilar edema and pleural fluid. Home COPD inhalers and duo nebs q4 per medicine recs. Continue nicotine patch. Pt on 3L nasal cannula. Will attempt to wean, but maintain sats between 88-92%. Continue IS 10x per hour.   -Nutrition consulted for decreased appetite- Continue Boost TID and encourage solid food intake.   -SSI  -TEDs/SCDs for dvt prophylaxis  HTN: Increase clonidine to 0.3 mg TID. Continue ramipril 10 mg   -Bowel regimen. KUB 5/9 reveals constipation with no signs of obstruction or post-op ileus. Small BM s/p brown bomb given yesterday. Pt still c/o constipation- can do another brown bomb today. Medicine following, appreciate recs.   -Strict delirium precautions  -PT/OT recommending SNF  -Pt stable for discharge to [facility]. Follow ups will be scheduled.    Discussed with Dr. Ramos.

## 2018-05-11 NOTE — NURSING
Patient up to [facility] when spouse and son arrived this am. She is still very confused, no acute distress during my shift. Complains of pain only with movement, Dressing to Lumbar region is dry and intact. Report given to Morena for day shift.

## 2018-05-11 NOTE — PROGRESS NOTES
"[facility]  Neurosurgery  Progress Note    Subjective:     History of Present Illness: A 77-year-old female with medical comorbidities significant for HTN, CAD, polycythemia vera, COPD, polycystic kidney disease, CKD stage 3 presents to the ED with a chief complaint of back pain. Pain began 3 days ago after lifting a full gallon of milk. Pt hurt her back while twisting away from the refrigerator.  Patient reports pain that she describes as a fist in the middle of my low back".  Patient has a history of chronic low back pain after a fall resulting in multiple lumbar fractures 6 years ago.  She reports that the pain occasionally radiates down bilateral lower extremities, however this is her baseline and not worse over the past 3 days after this new injury. Today, patient reports an episode of urinary incontinence which is not typical for her.  Patient reports noticing that she had urinated on herself and did not feel any sensation.  She denies any lower extremity weakness, numbness, saddle anesthesia, loss of bowel function.     MRI shows ring enhancing intradural lesion at level of cauda equina.     On exam pt with bilateral S1 dermatome radiculopathy. Otherwise intact. Rectal tone present. Corbett catheter in place.    Post-Op Info:  Procedure(s) (LRB):  LAMINECTOMY-LUMBAR-DECOMPRESSION and RESECTION OF TUMOR L3-5 (N/A)   7 Days Post-Op     Interval History: NAEON. Nursing staff reports bm overnight. Pt reports she still feels slightly constipated. Denies pain, paresthesias, n/v, or weakness.     Medications:  Continuous Infusions:  Scheduled Meds:   albuterol-ipratropium 2.5mg-0.5mg/3mL  3 mL Nebulization Q4H WAKE    cloNIDine  0.3 mg Oral TID    docusate sodium  100 mg Oral BID    enoxaparin  40 mg Subcutaneous Daily    fluticasone-vilanterol  1 puff Inhalation Daily    glycerin 99.5%  100 mL Rectal ED 1 Time    And    magnesium citrate  296 mL Rectal ED 1 Time    And    sodium chloride " 0.9%  500 mL Rectal ED 1 Time    nicotine  1 patch Transdermal Daily    polyethylene glycol  17 g Oral Daily    ramipril  10 mg Oral Daily    tiotropium  1 capsule Inhalation Daily    warfarin  2 mg Oral Daily     PRN Meds:acetaminophen, aluminum-magnesium hydroxide-simethicone, cyclobenzaprine, dextromethorphan-guaifenesin  mg/5 ml, dextrose 50%, dextrose 50%, glucagon (human recombinant), glucose, glucose, glucose, hydrALAZINE, hydrocodone-acetaminophen 5-325mg, labetalol, nitroGLYCERIN, ondansetron, senna-docusate 8.6-50 mg, traMADol     Review of Systems  Objective:     Weight: 49.7 kg (109 lb 9.1 oz)  Body mass index is 20.04 kg/m².  Vital Signs (Most Recent):  Temp: 98.9 °F (37.2 °C) (05/11/18 1141)  Pulse: 102 (05/11/18 1141)  Resp: 18 (05/11/18 1141)  BP: (!) 177/75 (05/11/18 1141)  SpO2: (!) 93 % (05/11/18 1141) Vital Signs (24h Range):  Temp:  [97.5 °F (36.4 °C)-98.9 °F (37.2 °C)] 98.9 °F (37.2 °C)  Pulse:  [] 102  Resp:  [18-26] 18  SpO2:  [88 %-97 %] 93 %  BP: (172-186)/(70-77) 177/75                           Neurosurgery Physical Exam  General: well developed, well nourished, no distress. Generalized deconditioning.   Head: normocephalic, atraumatic  Neurologic: Alert and oriented. Answers most questions, will sometimes give inappropriate responses.  GCS: Motor: 6/Verbal: 5/Eyes: 4 GCS Total: 15  Mental Status: Awake, Alert, Oriented x 4  Language: No aphasia  Speech: No dysarthria  Cranial nerves: face symmetric, tongue midline, CN II-XII grossly intact.   Eyes: pupils equal, round, reactive to light with accomodation, EOMI.  Pulmonary: normal respirations, no signs of respiratory distress  Abdomen: soft, non-distended, not tender to palpation  Sensory: intact to light touch throughout  Motor Strength: Moves all extremities spontaneously with good tone.  Full strength upper and lower extremities. No abnormal movements seen.   DTR's: 2 + and symmetric in UE and LE  Pronator Drift: no  drift noted  Finger-to-nose: Intact bilaterally  Vázquez: absent  Clonus: absent  Babinski: absent  Pulses: 2+ and symmetric radial and dorsalis pedis.  Skin: Skin is warm, dry and intact.  Incision c/d/i with no surrounding erythema, edema, or drainage.     Significant Labs:    Recent Labs  Lab 05/09/18  1429 05/10/18  0443   * 112*    142   K 3.7 3.6    108   CO2 26 24   BUN 22 17   CREATININE 0.8 0.7   CALCIUM 9.2 8.9       Recent Labs  Lab 05/09/18  1429   WBC 6.93   HGB 9.1*   HCT 28.6*          Recent Labs  Lab 05/10/18  0443 05/11/18  0414   INR 1.2 1.3*     Microbiology Results (last 7 days)     Procedure Component Value Units Date/Time    Blood culture [[id number]] Collected:  05/04/18 0457    Order Status:  Completed Specimen:  Blood from Peripheral, Hand, Left Updated:  05/09/18 0612     Blood Culture, Routine No growth after 5 days.    Urine culture [[id number]] Collected:  05/04/18 0129    Order Status:  Completed Specimen:  Urine from Catheterized Updated:  05/05/18 0728     Urine Culture, Routine No growth    Narrative:       add on per Dr Bai order #[id number] 05/04/2018  04:02 cxurn        Recent Lab Results       05/11/18  0414      Coumadin Monitoring INR 1.3  Comment:  Coumadin Therapy:  2.0 - 3.0 for INR for all indicators except mechanical heart valves  and antiphospholipid syndromes which should use 2.5 - 3.5.  (H)     Protime 13.3(H)         All pertinent labs from the last 24 hours have been reviewed.    Significant Diagnostics:  No new imaging    Assessment/Plan:     * Intradural mass    77F with tnilateral radiculopathy, ring enhancing lesion in cauda equina behind L4 vertebral body. Now s/p laminectomy for mass resection 5/4.    -Pt neurologically stable  -Pain control with tylenol 650 q6h prn as first choice, oxycodone 5mg prn. Patient extremely sensitive to pain medications.  -Wound stable  -No HOB restrictions, OOB as much as possible. Pt to be OOB atleast 6  hours per day.  -Continue coumadin 2mg daily. Restarted 5/7. Daily INR checks. INR goal 2-3. INR subtherapeutic, medicine following. Appreciate recs.   -Pt is chronic smoker. CXR 5/9 showed mild bibasilar edema and pleural fluid. Home COPD inhalers and duo nebs q4 per medicine recs. Continue nicotine patch. Pt on 3L nasal cannula. Will attempt to wean, but maintain sats between 88-92%. Continue IS 10x per hour.   -Nutrition consulted for decreased appetite- Continue Boost TID and encourage solid food intake.   -SSI  -TEDs/SCDs for dvt prophylaxis  HTN: Increase clonidine to 0.3 mg TID. Continue ramipril 10 mg   -Bowel regimen. KUB 5/9 reveals constipation with no signs of obstruction or post-op ileus. Small BM s/p brown bomb given yesterday. Pt still c/o constipation- can do another brown bomb today. Medicine following, appreciate recs.   -Strict delirium precautions  -PT/OT recommending SNF  -Pt stable for discharge to [facility]. Follow ups will be scheduled.    Discussed with Dr. Ramos.             Elsa Jenkins PA-C  Neurosurgery  [facility]

## 2018-05-11 NOTE — SUBJECTIVE & OBJECTIVE
Interval History:   Oxygen weaned to 1 L. Intermittent delirium. Has not had another BM yet and complaining of constipation. INR 1.3.     Review of Systems   Constitutional: Negative for appetite change.   HENT: Negative for congestion.    Respiratory: Negative for shortness of breath.    Cardiovascular: Negative for chest pain.   Gastrointestinal: Positive for constipation. Negative for abdominal distention, abdominal pain and nausea.   Genitourinary: Negative for dysuria.   Musculoskeletal: Positive for back pain and gait problem.   Psychiatric/Behavioral: Negative for confusion and decreased concentration.     Objective:     Vital Signs (Most Recent):  Temp: 98 °F (36.7 °C) (05/11/18 0739)  Pulse: 89 (05/11/18 1019)  Resp: (!) 26 (05/11/18 1019)  BP: (!) 186/77 (05/11/18 0739)  SpO2: (!) 88 % (05/11/18 1019) Vital Signs (24h Range):  Temp:  [97.5 °F (36.4 °C)-98.2 °F (36.8 °C)] 98 °F (36.7 °C)  Pulse:  [] 89  Resp:  [18-26] 26  SpO2:  [88 %-98 %] 88 %  BP: (142-186)/(65-77) 186/77     Weight: 49.7 kg (109 lb 9.1 oz)  Body mass index is 20.04 kg/m².  No intake or output data in the 24 hours ending 05/11/18 1048   Physical Exam   Constitutional: She appears cachectic. No distress.   HENT:   Mouth/Throat: Oropharynx is clear and moist.   Eyes: Conjunctivae are normal.   Neck: No JVD present.   Cardiovascular: Normal rate, regular rhythm and normal heart sounds.  Exam reveals no gallop and no friction rub.    No murmur heard.  Pulmonary/Chest: Effort normal and breath sounds normal. No respiratory distress. She has no wheezes. She has no rhonchi. She has no rales.   Abdominal: Soft. Bowel sounds are normal. She exhibits no distension. There is no tenderness. There is no rebound and no guarding.   Bowel sounds hypoactive   Musculoskeletal: She exhibits no edema.   Neurological: She is alert.   delirious   Skin: Skin is warm. Capillary refill takes less than 2 seconds. No erythema.   Psychiatric: She has a normal  mood and affect. Her behavior is normal.   Nursing note and vitals reviewed.      Significant Labs: All pertinent labs within the past 24 hours have been reviewed.    Significant Imaging: I have reviewed all pertinent imaging results/findings within the past 24 hours.

## 2018-05-11 NOTE — DISCHARGE INSTRUCTIONS
Please follow ONLY the instructions that are checked below.    Activity Restrictions:  [x]  Return to work will be determined on an individual basis.  [x]  No lifting greater than 10 pounds.  [x]  Avoid bending and twisting the area of your surgery more than 45 degrees from neutral position in any direction.  [x]  No driving or operating machinery:  [x]  until cleared by your surgeon.  [x]  while taking narcotic pain medications or muscle relaxants.  [x]  No cervical collar, soft collar, or lumbar brace required.  []  Wear your brace at all times. You may be given an extra brace or soft collar to wear when showering.  []  Wear your brace at all times except when flat in bed.  []  Wear brace for comfort only.  [x]  Increase ambulation over the next 2 weeks so that you are walking 2 miles per day at 2 weeks post-operatively.  [x]  Walk on paved surfaces only. It is okay to walk up and down stairs while holding onto a side rail.  [x]  No sexual activity for 2-3 weeks.    Discharge Medication/Follow-up:  [x]  Please refer to discharge medication reconciliation form.  [x]  Do not take ANY non-steroidal anti-inflammatory drugs (NSAIDS), including the following: ibuprofen, naprosyn, Aleve, Advil, Indocin, Mobic, or Celebrex for:  [x]  4 weeks  []  8 weeks  []  6 months  []  Prescriptions for appropriate medication will be given upon discharge.   []  Pain control:             []  Muscle relaxer:            [x]  Take docusate (Colace 100 mg): take one capsule a day as needed for constipation. You can get this over the counter.  [x]  Follow-up appointment:  [x]  10-14 days post-op for wound check by physician assistant/nurse  [x]  4-6 weeks with MD:  []  with x-rays  []  without x-rays  []  An appointment will be mailed to you.    Wound Care:  []  Remove dressing or bandaid in    days.  [x]  No bandage required. Keep your incision open to the air.  [x]  You may shower on the 2nd day after your surgery. Have the force of water  hit you opposite from the incision. Pat the incision dry after your shower; do not scrub the incision.  [x]  You cannot take a bath until 8 weeks after surgery.      Call your doctor or go to the Emergency Room for any signs of infection, including: increased redness, drainage, pain, or fever (temperature ?101.5 for 24 hours). Call your doctor or go to the Emergency Room if there are any localized neurological changes; problems with speech, vision, numbness, tingling, weakness, or severe headache; or for other concerns.    Special Instructions:  [x]  No use of tobacco products.  [x]  Diet: Please eat a regular diet as tolerated.  []  Other diet:              Specific physician instructions:           Physicians need 3 days' notice for pain medicine to be refilled. Pain medicine will only be refilled between 8 AM and 5 PM, Monday through Friday, due to Food and Drug Administration regulation of documentation.    If you have any questions about this form, please call [phone].    Form No. 39770 (Revised 10/31/2013)

## 2018-05-11 NOTE — ASSESSMENT & PLAN NOTE
· Patient's blood pressure is controlled here in the hospital over past 24 hours but has had several high readings in past several days and likely related to back pain.   · Goal for blood pressure is SBP < 150 and DBP < 90 as patient > or = 60 years of age with no diabetes or advanced kidney disease based on JNC 8 guidelines.   · Will increase clonidine to 0.3 mg TID  · Continue ramipril 10 mg   · Monitor patient's blood pressure routinely while patient is hospitalized.

## 2018-05-11 NOTE — NURSING
"Telemetry called, reported a run of V-Tach, checked on patient, very restless, but is asymptomatic. She is very confused and is on Delirium Precautions; states"I am ok." Will continue to monitor.  "

## 2018-05-11 NOTE — CONSULTS
OSNF consult spoke to the daughter concerning OSNF admit in agreement for admit. Daughter toured unit and very pleased with unit. Explained that this is a short term unit and also [employer] gave 7 days and would have to re-evaluate criteria to get any more days approved. Daughter understands.

## 2018-05-11 NOTE — PLAN OF CARE
MEET following for DC needs. MEET in communication with CM.    Patient will admit to OSNF today.     MEET arranged transport with [facility] for 4:30PM.     Nurse call report to [phone].    SW notified nurse of the above.     Ana Maria Gomez, MARILYN  [id number]

## 2018-05-11 NOTE — ASSESSMENT & PLAN NOTE
- Patient with significant constipation on KUB, 5/9.   - Recommend to continue Docusate 100 mg po BID + Miralax 17 grams po daily to treat.  - s/p fleets enema with 1 small BM  - will give brown bomb today  - Relief of constipation should help with appetite.

## 2018-05-11 NOTE — SUBJECTIVE & OBJECTIVE
Interval History: NAEON. Nursing staff reports bm overnight. Pt reports she still feels slightly constipated. Denies pain, paresthesias, n/v, or weakness.     Medications:  Continuous Infusions:  Scheduled Meds:   albuterol-ipratropium 2.5mg-0.5mg/3mL  3 mL Nebulization Q4H WAKE    cloNIDine  0.3 mg Oral TID    docusate sodium  100 mg Oral BID    enoxaparin  40 mg Subcutaneous Daily    fluticasone-vilanterol  1 puff Inhalation Daily    glycerin 99.5%  100 mL Rectal ED 1 Time    And    magnesium citrate  296 mL Rectal ED 1 Time    And    sodium chloride 0.9%  500 mL Rectal ED 1 Time    nicotine  1 patch Transdermal Daily    polyethylene glycol  17 g Oral Daily    ramipril  10 mg Oral Daily    tiotropium  1 capsule Inhalation Daily    warfarin  2 mg Oral Daily     PRN Meds:acetaminophen, aluminum-magnesium hydroxide-simethicone, cyclobenzaprine, dextromethorphan-guaifenesin  mg/5 ml, dextrose 50%, dextrose 50%, glucagon (human recombinant), glucose, glucose, glucose, hydrALAZINE, hydrocodone-acetaminophen 5-325mg, labetalol, nitroGLYCERIN, ondansetron, senna-docusate 8.6-50 mg, traMADol     Review of Systems  Objective:     Weight: 49.7 kg (109 lb 9.1 oz)  Body mass index is 20.04 kg/m².  Vital Signs (Most Recent):  Temp: 98.9 °F (37.2 °C) (05/11/18 1141)  Pulse: 102 (05/11/18 1141)  Resp: 18 (05/11/18 1141)  BP: (!) 177/75 (05/11/18 1141)  SpO2: (!) 93 % (05/11/18 1141) Vital Signs (24h Range):  Temp:  [97.5 °F (36.4 °C)-98.9 °F (37.2 °C)] 98.9 °F (37.2 °C)  Pulse:  [] 102  Resp:  [18-26] 18  SpO2:  [88 %-97 %] 93 %  BP: (172-186)/(70-77) 177/75                           Neurosurgery Physical Exam  General: well developed, well nourished, no distress. Generalized deconditioning.   Head: normocephalic, atraumatic  Neurologic: Alert and oriented. Answers most questions, will sometimes give inappropriate responses.  GCS: Motor: 6/Verbal: 5/Eyes: 4 GCS Total: 15  Mental Status: Awake, Alert,  Oriented x 4  Language: No aphasia  Speech: No dysarthria  Cranial nerves: face symmetric, tongue midline, CN II-XII grossly intact.   Eyes: pupils equal, round, reactive to light with accomodation, EOMI.  Pulmonary: normal respirations, no signs of respiratory distress  Abdomen: soft, non-distended, not tender to palpation  Sensory: intact to light touch throughout  Motor Strength: Moves all extremities spontaneously with good tone.  Full strength upper and lower extremities. No abnormal movements seen.   DTR's: 2 + and symmetric in UE and LE  Pronator Drift: no drift noted  Finger-to-nose: Intact bilaterally  Vázquez: absent  Clonus: absent  Babinski: absent  Pulses: 2+ and symmetric radial and dorsalis pedis.  Skin: Skin is warm, dry and intact.  Incision c/d/i with no surrounding erythema, edema, or drainage.     Significant Labs:    Recent Labs  Lab 05/09/18  1429 05/10/18  0443   * 112*    142   K 3.7 3.6    108   CO2 26 24   BUN 22 17   CREATININE 0.8 0.7   CALCIUM 9.2 8.9       Recent Labs  Lab 05/09/18  1429   WBC 6.93   HGB 9.1*   HCT 28.6*          Recent Labs  Lab 05/10/18  0443 05/11/18  0414   INR 1.2 1.3*     Microbiology Results (last 7 days)     Procedure Component Value Units Date/Time    Blood culture [[id number]] Collected:  05/04/18 0457    Order Status:  Completed Specimen:  Blood from Peripheral, Hand, Left Updated:  05/09/18 0612     Blood Culture, Routine No growth after 5 days.    Urine culture [[id number]] Collected:  05/04/18 0129    Order Status:  Completed Specimen:  Urine from Catheterized Updated:  05/05/18 0728     Urine Culture, Routine No growth    Narrative:       add on per Dr Bai order #[id number] 05/04/2018  04:02 cxurn        Recent Lab Results       05/11/18  0414      Coumadin Monitoring INR 1.3  Comment:  Coumadin Therapy:  2.0 - 3.0 for INR for all indicators except mechanical heart valves  and antiphospholipid syndromes which should use 2.5 -  3.5.  (H)     Protime 13.3(H)         All pertinent labs from the last 24 hours have been reviewed.    Significant Diagnostics:  No new imaging

## 2018-05-11 NOTE — ASSESSMENT & PLAN NOTE
Current use of long term anticoagulation  - Patient with polycythemia vera and history of DVT on chronic coumadin.  - Coumadin resumed at home dosing of 2 mg po daily by Neurosurgery on 5/7 and INR on 5/7 was 1. Should have daily INR monitoring while hospitalized with goal INR 2-3.   - INR currently subtherapeutic  - discussed with patient and daughter regarding possibly starting a DOAC in lieu of coumadin. Resources provided- will continue to discuss

## 2018-05-12 ENCOUNTER — PATIENT MESSAGE (OUTPATIENT)
Dept: NEUROSURGERY | Facility: CLINIC | Age: 78
End: 2018-05-12

## 2018-05-12 PROBLEM — R41.82 ALTERED MENTAL STATE: Status: ACTIVE | Noted: 2018-05-12

## 2018-05-12 PROBLEM — I61.9 ICH (INTRACEREBRAL HEMORRHAGE): Status: ACTIVE | Noted: 2018-05-12

## 2018-05-12 PROBLEM — R40.0 SOMNOLENCE: Status: ACTIVE | Noted: 2018-05-12

## 2018-05-12 LAB
ABO + RH BLD: NORMAL
ALBUMIN SERPL BCP-MCNC: 2.6 G/DL
ALP SERPL-CCNC: 95 U/L
ALT SERPL W/O P-5'-P-CCNC: 41 U/L
AMMONIA PLAS-SCNC: 40 UMOL/L
AMPHET+METHAMPHET UR QL: NEGATIVE
ANION GAP SERPL CALC-SCNC: 10 MMOL/L
AST SERPL-CCNC: 32 U/L
BACTERIA #/AREA URNS AUTO: ABNORMAL /HPF
BARBITURATES UR QL SCN>200 NG/ML: NEGATIVE
BASOPHILS # BLD AUTO: 0.03 K/UL
BASOPHILS # BLD AUTO: 0.03 K/UL
BASOPHILS NFR BLD: 0.4 %
BASOPHILS NFR BLD: 0.4 %
BENZODIAZ UR QL SCN>200 NG/ML: NORMAL
BILIRUB SERPL-MCNC: 0.4 MG/DL
BILIRUB UR QL STRIP: NEGATIVE
BLD GP AB SCN CELLS X3 SERPL QL: NORMAL
BUN SERPL-MCNC: 21 MG/DL
BZE UR QL SCN: NEGATIVE
CALCIUM SERPL-MCNC: 8.9 MG/DL
CANNABINOIDS UR QL SCN: NEGATIVE
CHLORIDE SERPL-SCNC: 105 MMOL/L
CHOLEST SERPL-MCNC: 123 MG/DL
CHOLEST/HDLC SERPL: 3.5 {RATIO}
CK MB SERPL-MCNC: 1.6 NG/ML
CK MB SERPL-RTO: 3.2 %
CK SERPL-CCNC: 50 U/L
CLARITY UR REFRACT.AUTO: ABNORMAL
CO2 SERPL-SCNC: 30 MMOL/L
COLOR UR AUTO: ABNORMAL
CREAT SERPL-MCNC: 0.8 MG/DL
CREAT UR-MCNC: 62 MG/DL
DIFFERENTIAL METHOD: ABNORMAL
DIFFERENTIAL METHOD: ABNORMAL
EOSINOPHIL # BLD AUTO: 0.1 K/UL
EOSINOPHIL # BLD AUTO: 0.1 K/UL
EOSINOPHIL NFR BLD: 1.3 %
EOSINOPHIL NFR BLD: 1.4 %
ERYTHROCYTE [DISTWIDTH] IN BLOOD BY AUTOMATED COUNT: 13.2 %
ERYTHROCYTE [DISTWIDTH] IN BLOOD BY AUTOMATED COUNT: 13.5 %
EST. GFR  (AFRICAN AMERICAN): >60 ML/MIN/1.73 M^2
EST. GFR  (NON AFRICAN AMERICAN): >60 ML/MIN/1.73 M^2
ESTIMATED AVG GLUCOSE: 108 MG/DL
GLUCOSE SERPL-MCNC: 105 MG/DL
GLUCOSE UR QL STRIP: NEGATIVE
HBA1C MFR BLD HPLC: 5.4 %
HCT VFR BLD AUTO: 30.9 %
HCT VFR BLD AUTO: 32.9 %
HDLC SERPL-MCNC: 35 MG/DL
HDLC SERPL: 28.5 %
HGB BLD-MCNC: 10.4 G/DL
HGB BLD-MCNC: 9.9 G/DL
HGB UR QL STRIP: ABNORMAL
HYALINE CASTS UR QL AUTO: 0 /LPF
IMM GRANULOCYTES # BLD AUTO: 0.07 K/UL
IMM GRANULOCYTES # BLD AUTO: 0.08 K/UL
IMM GRANULOCYTES NFR BLD AUTO: 1 %
IMM GRANULOCYTES NFR BLD AUTO: 1 %
INR PPP: 1.6
INR PPP: 1.7
KETONES UR QL STRIP: ABNORMAL
LDLC SERPL CALC-MCNC: 60.6 MG/DL
LEUKOCYTE ESTERASE UR QL STRIP: ABNORMAL
LYMPHOCYTES # BLD AUTO: 0.9 K/UL
LYMPHOCYTES # BLD AUTO: 1 K/UL
LYMPHOCYTES NFR BLD: 11.2 %
LYMPHOCYTES NFR BLD: 13.3 %
MAGNESIUM SERPL-MCNC: 1.8 MG/DL
MCH RBC QN AUTO: 27.8 PG
MCH RBC QN AUTO: 28 PG
MCHC RBC AUTO-ENTMCNC: 31.6 G/DL
MCHC RBC AUTO-ENTMCNC: 32 G/DL
MCV RBC AUTO: 88 FL
MCV RBC AUTO: 88 FL
METHADONE UR QL SCN>300 NG/ML: NEGATIVE
MICROSCOPIC COMMENT: ABNORMAL
MONOCYTES # BLD AUTO: 0.7 K/UL
MONOCYTES # BLD AUTO: 0.7 K/UL
MONOCYTES NFR BLD: 9.3 %
MONOCYTES NFR BLD: 9.4 %
NEUTROPHILS # BLD AUTO: 5.5 K/UL
NEUTROPHILS # BLD AUTO: 6.1 K/UL
NEUTROPHILS NFR BLD: 74.5 %
NEUTROPHILS NFR BLD: 76.8 %
NITRITE UR QL STRIP: POSITIVE
NONHDLC SERPL-MCNC: 88 MG/DL
NRBC BLD-RTO: 0 /100 WBC
NRBC BLD-RTO: 0 /100 WBC
OPIATES UR QL SCN: NEGATIVE
PCP UR QL SCN>25 NG/ML: NEGATIVE
PH UR STRIP: 7 [PH] (ref 5–8)
PHOSPHATE SERPL-MCNC: 2.6 MG/DL
PLATELET # BLD AUTO: 322 K/UL
PLATELET # BLD AUTO: 338 K/UL
PMV BLD AUTO: 10.3 FL
PMV BLD AUTO: 9.5 FL
POCT GLUCOSE: 104 MG/DL (ref 70–110)
POCT GLUCOSE: 107 MG/DL (ref 70–110)
POCT GLUCOSE: 108 MG/DL (ref 70–110)
POTASSIUM SERPL-SCNC: 2.8 MMOL/L
PROT SERPL-MCNC: 6.5 G/DL
PROT UR QL STRIP: ABNORMAL
PROTHROMBIN TIME: 15.5 SEC
PROTHROMBIN TIME: 16.3 SEC
RBC # BLD AUTO: 3.53 M/UL
RBC # BLD AUTO: 3.74 M/UL
RBC #/AREA URNS AUTO: 17 /HPF (ref 0–4)
SODIUM SERPL-SCNC: 145 MMOL/L
SP GR UR STRIP: 1.01 (ref 1–1.03)
SQUAMOUS #/AREA URNS AUTO: 3 /HPF
TOXICOLOGY INFORMATION: NORMAL
TRIGL SERPL-MCNC: 137 MG/DL
TROPONIN I SERPL DL<=0.01 NG/ML-MCNC: 0.02 NG/ML
TSH SERPL DL<=0.005 MIU/L-ACNC: 1.69 UIU/ML
URN SPEC COLLECT METH UR: ABNORMAL
UROBILINOGEN UR STRIP-ACNC: NEGATIVE EU/DL
WBC # BLD AUTO: 7.35 K/UL
WBC # BLD AUTO: 7.93 K/UL
WBC #/AREA URNS AUTO: 19 /HPF (ref 0–5)

## 2018-05-12 PROCEDURE — 25000003 PHARM REV CODE 250: Performed by: NURSE PRACTITIONER

## 2018-05-12 PROCEDURE — 63600175 PHARM REV CODE 636 W HCPCS: Performed by: PSYCHIATRY & NEUROLOGY

## 2018-05-12 PROCEDURE — G8997 SWALLOW GOAL STATUS: HCPCS | Mod: CJ

## 2018-05-12 PROCEDURE — 20000000 HC ICU ROOM

## 2018-05-12 PROCEDURE — 85025 COMPLETE CBC W/AUTO DIFF WBC: CPT

## 2018-05-12 PROCEDURE — 92610 EVALUATE SWALLOWING FUNCTION: CPT

## 2018-05-12 PROCEDURE — 82553 CREATINE MB FRACTION: CPT

## 2018-05-12 PROCEDURE — 25000003 PHARM REV CODE 250: Performed by: PSYCHIATRY & NEUROLOGY

## 2018-05-12 PROCEDURE — 99254 IP/OBS CNSLTJ NEW/EST MOD 60: CPT | Mod: 24,,, | Performed by: NEUROLOGICAL SURGERY

## 2018-05-12 PROCEDURE — 81001 URINALYSIS AUTO W/SCOPE: CPT

## 2018-05-12 PROCEDURE — S4991 NICOTINE PATCH NONLEGEND: HCPCS | Performed by: NURSE PRACTITIONER

## 2018-05-12 PROCEDURE — 83036 HEMOGLOBIN GLYCOSYLATED A1C: CPT

## 2018-05-12 PROCEDURE — 25000003 PHARM REV CODE 250: Performed by: EMERGENCY MEDICINE

## 2018-05-12 PROCEDURE — 84484 ASSAY OF TROPONIN QUANT: CPT

## 2018-05-12 PROCEDURE — 80307 DRUG TEST PRSMV CHEM ANLYZR: CPT

## 2018-05-12 PROCEDURE — 84100 ASSAY OF PHOSPHORUS: CPT

## 2018-05-12 PROCEDURE — 94761 N-INVAS EAR/PLS OXIMETRY MLT: CPT

## 2018-05-12 PROCEDURE — 86901 BLOOD TYPING SEROLOGIC RH(D): CPT

## 2018-05-12 PROCEDURE — 93306 TTE W/DOPPLER COMPLETE: CPT | Mod: 26,,, | Performed by: INTERNAL MEDICINE

## 2018-05-12 PROCEDURE — 63600175 PHARM REV CODE 636 W HCPCS: Performed by: PHYSICIAN ASSISTANT

## 2018-05-12 PROCEDURE — 99291 CRITICAL CARE FIRST HOUR: CPT | Mod: ,,, | Performed by: PSYCHIATRY & NEUROLOGY

## 2018-05-12 PROCEDURE — 99233 SBSQ HOSP IP/OBS HIGH 50: CPT | Mod: ,,, | Performed by: PSYCHIATRY & NEUROLOGY

## 2018-05-12 PROCEDURE — A4216 STERILE WATER/SALINE, 10 ML: HCPCS | Performed by: NURSE PRACTITIONER

## 2018-05-12 PROCEDURE — 85610 PROTHROMBIN TIME: CPT

## 2018-05-12 PROCEDURE — 85610 PROTHROMBIN TIME: CPT | Mod: 91

## 2018-05-12 PROCEDURE — 83735 ASSAY OF MAGNESIUM: CPT

## 2018-05-12 PROCEDURE — 27000221 HC OXYGEN, UP TO 24 HOURS

## 2018-05-12 PROCEDURE — 82140 ASSAY OF AMMONIA: CPT

## 2018-05-12 PROCEDURE — 25000003 PHARM REV CODE 250: Performed by: PHYSICIAN ASSISTANT

## 2018-05-12 PROCEDURE — G8996 SWALLOW CURRENT STATUS: HCPCS | Mod: CJ

## 2018-05-12 PROCEDURE — 93306 TTE W/DOPPLER COMPLETE: CPT

## 2018-05-12 PROCEDURE — 80053 COMPREHEN METABOLIC PANEL: CPT

## 2018-05-12 PROCEDURE — 82550 ASSAY OF CK (CPK): CPT

## 2018-05-12 PROCEDURE — 93010 ELECTROCARDIOGRAM REPORT: CPT | Mod: ,,, | Performed by: INTERNAL MEDICINE

## 2018-05-12 PROCEDURE — 80061 LIPID PANEL: CPT

## 2018-05-12 RX ORDER — SODIUM,POTASSIUM PHOSPHATES 280-250MG
2 POWDER IN PACKET (EA) ORAL
Status: DISCONTINUED | OUTPATIENT
Start: 2018-05-12 | End: 2018-05-13 | Stop reason: HOSPADM

## 2018-05-12 RX ORDER — POTASSIUM CHLORIDE 7.45 MG/ML
40 INJECTION INTRAVENOUS
Status: DISCONTINUED | OUTPATIENT
Start: 2018-05-12 | End: 2018-05-13 | Stop reason: HOSPADM

## 2018-05-12 RX ORDER — IBUPROFEN 200 MG
1 TABLET ORAL DAILY
Status: DISCONTINUED | OUTPATIENT
Start: 2018-05-12 | End: 2018-05-13 | Stop reason: HOSPADM

## 2018-05-12 RX ORDER — NICARDIPINE HYDROCHLORIDE 0.2 MG/ML
2.5 INJECTION INTRAVENOUS CONTINUOUS
Status: DISCONTINUED | OUTPATIENT
Start: 2018-05-12 | End: 2018-05-13

## 2018-05-12 RX ORDER — RAMIPRIL 5 MG/1
10 CAPSULE ORAL
Status: COMPLETED | OUTPATIENT
Start: 2018-05-12 | End: 2018-05-12

## 2018-05-12 RX ORDER — POTASSIUM CHLORIDE 7.45 MG/ML
60 INJECTION INTRAVENOUS
Status: DISCONTINUED | OUTPATIENT
Start: 2018-05-12 | End: 2018-05-13 | Stop reason: HOSPADM

## 2018-05-12 RX ORDER — ACETAMINOPHEN 650 MG/1
650 SUPPOSITORY RECTAL EVERY 6 HOURS PRN
Status: DISCONTINUED | OUTPATIENT
Start: 2018-05-12 | End: 2018-05-13 | Stop reason: HOSPADM

## 2018-05-12 RX ORDER — LANOLIN ALCOHOL/MO/W.PET/CERES
800 CREAM (GRAM) TOPICAL
Status: DISCONTINUED | OUTPATIENT
Start: 2018-05-12 | End: 2018-05-13 | Stop reason: HOSPADM

## 2018-05-12 RX ORDER — DEXMEDETOMIDINE HYDROCHLORIDE 4 UG/ML
0.2 INJECTION, SOLUTION INTRAVENOUS CONTINUOUS
Status: DISCONTINUED | OUTPATIENT
Start: 2018-05-12 | End: 2018-05-13

## 2018-05-12 RX ORDER — POTASSIUM CHLORIDE 7.45 MG/ML
80 INJECTION INTRAVENOUS
Status: DISCONTINUED | OUTPATIENT
Start: 2018-05-12 | End: 2018-05-13 | Stop reason: HOSPADM

## 2018-05-12 RX ORDER — LORAZEPAM 2 MG/ML
INJECTION INTRAMUSCULAR
Status: DISPENSED
Start: 2018-05-12 | End: 2018-05-12

## 2018-05-12 RX ORDER — CLONIDINE HYDROCHLORIDE 0.1 MG/1
0.1 TABLET ORAL
Status: COMPLETED | OUTPATIENT
Start: 2018-05-12 | End: 2018-05-12

## 2018-05-12 RX ORDER — AMOXICILLIN 250 MG
1 CAPSULE ORAL 2 TIMES DAILY
Status: DISCONTINUED | OUTPATIENT
Start: 2018-05-12 | End: 2018-05-13 | Stop reason: HOSPADM

## 2018-05-12 RX ORDER — POTASSIUM CHLORIDE 20 MEQ/15ML
40 SOLUTION ORAL
Status: DISCONTINUED | OUTPATIENT
Start: 2018-05-12 | End: 2018-05-12

## 2018-05-12 RX ORDER — SODIUM CHLORIDE 0.9 % (FLUSH) 0.9 %
3 SYRINGE (ML) INJECTION EVERY 8 HOURS
Status: DISCONTINUED | OUTPATIENT
Start: 2018-05-12 | End: 2018-05-13 | Stop reason: HOSPADM

## 2018-05-12 RX ORDER — SODIUM CHLORIDE 9 MG/ML
INJECTION, SOLUTION INTRAVENOUS CONTINUOUS
Status: DISCONTINUED | OUTPATIENT
Start: 2018-05-12 | End: 2018-05-13

## 2018-05-12 RX ORDER — LABETALOL HYDROCHLORIDE 5 MG/ML
10 INJECTION, SOLUTION INTRAVENOUS EVERY 4 HOURS PRN
Status: DISCONTINUED | OUTPATIENT
Start: 2018-05-12 | End: 2018-05-13 | Stop reason: HOSPADM

## 2018-05-12 RX ORDER — HYDRALAZINE HYDROCHLORIDE 20 MG/ML
10 INJECTION INTRAMUSCULAR; INTRAVENOUS
Status: COMPLETED | OUTPATIENT
Start: 2018-05-12 | End: 2018-05-12

## 2018-05-12 RX ORDER — LORAZEPAM 2 MG/ML
1 INJECTION INTRAMUSCULAR ONCE
Status: COMPLETED | OUTPATIENT
Start: 2018-05-12 | End: 2018-05-12

## 2018-05-12 RX ORDER — POTASSIUM CHLORIDE 7.46 G/1000ML
10 INJECTION, SOLUTION INTRAVENOUS ONCE
Status: DISCONTINUED | OUTPATIENT
Start: 2018-05-12 | End: 2018-05-13 | Stop reason: HOSPADM

## 2018-05-12 RX ORDER — POTASSIUM CHLORIDE 20 MEQ/15ML
60 SOLUTION ORAL
Status: DISCONTINUED | OUTPATIENT
Start: 2018-05-12 | End: 2018-05-12

## 2018-05-12 RX ADMIN — NICARDIPINE HYDROCHLORIDE 2.5 MG/HR: 0.2 INJECTION, SOLUTION INTRAVENOUS at 04:05

## 2018-05-12 RX ADMIN — HYDRALAZINE HYDROCHLORIDE 10 MG: 20 INJECTION INTRAMUSCULAR; INTRAVENOUS at 02:05

## 2018-05-12 RX ADMIN — CLONIDINE HYDROCHLORIDE 0.3 MG: 0.2 TABLET ORAL at 10:05

## 2018-05-12 RX ADMIN — PHYTONADIONE 10 MG: 10 INJECTION, EMULSION INTRAMUSCULAR; INTRAVENOUS; SUBCUTANEOUS at 12:05

## 2018-05-12 RX ADMIN — LORAZEPAM 1 MG: 2 INJECTION INTRAMUSCULAR; INTRAVENOUS at 12:05

## 2018-05-12 RX ADMIN — CLONIDINE HYDROCHLORIDE 0.1 MG: 0.1 TABLET ORAL at 01:05

## 2018-05-12 RX ADMIN — NICARDIPINE HYDROCHLORIDE 15 MG/HR: 0.2 INJECTION, SOLUTION INTRAVENOUS at 02:05

## 2018-05-12 RX ADMIN — STANDARDIZED SENNA CONCENTRATE AND DOCUSATE SODIUM 1 TABLET: 8.6; 5 TABLET, FILM COATED ORAL at 09:05

## 2018-05-12 RX ADMIN — RAMIPRIL 10 MG: 5 CAPSULE ORAL at 02:05

## 2018-05-12 RX ADMIN — DEXMEDETOMIDINE HYDROCHLORIDE 0.2 MCG/KG/HR: 4 INJECTION, SOLUTION INTRAVENOUS at 12:05

## 2018-05-12 RX ADMIN — NICOTINE 1 PATCH: 14 PATCH, EXTENDED RELEASE TRANSDERMAL at 12:05

## 2018-05-12 RX ADMIN — NICARDIPINE HYDROCHLORIDE 15 MG/HR: 0.2 INJECTION, SOLUTION INTRAVENOUS at 07:05

## 2018-05-12 RX ADMIN — SODIUM CHLORIDE 80 MEQ: 0.9 INJECTION INTRAVENOUS at 03:05

## 2018-05-12 RX ADMIN — SODIUM CHLORIDE, PRESERVATIVE FREE 3 ML: 5 INJECTION INTRAVENOUS at 10:05

## 2018-05-12 NOTE — ASSESSMENT & PLAN NOTE
-Stroke risk factor, noted in chart.  Unclear if patient is a current active smoker as she was minimally cooperative with assessment and questions.  -Verify smoking status and if a current smoker encourage cessation, consider nicotine patch prn

## 2018-05-12 NOTE — H&P
[facility]  Neurocritical Care  History & Physical    Admit Date: 5/11/2018  Service Date: 05/12/2018  Length of Stay: 0    Subjective:     Chief Complaint: ICH (intracerebral hemorrhage)    History of Present Illness: Ms. Zimmer is a 77 year old female  with significant pmhx of CAD, Cardiac Angioplasty, Brain aneurysm x 4, Smoker, CKD, Coumadin for DVT, HTN, and PVD who presents to [facility] for small intraventricular hemorrhage in the left occipital horn. The patient initially presented to [facility] ED for AMS and difficulty to arouse. CT brain and MRI brain obtained shows small focus of blood involving L occipital horn.           Past Medical History:   Diagnosis Date    Active smoker 6/4/2014    Anticoagulant long-term use     Apocrine adenocarcinoma     Back pain     lower back    Brain aneurysm     4 times    Central scotoma, 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    Centrilobular emphysema 5/7/2018    Chronic kidney disease (CKD), stage III (moderate)     COPD (chronic obstructive pulmonary disease)     Coronary artery disease     Current use of long term anticoagulation 5/9/2018    Coumadin for history of DVT    Encounter for blood transfusion     Essential hypertension 6/4/2014    NAION (non-arteritic anterior ischemic optic neuropathy), 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    Peripheral vascular disease 6/4/2014    Polycystic kidney disease     Polycythemia vera     congential    Senile nuclear sclerosis 11/21/2017    Subclavian artery stenosis, right 7/22/2014     Past Surgical History:   Procedure Laterality Date    ABDOMINAL SURGERY      APPENDECTOMY      BRAIN SURGERY      relieve hemmorhages/had 4 surgeries    CARDIAC CATHETERIZATION      CORONARY ANGIOPLASTY      FRACTURE SURGERY      GALLBLADDER SURGERY      HYSTERECTOMY      illiac stent      JOINT REPLACEMENT      SKIN BIOPSY      skin repair      apocrine cancer    tibial repair      steel plate inserted    TONSILLECTOMY      TOTAL KNEE  ARTHROPLASTY      VEIN BYPASS SURGERY        Current Facility-Administered Medications on File Prior to Encounter   Medication Dose Route Frequency Provider Last Rate Last Dose    [COMPLETED] glycerin 99.5% topical solution 100 mL  100 mL Rectal ED 1 Time Shi Ny MD   100 mL at 05/11/18 1329    And    [COMPLETED] magnesium citrate solution 296 mL  296 mL Rectal ED 1 Time Shi Ny MD   296 mL at 05/11/18 1328    And    [COMPLETED] sodium chloride 0.9% bolus 500 mL  500 mL Rectal ED 1 Time Shi Ny MD   500 mL at 05/11/18 1115    [DISCONTINUED] acetaminophen tablet 650 mg  650 mg Oral Q6H PRN Jude Ramos DO   650 mg at 05/08/18 0429    [DISCONTINUED] albuterol-ipratropium 2.5mg-0.5mg/3mL nebulizer solution 3 mL  3 mL Nebulization Q4H WAKE Jude Mckeon MD   3 mL at 05/11/18 1322    [DISCONTINUED] aluminum-magnesium hydroxide-simethicone 200-200-20 mg/5 mL suspension 30 mL  30 mL Oral Q4H PRN Adelita Benedict PA-C        [DISCONTINUED] cloNIDine tablet 0.3 mg  0.3 mg Oral BID Dallas Wiseman MD   0.3 mg at 05/10/18 2100    [DISCONTINUED] cloNIDine tablet 0.3 mg  0.3 mg Oral TID Shi Ny MD   0.3 mg at 05/11/18 1556    [DISCONTINUED] cyclobenzaprine tablet 5 mg  5 mg Oral TID PRN Jude Ramos DO   5 mg at 05/07/18 0516    [DISCONTINUED] dextromethorphan-guaifenesin  mg/5 ml liquid 10 mL  10 mL Oral Q6H PRN Shi Ny MD   10 mL at 05/10/18 1509    [DISCONTINUED] dextrose 50% injection 12.5 g  12.5 g Intravenous PRN Dallas Wiseman MD        [DISCONTINUED] dextrose 50% injection 25 g  25 g Intravenous PRN Dallas Wiseman MD        [DISCONTINUED] docusate sodium capsule 100 mg  100 mg Oral BID Dallas Wiseman MD   100 mg at 05/11/18 0829    [DISCONTINUED] enoxaparin injection 40 mg  40 mg Subcutaneous Daily Jude Ramos DO   40 mg at 05/11/18 1641    [DISCONTINUED] fluticasone-vilanterol 100-25 mcg/dose diskus inhaler 1 puff  1 puff Inhalation Daily  Andrade Roman MD   1 puff at 05/11/18 0833    [DISCONTINUED] glucagon (human recombinant) injection 1 mg  1 mg Intramuscular PRN Dallas Wiseman MD        [DISCONTINUED] glucose chewable tablet 16 g  16 g Oral PRN Dallas Wiseman MD        [DISCONTINUED] glucose chewable tablet 16 g  16 g Oral PRN Dallas Wiseman MD        [DISCONTINUED] glucose chewable tablet 24 g  24 g Oral PRN Dallas Wiseman MD        [DISCONTINUED] hydrALAZINE tablet 25 mg  25 mg Oral Q8H PRN Jerome Michelle PA-C        [DISCONTINUED] hydrocodone-acetaminophen 5-325mg per tablet 1 tablet  1 tablet Oral Q4H PRN Jude Ramos DO        [DISCONTINUED] labetalol injection 10 mg  10 mg Intravenous Q4H PRN Jerome Michelle PA-C        [DISCONTINUED] nicotine 14 mg/24 hr 1 patch  1 patch Transdermal Daily Shi Ny MD   1 patch at 05/11/18 0828    [DISCONTINUED] nitroGLYCERIN SL tablet 0.4 mg  0.4 mg Sublingual Q5 Min PRN Dallas Wiseman MD        [DISCONTINUED] ondansetron disintegrating tablet 8 mg  8 mg Oral Q6H PRN Adelita Benedict PA-C   8 mg at 05/10/18 1411    [DISCONTINUED] polyethylene glycol packet 17 g  17 g Oral Daily Elsa Jenkins PA-C   17 g at 05/11/18 0829    [DISCONTINUED] ramipril capsule 10 mg  10 mg Oral Daily Dallas Wiseman MD   10 mg at 05/11/18 0828    [DISCONTINUED] senna-docusate 8.6-50 mg per tablet 2 tablet  2 tablet Oral Nightly PRN Adelita Benedict PA-C        [DISCONTINUED] tiotropium inhalation capsule 18 mcg  1 capsule Inhalation Daily Andrade Roman MD   18 mcg at 05/11/18 0833    [DISCONTINUED] traMADol tablet 50 mg  50 mg Oral Q6H PRN Jude Ramos DO        [DISCONTINUED] warfarin (COUMADIN) tablet 2 mg  2 mg Oral Daily Adelita Benedict PA-C   2 mg at 05/11/18 1641     Current Outpatient Prescriptions on File Prior to Encounter   Medication Sig Dispense Refill    acetaminophen-codeine 300-30mg (TYLENOL-CODEINE #3) 300-30 mg Tab Take by mouth.      albuterol (PROVENTIL) 4 MG  Tab       alprazolam (XANAX) 0.5 MG tablet       clonidine (CATAPRES) 0.1 MG tablet Take 0.3 mg by mouth 2 (two) times daily.       cyclobenzaprine (FLEXERIL) 10 MG tablet Take 10 mg by mouth 3 (three) times daily as needed for Muscle spasms.      diazepam (VALIUM) 5 MG tablet Take 5 mg by mouth every 6 (six) hours as needed for Anxiety.      FLUZONE HIGH-DOSE 2017-18, PF, 180 mcg/0.5 mL vaccine       hydrocodone-acetaminophen 7.5-500 mg (LORTAB) 7.5-500 mg per tablet Take 1 tablet by mouth every 6 (six) hours as needed for Pain.      ketorolac 0.5% (ACULAR) 0.5 % Drop Place 1 drop into the right eye 3 (three) times daily. 5 mL 1    nitroGLYCERIN (NITROSTAT) 0.4 MG SL tablet Place 0.4 mg under the tongue every 5 (five) minutes as needed for Chest pain.      ofloxacin (OCUFLOX) 0.3 % ophthalmic solution 1 drop 4 (four) times daily.      polyethylene glycol (GLYCOLAX) 17 gram/dose powder Take 17 g by mouth.      prednisoLONE acetate (PRED FORTE) 1 % DrpS 1 drop 3 (three) times daily.      ramipril (ALTACE) 10 MG capsule Take 10 mg by mouth.       temazepam (RESTORIL) 15 mg Cap Take 15 mg by mouth.      umeclidinium 62.5 mcg/actuation DsDv Inhale 1 puff into the lungs.      VENTOLIN HFA 90 mcg/actuation inhaler       warfarin (COUMADIN) 3 MG tablet Take 2 mg by mouth Daily.         Allergies: Bacitracin and Bactrim [sulfamethoxazole-trimethoprim]    Family History   Problem Relation Age of Onset    Heart disease Brother     Blindness Neg Hx     Glaucoma Neg Hx     Macular degeneration Neg Hx     Retinal detachment Neg Hx      Social History   Substance Use Topics    Smoking status: Current Every Day Smoker     Packs/day: 1.00     Years: 60.00    Smokeless tobacco: Never Used    Alcohol use No     Review of Systems   Unable to obtain due to patient not cooperating.  Objective:     Vitals:    Temp: 98.3 °F (36.8 °C)  Pulse: 104  BP: (!) 184/76  MAP (mmHg): 109  Resp: 20  SpO2: 97 %  O2 Device  (Oxygen Therapy): nasal cannula    Temp  Min: 97.9 °F (36.6 °C)  Max: 98.9 °F (37.2 °C)  Pulse  Min: 83  Max: 108  BP  Min: 144/95  Max: 214/112  MAP (mmHg)  Min: 97  Max: 144  Resp  Min: 18  Max: 26  SpO2  Min: 88 %  Max: 100 %    05/11 0701 - 05/12 0700  In: 150   Out: -            Physical Exam  GA: Alert, comfortable, no acute distress.   HEENT: No scleral icterus or JVD.   Pulmonary: Clear to auscultation A/L. No wheezing, crackles, or rhonchi.  Cardiac: RRR S1 & S2 w/o rubs/murmurs/gallops.   Abdominal: Bowel sounds present x 4. No appreciable hepatosplenomegaly.  Skin: No jaundice, rashes, or visible lesions.  Neuro:  --GCS: E4 V4 M6  --Mental Status:  AAO x 2, following commands but difficult to participate, moves all ext on command  --CN II-XII grossly intact.   --Pupils 3mm, PERRL.   --Corneal reflex, gag, cough intact.  --LUE strength: 5/5  --LLE strength: 5/5  --RUE strength: 5/5  --RLE strength: 5/5      Today I personally reviewed pertinent medications, lines/drains/airways, imaging, lab results, notably:        Assessment/Plan:     Neuro   * ICH (intracerebral hemorrhage)    -- Continue Neuro checks q 1hr  -- Vascular Neurology consulted  -- Neurosurgery consulted  -- 05/12 CT brain and MRI brain obtained shows small focus of blood involving L occipital horn.   -- Pending repeat CTH  -- SBP goal < 160  -- PT/OT/Speech        Cardiac/Vascular   Essential hypertension    -- Continue to monitor HR and BP   -- SBP goal < 160  -- 2D echo pending  -- Ekg pending  -- Labetalol and Cardene Prn            Prophylaxis:  Venous Thromboembolism: mechanical  Stress Ulcer: None  Ventilator Pneumonia: no     Activity Orders          Bed rest starting at 05/11 1943        Full Code    Ruperto Araujo NP  Neurocritical Care  [facility]

## 2018-05-12 NOTE — NURSING
Patient arrived to [facility] from ED via stretcher attached to cardiac monitoring and 2L NC    Type of Stroke: ICH    TPA start time and end time: N/A    Patients current symptoms include Oriented to self only and patient is noncompliant

## 2018-05-12 NOTE — PLAN OF CARE
Pt refusing to take BP medication, pt spit out medication and threw across the room. Pt continues to take EKG and spo2 off. Pt trying to leave AMA, ativan 1 mg given and Precedex ordered by MD.    MD aware.

## 2018-05-12 NOTE — PLAN OF CARE
Notified NP CTA has not been completed, CT states not ready and pt uncooperative. States CTA head needs to be completed by am rounds. Ok to wait.

## 2018-05-12 NOTE — ED NOTES
Patient on stretcher locked in lowest position, side rails up x 2, call light is within reach of patient attached to side rail to prevent call bell from falling out of reach.  Patient placed into position of comfort. Head of bed elevated due to cough. Patient in NAD and offers no complaints at this time. Patient appears more alert and this time answering questions in a normal tone as opposed to whisper on arrival. Will continue to monitor.

## 2018-05-12 NOTE — ED NOTES
LOC: The patient is awake, alert, and oriented to place. Affect is appropriate. Speech is appropriate and whispered.     APPEARANCE: Patient resting comfortably in no acute distress. Patient is clean and well groomed.     SKIN: The skin is warm and dry; color consistent with ethnicity. Patient has normal skin turgor and moist mucus membranes. Bruising noted to arms.     MUSCULOSKELETAL: Patient moving upper and lower extremities without difficulty.      RESPIRATORY: Airway is open and patent. Respirations spontaneous, even, easy, and non-labored. Audible wheezing. Patient has a normal effort and rate. No accessory muscle use noted. Cough. arrived on 2L NC     CARDIAC: Normal rhythm and rate noted. No peripheral edema noted. No complaints of chest pain.       ABDOMEN: Soft and tender to palpation LLQ. No distention noted.       NEUROLOGIC: Eyes open spontaneously to voice. Behavior appropriate to situation. Follows commands; facial expression symmetrical. Purposeful motor response noted; normal sensation in all extremities.

## 2018-05-12 NOTE — CONSULTS
[facility]  Vascular Neurology  Comprehensive Stroke Center  Consult Note    Inpatient consult to Vascular (Stroke) Neurology  Consult performed by: SHMUEL KIRKLAND  Consult ordered by: SHAYNA COTTRELL        Assessment/Plan:       Altered mental status, unspecified    77 y.o. female with significant past medical history of HTN, centrilobular emphysema, CKD III, polycystic kidney disease, polycythemia vera, hx of brain aneurysm, and chronic coagulation presented to hospital complaining of altered mental status.  Troponin 0.042, , Na 146.  CTH inconclusive for acute stroke.    -If MRI brain reveals acute stroke will admit to Vascular Neurology  -recommend following up other possible causes for the patient's symptoms including metabolic encephalopathy, seizure    Discussed with Dr. Schwab        Current smoker    -Stroke risk factor, noted in chart.  Unclear if patient is a current active smoker as she was minimally cooperative with assessment and questions.  -Verify smoking status and if a current smoker encourage cessation, consider nicotine patch prn        Essential hypertension    -Stroke risk factor.  Recommend SBP<220 until no acute stroke confirmed.              STROKE DOCUMENTATION          NIH Scale:  Interval: baseline (upon arrival/admit)  1a. Level Of Consciousness: 1-->Not alert: but arousable by minor stimulation to obey, answer, or respond  1b. LOC Questions: 1-->Answers one question correctly  1c. LOC Commands: 0-->Performs both tasks correctly  2. Best Gaze: 0-->Normal  3. Visual: 0-->No visual loss  4. Facial Palsy: 1-->Minor paralysis (flattened nasolabial fold, asymmetry on smiling) (right)  5a. Motor Arm, Left: 0-->No drift: limb holds 90 (or 45) degrees for full 10 secs  5b. Motor Arm, Right: 0-->No drift: limb holds 90 (or 45) degrees for full 10 secs  6a. Motor Leg, Left: 0-->No drift: leg holds 30 degree position for full 5 secs  6b. Motor Leg, Right:  0-->No drift: leg holds 30 degree position for full 5 secs  7. Limb Ataxia: 0-->Absent  8. Sensory: 0-->Normal: no sensory loss  9. Best Language: 1-->Mild-to-moderate aphasia: some obvious loss of fluency or facility of comprehension, without significant limitation on ideas expressed or form of expression. Reduction of speech and/or comprehension, however, makes conversation. . . (see row details)  10. Dysarthria: 0-->Normal  11. Extinction and Inattention (formerly Neglect): 0-->No abnormality  Total (NIH Stroke Scale): 4    Modified [place] Score: 3  [place] Coma Scale:14   ABCD2 Score:    [id number] Score:   HAS -BLED Score:   ICH Score:   Hunt & Almeida Classification:       Thrombolysis Candidate? No, Out of window , Strong suspicion for stroke mimic or alternative diagnosis       Interventional Revascularization Candidate?   Is the patient eligible for mechanical endovascular reperfusion (RAMILA)?  No; No large vessel occlusion      Hemorrhagic change of an Ischemic Stroke: Does this patient have an ischemic stroke with hemorrhagic changes? No     Subjective:     History of Present Illness:  Patient is a 77 y.o. female with significant past medical history of HTN, centrilobular emphysema, CKD III, polycystic kidney disease, polycythemia vera, hx of brain aneurysm, and chronic coagulation presented to hospital complaining of altered mental status.  HPI information gathered from review of the patient's records due to the patient's condition.  The patient was admitted to [facility] and was noted to be difficult to arouse, requiring a sternal rub today by facility staff.  EMS was activated and the patient was transported to the ED for evaluation.    Currently, the patient is drowsy.  She is oriented to person only.  The patient denies HA, dizziness, CP, abdominal pain, or N/V.  CTH obtained in the ED and is inconclusive for acute stroke.  MRI brain pending.  No focal neuro deficit on exam.  The patient may have a  right facial droop though that may be baseline for this patient.  Labs revealed a troponin 0.042, , Na 146.  BBS course.  The patient's symptoms may be related to a metabolic cause.  If MRI brain reveals acute stroke, will admit to Vascular Neurology.             Past Medical History:   Diagnosis Date    Active smoker 6/4/2014    Anticoagulant long-term use     Apocrine adenocarcinoma     Back pain     lower back    Brain aneurysm     4 times    Central scotoma, 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    Centrilobular emphysema 5/7/2018    Chronic kidney disease (CKD), stage III (moderate)     COPD (chronic obstructive pulmonary disease)     Coronary artery disease     Current use of long term anticoagulation 5/9/2018    Coumadin for history of DVT    Encounter for blood transfusion     Essential hypertension 6/4/2014    NAION (non-arteritic anterior ischemic optic neuropathy), 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    Peripheral vascular disease 6/4/2014    Polycystic kidney disease     Polycythemia vera     congential    Senile nuclear sclerosis 11/21/2017    Subclavian artery stenosis, right 7/22/2014     Past Surgical History:   Procedure Laterality Date    ABDOMINAL SURGERY      APPENDECTOMY      BRAIN SURGERY      relieve hemmorhages/had 4 surgeries    CARDIAC CATHETERIZATION      CORONARY ANGIOPLASTY      FRACTURE SURGERY      GALLBLADDER SURGERY      HYSTERECTOMY      illiac stent      JOINT REPLACEMENT      SKIN BIOPSY      skin repair      apocrine cancer    tibial repair      steel plate inserted    TONSILLECTOMY      TOTAL KNEE ARTHROPLASTY      VEIN BYPASS SURGERY       Family History   Problem Relation Age of Onset    Heart disease Brother     Blindness Neg Hx     Glaucoma Neg Hx     Macular degeneration Neg Hx     Retinal detachment Neg Hx      Social History   Substance Use Topics    Smoking status: Current Every Day Smoker     Packs/day: 1.00     Years: 60.00    Smokeless tobacco: Never  "Used    Alcohol use No     Review of patient's allergies indicates:   Allergen Reactions    Bacitracin Other (See Comments)     "can not take because of Warfarin"    Bactrim [sulfamethoxazole-trimethoprim] Other (See Comments)     "can not take because of Warfarin"       Medications: I have reviewed the current medication administration record.    Current Outpatient Prescriptions:     acetaminophen-codeine 300-30mg (TYLENOL-CODEINE #3) 300-30 mg Tab, Take by mouth., Disp: , Rfl:     albuterol (PROVENTIL) 4 MG Tab, , Disp: , Rfl:     alprazolam (XANAX) 0.5 MG tablet, , Disp: , Rfl:     clonidine (CATAPRES) 0.1 MG tablet, Take 0.3 mg by mouth 2 (two) times daily. , Disp: , Rfl:     cyclobenzaprine (FLEXERIL) 10 MG tablet, Take 10 mg by mouth 3 (three) times daily as needed for Muscle spasms., Disp: , Rfl:     diazepam (VALIUM) 5 MG tablet, Take 5 mg by mouth every 6 (six) hours as needed for Anxiety., Disp: , Rfl:     FLUZONE HIGH-DOSE 2017-18, PF, 180 mcg/0.5 mL vaccine, , Disp: , Rfl:     hydrocodone-acetaminophen 7.5-500 mg (LORTAB) 7.5-500 mg per tablet, Take 1 tablet by mouth every 6 (six) hours as needed for Pain., Disp: , Rfl:     ketorolac 0.5% (ACULAR) 0.5 % Drop, Place 1 drop into the right eye 3 (three) times daily., Disp: 5 mL, Rfl: 1    nitroGLYCERIN (NITROSTAT) 0.4 MG SL tablet, Place 0.4 mg under the tongue every 5 (five) minutes as needed for Chest pain., Disp: , Rfl:     ofloxacin (OCUFLOX) 0.3 % ophthalmic solution, 1 drop 4 (four) times daily., Disp: , Rfl:     polyethylene glycol (GLYCOLAX) 17 gram/dose powder, Take 17 g by mouth., Disp: , Rfl:     prednisoLONE acetate (PRED FORTE) 1 % DrpS, 1 drop 3 (three) times daily., Disp: , Rfl:     ramipril (ALTACE) 10 MG capsule, Take 10 mg by mouth. , Disp: , Rfl:     temazepam (RESTORIL) 15 mg Cap, Take 15 mg by mouth., Disp: , Rfl:     umeclidinium 62.5 mcg/actuation DsDv, Inhale 1 puff into the lungs., Disp: , Rfl:     VENTOLIN HFA " 90 mcg/actuation inhaler, , Disp: , Rfl:     warfarin (COUMADIN) 3 MG tablet, Take 2 mg by mouth Daily. , Disp: , Rfl:       Review of Systems   Unable to perform ROS: Mental status change   Constitutional: Negative for fever.   HENT: Negative for drooling.    Eyes: Negative for discharge and redness.   Respiratory: Positive for cough. Negative for shortness of breath.    Cardiovascular: Negative for leg swelling.   Gastrointestinal: Negative for diarrhea and vomiting.   Genitourinary: Negative for hematuria.   Skin: Negative for rash.   Psychiatric/Behavioral: Positive for confusion.     Objective:     Vital Signs (Most Recent):  Temp: 98.8 °F (37.1 °C) (05/11/18 1913)  Pulse: 84 (05/11/18 2132)  Resp: 18 (05/11/18 1913)  BP: (!) 168/71 (05/11/18 2132)  SpO2: 99 % (05/11/18 2132)    Vital Signs Range (Last 24H):  Temp:  [97.9 °F (36.6 °C)-98.9 °F (37.2 °C)]   Pulse:  []   Resp:  [18-26]   BP: (144-189)/(67-95)   SpO2:  [88 %-100 %]     Physical Exam   Constitutional: She appears well-developed and well-nourished. She appears lethargic. No distress.   HENT:   Head: Normocephalic and atraumatic.   Right Ear: External ear normal.   Left Ear: External ear normal.   Nose: Nose normal.   Mouth/Throat: No oropharyngeal exudate.   Eyes: Conjunctivae and EOM are normal. Pupils are equal, round, and reactive to light. Right eye exhibits no discharge. Left eye exhibits no discharge. No scleral icterus.   Neck: Normal range of motion. Neck supple. No thyromegaly present.   Cardiovascular: Normal rate, regular rhythm and normal heart sounds.    No murmur heard.  Pulmonary/Chest: Effort normal. No respiratory distress. She has rhonchi in the right upper field, the right middle field, the left upper field and the left middle field.   Abdominal: Soft. Bowel sounds are normal. She exhibits no distension. There is no hepatosplenomegaly. There is no tenderness.   Musculoskeletal: She exhibits no edema.   Lymphadenopathy:      She has no cervical adenopathy.   Neurological: She appears lethargic. She is disoriented. GCS eye subscore is 4. GCS verbal subscore is 4. GCS motor subscore is 6.   Skin: Skin is warm and dry. Capillary refill takes less than 2 seconds. She is not diaphoretic.   Psychiatric: She has a normal mood and affect.   Nursing note and vitals reviewed.      Neurological Exam:   LOC: drowsy  Attention Span: poor  Language: Naming impaired  Articulation: No dysarthria  Orientation: Not oriented to place, Not oriented to time  Facial Movement (CN VII): Lower facial weakness on the Right  Sensation: Intact to light touch, temperature and vibration      Laboratory:  CMP:   Recent Labs  Lab 05/11/18 1947   CALCIUM 9.4   ALBUMIN 2.7*   PROT 6.9   *   K 3.6   CO2 30*      BUN 23   CREATININE 0.9   ALKPHOS 103   ALT 43   AST 38   BILITOT 0.5     CBC:   Recent Labs  Lab 05/11/18 1947   WBC 9.43   RBC 3.85*   HGB 11.0*   HCT 34.2*      MCV 89   MCH 28.6   MCHC 32.2     Lipid Panel: No results for input(s): CHOL, LDLCALC, HDL, TRIG in the last 168 hours.  Coagulation:   Recent Labs  Lab 05/11/18 1947   INR 1.4*     Hgb A1C: No results for input(s): HGBA1C in the last 168 hours.  TSH: No results for input(s): TSH in the last 168 hours.    Diagnostic Results:      Brain imaging:  [facility] 05/11/2018   Very small focus of hyperdensity appearing to layer in the occipital horn left lateral ventricle may reflect blood products from a recent trace intraventricular hemorrhage.  No acute parenchymal hemorrhage.  No hydrocephalus.  Consider further evaluation with MRI.  New focus of hypodensity in the anterior limb left internal capsule may reflect an age-indeterminate infarct.  This could also be further evaluated with MRI.    MRI Brain pending    Vessel Imaging:  None    Cardiac Evaluation:   None      Bernie Hung, NARESH, NP  [facility]  Department of Vascular Neurology   [facility]

## 2018-05-12 NOTE — HPI
Patient is a 77 y.o. female with significant past medical history of HTN, centrilobular emphysema, CKD III, polycystic kidney disease, polycythemia vera, hx of brain aneurysm, and chronic coagulation presented to hospital complaining of altered mental status.  HPI information gathered from review of the patient's records due to the patient's condition.  The patient was admitted to [facility] and was noted to be difficult to arouse, requiring a sternal rub today by facility staff.  EMS was activated and the patient was transported to the ED for evaluation.    Currently, the patient is drowsy.  She is oriented to person only.  The patient denies HA, dizziness, CP, abdominal pain, or N/V.  CTH obtained in the ED and is inconclusive for acute stroke.  MRI brain pending.  No focal neuro deficit on exam.  The patient may have a right facial droop though that may be baseline for this patient.  Labs revealed a troponin 0.042, , Na 146.  BBS course.  The patient's symptoms may be related to a metabolic cause.  If MRI brain reveals acute stroke, will admit to Vascular Neurology.

## 2018-05-12 NOTE — ED PROVIDER NOTES
"Encounter Date: 2018    SCRIBE #1 NOTE: I, Meri Hilario , am scribing for, and in the presence of,  Dr. Ghosh. I have scribed the following portions of the note - the APC attestation.       History     Chief Complaint   Patient presents with    Altered Mental Status     Pt presents via EMS for new onset difficulty to arouse. Per EMS- pt normally responds to voice, but since this morning per [facility] pt has only been responsive to sternal rub. On arrival, pt responsive to voice, but unable to assess orientation. When asked to state her name and , pt replied "why do you need to know my name?"     Patient is a 77 year old female with PMHX of CAD, chronic anticoagulation on Coumadin, HTN, hx of brain aneurysm x 4, centrilobular emphysema, CKD stage 3, polycystic kidney disease, and polycythemia vera. She presents to the ED via EMS for AMS. According to EMS patient transferred from [facility] for new onset difficulty to arouse. According to EMS, patient normally responds to voice, but since this morning patient has only been responsive to sternal rub. Unknown time of onset of change in mentation. Patient reports having abdominal pain. Describes pain as constant and aching. Rates pain 4/10. Unknown last BM. Denies back pain at this time.           Review of patient's allergies indicates:   Allergen Reactions    Bacitracin Other (See Comments)     "can not take because of Warfarin"    Bactrim [sulfamethoxazole-trimethoprim] Other (See Comments)     "can not take because of Warfarin"     Past Medical History:   Diagnosis Date    Active smoker 2014    Anticoagulant long-term use     Apocrine adenocarcinoma     Back pain     lower back    Brain aneurysm     4 times    Central scotoma, right eye 2017    Centrilobular emphysema 2018    Chronic kidney disease (CKD), stage III (moderate)     COPD (chronic obstructive pulmonary disease)     Coronary artery disease     Current " use of long term anticoagulation 5/9/2018    Coumadin for history of DVT    Encounter for blood transfusion     Essential hypertension 6/4/2014    NAION (non-arteritic anterior ischemic optic neuropathy), 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    Peripheral vascular disease 6/4/2014    Polycystic kidney disease     Polycythemia vera     congential    Senile nuclear sclerosis 11/21/2017    Subclavian artery stenosis, right 7/22/2014     Past Surgical History:   Procedure Laterality Date    ABDOMINAL SURGERY      APPENDECTOMY      BRAIN SURGERY      relieve hemmorhages/had 4 surgeries    CARDIAC CATHETERIZATION      CORONARY ANGIOPLASTY      FRACTURE SURGERY      GALLBLADDER SURGERY      HYSTERECTOMY      illiac stent      JOINT REPLACEMENT      SKIN BIOPSY      skin repair      apocrine cancer    tibial repair      steel plate inserted    TONSILLECTOMY      TOTAL KNEE ARTHROPLASTY      VEIN BYPASS SURGERY       Family History   Problem Relation Age of Onset    Heart disease Brother     Blindness Neg Hx     Glaucoma Neg Hx     Macular degeneration Neg Hx     Retinal detachment Neg Hx      Social History   Substance Use Topics    Smoking status: Current Every Day Smoker     Packs/day: 1.00     Years: 60.00    Smokeless tobacco: Never Used    Alcohol use No     Review of Systems   Unable to perform ROS: Mental status change       Physical Exam     Initial Vitals [05/11/18 1913]   BP Pulse Resp Temp SpO2   (!) 173/71 92 18 98.8 °F (37.1 °C) 97 %      MAP       105         Physical Exam    Vitals reviewed.  Constitutional: She appears well-developed and well-nourished.   HENT:   Head: Normocephalic and atraumatic.   Nose: Nose normal.   Eyes: Conjunctivae and EOM are normal. Pupils are equal, round, and reactive to light.   Neck: Normal range of motion.   Cardiovascular: Normal rate and regular rhythm. Exam reveals no friction rub.    Murmur heard.  Pulmonary/Chest: No respiratory distress. She has no  wheezes. She has rhonchi.   Abdominal: Soft. Bowel sounds are normal. She exhibits no distension. There is no tenderness. There is no rebound.   Musculoskeletal: Normal range of motion.   Neurological: She is alert. She has normal strength. She is disoriented. No cranial nerve deficit or sensory deficit. Coordination normal.   Patient responsive to verbal stimulus. Patient oriented to self and place. Patient disoriented to time. Motor strength of b/l UE and LE 5/5. Finger to nose negative.    Skin: Skin is warm and dry. No erythema.         ED Course   Procedures  Labs Reviewed   CBC W/ AUTO DIFFERENTIAL - Abnormal; Notable for the following:        Result Value    RBC 3.85 (*)     Hemoglobin 11.0 (*)     Hematocrit 34.2 (*)     Immature Granulocytes 0.6 (*)     Immature Grans (Abs) 0.06 (*)     Gran% 76.2 (*)     Lymph% 12.7 (*)     All other components within normal limits   COMPREHENSIVE METABOLIC PANEL - Abnormal; Notable for the following:     Sodium 146 (*)     CO2 30 (*)     Glucose 128 (*)     Albumin 2.7 (*)     All other components within normal limits   URINALYSIS, REFLEX TO URINE CULTURE - Abnormal; Notable for the following:     Protein, UA 1+ (*)     Ketones, UA Trace (*)     All other components within normal limits    Narrative:     Preferred Collection Type->Urine, Catheterized   PROTIME-INR - Abnormal; Notable for the following:     Prothrombin Time 14.4 (*)     INR 1.4 (*)     All other components within normal limits   B-TYPE NATRIURETIC PEPTIDE - Abnormal; Notable for the following:      (*)     All other components within normal limits   TROPONIN I - Abnormal; Notable for the following:     Troponin I 0.042 (*)     All other components within normal limits   CULTURE, BLOOD    Narrative:     Aerobic and anaerobic   CULTURE, BLOOD    Narrative:     Aerobic and anaerobic   LACTIC ACID, PLASMA   MAGNESIUM   PHOSPHORUS   LIPASE   URINALYSIS MICROSCOPIC    Narrative:     Preferred Collection  Type->Urine, Catheterized   TSH   TSH    Narrative:     add on per Dr Terrell order #[id number] 05/12/2018  01:05 TSH   AMMONIA   COMPREHENSIVE METABOLIC PANEL   LIPID PANEL   HEMOGLOBIN A1C   TROPONIN I   CK-MB   CBC W/ AUTO DIFFERENTIAL   MAGNESIUM   PHOSPHORUS   PROTIME-INR   URINALYSIS   DRUG SCREEN PANEL, URINE EMERGENCY   TYPE & SCREEN   POCT GLUCOSE MONITORING CONTINUOUS     EKG Readings: (Independently Interpreted)   SR, no ischemia, normal axis      Imaging Results          CT Head Without Contrast (In process)  Result time 05/12/18 05:24:24               MRI Brain Without Contrast (Final result)  Result time 05/12/18 01:31:08    Final result by Fei Ascencio MD (05/12/18 01:31:08)                 Impression:      Small intraventricular hemorrhage in the left occipital horn corresponding to CT finding.    No acute infarcts.    Old lacunar infarcts bilateral basal ganglia.    Senescent changes.      Electronically signed by: Fei Ascencio MD  Date:    05/12/2018  Time:    01:31             Narrative:    EXAMINATION:  MRI BRAIN WITHOUT CONTRAST    CLINICAL HISTORY:  Stroke;.  Cerebral infarction, unspecified    TECHNIQUE:  Multiplanar multisequence MR imaging of the brain was performed without contrast.    COMPARISON:  None    FINDINGS:  Intracranial compartment:    Ventricles and sulci are enlarged suggesting generalized involutional change.  Small intraventricular hemorrhage in the left occipital horn corresponding to CT finding.  No extra-axial fluid collections.    Old lacunar infarcts bilateral basal ganglia.  Supratentorial white matter T2/flair hyperintense signal foci suggesting sequela of chronic small vessel ischemic change.  No mass lesion, acute intraparenchymal hemorrhage, edema or acute infarct.    Normal vascular flow voids are preserved.    Skull/extracranial contents (limited evaluation): Bone marrow signal intensity is normal.                               CT Head Without Contrast  (Final result)  Result time 05/11/18 22:08:15    Final result by Fei Ascencio MD (05/11/18 22:08:15)                 Impression:      Very small focus of hyperdensity appearing to layer in the occipital horn left lateral ventricle may reflect blood products from a recent trace intraventricular hemorrhage.  No acute parenchymal hemorrhage.  No hydrocephalus.  Consider further evaluation with MRI.    New focus of hypodensity in the anterior limb left internal capsule may reflect an age-indeterminate infarct.  This could also be further evaluated with MRI.    Stable hypodensity in the right insular lobe possibly a remote lacunar type infarct or prominent perivascular space.    Senescent changes.    COMMUNICATION  This critical result was discovered/received at 9:35 p.m..  The critical information above was relayed directly by resident Lito Saunders by telephone to Dr. Ghosh on 05/11/2018 at 9:49 p.m..    Electronically signed by resident: Lito Saunders  Date:    05/11/2018  Time:    21:19    Electronically signed by: Fei Ascencio MD  Date:    05/11/2018  Time:    22:08             Narrative:    EXAMINATION:  CT HEAD WITHOUT CONTRAST    CLINICAL HISTORY:  Confusion/delirium, altered LOC, unexplained;    TECHNIQUE:  Low dose axial CT images obtained throughout the head without intravenous contrast. Sagittal and coronal reconstructions were performed.    COMPARISON:  CT head 05/22/2017    FINDINGS:  Intracranial compartment:    Stable size and configuration the ventricles without hydrocephalus.  Very small focus of hyperdense material layering in the occipital left lateral ventricle, possibly blood products from recent trace intraventricular hemorrhage.  Enlargement of the ventricular system and sulci suggesting generalized involutional changes.  No transependymal edema or sulcal effacement.  Mild confluent decreased supratentorial white matter attenuation most likely related to chronic nonspecific small  vessel disease.      No extra-axial blood or fluid collections.    New hypodense focus in the anterior limb left internal capsule concerning for age indeterminate infarct.  Stable hypodense focus in the right subinsular region represent remote lacunar type infarct or space.  Acute parenchymal hemorrhage.  No abnormal edema or mass effect.    Skull/extracranial contents (limited evaluation): No acute displaced fracture.  Minimal right maxillary sinus thickening.  Remaining visualized paranasal sinuses and bilateral mastoid air cells are clear.                               X-Ray Chest AP Portable (Final result)  Result time 05/11/18 20:03:02    Final result by Corona Contreras MD (05/11/18 20:03:02)                 Impression:      As above.      Electronically signed by: Corona Contreras MD  Date:    05/11/2018  Time:    20:03             Narrative:    EXAMINATION:  XR CHEST AP PORTABLE    CLINICAL HISTORY:  Sepsis;    TECHNIQUE:  Single frontal view of the chest was performed.    COMPARISON:  Chest radiograph 05/09/2018 and CT thorax 08/04/2014    FINDINGS:  Tubing overlies the upper right chest.  Patient is somewhat rotated.  Cardiomediastinal silhouette appears stable.  Cardiomediastinal silhouette is grossly unchanged allowing for AP portable technique and rotation.  There are small bilateral pleural effusions, left greater than right with probable left basilar atelectasis/infiltrate grossly similar to prior.  No large pneumothorax or new focal opacity.  No acute osseous process seen.  PA and lateral views can be obtained.                                           APC / Resident Notes:   Patient is a 77 year old female with PMHX of CAD, chronic anticoagulation on Coumadin, HTN, hx of brain aneurysm, centrilobular emphysema, CKD stage 3, polycystic kidney disease, and polycythemia vera. She presents to the ED via EMS for AMS.     Will order septic workup. Will order imaging. Will continue to monitor.     Differential  diagnoses include, but are not limited to: sepsis, SDH, pneumonia, electrolyte imbalance, or UTI.     Initial lactate WNL. No leukocytosis. Granulocytosis. Blood cultures pending. Hemodynamically stable. Elevated glucose 128. PT-INR 14.4-1.4. Elevated . Elevated troponin 0.042. Lipase WNL. CXR found to have  small bilateral pleural effusions, left greater than right with probable left basilar atelectasis/infiltrate grossly similar to prior.  No large pneumothorax or new focal opacity.  No acute osseous process seen.     Patient reassessed, patient continues to respond to verbal stimulus. Patient disoriented to place and time. Patient oriented to self. /85. Will order 5 mg IV metoprolol. Notified by Radiology patient found to have acute infarction on CT head. Will consult vascular neurology, awaiting recommendations. UA unremarkable for infectious process.     CT head final results found to have Very small focus of hyperdensity appearing to layer in the occipital horn left lateral ventricle may reflect blood products from a recent trace intraventricular hemorrhage.  No acute parenchymal hemorrhage.  No hydrocephalus. New focus of hypodensity in the anterior limb left internal capsule may reflect an age-indeterminate infarct.  Stable hypodensity in the right insular lobe possibly a remote lacunar type infarct or prominent perivascular space. Will evaluate further with MRI brain.     Patient reassessed, patient combative and found on floor by myself. Patient assisted back within bed. Will order 0.5 mg of ativan. Will order direct observation for patient safety. Will continue to monitor.     Ammonia WNL. TSH WNL. MRI Brain found to have Small intraventricular hemorrhage in the left occipital horn. No acute infarcts. Old lacunar infarcts bilateral basal ganglia. Will consult Neuro ICU and Neurosurgery, awaiting recommendations. Per neuro ICU recommendations will order 10 mg IV hydralazine and Altace PO. Will  continue to monitor.     4:03 AM  Patient reassessed, patient resting comfortably. /81. . Despite 10 mg IV hydralazine and PO Altace. Neurosurgery ordered IV Cardene infusion. Will continue to monitor.     Patient will be admitted to neuro ICU for further management.     I have discussed and reviewed with my supervising physician.           Scribe Attestation:   Scribe #1: I performed the above scribed service and the documentation accurately describes the services I performed. I attest to the accuracy of the note.    Attending Attestation:     Physician Attestation Statement for NP/PA:   I discussed this assessment and plan of this patient with the NP/PA, but I did not personally examine the patient. The face to face encounter was performed by the NP/PA.    Other NP/PA Attestation Additions:    History of Present Illness: I am in agreement with my physicians assistant's assessment, treatment and plan of care.             Clinical Impression:   The primary encounter diagnosis was Intraventricular hemorrhage. Diagnoses of Altered mental state, Elevated troponin, Pleural effusion, bilateral, Stroke, and Hypertension, unspecified type were also pertinent to this visit.    Disposition:   Disposition: Admitted  Condition: Critical                        Deonna Terrell PA-C  05/12/18 0536

## 2018-05-12 NOTE — SUBJECTIVE & OBJECTIVE
Neurologic Chief Complaint: ICH    Subjective:     Interval History: Patient is seen for follow-up neurological assessment and treatment recommendations: No acute events overnight. Her mental status waxes and wanes, otherwise no new issues.    HPI, Past Medical, Family, and Social History remains the same as documented in the initial encounter.     Review of Systems   Constitutional: Negative for fever.   Eyes: Negative for visual disturbance.   Neurological: Negative for speech difficulty and weakness.   Psychiatric/Behavioral: Negative for agitation.     Scheduled Meds:   cloNIDine  0.3 mg Oral Q8H    lorazepam        nicotine  1 patch Transdermal Daily    potassium chloride  10 mEq Intravenous Once    senna-docusate 8.6-50 mg  1 tablet Oral BID    sodium chloride 0.9%  3 mL Intravenous Q8H     Continuous Infusions:   sodium chloride 0.9% 50 mL/hr at 05/12/18 1700    dexmedetomidine (PRECEDEX) infusion 0.2 mcg/kg/hr (05/12/18 1255)    niCARdipine 15 mg/hr (05/12/18 1700)     PRN Meds:acetaminophen, labetalol, magnesium oxide, magnesium oxide, potassium chloride in water **AND** potassium chloride in water **AND** potassium chloride in water, potassium, sodium phosphates, potassium, sodium phosphates, potassium, sodium phosphates    Objective:     Vital Signs (Most Recent):  Temp: 99 °F (37.2 °C) (05/12/18 1505)  Pulse: (!) 116 (05/12/18 1705)  Resp: 17 (05/12/18 1705)  BP: (!) 73/57 (05/12/18 1705)  SpO2: 98 % (05/12/18 1705)  BP Location: Right arm    Vital Signs Range (Last 24H):  Temp:  [98.3 °F (36.8 °C)-99.2 °F (37.3 °C)]   Pulse:  []   Resp:  [17-39]   BP: ()/()   SpO2:  [94 %-100 %]   BP Location: Right arm    Physical Exam   Constitutional: She appears well-developed and well-nourished.   HENT:   Head: Normocephalic and atraumatic.   Neck: Normal range of motion.   Pulmonary/Chest: Effort normal.   Psychiatric: She has a normal mood and affect. Her behavior is normal.        Neurological Exam:   LOC: alert  Attention Span: Good   Language: No aphasia  Articulation: Dysarthria  Orientation: Person, Place, Time   Facial Movement (CN VII): Lower facial weakness on the Left  Motor: Moves all extremities spontaneously    Laboratory:  CMP:   Recent Labs  Lab 05/12/18  0630   CALCIUM 8.9   ALBUMIN 2.6*   PROT 6.5      K 2.8*   CO2 30*      BUN 21   CREATININE 0.8   ALKPHOS 95   ALT 41   AST 32   BILITOT 0.4     CBC:   Recent Labs  Lab 05/12/18  0630   WBC 7.93   RBC 3.53*   HGB 9.9*   HCT 30.9*      MCV 88   MCH 28.0   MCHC 32.0     Coagulation:   Recent Labs  Lab 05/12/18  0630   INR 1.7*     Hgb A1C:   Recent Labs  Lab 05/12/18  0522   HGBA1C 5.4     TSH:   Recent Labs  Lab 05/11/18  1947   TSH 1.687       Diagnostic Results     Brain Imaging   CTH 05/11/2018   Very small focus of hyperdensity appearing to layer in the occipital horn left lateral ventricle may reflect blood products from a recent trace intraventricular hemorrhage.  No acute parenchymal hemorrhage.  No hydrocephalus.  Consider further evaluation with MRI.  New focus of hypodensity in the anterior limb left internal capsule may reflect an age-indeterminate infarct.  This could also be further evaluated with MRI.      MRI brain 5/12/18  Small intraventricular hemorrhage in the left occipital horn corresponding to CT finding. No acute infarcts. Old lacunar infarcts bilateral basal ganglia.      Vessel Imaging   CTA pending    Cardiac Imaging   Echo pending

## 2018-05-12 NOTE — ED NOTES
Patient on stretcher, Bed placed in low/locked position, side rails up x 2, call light is within reach of patient. Sitter remains at bedside. Patient in NAD and offers no complaints at this time. Will continue to monitor.

## 2018-05-12 NOTE — ASSESSMENT & PLAN NOTE
77 year old woman with significant past medical history of HTN, centrilobular emphysema, CKD III, polycystic kidney disease, polycythemia vera, hx of brain aneurysm, and chronic coagulation presented to hospital complaining of altered mental status. CT head revealed a small IVH in the left occipital horn of the ventricles. This is unlikely the cause of her AMS.    Antithrombotics for secondary stroke prevention: None - no acute stroke    Statins for secondary stroke prevention and hyperlipidemia, if present:   Statins: None: Reason: No acute stroke    Aggressive risk factor modification: HTN, HLD, Diet, Exercise     Rehab efforts: Occupational Therapy, PT/OT/SLP to evaluate and treat, PM&R consult     Diagnostics ordered/pending: CTA Head to assess vasculature     VTE prophylaxis: None: Reason for No Pharmacological VTE Prophylaxis: Small IVH    BP parameters: ICH: SBP <140

## 2018-05-12 NOTE — PT/OT/SLP EVAL
"Speech Language Pathology Evaluation  Bedside Swallow    Patient Name:  Sunita Zimmer   MRN:  [id number]  Admitting Diagnosis: ICH (intracerebral hemorrhage)    Recommendations:                 General Recommendations:  Speech language evaluation and Cognitive-linguistic evaluation  Diet recommendations:  Dental Soft, Thin   Aspiration Precautions: Standard aspiration precautions   General Precautions: Standard,    Communication strategies:  none    History:     Past Medical History:   Diagnosis Date    Active smoker 6/4/2014    Anticoagulant long-term use     Apocrine adenocarcinoma     Back pain     lower back    Brain aneurysm     4 times    Central scotoma, 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    Centrilobular emphysema 5/7/2018    Chronic kidney disease (CKD), stage III (moderate)     COPD (chronic obstructive pulmonary disease)     Coronary artery disease     Current use of long term anticoagulation 5/9/2018    Coumadin for history of DVT    Encounter for blood transfusion     Essential hypertension 6/4/2014    NAION (non-arteritic anterior ischemic optic neuropathy), 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    Peripheral vascular disease 6/4/2014    Polycystic kidney disease     Polycythemia vera     congential    Senile nuclear sclerosis 11/21/2017    Subclavian artery stenosis, right 7/22/2014       Past Surgical History:   Procedure Laterality Date    ABDOMINAL SURGERY      APPENDECTOMY      BRAIN SURGERY      relieve hemmorhages/had 4 surgeries    CARDIAC CATHETERIZATION      CORONARY ANGIOPLASTY      FRACTURE SURGERY      GALLBLADDER SURGERY      HYSTERECTOMY      illiac stent      JOINT REPLACEMENT      SKIN BIOPSY      skin repair      apocrine cancer    tibial repair      steel plate inserted    TONSILLECTOMY      TOTAL KNEE ARTHROPLASTY      VEIN BYPASS SURGERY         Social History: Pt poor historian, no family at bedside       Subjective     "I'm actually from [place]"     Pain/Comfort:  · Pain " Rating 1: 0/10  · Pain Rating Post-Intervention 1: 0/10    Objective:     Oral Musculature Evaluation  · Oral Musculature: WFL (? subtle mild left sided droop )  · Dentition: edentulous  · Oral Labial Strength and Mobility: WFL  · Lingual Strength and Mobility: WFL  · Volitional Cough: strong and productive; spontaneous coughing prior to PO trials   · Volitional Swallow: prompt; mildly diminished appearing consistent with baseline   · Voice Prior to PO Intake: strong and clear     Bedside Swallow Eval:   Consistencies Assessed:  · Thin liquids 4oz via conescutive sips from a straw   · Puree x5  · Solids x3     Oral Phase:   · WFL  · Prolonged mastication    Pharyngeal Phase:   · no overt clinical signs/symptoms of aspiration  · no overt clinical signs/symptoms of pharyngeal dysphagia      Assessment:     Sunita Zimmer is a 77 y.o. female with an SLP diagnosis of essentially intact oral feeding and swallowing skills to advance die tto soft solids and thin liqiuds. Ongoing skilled speech service follow up warranted to assess overall cognitve-linguistic function.      Goals:    SLP Goals        Problem: SLP Goal    Goal Priority Disciplines Outcome   SLP Goal     SLP                    Plan:     · Patient to be seen:  4 x/week   · Plan of Care expires:  06/10/18    Discharge recommendations:  nursing facility, skilled (in NH )   Barriers to Discharge:  None    Time Tracking:     SLP Treatment Date:   05/12/18  Speech Start Time:  0807  Speech Stop Time:  0819     Speech Total Time (min):  12 min    Billable Minutes: Eval Swallow and Oral Function 12    Yoon Taylor CCC-SLP  05/12/2018

## 2018-05-12 NOTE — ED NOTES
Unable to assess orientation due to pt answering questions inappropriately. Pt came to [facility] ED via Steven. Steven reports upon picking pt up from SNF, no report was able to be obtained as the nurses did not know about the pt. This RN called the SNF and spoke to Maryan, charge nurse, and was told they knew nothing about the pt. Melina, ED [facility] charge nurse, aware.

## 2018-05-12 NOTE — ED NOTES
Patient is alert, calm and cooperative, waiting on room assignment. Sitter remains at bedside. No distress noted. Bed in low position, side rails up and call bell within reach. Will continue to monitor.

## 2018-05-12 NOTE — PLAN OF CARE
Problem: Patient Care Overview  Goal: Plan of Care Review  Outcome: Ongoing (interventions implemented as appropriate)  POC reviewed with pt and family at 1400. Pt refusing to take medications and is uncooperative throughout shift.. Questions and concerns of family addressed. No acute events today. CTA head to be completed overnight. US of extremities and echo completed today. Pt remains maxed out on Cardene for SBP <160. Pt refusing to eat, continues to take off SPO2 and nasal canula. Sitter at bedside throughout shift. Will continue to monitor. See flowsheets for full assessment and VS info.

## 2018-05-12 NOTE — ED NOTES
"PA into room and noticed patient on floor. Asked AMEYA Cisse for assistance to assist patient back into bed. Patient found near the door on her left side. No visible harm noted,patient denying any pain. When this RN asked patient about fall patient reported "I was going to my bed." patient repositioned in bed, awaiting ativan order per VIKKI Pack. Dr Ghosh and Melina,charge nurse notified of incident. Sitter placed at bedside for patient safety.  "

## 2018-05-12 NOTE — ASSESSMENT & PLAN NOTE
-- Continue to monitor HR and BP   -- SBP goal < 160  -- 2D echo pending  -- Ekg pending  -- Labetalol and Cardene Prn

## 2018-05-12 NOTE — SUBJECTIVE & OBJECTIVE
"    Past Medical History:   Diagnosis Date    Active smoker 6/4/2014    Anticoagulant long-term use     Apocrine adenocarcinoma     Back pain     lower back    Brain aneurysm     4 times    Central scotoma, 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    Centrilobular emphysema 5/7/2018    Chronic kidney disease (CKD), stage III (moderate)     COPD (chronic obstructive pulmonary disease)     Coronary artery disease     Current use of long term anticoagulation 5/9/2018    Coumadin for history of DVT    Encounter for blood transfusion     Essential hypertension 6/4/2014    NAION (non-arteritic anterior ischemic optic neuropathy), 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    Peripheral vascular disease 6/4/2014    Polycystic kidney disease     Polycythemia vera     congential    Senile nuclear sclerosis 11/21/2017    Subclavian artery stenosis, right 7/22/2014     Past Surgical History:   Procedure Laterality Date    ABDOMINAL SURGERY      APPENDECTOMY      BRAIN SURGERY      relieve hemmorhages/had 4 surgeries    CARDIAC CATHETERIZATION      CORONARY ANGIOPLASTY      FRACTURE SURGERY      GALLBLADDER SURGERY      HYSTERECTOMY      illiac stent      JOINT REPLACEMENT      SKIN BIOPSY      skin repair      apocrine cancer    tibial repair      steel plate inserted    TONSILLECTOMY      TOTAL KNEE ARTHROPLASTY      VEIN BYPASS SURGERY       Family History   Problem Relation Age of Onset    Heart disease Brother     Blindness Neg Hx     Glaucoma Neg Hx     Macular degeneration Neg Hx     Retinal detachment Neg Hx      Social History   Substance Use Topics    Smoking status: Current Every Day Smoker     Packs/day: 1.00     Years: 60.00    Smokeless tobacco: Never Used    Alcohol use No     Review of patient's allergies indicates:   Allergen Reactions    Bacitracin Other (See Comments)     "can not take because of Warfarin"    Bactrim [sulfamethoxazole-trimethoprim] Other (See Comments)     "can not take because of Warfarin" "       Medications: I have reviewed the current medication administration record.    Current Outpatient Prescriptions:     acetaminophen-codeine 300-30mg (TYLENOL-CODEINE #3) 300-30 mg Tab, Take by mouth., Disp: , Rfl:     albuterol (PROVENTIL) 4 MG Tab, , Disp: , Rfl:     alprazolam (XANAX) 0.5 MG tablet, , Disp: , Rfl:     clonidine (CATAPRES) 0.1 MG tablet, Take 0.3 mg by mouth 2 (two) times daily. , Disp: , Rfl:     cyclobenzaprine (FLEXERIL) 10 MG tablet, Take 10 mg by mouth 3 (three) times daily as needed for Muscle spasms., Disp: , Rfl:     diazepam (VALIUM) 5 MG tablet, Take 5 mg by mouth every 6 (six) hours as needed for Anxiety., Disp: , Rfl:     FLUZONE HIGH-DOSE 2017-18, PF, 180 mcg/0.5 mL vaccine, , Disp: , Rfl:     hydrocodone-acetaminophen 7.5-500 mg (LORTAB) 7.5-500 mg per tablet, Take 1 tablet by mouth every 6 (six) hours as needed for Pain., Disp: , Rfl:     ketorolac 0.5% (ACULAR) 0.5 % Drop, Place 1 drop into the right eye 3 (three) times daily., Disp: 5 mL, Rfl: 1    nitroGLYCERIN (NITROSTAT) 0.4 MG SL tablet, Place 0.4 mg under the tongue every 5 (five) minutes as needed for Chest pain., Disp: , Rfl:     ofloxacin (OCUFLOX) 0.3 % ophthalmic solution, 1 drop 4 (four) times daily., Disp: , Rfl:     polyethylene glycol (GLYCOLAX) 17 gram/dose powder, Take 17 g by mouth., Disp: , Rfl:     prednisoLONE acetate (PRED FORTE) 1 % DrpS, 1 drop 3 (three) times daily., Disp: , Rfl:     ramipril (ALTACE) 10 MG capsule, Take 10 mg by mouth. , Disp: , Rfl:     temazepam (RESTORIL) 15 mg Cap, Take 15 mg by mouth., Disp: , Rfl:     umeclidinium 62.5 mcg/actuation DsDv, Inhale 1 puff into the lungs., Disp: , Rfl:     VENTOLIN HFA 90 mcg/actuation inhaler, , Disp: , Rfl:     warfarin (COUMADIN) 3 MG tablet, Take 2 mg by mouth Daily. , Disp: , Rfl:       Review of Systems   Unable to perform ROS: Mental status change   Constitutional: Negative for fever.   HENT: Negative for drooling.    Eyes:  Negative for discharge and redness.   Respiratory: Positive for cough. Negative for shortness of breath.    Cardiovascular: Negative for leg swelling.   Gastrointestinal: Negative for diarrhea and vomiting.   Genitourinary: Negative for hematuria.   Skin: Negative for rash.   Psychiatric/Behavioral: Positive for confusion.     Objective:     Vital Signs (Most Recent):  Temp: 98.8 °F (37.1 °C) (05/11/18 1913)  Pulse: 84 (05/11/18 2132)  Resp: 18 (05/11/18 1913)  BP: (!) 168/71 (05/11/18 2132)  SpO2: 99 % (05/11/18 2132)    Vital Signs Range (Last 24H):  Temp:  [97.9 °F (36.6 °C)-98.9 °F (37.2 °C)]   Pulse:  []   Resp:  [18-26]   BP: (144-189)/(67-95)   SpO2:  [88 %-100 %]     Physical Exam   Constitutional: She appears well-developed and well-nourished. She appears lethargic. No distress.   HENT:   Head: Normocephalic and atraumatic.   Right Ear: External ear normal.   Left Ear: External ear normal.   Nose: Nose normal.   Mouth/Throat: No oropharyngeal exudate.   Eyes: Conjunctivae and EOM are normal. Pupils are equal, round, and reactive to light. Right eye exhibits no discharge. Left eye exhibits no discharge. No scleral icterus.   Neck: Normal range of motion. Neck supple. No thyromegaly present.   Cardiovascular: Normal rate, regular rhythm and normal heart sounds.    No murmur heard.  Pulmonary/Chest: Effort normal. No respiratory distress. She has rhonchi in the right upper field, the right middle field, the left upper field and the left middle field.   Abdominal: Soft. Bowel sounds are normal. She exhibits no distension. There is no hepatosplenomegaly. There is no tenderness.   Musculoskeletal: She exhibits no edema.   Lymphadenopathy:     She has no cervical adenopathy.   Neurological: She appears lethargic. She is disoriented. GCS eye subscore is 4. GCS verbal subscore is 4. GCS motor subscore is 6.   Skin: Skin is warm and dry. Capillary refill takes less than 2 seconds. She is not diaphoretic.    Psychiatric: She has a normal mood and affect.   Nursing note and vitals reviewed.      Neurological Exam:   LOC: drowsy  Attention Span: poor  Language: Naming impaired  Articulation: No dysarthria  Orientation: Not oriented to place, Not oriented to time  Facial Movement (CN VII): Lower facial weakness on the Right  Sensation: Intact to light touch, temperature and vibration      Laboratory:  CMP:   Recent Labs  Lab 05/11/18 1947   CALCIUM 9.4   ALBUMIN 2.7*   PROT 6.9   *   K 3.6   CO2 30*      BUN 23   CREATININE 0.9   ALKPHOS 103   ALT 43   AST 38   BILITOT 0.5     CBC:   Recent Labs  Lab 05/11/18 1947   WBC 9.43   RBC 3.85*   HGB 11.0*   HCT 34.2*      MCV 89   MCH 28.6   MCHC 32.2     Lipid Panel: No results for input(s): CHOL, LDLCALC, HDL, TRIG in the last 168 hours.  Coagulation:   Recent Labs  Lab 05/11/18 1947   INR 1.4*     Hgb A1C: No results for input(s): HGBA1C in the last 168 hours.  TSH: No results for input(s): TSH in the last 168 hours.    Diagnostic Results:      Brain imaging:  CTH 05/11/2018   Very small focus of hyperdensity appearing to layer in the occipital horn left lateral ventricle may reflect blood products from a recent trace intraventricular hemorrhage.  No acute parenchymal hemorrhage.  No hydrocephalus.  Consider further evaluation with MRI.  New focus of hypodensity in the anterior limb left internal capsule may reflect an age-indeterminate infarct.  This could also be further evaluated with MRI.    MRI Brain pending    Vessel Imaging:  None    Cardiac Evaluation:   None

## 2018-05-12 NOTE — ASSESSMENT & PLAN NOTE
-- Continue Neuro checks q 1hr  -- Vascular Neurology consulted  -- Neurosurgery consulted  -- 05/12 CT brain and MRI brain obtained shows small focus of blood involving L occipital horn.   -- Pending repeat CTH  -- SBP goal < 160  -- PT/OT/Speech

## 2018-05-12 NOTE — ASSESSMENT & PLAN NOTE
77 y.o. female with significant past medical history of HTN, centrilobular emphysema, CKD III, polycystic kidney disease, polycythemia vera, hx of brain aneurysm, and chronic coagulation presented to hospital complaining of altered mental status.  Troponin 0.042, , Na 146.  CTH inconclusive for acute stroke.    -If MRI brain reveals acute stroke will admit to Vascular Neurology  -recommend following up other possible causes for the patient's symptoms including metabolic encephalopathy, seizure    Discussed with Dr. Schwab

## 2018-05-12 NOTE — PLAN OF CARE
Problem: SLP Goal  Goal: SLP Goal  Pt safe to advance dental soft and thin liquids.     Yoon Taylor MS, CCC-SLP  Speech Language Pathologist  Pager: [phone]  Date 5/12/2018

## 2018-05-12 NOTE — SUBJECTIVE & OBJECTIVE
Past Medical History:   Diagnosis Date    Active smoker 6/4/2014    Anticoagulant long-term use     Apocrine adenocarcinoma     Back pain     lower back    Brain aneurysm     4 times    Central scotoma, 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    Centrilobular emphysema 5/7/2018    Chronic kidney disease (CKD), stage III (moderate)     COPD (chronic obstructive pulmonary disease)     Coronary artery disease     Current use of long term anticoagulation 5/9/2018    Coumadin for history of DVT    Encounter for blood transfusion     Essential hypertension 6/4/2014    NAION (non-arteritic anterior ischemic optic neuropathy), 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    Peripheral vascular disease 6/4/2014    Polycystic kidney disease     Polycythemia vera     congential    Senile nuclear sclerosis 11/21/2017    Subclavian artery stenosis, right 7/22/2014     Past Surgical History:   Procedure Laterality Date    ABDOMINAL SURGERY      APPENDECTOMY      BRAIN SURGERY      relieve hemmorhages/had 4 surgeries    CARDIAC CATHETERIZATION      CORONARY ANGIOPLASTY      FRACTURE SURGERY      GALLBLADDER SURGERY      HYSTERECTOMY      illiac stent      JOINT REPLACEMENT      SKIN BIOPSY      skin repair      apocrine cancer    tibial repair      steel plate inserted    TONSILLECTOMY      TOTAL KNEE ARTHROPLASTY      VEIN BYPASS SURGERY        Current Facility-Administered Medications on File Prior to Encounter   Medication Dose Route Frequency Provider Last Rate Last Dose    [COMPLETED] glycerin 99.5% topical solution 100 mL  100 mL Rectal ED 1 Time Shi Ny MD   100 mL at 05/11/18 1329    And    [COMPLETED] magnesium citrate solution 296 mL  296 mL Rectal ED 1 Time Shi Ny MD   296 mL at 05/11/18 1328    And    [COMPLETED] sodium chloride 0.9% bolus 500 mL  500 mL Rectal ED 1 Time Shi Ny MD   500 mL at 05/11/18 1115    [DISCONTINUED] acetaminophen tablet 650 mg  650 mg Oral Q6H PRN Jude Ramos DO   650 mg at  05/08/18 0429    [DISCONTINUED] albuterol-ipratropium 2.5mg-0.5mg/3mL nebulizer solution 3 mL  3 mL Nebulization Q4H WAKE Jude Mckeon MD   3 mL at 05/11/18 1322    [DISCONTINUED] aluminum-magnesium hydroxide-simethicone 200-200-20 mg/5 mL suspension 30 mL  30 mL Oral Q4H PRN Adelita Benedict PA-C        [DISCONTINUED] cloNIDine tablet 0.3 mg  0.3 mg Oral BID Dallas Wiseman MD   0.3 mg at 05/10/18 2100    [DISCONTINUED] cloNIDine tablet 0.3 mg  0.3 mg Oral TID Shi Ny MD   0.3 mg at 05/11/18 1556    [DISCONTINUED] cyclobenzaprine tablet 5 mg  5 mg Oral TID PRN Jude Ramos DO   5 mg at 05/07/18 0516    [DISCONTINUED] dextromethorphan-guaifenesin  mg/5 ml liquid 10 mL  10 mL Oral Q6H PRN Shi Ny MD   10 mL at 05/10/18 1509    [DISCONTINUED] dextrose 50% injection 12.5 g  12.5 g Intravenous PRN Dallas Wiseman MD        [DISCONTINUED] dextrose 50% injection 25 g  25 g Intravenous PRN Dallas Wiseman MD        [DISCONTINUED] docusate sodium capsule 100 mg  100 mg Oral BID Dallas Wiseman MD   100 mg at 05/11/18 0829    [DISCONTINUED] enoxaparin injection 40 mg  40 mg Subcutaneous Daily Jude Ramos DO   40 mg at 05/11/18 1641    [DISCONTINUED] fluticasone-vilanterol 100-25 mcg/dose diskus inhaler 1 puff  1 puff Inhalation Daily Andrade Roman MD   1 puff at 05/11/18 0833    [DISCONTINUED] glucagon (human recombinant) injection 1 mg  1 mg Intramuscular PRN Dallas Wiseman MD        [DISCONTINUED] glucose chewable tablet 16 g  16 g Oral PRN Dallas Wiseman MD        [DISCONTINUED] glucose chewable tablet 16 g  16 g Oral PRN Dallas Wiseman MD        [DISCONTINUED] glucose chewable tablet 24 g  24 g Oral PRN Dallas Wiseman MD        [DISCONTINUED] hydrALAZINE tablet 25 mg  25 mg Oral Q8H PRN Jerome Michelle PA-C        [DISCONTINUED] hydrocodone-acetaminophen 5-325mg per tablet 1 tablet  1 tablet Oral Q4H PRN Jude Ramos DO        [DISCONTINUED] labetalol  injection 10 mg  10 mg Intravenous Q4H PRN Jerome Michelle PA-C        [DISCONTINUED] nicotine 14 mg/24 hr 1 patch  1 patch Transdermal Daily Shi Ny MD   1 patch at 05/11/18 0828    [DISCONTINUED] nitroGLYCERIN SL tablet 0.4 mg  0.4 mg Sublingual Q5 Min PRN Dallas Wiseman MD        [DISCONTINUED] ondansetron disintegrating tablet 8 mg  8 mg Oral Q6H PRN Adelita Benedict PA-C   8 mg at 05/10/18 1411    [DISCONTINUED] polyethylene glycol packet 17 g  17 g Oral Daily Elsa Jenkins PA-C   17 g at 05/11/18 0829    [DISCONTINUED] ramipril capsule 10 mg  10 mg Oral Daily Dallas Wiseman MD   10 mg at 05/11/18 0828    [DISCONTINUED] senna-docusate 8.6-50 mg per tablet 2 tablet  2 tablet Oral Nightly PRN Adelita Benedict PA-C        [DISCONTINUED] tiotropium inhalation capsule 18 mcg  1 capsule Inhalation Daily Andrade Roman MD   18 mcg at 05/11/18 0833    [DISCONTINUED] traMADol tablet 50 mg  50 mg Oral Q6H PRN Jude Ramos DO        [DISCONTINUED] warfarin (COUMADIN) tablet 2 mg  2 mg Oral Daily Adelita Benedict PA-C   2 mg at 05/11/18 1641     Current Outpatient Prescriptions on File Prior to Encounter   Medication Sig Dispense Refill    acetaminophen-codeine 300-30mg (TYLENOL-CODEINE #3) 300-30 mg Tab Take by mouth.      albuterol (PROVENTIL) 4 MG Tab       alprazolam (XANAX) 0.5 MG tablet       clonidine (CATAPRES) 0.1 MG tablet Take 0.3 mg by mouth 2 (two) times daily.       cyclobenzaprine (FLEXERIL) 10 MG tablet Take 10 mg by mouth 3 (three) times daily as needed for Muscle spasms.      diazepam (VALIUM) 5 MG tablet Take 5 mg by mouth every 6 (six) hours as needed for Anxiety.      FLUZONE HIGH-DOSE 2017-18, PF, 180 mcg/0.5 mL vaccine       hydrocodone-acetaminophen 7.5-500 mg (LORTAB) 7.5-500 mg per tablet Take 1 tablet by mouth every 6 (six) hours as needed for Pain.      ketorolac 0.5% (ACULAR) 0.5 % Drop Place 1 drop into the right eye 3 (three) times daily. 5 mL 1     nitroGLYCERIN (NITROSTAT) 0.4 MG SL tablet Place 0.4 mg under the tongue every 5 (five) minutes as needed for Chest pain.      ofloxacin (OCUFLOX) 0.3 % ophthalmic solution 1 drop 4 (four) times daily.      polyethylene glycol (GLYCOLAX) 17 gram/dose powder Take 17 g by mouth.      prednisoLONE acetate (PRED FORTE) 1 % DrpS 1 drop 3 (three) times daily.      ramipril (ALTACE) 10 MG capsule Take 10 mg by mouth.       temazepam (RESTORIL) 15 mg Cap Take 15 mg by mouth.      umeclidinium 62.5 mcg/actuation DsDv Inhale 1 puff into the lungs.      VENTOLIN HFA 90 mcg/actuation inhaler       warfarin (COUMADIN) 3 MG tablet Take 2 mg by mouth Daily.         Allergies: Bacitracin and Bactrim [sulfamethoxazole-trimethoprim]    Family History   Problem Relation Age of Onset    Heart disease Brother     Blindness Neg Hx     Glaucoma Neg Hx     Macular degeneration Neg Hx     Retinal detachment Neg Hx      Social History   Substance Use Topics    Smoking status: Current Every Day Smoker     Packs/day: 1.00     Years: 60.00    Smokeless tobacco: Never Used    Alcohol use No     Review of Systems   Unable to obtain due to patient not cooperating.  Objective:     Vitals:    Temp: 98.3 °F (36.8 °C)  Pulse: 104  BP: (!) 184/76  MAP (mmHg): 109  Resp: 20  SpO2: 97 %  O2 Device (Oxygen Therapy): nasal cannula    Temp  Min: 97.9 °F (36.6 °C)  Max: 98.9 °F (37.2 °C)  Pulse  Min: 83  Max: 108  BP  Min: 144/95  Max: 214/112  MAP (mmHg)  Min: 97  Max: 144  Resp  Min: 18  Max: 26  SpO2  Min: 88 %  Max: 100 %    05/11 0701 - 05/12 0700  In: 150   Out: -            Physical Exam  GA: Alert, comfortable, no acute distress.   HEENT: No scleral icterus or JVD.   Pulmonary: Clear to auscultation A/L. No wheezing, crackles, or rhonchi.  Cardiac: RRR S1 & S2 w/o rubs/murmurs/gallops.   Abdominal: Bowel sounds present x 4. No appreciable hepatosplenomegaly.  Skin: No jaundice, rashes, or visible lesions.  Neuro:  --GCS: E4 V4  M6  --Mental Status:  AAO x 2, following commands but difficult to participate, moves all ext on command  --CN II-XII grossly intact.   --Pupils 3mm, PERRL.   --Corneal reflex, gag, cough intact.  --LUE strength: 5/5  --LLE strength: 5/5  --RUE strength: 5/5  --RLE strength: 5/5      Today I personally reviewed pertinent medications, lines/drains/airways, imaging, lab results, notably:

## 2018-05-12 NOTE — HOSPITAL COURSE
05/12: Admit to [facility], CTH, MRI. CT head showed stable intraventricular hemorrhage, no interval change.   5/13: Pt refusing all medications and therapies. Discussions with patient and family, they would like to go home with hospice.  Arrangements made with / on call.  Palliative Care consulted. Plan for discharge home with hospice today.

## 2018-05-12 NOTE — ED TRIAGE NOTES
Pt arrived to ED via EMS transfer from [facility]. Unable to get report from rehab facility. Pt nonverbal by choice, occasionally answers questions. Pt reports LLQ abdominal pain.

## 2018-05-12 NOTE — PROGRESS NOTES
- Get CTA head to rule out vascular malformation   - Requiring precedex for agitation  - Get EEG giving the fluctuating exam  - On 2 L NC, wean as tolerated  - SBP<160, restart home clonidine   - Replace K  - Passed swallow eval, start diet  - Hx of DVT but currently off AC because of IVH, will get lower extremities US and decide the urgency of restarting her anticoagulation  - SCDs  - The patient and her family requested that her code status changes to DNR/DNI.     Uninterrupted Critical Care/Counseling Time (not including procedures): 35 minutes

## 2018-05-12 NOTE — ED NOTES
Patient sleeping on stretcher locked in lowest position, side rails up x 2, call light is within reach of patient and sitter. Sitter remains at bedside. patinet appears in NAD. Respirations spontaneous, even and unlabored. Waiting for MRI at this time. Will continue to monitor.

## 2018-05-12 NOTE — HPI
Ms. Zimmer is a 77 year old female with significant pmhx of CAD, Cardiac Angioplasty, Brain aneurysm x 4, Smoker, CKD, Coumadin for DVT, HTN, and PVD who presents to [facility] for small intraventricular hemorrhage in the left occipital horn. The patient initially presented to [facility] ED for AMS and difficulty to arouse. CT brain and MRI brain obtained shows small focus of blood involving L occipital horn.

## 2018-05-12 NOTE — ED NOTES
Patient resting on stretcher in NAD. Bed locked in lowest position, side rails up x2, call bell in reach of patient and sitter. Sitter remains at bedside conversing with patient and watching TV. Dr Ghosh notified on patients elevated BP.

## 2018-05-12 NOTE — NURSING
Called to bedside to help RN assess patient but patient is combative and swinging at RN and BENJY Hickey.  Patient is not cooperating in participating in any lab draws and NIH.  Sitter present at bedside.

## 2018-05-12 NOTE — CONSULTS
"Neurosurgery consult note    05/12/2018      Consult Requested By: ED  Reason for Consult: "IVH"    SUBJECTIVE:   CC:  AMS    HPI: Sunita Zimmer is a 77 y.o. year old female who presents on 5/11/2018 with complicated PMHx on chronic anticoagulation, Coumadin, for CAD. She had baseline dementia. Discharged to SNF recently and now p/w AMS per EMS. History limited and no family member available.Reportedly, she has a new onset difficulty to arouse and only been responsive to sternal rub.She was brought to [facility] for further work up. She denies any other complaints; no HA, N/V, weakness or numbness. Unclear of she has any hx of trauma however, she denies any fall. INR 13.-1.4. CT brain and MRI brain obtained shows small focus of blood involving L occipital horn.Brain atrophy with ex-vac HC seen on scans. NSGY consulted for further evaluation.       Active problems:  Patient Active Problem List   Diagnosis    Essential hypertension    Personal history of DVT (deep vein thrombosis)    Peripheral vascular disease    Current smoker    Polycythemia vera    Polycystic kidney disease    Chronic back pain    Subclavian artery stenosis, right    Central scotoma, right eye    NAION (non-arteritic anterior ischemic optic neuropathy), right eye    Senile nuclear sclerosis    Back pain    Cauda equina syndrome    Centrilobular emphysema    Intradural mass    Drug-induced constipation    Underweight    Current use of long term anticoagulation    Acute respiratory failure with hypoxia    Altered mental status, unspecified    Altered mental state       ROS: negative except as above per HPI    PMHx:  Past Medical History:   Diagnosis Date    Active smoker 6/4/2014    Anticoagulant long-term use     Apocrine adenocarcinoma     Back pain     lower back    Brain aneurysm     4 times    Central scotoma, 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    Centrilobular emphysema 5/7/2018    Chronic kidney disease (CKD), stage III (moderate) "     COPD (chronic obstructive pulmonary disease)     Coronary artery disease     Current use of long term anticoagulation 5/9/2018    Coumadin for history of DVT    Encounter for blood transfusion     Essential hypertension 6/4/2014    NAION (non-arteritic anterior ischemic optic neuropathy), 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    Peripheral vascular disease 6/4/2014    Polycystic kidney disease     Polycythemia vera     congential    Senile nuclear sclerosis 11/21/2017    Subclavian artery stenosis, right 7/22/2014       PSHx:   Past Surgical History:   Procedure Laterality Date    ABDOMINAL SURGERY      APPENDECTOMY      BRAIN SURGERY      relieve hemmorhages/had 4 surgeries    CARDIAC CATHETERIZATION      CORONARY ANGIOPLASTY      FRACTURE SURGERY      GALLBLADDER SURGERY      HYSTERECTOMY      illiac stent      JOINT REPLACEMENT      SKIN BIOPSY      skin repair      apocrine cancer    tibial repair      steel plate inserted    TONSILLECTOMY      TOTAL KNEE ARTHROPLASTY      VEIN BYPASS SURGERY         Social Hx:  Social History     Social History    Marital status:      Spouse name: N/A    Number of children: N/A    Years of education: N/A     Occupational History    Not on file.     Social History Main Topics    Smoking status: Current Every Day Smoker     Packs/day: 1.00     Years: 60.00    Smokeless tobacco: Never Used    Alcohol use No    Drug use: No    Sexual activity: Not on file     Other Topics Concern    Not on file     Social History Narrative    No narrative on file        reports that she has been smoking.  She has a 60.00 pack-year smoking history. She has never used smokeless tobacco. She reports that she does not drink alcohol or use drugs.    FMHx:  Family History   Problem Relation Age of Onset    Heart disease Brother     Blindness Neg Hx     Glaucoma Neg Hx     Macular degeneration Neg Hx     Retinal detachment Neg Hx         Allergy:  Review of patient's  "allergies indicates:   Allergen Reactions    Bacitracin Other (See Comments)     "can not take because of Warfarin"    Bactrim [sulfamethoxazole-trimethoprim] Other (See Comments)     "can not take because of Warfarin"         Home Medication:  Current Facility-Administered Medications on File Prior to Encounter   Medication Dose Route Frequency Provider Last Rate Last Dose    [COMPLETED] glycerin 99.5% topical solution 100 mL  100 mL Rectal ED 1 Time Shi Ny MD   100 mL at 05/11/18 1329    And    [COMPLETED] magnesium citrate solution 296 mL  296 mL Rectal ED 1 Time Shi Ny MD   296 mL at 05/11/18 1328    And    [COMPLETED] sodium chloride 0.9% bolus 500 mL  500 mL Rectal ED 1 Time Shi Ny MD   500 mL at 05/11/18 1115    [DISCONTINUED] acetaminophen tablet 650 mg  650 mg Oral Q6H PRN Jude Ramos, DO   650 mg at 05/08/18 0429    [DISCONTINUED] albuterol-ipratropium 2.5mg-0.5mg/3mL nebulizer solution 3 mL  3 mL Nebulization Q4H WAKE Jude Mckeon MD   3 mL at 05/11/18 1322    [DISCONTINUED] aluminum-magnesium hydroxide-simethicone 200-200-20 mg/5 mL suspension 30 mL  30 mL Oral Q4H PRN Adelita Benedict PA-C        [DISCONTINUED] cloNIDine tablet 0.3 mg  0.3 mg Oral BID Dallas Wiseman MD   0.3 mg at 05/10/18 2100    [DISCONTINUED] cloNIDine tablet 0.3 mg  0.3 mg Oral TID Shi Ny MD   0.3 mg at 05/11/18 1556    [DISCONTINUED] cyclobenzaprine tablet 5 mg  5 mg Oral TID PRN Jude Ramos, DO   5 mg at 05/07/18 0516    [DISCONTINUED] dextromethorphan-guaifenesin  mg/5 ml liquid 10 mL  10 mL Oral Q6H PRN Shi Ny MD   10 mL at 05/10/18 1509    [DISCONTINUED] dextrose 50% injection 12.5 g  12.5 g Intravenous PRN Dallas Wiseman MD        [DISCONTINUED] dextrose 50% injection 25 g  25 g Intravenous PRN Dallas Wiseman MD        [DISCONTINUED] docusate sodium capsule 100 mg  100 mg Oral BID Dallas Wiseman MD   100 mg at 05/11/18 0829    [DISCONTINUED] " enoxaparin injection 40 mg  40 mg Subcutaneous Daily Jude Ramos DO   40 mg at 05/11/18 1641    [DISCONTINUED] fluticasone-vilanterol 100-25 mcg/dose diskus inhaler 1 puff  1 puff Inhalation Daily Andrade Roman MD   1 puff at 05/11/18 0833    [DISCONTINUED] glucagon (human recombinant) injection 1 mg  1 mg Intramuscular PRN Dallas Wiseman MD        [DISCONTINUED] glucose chewable tablet 16 g  16 g Oral PRN Dallas Wiseman MD        [DISCONTINUED] glucose chewable tablet 16 g  16 g Oral PRN Dallas Wiseman MD        [DISCONTINUED] glucose chewable tablet 24 g  24 g Oral PRN Dallas Wiseman MD        [DISCONTINUED] hydrALAZINE tablet 25 mg  25 mg Oral Q8H PRN Jerome Michelle PA-C        [DISCONTINUED] hydrocodone-acetaminophen 5-325mg per tablet 1 tablet  1 tablet Oral Q4H PRN Jude Ramos DO        [DISCONTINUED] labetalol injection 10 mg  10 mg Intravenous Q4H PRN Jerome Michelle PA-C        [DISCONTINUED] nicotine 14 mg/24 hr 1 patch  1 patch Transdermal Daily Shi Ny MD   1 patch at 05/11/18 0828    [DISCONTINUED] nitroGLYCERIN SL tablet 0.4 mg  0.4 mg Sublingual Q5 Min PRN Dallas Wiseman MD        [DISCONTINUED] ondansetron disintegrating tablet 8 mg  8 mg Oral Q6H PRN Adelita Benedict PA-C   8 mg at 05/10/18 1411    [DISCONTINUED] polyethylene glycol packet 17 g  17 g Oral Daily Elsa Jenkins PA-C   17 g at 05/11/18 0829    [DISCONTINUED] ramipril capsule 10 mg  10 mg Oral Daily Dallas Wiseman MD   10 mg at 05/11/18 0828    [DISCONTINUED] senna-docusate 8.6-50 mg per tablet 2 tablet  2 tablet Oral Nightly PRN Adelita Benedict PA-C        [DISCONTINUED] tiotropium inhalation capsule 18 mcg  1 capsule Inhalation Daily Andrade Roman MD   18 mcg at 05/11/18 0833    [DISCONTINUED] traMADol tablet 50 mg  50 mg Oral Q6H PRN Jude R. Keen, DO        [DISCONTINUED] warfarin (COUMADIN) tablet 2 mg  2 mg Oral Daily Adelita Benedict PA-C   2 mg at 05/11/18 1648     Current  Outpatient Prescriptions on File Prior to Encounter   Medication Sig Dispense Refill    acetaminophen-codeine 300-30mg (TYLENOL-CODEINE #3) 300-30 mg Tab Take by mouth.      albuterol (PROVENTIL) 4 MG Tab       alprazolam (XANAX) 0.5 MG tablet       clonidine (CATAPRES) 0.1 MG tablet Take 0.3 mg by mouth 2 (two) times daily.       cyclobenzaprine (FLEXERIL) 10 MG tablet Take 10 mg by mouth 3 (three) times daily as needed for Muscle spasms.      diazepam (VALIUM) 5 MG tablet Take 5 mg by mouth every 6 (six) hours as needed for Anxiety.      FLUZONE HIGH-DOSE 2017-18, PF, 180 mcg/0.5 mL vaccine       hydrocodone-acetaminophen 7.5-500 mg (LORTAB) 7.5-500 mg per tablet Take 1 tablet by mouth every 6 (six) hours as needed for Pain.      ketorolac 0.5% (ACULAR) 0.5 % Drop Place 1 drop into the right eye 3 (three) times daily. 5 mL 1    nitroGLYCERIN (NITROSTAT) 0.4 MG SL tablet Place 0.4 mg under the tongue every 5 (five) minutes as needed for Chest pain.      ofloxacin (OCUFLOX) 0.3 % ophthalmic solution 1 drop 4 (four) times daily.      polyethylene glycol (GLYCOLAX) 17 gram/dose powder Take 17 g by mouth.      prednisoLONE acetate (PRED FORTE) 1 % DrpS 1 drop 3 (three) times daily.      ramipril (ALTACE) 10 MG capsule Take 10 mg by mouth.       temazepam (RESTORIL) 15 mg Cap Take 15 mg by mouth.      umeclidinium 62.5 mcg/actuation DsDv Inhale 1 puff into the lungs.      VENTOLIN HFA 90 mcg/actuation inhaler       warfarin (COUMADIN) 3 MG tablet Take 2 mg by mouth Daily.        Continuous Infusions:      OBJECTIVE:     Vital Signs (Most Recent)  Temp: 98.8 °F (37.1 °C) (05/11/18 1913)  Pulse: 103 (05/12/18 0422)  Resp: 20 (05/11/18 2256)  BP: (!) 204/86 (05/12/18 0422)  SpO2: 96 % (05/12/18 0422)      Vital Signs Range (Last 24H):  Temp:  [97.9 °F (36.6 °C)-98.9 °F (37.2 °C)]   Pulse:  []   Resp:  [18-26]   BP: (144-214)/()   SpO2:  [88 %-100 %]       I/O:    Intake/Output Summary (Last 24  hours) at 05/12/18 0422  Last data filed at 05/11/18 2046   Gross per 24 hour   Intake              150 ml   Output                0 ml   Net              150 ml         Physical exam:   General: appears well-developed and well-nourished, no distress  Eyes: EOMI, PERRLA  Cardiovascular: RRR  Pulm: NWOB, no distress   Abdominal: soft, ND, NT  MSK: moves all extremities spontaneously with good tone  Neurological: GCS E4V4M6, AAOX1, disoriented to place and time (baseline), no drift, CN II-XII grossly intact and face symm, normal speech, following simple commands, DYER, good strength throughout, SILT    Laboratory:    Recent Results (from the past 24 hour(s))   Blood culture x two cultures. Draw prior to antibiotics.    Collection Time: 05/11/18  7:20 PM   Result Value Ref Range    Blood Culture, Routine No Growth to date    CBC auto differential    Collection Time: 05/11/18  7:47 PM   Result Value Ref Range    WBC 9.43 3.90 - 12.70 K/uL    RBC 3.85 (L) 4.00 - 5.40 M/uL    Hemoglobin 11.0 (L) 12.0 - 16.0 g/dL    Hematocrit 34.2 (L) 37.0 - 48.5 %    MCV 89 82 - 98 fL    MCH 28.6 27.0 - 31.0 pg    MCHC 32.2 32.0 - 36.0 g/dL    RDW 13.2 11.5 - 14.5 %    Platelets 280 150 - 350 K/uL    MPV 10.0 9.2 - 12.9 fL    Immature Granulocytes 0.6 (H) 0.0 - 0.5 %    Gran # (ANC) 7.2 1.8 - 7.7 K/uL    Immature Grans (Abs) 0.06 (H) 0.00 - 0.04 K/uL    Lymph # 1.2 1.0 - 4.8 K/uL    Mono # 0.9 0.3 - 1.0 K/uL    Eos # 0.1 0.0 - 0.5 K/uL    Baso # 0.02 0.00 - 0.20 K/uL    nRBC 0 0 /100 WBC    Gran% 76.2 (H) 38.0 - 73.0 %    Lymph% 12.7 (L) 18.0 - 48.0 %    Mono% 9.5 4.0 - 15.0 %    Eosinophil% 0.8 0.0 - 8.0 %    Basophil% 0.2 0.0 - 1.9 %    Differential Method Automated    Comprehensive metabolic panel    Collection Time: 05/11/18  7:47 PM   Result Value Ref Range    Sodium 146 (H) 136 - 145 mmol/L    Potassium 3.6 3.5 - 5.1 mmol/L    Chloride 105 95 - 110 mmol/L    CO2 30 (H) 23 - 29 mmol/L    Glucose 128 (H) 70 - 110 mg/dL    BUN, Bld 23  8 - 23 mg/dL    Creatinine 0.9 0.5 - 1.4 mg/dL    Calcium 9.4 8.7 - 10.5 mg/dL    Total Protein 6.9 6.0 - 8.4 g/dL    Albumin 2.7 (L) 3.5 - 5.2 g/dL    Total Bilirubin 0.5 0.1 - 1.0 mg/dL    Alkaline Phosphatase 103 55 - 135 U/L    AST 38 10 - 40 U/L    ALT 43 10 - 44 U/L    Anion Gap 11 8 - 16 mmol/L    eGFR if African American >60.0 >60 mL/min/1.73 m^2    eGFR if non African American >60.0 >60 mL/min/1.73 m^2   Lactic acid, plasma #1    Collection Time: 05/11/18  7:47 PM   Result Value Ref Range    Lactate (Lactic Acid) 1.3 0.5 - 2.2 mmol/L   Magnesium    Collection Time: 05/11/18  7:47 PM   Result Value Ref Range    Magnesium 1.9 1.6 - 2.6 mg/dL   Phosphorus    Collection Time: 05/11/18  7:47 PM   Result Value Ref Range    Phosphorus 3.2 2.7 - 4.5 mg/dL   Protime-INR    Collection Time: 05/11/18  7:47 PM   Result Value Ref Range    Prothrombin Time 14.4 (H) 9.0 - 12.5 sec    INR 1.4 (H) 0.8 - 1.2   Brain natriuretic peptide    Collection Time: 05/11/18  7:47 PM   Result Value Ref Range     (H) 0 - 99 pg/mL   Lipase    Collection Time: 05/11/18  7:47 PM   Result Value Ref Range    Lipase 59 4 - 60 U/L   Troponin I    Collection Time: 05/11/18  7:47 PM   Result Value Ref Range    Troponin I 0.042 (H) 0.000 - 0.026 ng/mL   TSH    Collection Time: 05/11/18  7:47 PM   Result Value Ref Range    TSH 1.687 0.400 - 4.000 uIU/mL   Blood culture x two cultures. Draw prior to antibiotics.    Collection Time: 05/11/18  7:48 PM   Result Value Ref Range    Blood Culture, Routine No Growth to date    Urinalysis, Reflex to Urine Culture Urine, Catheterized    Collection Time: 05/11/18  8:15 PM   Result Value Ref Range    Specimen UA Urine, Catheterized     Color, UA Yellow Yellow, Straw, Mary    Appearance, UA Clear Clear    pH, UA 6.0 5.0 - 8.0    Specific Gravity, UA 1.010 1.005 - 1.030    Protein, UA 1+ (A) Negative    Glucose, UA Negative Negative    Ketones, UA Trace (A) Negative    Bilirubin (UA) Negative Negative     Occult Blood UA Negative Negative    Nitrite, UA Negative Negative    Urobilinogen, UA Negative <2.0 EU/dL    Leukocytes, UA Negative Negative   Urinalysis Microscopic    Collection Time: 05/11/18  8:15 PM   Result Value Ref Range    RBC, UA 0 0 - 4 /hpf    WBC, UA 0 0 - 5 /hpf    Bacteria, UA None None-Occ /hpf    Hyaline Casts, UA 0 0-1/lpf /lpf    Microscopic Comment SEE COMMENT    Ammonia    Collection Time: 05/12/18  1:31 AM   Result Value Ref Range    Ammonia 40 10 - 50 umol/L          ASSESSMENT/PLAN:   Sunita Zimmer is a 77 y.o. year old female on Coumadin who presents with small L occipital horn IVH.No acute hydrocephalus.     -- No acute neurosurgical intervention necessary   -- Keep SBP <160  -- Repeat CT head in 6 hrs   -- Okay for discharge from NSGY standpoint  if scan stable      Discussed with attending staff Dr. De La Cruz.    Paloma Norwood MD  05/12/2018  4:22 AM

## 2018-05-12 NOTE — ED NOTES
Patient is alert, calm and cooperative, waiting on test results. No distress noted. Bed in low position, side rails up and call bell within reach. Son and  called RN to check status of patient. Explained to family that no medical information is to be given over the phone but the patient is in the ED and stable. Family verbalized understanding. Son and  report that patient was to be discharged to SNF and not rehab. Family upset that patient was transferred to a rehab facility.  reports he will come to the hospital in the morning to further discuss options for the patient upon discharge. Will continue to monitor.

## 2018-05-12 NOTE — PROGRESS NOTES
[facility]  Vascular Neurology  Comprehensive Stroke Center  Progress Note    Assessment/Plan:     * ICH (intracerebral hemorrhage)    77 year old woman with significant past medical history of HTN, centrilobular emphysema, CKD III, polycystic kidney disease, polycythemia vera, hx of brain aneurysm, and chronic coagulation presented to hospital complaining of altered mental status. CT head revealed a small IVH in the left occipital horn of the ventricles. This is unlikely the cause of her AMS.    Antithrombotics for secondary stroke prevention: None - no acute stroke    Statins for secondary stroke prevention and hyperlipidemia, if present:   Statins: None: Reason: No acute stroke    Aggressive risk factor modification: HTN, HLD, Diet, Exercise     Rehab efforts: Occupational Therapy, PT/OT/SLP to evaluate and treat, PM&R consult     Diagnostics ordered/pending: CTA Head to assess vasculature     VTE prophylaxis: None: Reason for No Pharmacological VTE Prophylaxis: Small IVH    BP parameters: ICH: SBP <140            Altered mental status, unspecified    -- EEG pending  -- patient has had a cough for about 2 weeks, possibly infectious encephalopathy        Current smoker    -Stroke risk factor, noted in chart.  Unclear if patient is a current active smoker as she was minimally cooperative with assessment and questions.  -Verify smoking status and if a current smoker encourage cessation, consider nicotine patch prn        Essential hypertension    -Stroke risk factor.  Recommend SBP<220 until no acute stroke confirmed.               5/11: Admit to [facility]  5/12: Exam fluctuating, EEG pending    STROKE DOCUMENTATION        NIH Scale:  1a. Level Of Consciousness: 0-->Alert: keenly responsive  1b. LOC Questions: 0-->Answers both questions correctly  1c. LOC Commands: 0-->Performs both tasks correctly  2. Best Gaze: 0-->Normal  3. Visual: 0-->No visual loss  4. Facial Palsy: 1-->Minor paralysis (flattened  nasolabial fold, asymmetry on smiling)  5a. Motor Arm, Left: 0-->No drift: limb holds 90 (or 45) degrees for full 10 secs  5b. Motor Arm, Right: 0-->No drift: limb holds 90 (or 45) degrees for full 10 secs  6a. Motor Leg, Left: 0-->No drift: leg holds 30 degree position for full 5 secs  6b. Motor Leg, Right: 0-->No drift: leg holds 30 degree position for full 5 secs  7. Limb Ataxia: 0-->Absent  8. Sensory: 0-->Normal: no sensory loss  9. Best Language: 0-->No aphasia: normal  10. Dysarthria: 0-->Normal  11. Extinction and Inattention (formerly Neglect): 0-->No abnormality  Total (NIH Stroke Scale): 1       Modified Renny Score: 3  Sarthak Coma Scale:    ABCD2 Score:    [id number] Score:   HAS -BLED Score:   ICH Score:1  Hunt & Almeida Classification:      Hemorrhagic change of an Ischemic Stroke: Does this patient have an ischemic stroke with hemorrhagic changes? No     Neurologic Chief Complaint: ICH    Subjective:     Interval History: Patient is seen for follow-up neurological assessment and treatment recommendations: No acute events overnight. Her mental status waxes and wanes, otherwise no new issues.    HPI, Past Medical, Family, and Social History remains the same as documented in the initial encounter.     Review of Systems   Constitutional: Negative for fever.   Eyes: Negative for visual disturbance.   Neurological: Negative for speech difficulty and weakness.   Psychiatric/Behavioral: Negative for agitation.     Scheduled Meds:   cloNIDine  0.3 mg Oral Q8H    lorazepam        nicotine  1 patch Transdermal Daily    potassium chloride  10 mEq Intravenous Once    senna-docusate 8.6-50 mg  1 tablet Oral BID    sodium chloride 0.9%  3 mL Intravenous Q8H     Continuous Infusions:   sodium chloride 0.9% 50 mL/hr at 05/12/18 1700    dexmedetomidine (PRECEDEX) infusion 0.2 mcg/kg/hr (05/12/18 1255)    niCARdipine 15 mg/hr (05/12/18 1700)     PRN Meds:acetaminophen, labetalol, magnesium oxide, magnesium  oxide, potassium chloride in water **AND** potassium chloride in water **AND** potassium chloride in water, potassium, sodium phosphates, potassium, sodium phosphates, potassium, sodium phosphates    Objective:     Vital Signs (Most Recent):  Temp: 99 °F (37.2 °C) (05/12/18 1505)  Pulse: (!) 116 (05/12/18 1705)  Resp: 17 (05/12/18 1705)  BP: (!) 73/57 (05/12/18 1705)  SpO2: 98 % (05/12/18 1705)  BP Location: Right arm    Vital Signs Range (Last 24H):  Temp:  [98.3 °F (36.8 °C)-99.2 °F (37.3 °C)]   Pulse:  []   Resp:  [17-39]   BP: ()/()   SpO2:  [94 %-100 %]   BP Location: Right arm    Physical Exam   Constitutional: She appears well-developed and well-nourished.   HENT:   Head: Normocephalic and atraumatic.   Neck: Normal range of motion.   Pulmonary/Chest: Effort normal.   Psychiatric: She has a normal mood and affect. Her behavior is normal.       Neurological Exam:   LOC: alert  Attention Span: Good   Language: No aphasia  Articulation: Dysarthria  Orientation: Person, Place, Time   Facial Movement (CN VII): Lower facial weakness on the Left  Motor: Moves all extremities spontaneously    Laboratory:  CMP:   Recent Labs  Lab 05/12/18  0630   CALCIUM 8.9   ALBUMIN 2.6*   PROT 6.5      K 2.8*   CO2 30*      BUN 21   CREATININE 0.8   ALKPHOS 95   ALT 41   AST 32   BILITOT 0.4     CBC:   Recent Labs  Lab 05/12/18  0630   WBC 7.93   RBC 3.53*   HGB 9.9*   HCT 30.9*      MCV 88   MCH 28.0   MCHC 32.0     Coagulation:   Recent Labs  Lab 05/12/18  0630   INR 1.7*     Hgb A1C:   Recent Labs  Lab 05/12/18  0522   HGBA1C 5.4     TSH:   Recent Labs  Lab 05/11/18  1947   TSH 1.687       Diagnostic Results     Brain Imaging   [facility] 05/11/2018   Very small focus of hyperdensity appearing to layer in the occipital horn left lateral ventricle may reflect blood products from a recent trace intraventricular hemorrhage.  No acute parenchymal hemorrhage.  No hydrocephalus.  Consider further  evaluation with MRI.  New focus of hypodensity in the anterior limb left internal capsule may reflect an age-indeterminate infarct.  This could also be further evaluated with MRI.      MRI brain 5/12/18  Small intraventricular hemorrhage in the left occipital horn corresponding to CT finding. No acute infarcts. Old lacunar infarcts bilateral basal ganglia.      Vessel Imaging   CTA pending    Cardiac Imaging   Echo pending      Reina Charles MD  [facility]  Department of Vascular Neurology   [facility]

## 2018-05-12 NOTE — PLAN OF CARE
Notified [facility] NP pt continuing to refuse medication, tried to crush meds in pudding to admin BP medication and PRN phos replacements. Pt spit out pudding.

## 2018-05-12 NOTE — ED NOTES
Patient on stretcher, Bed placed in low/locked position, side rails up x 2, call light is within reach of patient. Showed patient red call button in case of assistance, explained to patient RN will be back shortly to obtain urine sample. Pt verbalized understanding and reported urinary burning. Patient placed into position of comfort. Patient in NAD and offers no complaints at this time. Will continue to monitor.

## 2018-05-13 VITALS
WEIGHT: 109.13 LBS | OXYGEN SATURATION: 98 % | TEMPERATURE: 99 F | BODY MASS INDEX: 20.08 KG/M2 | HEART RATE: 104 BPM | HEIGHT: 62 IN | RESPIRATION RATE: 36 BRPM | SYSTOLIC BLOOD PRESSURE: 151 MMHG | DIASTOLIC BLOOD PRESSURE: 67 MMHG

## 2018-05-13 PROBLEM — N39.0 UTI (URINARY TRACT INFECTION): Status: ACTIVE | Noted: 2018-05-13

## 2018-05-13 LAB
ALBUMIN SERPL BCP-MCNC: 2.4 G/DL
ALP SERPL-CCNC: 95 U/L
ALT SERPL W/O P-5'-P-CCNC: 34 U/L
ANION GAP SERPL CALC-SCNC: 12 MMOL/L
AST SERPL-CCNC: 35 U/L
BILIRUB SERPL-MCNC: 0.6 MG/DL
BUN SERPL-MCNC: 22 MG/DL
CALCIUM SERPL-MCNC: 8 MG/DL
CHLORIDE SERPL-SCNC: 117 MMOL/L
CO2 SERPL-SCNC: 18 MMOL/L
CREAT SERPL-MCNC: 0.7 MG/DL
EST. GFR  (AFRICAN AMERICAN): >60 ML/MIN/1.73 M^2
EST. GFR  (NON AFRICAN AMERICAN): >60 ML/MIN/1.73 M^2
ESTIMATED PA SYSTOLIC PRESSURE: 52
GLUCOSE SERPL-MCNC: 106 MG/DL
MAGNESIUM SERPL-MCNC: 2.1 MG/DL
MITRAL VALVE MOBILITY: NORMAL
MITRAL VALVE REGURGITATION: ABNORMAL
PHOSPHATE SERPL-MCNC: 3 MG/DL
POTASSIUM SERPL-SCNC: 5.2 MMOL/L
PROT SERPL-MCNC: 6.4 G/DL
RETIRED EF AND QEF - SEE NOTES: 70 (ref 55–65)
SODIUM SERPL-SCNC: 147 MMOL/L
TRICUSPID VALVE REGURGITATION: ABNORMAL

## 2018-05-13 PROCEDURE — 27000221 HC OXYGEN, UP TO 24 HOURS

## 2018-05-13 PROCEDURE — 94761 N-INVAS EAR/PLS OXIMETRY MLT: CPT

## 2018-05-13 PROCEDURE — S4991 NICOTINE PATCH NONLEGEND: HCPCS | Performed by: NURSE PRACTITIONER

## 2018-05-13 PROCEDURE — 25000003 PHARM REV CODE 250: Performed by: PHYSICIAN ASSISTANT

## 2018-05-13 PROCEDURE — 25000003 PHARM REV CODE 250: Performed by: NURSE PRACTITIONER

## 2018-05-13 PROCEDURE — 99233 SBSQ HOSP IP/OBS HIGH 50: CPT | Mod: ,,, | Performed by: PSYCHIATRY & NEUROLOGY

## 2018-05-13 PROCEDURE — 99232 SBSQ HOSP IP/OBS MODERATE 35: CPT | Mod: 24,,, | Performed by: NEUROLOGICAL SURGERY

## 2018-05-13 PROCEDURE — 83735 ASSAY OF MAGNESIUM: CPT

## 2018-05-13 PROCEDURE — 84100 ASSAY OF PHOSPHORUS: CPT

## 2018-05-13 PROCEDURE — 80053 COMPREHEN METABOLIC PANEL: CPT

## 2018-05-13 RX ORDER — CLONIDINE HYDROCHLORIDE 0.3 MG/1
0.3 TABLET ORAL EVERY 8 HOURS
Qty: 90 TABLET | Refills: 11 | Status: SHIPPED | OUTPATIENT
Start: 2018-05-13 | End: 2018-05-13 | Stop reason: HOSPADM

## 2018-05-13 RX ADMIN — NICARDIPINE HYDROCHLORIDE 15 MG/HR: 0.2 INJECTION, SOLUTION INTRAVENOUS at 04:05

## 2018-05-13 RX ADMIN — NICOTINE 1 PATCH: 14 PATCH, EXTENDED RELEASE TRANSDERMAL at 10:05

## 2018-05-13 RX ADMIN — CLONIDINE HYDROCHLORIDE 0.3 MG: 0.2 TABLET ORAL at 05:05

## 2018-05-13 NOTE — PT/OT/SLP PROGRESS
Physical Therapy Discharge Note      Patient Name:  Sunita Zimmer   MRN:  [id number]    Patient not seen today. Spoke with MD team. Patient's POC is to go home with hospice services. Order's acknowledged and discontinued. PT evaluation not performed.    Placido Polo III, PT   5/13/2018

## 2018-05-13 NOTE — PLAN OF CARE
CM to bedside; pt, spouse & son & dgtr present. Family wants pt to transfer to home hospice. CM provided Senior Resource Guide for choices and family w/ choice of [facility]. CM called agency and left msg w/ on-call p [phone] for callback. CM contacted MD team for hospice orders.    Update: CM received callback from britney Ospina w/ home hospice - she will speak w/ family concerning DME, etc. Libby requested that orders and clinical info be sent thru [facility] and to her email - [email or web address]    CM will await callback when DME is in pt's home; pt is ready for transport and [facility] setup in will call - Libby will contact CM when pt can transport. CM updated pt's nurse, Omar [phone] of plan and left ambulance packet at bedside/in chart as well as adding DNR to packet.    Update: CM spoke w/ Cinnamon and DME is expected to be setup by 1:30pm; CM took ambulance out of will call and setup for a  time for 3pm. CM also relayed info to pt's nurse, Omar.     05/13/18 0957   Discharge Assessment   Assessment Type Discharge Planning Assessment   Confirmed/corrected address and phone number on facesheet? Yes   Assessment information obtained from? Patient;Caregiver;Medical Record   Expected Length of Stay (days) 2   Communicated expected length of stay with patient/caregiver yes   Prior to hospitilization cognitive status: Unable to Assess   Prior to hospitalization functional status: Assistive Equipment;Needs Assistance;Partially Dependent   Current cognitive status: Not Oriented to Time   Current Functional Status: Assistive Equipment;Needs Assistance;Partially Dependent   [facility]   Lives With spouse   Able to Return to Prior Arrangements unable to determine at this time (comments)   Is patient able to care for self after discharge? Unable to determine at this time (comments)   Who are your caregiver(s) and their phone number(s)? spouse  Akin Canchola [phone]   Patient's perception of discharge disposition hospice/home   Readmission Within The Last 30 Days current reason for admission unrelated to previous admission   Patient currently receives any other outside agency services? No   Equipment Currently Used at Home cane, straight;walker, rolling;wheelchair   Do you have any problems affording any of your prescribed medications? No   Is the patient taking medications as prescribed? yes   Transportation Available family or friend will provide   Dialysis Name and Scheduled days N/A   Does the patient receive services at the [facility]? No   Discharge Plan A Home with family;Hospice/home   Discharge Plan B Inpatient Hospice   Patient/Family In Agreement With Plan yes   Readmission Questionnaire   At the time of your discharge, did someone talk to you about what your health problems were? Yes  (readminit completed on opening screen)

## 2018-05-13 NOTE — PROGRESS NOTES
[facility]  Neurosurgery  Progress Note    Subjective:     History of Present Illness: Sunita Zimmer is a 77 y.o. year old female who presents on 5/11/2018 with complicated PMHx on chronic anticoagulation, Coumadin, for CAD. She had baseline dementia. Discharged to SNF recently and now p/w AMS per EMS. History limited and no family member available.Reportedly, she has a new onset difficulty to arouse and only been responsive to sternal rub.She was brought to [facility] for further work up. She denies any other complaints; no HA, N/V, weakness or numbness. Unclear of she has any hx of trauma however, she denies any fall. INR 13.-1.4. CT brain and MRI brain obtained shows small focus of blood involving L occipital horn.Brain atrophy with ex-vac HC seen on scans. NSGY consulted for further evaluation.        Post-Op Info:  * No surgery found *         Interval History: family requesting her to be discharged home to hospice    Medications:  Continuous Infusions:   sodium chloride 0.9% 50 mL/hr at 05/13/18 1305    dexmedetomidine (PRECEDEX) infusion 0.2 mcg/kg/hr (05/13/18 1305)    niCARdipine Stopped (05/13/18 0905)     Scheduled Meds:   nicotine  1 patch Transdermal Daily    potassium chloride  10 mEq Intravenous Once    senna-docusate 8.6-50 mg  1 tablet Oral BID    sodium chloride 0.9%  3 mL Intravenous Q8H     PRN Meds:acetaminophen, labetalol, magnesium oxide, magnesium oxide, potassium chloride in water **AND** potassium chloride in water **AND** potassium chloride in water, potassium, sodium phosphates, potassium, sodium phosphates, potassium, sodium phosphates     Review of Systems  Objective:     Weight: 49.5 kg (109 lb 2 oz)  Body mass index is 19.96 kg/m².  Vital Signs (Most Recent):  Temp: 97.7 °F (36.5 °C) (05/13/18 1105)  Pulse: 100 (05/13/18 1305)  Resp: (!) 34 (05/13/18 1305)  BP: (!) 168/70 (05/13/18 1305)  SpO2: 100 % (05/13/18 1305) Vital Signs (24h Range):  Temp:  [96.3 °F  (35.7 °C)-99.2 °F (37.3 °C)] 97.7 °F (36.5 °C)  Pulse:  [] 100  Resp:  [17-65] 34  SpO2:  [93 %-100 %] 100 %  BP: ()/(57-97) 168/70       Date 05/13/18 0700 - 05/14/18 0659   Shift 3198-2393 8393-7700 0946-7495 24 Hour Total   I  N  T  A  K  E   I.V.  (mL/kg) 485.8  (9.8)   485.8  (9.8)    Shift Total  (mL/kg) 485.8  (9.8)   485.8  (9.8)   O  U  T  P  U  T   Shift Total  (mL/kg)       Weight (kg) 49.5 49.5 49.5 49.5              Oxygen Concentration (%):  [34] 34         Neurosurgery Physical Exam   AAOx1, confused, restless  DYER, follows commands intermittently  SILT      Significant Labs:    Recent Labs  Lab 05/11/18 1947 05/12/18  0630 05/13/18  0250   * 105 106   * 145 147*   K 3.6 2.8* 5.2*    105 117*   CO2 30* 30* 18*   BUN 23 21 22   CREATININE 0.9 0.8 0.7   CALCIUM 9.4 8.9 8.0*   MG 1.9 1.8 2.1       Recent Labs  Lab 05/11/18 1947 05/12/18  0522 05/12/18  0630   WBC 9.43 7.35 7.93   HGB 11.0* 10.4* 9.9*   HCT 34.2* 32.9* 30.9*    338 322       Recent Labs  Lab 05/11/18 1947 05/12/18  0522 05/12/18  0630   INR 1.4* 1.6* 1.7*     Microbiology Results (last 7 days)     Procedure Component Value Units Date/Time    Blood culture x two cultures. Draw prior to antibiotics. [[id number]] Collected:  05/11/18 1920    Order Status:  Completed Specimen:  Blood from Peripheral, Antecubital, Left Updated:  05/12/18 2212     Blood Culture, Routine No Growth to date     Blood Culture, Routine No Growth to date    Narrative:       Aerobic and anaerobic    Blood culture x two cultures. Draw prior to antibiotics. [[id number]] Collected:  05/11/18 1948    Order Status:  Completed Specimen:  Blood from Peripheral, Hand, Right Updated:  05/12/18 2212     Blood Culture, Routine No Growth to date     Blood Culture, Routine No Growth to date    Narrative:       Aerobic and anaerobic        All pertinent labs from the last 24 hours have been reviewed.    Significant Diagnostics:  I have  reviewed and interpreted all pertinent imaging results/findings within the past 24 hours.    Assessment/Plan:     * ICH (intracerebral hemorrhage)    Sunita Zimmer is a 77 y.o. year old female on Coumadin who presents with small L occipital horn IVH.No acute hydrocephalus.      -- Family requesting hospice after discussion with NCC  -- will sign off               Jude Mckeon MD  Neurosurgery  [facility]

## 2018-05-13 NOTE — PROGRESS NOTES
POC reviewed with pt at 0500. Pt verbalized understanding. Questions and concerns addressed. Pt refused to go to CT for scheduled CTA, take medication, or have blood drawn for labs.  PT uncooperative with treatment plan.  Sitter  at bedside overnight.  Pt remained calm on Precidex.  Will continue to monitor. See flowsheets for full assessment and VS info

## 2018-05-13 NOTE — SUBJECTIVE & OBJECTIVE
Interval History:  Pt refusing medications and therapies overnight. Discussions with family and patient. They would like to go home with hospice. Arrangements in process.     Review of Systems   Constitutional: Negative for fever.   Respiratory: Negative for cough.    Cardiovascular: Negative for chest pain.   Gastrointestinal: Negative for abdominal pain and vomiting.   Skin: Negative for rash.         Objective:     Vitals:  Temp: 97.7 °F (36.5 °C)  Pulse: 101  Rhythm: normal sinus rhythm  BP: (!) 157/71  MAP (mmHg): 102  Resp: (!) 30  SpO2: 98 %  Oxygen Concentration (%): 34  O2 Device (Oxygen Therapy): nasal cannula    Temp  Min: 96.3 °F (35.7 °C)  Max: 99.2 °F (37.3 °C)  Pulse  Min: 72  Max: 119  BP  Min: 73/57  Max: 174/79  MAP (mmHg)  Min: 62  Max: 113  Resp  Min: 17  Max: 65  SpO2  Min: 93 %  Max: 100 %  Oxygen Concentration (%)  Min: 34  Max: 34    05/12 0701 - 05/13 0700  In: 3300.4 [I.V.:3150.4]  Out: -    Unmeasured Output  Urine Occurrence: 1  Stool Occurrence: 1       Physical Exam   Constitutional: No distress.   HENT:   Head: Normocephalic and atraumatic.   Eyes: EOM are normal. Pupils are equal, round, and reactive to light.   Pulmonary/Chest: Effort normal. No respiratory distress.   Abdominal: She exhibits no distension.   Musculoskeletal: Normal range of motion. She exhibits no deformity.   Neurological: She is alert.   Follows commands, moves all extremities.    Skin: Skin is warm and dry. She is not diaphoretic.       Medications:  Continuous  sodium chloride 0.9% Last Rate: 50 mL/hr at 05/13/18 1205   dexmedetomidine (PRECEDEX) infusion Last Rate: 0.2 mcg/kg/hr (05/13/18 1205)   niCARdipine Last Rate: Stopped (05/13/18 0905)   Scheduled  nicotine 1 patch Daily   potassium chloride 10 mEq Once   senna-docusate 8.6-50 mg 1 tablet BID   sodium chloride 0.9% 3 mL Q8H   PRN  acetaminophen 650 mg Q6H PRN   labetalol 10 mg Q4H PRN   magnesium oxide 800 mg PRN   magnesium oxide 800 mg PRN   potassium  chloride in water 40 mEq PRN   And     potassium chloride in water 60 mEq PRN   And     potassium chloride in water 80 mEq PRN   potassium, sodium phosphates 2 packet PRN   potassium, sodium phosphates 2 packet PRN   potassium, sodium phosphates 2 packet PRN     Today I personally reviewed pertinent medications, lines/drains/airways, imaging, cardiology, lab results, microbiology results, notably:    Diet  Diet Dysphagia Mechanical Soft (IDDSI Level 5) Thin  Diet Dysphagia Mechanical Soft (IDDSI Level 5) Thin

## 2018-05-13 NOTE — PLAN OF CARE
"   [facility]     Department of Hospital Medicine     [address] [place] [address]     [phone] after hours  [phone] fax                                   HOSPICE  ORDERS     05/13/2018    Admit to Hospice:  Home Service     Diagnoses:  Active Hospital Problems    Diagnosis  POA    *ICH (intracerebral hemorrhage) [I61.9]  Unknown    Altered mental state [R41.82]  Yes    Altered mental status, unspecified [R41.82]  Yes    Essential hypertension [I10]  Yes    Current smoker [F17.200]  Yes      Resolved Hospital Problems    Diagnosis Date Resolved POA   No resolved problems to display.       Hospice Qualifying Diagnoses:        Patient has a life expectancy < 6 months due to these conditions.    Vital Signs: Routine per Hospice Protocol.    Allergies:  Review of patient's allergies indicates:   Allergen Reactions    Bacitracin Other (See Comments)     "can not take because of Warfarin"    Bactrim [sulfamethoxazole-trimethoprim] Other (See Comments)     "can not take because of Warfarin"       Diet: mechanical soft, thin liquids    Activities: As tolerated    Nursing: Per Hospice Routine    Future Orders:  Hospice Medical Director may dictate new orders for comfortable care measures & sign death certificate.    Medications:        Sunita Zimmer   Home Medication Instructions RL:[id number]    Printed on:05/13/18 1011   Medication Information                      acetaminophen-codeine 300-30mg (TYLENOL-CODEINE #3) 300-30 mg Tab  Take by mouth.             albuterol (PROVENTIL) 4 MG Tab               alprazolam (XANAX) 0.5 MG tablet                          cyclobenzaprine (FLEXERIL) 10 MG tablet  Take 10 mg by mouth 3 (three) times daily as needed for Muscle spasms.             diazepam (VALIUM) 5 MG tablet  Take 5 mg by mouth every 6 (six) hours as needed for Anxiety.             FLUZONE HIGH-DOSE 2017-18, PF, 180 mcg/0.5 mL vaccine             "   hydrocodone-acetaminophen 7.5-500 mg (LORTAB) 7.5-500 mg per tablet  Take 1 tablet by mouth every 6 (six) hours as needed for Pain.             ketorolac 0.5% (ACULAR) 0.5 % Drop  Place 1 drop into the right eye 3 (three) times daily.             nitroGLYCERIN (NITROSTAT) 0.4 MG SL tablet  Place 0.4 mg under the tongue every 5 (five) minutes as needed for Chest pain.             ofloxacin (OCUFLOX) 0.3 % ophthalmic solution  1 drop 4 (four) times daily.             polyethylene glycol (GLYCOLAX) 17 gram/dose powder  Take 17 g by mouth.             prednisoLONE acetate (PRED FORTE) 1 % DrpS  1 drop 3 (three) times daily.             ramipril (ALTACE) 10 MG capsule  Take 10 mg by mouth.              temazepam (RESTORIL) 15 mg Cap  Take 15 mg by mouth.             umeclidinium 62.5 mcg/actuation DsDv  Inhale 1 puff into the lungs.             VENTOLIN HFA 90 mcg/actuation inhaler                         _________________________________  Jonelle Pop MD  05/13/2018

## 2018-05-13 NOTE — HPI
Sunita Zimmer is a 77 y.o. year old female who presents on 5/11/2018 with complicated PMHx on chronic anticoagulation, Coumadin, for CAD. She had baseline dementia. Discharged to SNF recently and now p/w AMS per EMS. History limited and no family member available.Reportedly, she has a new onset difficulty to arouse and only been responsive to sternal rub.She was brought to [facility] for further work up. She denies any other complaints; no HA, N/V, weakness or numbness. Unclear of she has any hx of trauma however, she denies any fall. INR 13.-1.4. CT brain and MRI brain obtained shows small focus of blood involving L occipital horn.Brain atrophy with ex-vac HC seen on scans. NSGY consulted for further evaluation.

## 2018-05-13 NOTE — SUBJECTIVE & OBJECTIVE
Interval History: family requesting her to be discharged home to hospice    Medications:  Continuous Infusions:   sodium chloride 0.9% 50 mL/hr at 05/13/18 1305    dexmedetomidine (PRECEDEX) infusion 0.2 mcg/kg/hr (05/13/18 1305)    niCARdipine Stopped (05/13/18 0905)     Scheduled Meds:   nicotine  1 patch Transdermal Daily    potassium chloride  10 mEq Intravenous Once    senna-docusate 8.6-50 mg  1 tablet Oral BID    sodium chloride 0.9%  3 mL Intravenous Q8H     PRN Meds:acetaminophen, labetalol, magnesium oxide, magnesium oxide, potassium chloride in water **AND** potassium chloride in water **AND** potassium chloride in water, potassium, sodium phosphates, potassium, sodium phosphates, potassium, sodium phosphates     Review of Systems  Objective:     Weight: 49.5 kg (109 lb 2 oz)  Body mass index is 19.96 kg/m².  Vital Signs (Most Recent):  Temp: 97.7 °F (36.5 °C) (05/13/18 1105)  Pulse: 100 (05/13/18 1305)  Resp: (!) 34 (05/13/18 1305)  BP: (!) 168/70 (05/13/18 1305)  SpO2: 100 % (05/13/18 1305) Vital Signs (24h Range):  Temp:  [96.3 °F (35.7 °C)-99.2 °F (37.3 °C)] 97.7 °F (36.5 °C)  Pulse:  [] 100  Resp:  [17-65] 34  SpO2:  [93 %-100 %] 100 %  BP: ()/(57-97) 168/70       Date 05/13/18 0700 - 05/14/18 0659   Shift 7298-8939 7556-0523 7477-5723 24 Hour Total   I  N  T  A  K  E   I.V.  (mL/kg) 485.8  (9.8)   485.8  (9.8)    Shift Total  (mL/kg) 485.8  (9.8)   485.8  (9.8)   O  U  T  P  U  T   Shift Total  (mL/kg)       Weight (kg) 49.5 49.5 49.5 49.5              Oxygen Concentration (%):  [34] 34         Neurosurgery Physical Exam   AAOx1, confused, restless  DYER, follows commands intermittently  SILT      Significant Labs:    Recent Labs  Lab 05/11/18  1947 05/12/18  0630 05/13/18  0250   * 105 106   * 145 147*   K 3.6 2.8* 5.2*    105 117*   CO2 30* 30* 18*   BUN 23 21 22   CREATININE 0.9 0.8 0.7   CALCIUM 9.4 8.9 8.0*   MG 1.9 1.8 2.1       Recent Labs  Lab  05/11/18 1947 05/12/18  0522 05/12/18  0630   WBC 9.43 7.35 7.93   HGB 11.0* 10.4* 9.9*   HCT 34.2* 32.9* 30.9*    338 322       Recent Labs  Lab 05/11/18 1947 05/12/18 0522 05/12/18  0630   INR 1.4* 1.6* 1.7*     Microbiology Results (last 7 days)     Procedure Component Value Units Date/Time    Blood culture x two cultures. Draw prior to antibiotics. [[id number]] Collected:  05/11/18 1920    Order Status:  Completed Specimen:  Blood from Peripheral, Antecubital, Left Updated:  05/12/18 2212     Blood Culture, Routine No Growth to date     Blood Culture, Routine No Growth to date    Narrative:       Aerobic and anaerobic    Blood culture x two cultures. Draw prior to antibiotics. [[id number]] Collected:  05/11/18 1948    Order Status:  Completed Specimen:  Blood from Peripheral, Hand, Right Updated:  05/12/18 2212     Blood Culture, Routine No Growth to date     Blood Culture, Routine No Growth to date    Narrative:       Aerobic and anaerobic        All pertinent labs from the last 24 hours have been reviewed.    Significant Diagnostics:  I have reviewed and interpreted all pertinent imaging results/findings within the past 24 hours.

## 2018-05-13 NOTE — PROGRESS NOTES
MD spoke with the family to discuss hospice care and bringing the patient home. Two RNs in room during discussion. Family agrees hospice care and bringing the patient home is the wishes of the patient. Hospice care orders will be placed. RN to discontinue cardene and will not follow the < 140 orders. NS and precedex to stay on to per MD. RN will continue to monitor.

## 2018-05-13 NOTE — ASSESSMENT & PLAN NOTE
-- Continue Neuro checks q 1hr  -- Vascular Neurology consulted, appreciate assistance.   -- 05/12 CT brain and MRI brain obtained shows small focus of blood involving L occipital horn.   -- Repeat CT Head shows stable intraventricular hemorrhage within the posterior horn of the left lateral ventricle.  No interval detrimental change.   -- SBP goal < 160  -- Pt and family wish to be DC'd home with hospice. Arrangements made.

## 2018-05-13 NOTE — ASSESSMENT & PLAN NOTE
- Evaluated by Vascular Neurology, intraventricular hemorrhage unlikely the cause of her AMS  - UA significant for UTI  - Pt refusing medications and therapies

## 2018-05-13 NOTE — PROGRESS NOTES
Pt refusing CTA head.  Pt informed of importance of scan for ongoing care.  Pt continues to refuse.  NCC informed of pts refusal.  No new orders.

## 2018-05-13 NOTE — DISCHARGE SUMMARY
[facility]  Neurocritical Care  Discharge Summary    Admit Date: 5/11/2018    Service Date: 05/13/2018    Discharge Date:     Length of Stay: 1    Final Active Diagnoses:    Diagnosis Date Noted POA    PRINCIPAL PROBLEM:  ICH (intracerebral hemorrhage) [I61.9] 05/12/2018 Unknown    UTI (urinary tract infection) [N39.0] 05/13/2018 Yes    Altered mental state [R41.82] 05/12/2018 Yes    Altered mental status, unspecified [R41.82] 05/11/2018 Yes    Essential hypertension [I10] 06/04/2014 Yes    Current smoker [F17.200] 06/04/2014 Yes      Problems Resolved During this Admission:    Diagnosis Date Noted Date Resolved POA      History of Present Illness: Ms. Zimmer is a 77 year old female  with significant pmhx of CAD, Cardiac Angioplasty, Brain aneurysm x 4, Smoker, CKD, Coumadin for DVT, HTN, and PVD who presents to [facility] for small intraventricular hemorrhage in the left occipital horn. The patient [place] [facility] [place] AMS and difficulty to arouse. CT brain and MRI brain obtained shows small focus of blood involving L occipital horn.       Hospital Course by Event: 05/12: Admit to [facility], CTH, MRI. CT head showed stable intraventricular hemorrhage, no interval change.   5/13: Pt refusing all medications and therapies. Discussions with patient and family, they would like to go home with hospice.  Arrangements made with / on call.  Palliative Care consulted. Plan for discharge home with hospice today.     Hospital Course by Problem:   * ICH (intracerebral hemorrhage)    -- Continue Neuro checks q 1hr  -- Vascular Neurology consulted, appreciate assistance.   -- 05/12 CT brain and MRI brain obtained shows small focus of blood involving L occipital horn.   -- Repeat CT Head shows stable intraventricular hemorrhage within the posterior horn of the left lateral ventricle.  No interval detrimental change.   -- SBP goal < 160  -- Pt and family wish to be DC'd home  with hospice. Arrangements made.         UTI (urinary tract infection)    - UA with 3+ leukocytes, +nitrtes, many bacteria   - Pt refusing medications        Altered mental state    - Evaluated by Vascular Neurology, intraventricular hemorrhage unlikely the cause of her AMS  - UA significant for UTI  - Pt refusing medications and therapies        Current smoker    - Nicotine patch        Essential hypertension    -- Continue to monitor HR and BP   -- SBP goal < 160  -- 2D echo EF 60-65%, pulmonary htn  -- Pt refusing medications           Significant Results:  Imaging:  Imaging Results          X-Ray Chest AP Single View (Final result)  Result time 05/12/18 08:44:03    Final result by Lawrence Victoria MD (05/12/18 08:44:03)                 Impression:      Unchanged small bilateral pleural effusions with bibasilar airspace disease and mild interstitial edema.      Electronically signed by: Lawrence Victoria MD  Date:    05/12/2018  Time:    08:44             Narrative:    EXAMINATION:  XR CHEST 1 VIEW    CLINICAL HISTORY:  ro pna;    TECHNIQUE:  Single frontal portable view of the chest was performed.    COMPARISON:  Chest radiograph 05/11/2018.    FINDINGS:  Support devices: Right subclavian vascular stent is present.    Small left greater than right pleural effusions are unchanged with associated mild bibasilar airspace disease, also unchanged.  Interstitial markings throughout the lungs are mildly accentuated.  No pneumothorax.    The cardiac silhouette is normal in size. The hilar and mediastinal contours are unremarkable.    Bones are intact.                               CT Head Without Contrast (Final result)  Result time 05/12/18 05:41:57    Final result by Efren Sagastume MD (05/12/18 05:41:57)                 Impression:      1. Stable intraventricular hemorrhage within the posterior horn of the left lateral ventricle.  No interval detrimental change.    Electronically signed by resident: Marija  Thi  Date:    05/12/2018  Time:    05:28    Electronically signed by: Efren Sagastume MD  Date:    05/12/2018  Time:    05:41             Narrative:    EXAMINATION:  CT HEAD WITHOUT CONTRAST    CLINICAL HISTORY:  Stroke;    TECHNIQUE:  Low dose axial CT images obtained throughout the head without intravenous contrast. Sagittal and coronal reconstructions were performed.    COMPARISON:  CT head without contrast 05/11/2018, MRI brain without contrast 05/12/2018    FINDINGS:  Intracranial compartment:    There is generalized cerebral volume loss with compensatory enlargement of the ventricles and sulci.  There is redemonstration of small volume hyperdense material, within the posterior horn of the left lateral ventricle, unchanged since the prior examination.    There are hypodensities in the bilateral basal ganglia, compatible with remote lacunar type infarction versus prominent perivascular spaces.  There is mild patchy hypoattenuation of the supratentorial white matter, compatible with chronic microvascular ischemic change.  No parenchymal mass, hemorrhage, edema or major vascular distribution infarct.    Skull/extracranial contents (limited evaluation): No fracture. Minimal mucosal thickening of the right maxillary antrum.  The remaining paranasal sinuses and mastoid air cells are clear.                               MRI Brain Without Contrast (Final result)  Result time 05/12/18 01:31:08    Final result by Fei Ascencio MD (05/12/18 01:31:08)                 Impression:      Small intraventricular hemorrhage in the left occipital horn corresponding to CT finding.    No acute infarcts.    Old lacunar infarcts bilateral basal ganglia.    Senescent changes.      Electronically signed by: Fei Ascencio MD  Date:    05/12/2018  Time:    01:31             Narrative:    EXAMINATION:  MRI BRAIN WITHOUT CONTRAST    CLINICAL HISTORY:  Stroke;.  Cerebral infarction, unspecified    TECHNIQUE:  Multiplanar  multisequence MR imaging of the brain was performed without contrast.    COMPARISON:  None    FINDINGS:  Intracranial compartment:    Ventricles and sulci are enlarged suggesting generalized involutional change.  Small intraventricular hemorrhage in the left occipital horn corresponding to CT finding.  No extra-axial fluid collections.    Old lacunar infarcts bilateral basal ganglia.  Supratentorial white matter T2/flair hyperintense signal foci suggesting sequela of chronic small vessel ischemic change.  No mass lesion, acute intraparenchymal hemorrhage, edema or acute infarct.    Normal vascular flow voids are preserved.    Skull/extracranial contents (limited evaluation): Bone marrow signal intensity is normal.                               CT Head Without Contrast (Final result)  Result time 05/11/18 22:08:15    Final result by Fei Ascencio MD (05/11/18 22:08:15)                 Impression:      Very small focus of hyperdensity appearing to layer in the occipital horn left lateral ventricle may reflect blood products from a recent trace intraventricular hemorrhage.  No acute parenchymal hemorrhage.  No hydrocephalus.  Consider further evaluation with MRI.    New focus of hypodensity in the anterior limb left internal capsule may reflect an age-indeterminate infarct.  This could also be further evaluated with MRI.    Stable hypodensity in the right insular lobe possibly a remote lacunar type infarct or prominent perivascular space.    Senescent changes.    COMMUNICATION  This critical result was discovered/received at 9:35 p.m..  The critical information above was relayed directly by resident Lito Saunders by telephone to Dr. Ghosh on 05/11/2018 at 9:49 p.m..    Electronically signed by resident: Lito Saunders  Date:    05/11/2018  Time:    21:19    Electronically signed by: Fei Ascencio MD  Date:    05/11/2018  Time:    22:08             Narrative:    EXAMINATION:  CT HEAD WITHOUT  CONTRAST    CLINICAL HISTORY:  Confusion/delirium, altered LOC, unexplained;    TECHNIQUE:  Low dose axial CT images obtained throughout the head without intravenous contrast. Sagittal and coronal reconstructions were performed.    COMPARISON:  CT head 05/22/2017    FINDINGS:  Intracranial compartment:    Stable size and configuration the ventricles without hydrocephalus.  Very small focus of hyperdense material layering in the occipital left lateral ventricle, possibly blood products from recent trace intraventricular hemorrhage.  Enlargement of the ventricular system and sulci suggesting generalized involutional changes.  No transependymal edema or sulcal effacement.  Mild confluent decreased supratentorial white matter attenuation most likely related to chronic nonspecific small vessel disease.      No extra-axial blood or fluid collections.    New hypodense focus in the anterior limb left internal capsule concerning for age indeterminate infarct.  Stable hypodense focus in the right subinsular region represent remote lacunar type infarct or space.  Acute parenchymal hemorrhage.  No abnormal edema or mass effect.    Skull/extracranial contents (limited evaluation): No acute displaced fracture.  Minimal right maxillary sinus thickening.  Remaining visualized paranasal sinuses and bilateral mastoid air cells are clear.                               X-Ray Chest AP Portable (Final result)  Result time 05/11/18 20:03:02    Final result by Corona Contreras MD (05/11/18 20:03:02)                 Impression:      As above.      Electronically signed by: Corona Contreras MD  Date:    05/11/2018  Time:    20:03             Narrative:    EXAMINATION:  XR CHEST AP PORTABLE    CLINICAL HISTORY:  Sepsis;    TECHNIQUE:  Single frontal view of the chest was performed.    COMPARISON:  Chest radiograph 05/09/2018 and CT thorax 08/04/2014    FINDINGS:  Tubing overlies the upper right chest.  Patient is somewhat rotated.   Cardiomediastinal silhouette appears stable.  Cardiomediastinal silhouette is grossly unchanged allowing for AP portable technique and rotation.  There are small bilateral pleural effusions, left greater than right with probable left basilar atelectasis/infiltrate grossly similar to prior.  No large pneumothorax or new focal opacity.  No acute osseous process seen.  PA and lateral views can be obtained.                                  Cardiology:    CONCLUSIONS     1 - Normal left ventricular systolic function (EF 65-70%).     2 - Normal right ventricular systolic function .     3 - Pulmonary hypertension. The estimated PA systolic pressure is 52 mmHg.     4 - Trivial mitral regurgitation.     5 - Mild tricuspid regurgitation.     6 - Concentric remodeling.     7 - No wall motion abnormalities.     Microbiology:  Microbiology Results (last 7 days)     Procedure Component Value Units Date/Time    Blood culture x two cultures. Draw prior to antibiotics. [[id number]] Collected:  05/11/18 1920    Order Status:  Completed Specimen:  Blood from Peripheral, Antecubital, Left Updated:  05/12/18 2212     Blood Culture, Routine No Growth to date     Blood Culture, Routine No Growth to date    Narrative:       Aerobic and anaerobic    Blood culture x two cultures. Draw prior to antibiotics. [[id number]] Collected:  05/11/18 1948    Order Status:  Completed Specimen:  Blood from Peripheral, Hand, Right Updated:  05/12/18 2212     Blood Culture, Routine No Growth to date     Blood Culture, Routine No Growth to date    Narrative:       Aerobic and anaerobic            Laboratory:  Lab Results   Component Value Date    HGBA1C 5.4 05/12/2018    CHOL 123 05/12/2018    HDL 35 (L) 05/12/2018    LDLCALC 60.6 (L) 05/12/2018    TRIG 137 05/12/2018    TSH 1.687 05/11/2018       Pending Results:     Consultations:  IP CONSULT TO VASCULAR (STROKE) NEUROLOGY  IP CONSULT TO NEUROSURGERY  IP CONSULT TO NEURO CRITICAL CARE  IP CONSULT TO  VASCULAR (STROKE) NEUROLOGY  IP CONSULT TO SOCIAL WORK/CASE MANAGEMENT  IP CONSULT TO PALLIATIVE CARE  IP CONSULT TO HOSPICE      Procedures:       Medications:    Sunita Zimmer   Home Medication Instructions RL:[id number]    Printed on:05/13/18 0560   Medication Information                      acetaminophen-codeine 300-30mg (TYLENOL-CODEINE #3) 300-30 mg Tab  Take by mouth.             albuterol (PROVENTIL) 4 MG Tab               alprazolam (XANAX) 0.5 MG tablet               cyclobenzaprine (FLEXERIL) 10 MG tablet  Take 10 mg by mouth 3 (three) times daily as needed for Muscle spasms.             diazepam (VALIUM) 5 MG tablet  Take 5 mg by mouth every 6 (six) hours as needed for Anxiety.             FLUZONE HIGH-DOSE 2017-18, PF, 180 mcg/0.5 mL vaccine               hydrocodone-acetaminophen 7.5-500 mg (LORTAB) 7.5-500 mg per tablet  Take 1 tablet by mouth every 6 (six) hours as needed for Pain.             ketorolac 0.5% (ACULAR) 0.5 % Drop  Place 1 drop into the right eye 3 (three) times daily.             nitroGLYCERIN (NITROSTAT) 0.4 MG SL tablet  Place 0.4 mg under the tongue every 5 (five) minutes as needed for Chest pain.             ofloxacin (OCUFLOX) 0.3 % ophthalmic solution  1 drop 4 (four) times daily.             polyethylene glycol (GLYCOLAX) 17 gram/dose powder  Take 17 g by mouth.             prednisoLONE acetate (PRED FORTE) 1 % DrpS  1 drop 3 (three) times daily.             ramipril (ALTACE) 10 MG capsule  Take 10 mg by mouth.              temazepam (RESTORIL) 15 mg Cap  Take 15 mg by mouth.             umeclidinium 62.5 mcg/actuation DsDv  Inhale 1 puff into the lungs.             VENTOLIN HFA 90 mcg/actuation inhaler                 Diet: dysphagia diet, thin     Activity: As tolerated.    Disposition: Discharged to home with hospice    Follow Up Plan:  Home with Hospice    This discharge took 30 minutes to complete.    Jonelle Pop MD  Neurocritical Care  [facility]

## 2018-05-13 NOTE — PROGRESS NOTES
Patient transferred via EMS, [facility] service to the patient's home for Hospice care. Paperwork sent with EMS providers. Belongings sent home with family.

## 2018-05-13 NOTE — PROGRESS NOTES
[facility]  Neurocritical Care  Progress Note    Admit Date: 5/11/2018  Service Date: 05/13/2018  Length of Stay: 1    Subjective:     Chief Complaint: ICH (intracerebral hemorrhage)    History of Present Illness: Ms. Zimmer is a 77 year old female  with significant pmhx of CAD, Cardiac Angioplasty, Brain aneurysm x 4, Smoker, CKD, Coumadin for DVT, HTN, and PVD who presents to [facility] for small intraventricular hemorrhage in the left occipital horn. The patient initially [place] [facility] [place] and difficulty to arouse. CT brain and MRI brain obtained shows small focus of blood involving L occipital horn.       Hospital Course: 05/12: Admit to [facility], CTH, MRI  5/13: Pt refusing all medications and therapies. Discussions with patient and family, they would like to go home with hospice.  Arrangements made with / on call.  Palliative Care consulted. Plan for discharge home with hospice today.     Interval History:  Pt refusing medications and therapies overnight. Discussions with family and patient. They would like to go home with hospice. Arrangements in process.     Review of Systems   Constitutional: Negative for fever.   Respiratory: Negative for cough.    Cardiovascular: Negative for chest pain.   Gastrointestinal: Negative for abdominal pain and vomiting.   Skin: Negative for rash.         Objective:     Vitals:  Temp: 97.7 °F (36.5 °C)  Pulse: 101  Rhythm: normal sinus rhythm  BP: (!) 157/71  MAP (mmHg): 102  Resp: (!) 30  SpO2: 98 %  Oxygen Concentration (%): 34  O2 Device (Oxygen Therapy): nasal cannula    Temp  Min: 96.3 °F (35.7 °C)  Max: 99.2 °F (37.3 °C)  Pulse  Min: 72  Max: 119  BP  Min: 73/57  Max: 174/79  MAP (mmHg)  Min: 62  Max: 113  Resp  Min: 17  Max: 65  SpO2  Min: 93 %  Max: 100 %  Oxygen Concentration (%)  Min: 34  Max: 34    05/12 0701 - 05/13 0700  In: 3300.4 [I.V.:3150.4]  Out: -    Unmeasured Output  Urine Occurrence: 1  Stool Occurrence: 1        Physical Exam   Constitutional: No distress.   HENT:   Head: Normocephalic and atraumatic.   Eyes: EOM are normal. Pupils are equal, round, and reactive to light.   Pulmonary/Chest: Effort normal. No respiratory distress.   Abdominal: She exhibits no distension.   Musculoskeletal: Normal range of motion. She exhibits no deformity.   Neurological: She is alert.   Follows commands, moves all extremities.    Skin: Skin is warm and dry. She is not diaphoretic.       Medications:  Continuous  sodium chloride 0.9% Last Rate: 50 mL/hr at 05/13/18 1205   dexmedetomidine (PRECEDEX) infusion Last Rate: 0.2 mcg/kg/hr (05/13/18 1205)   niCARdipine Last Rate: Stopped (05/13/18 0905)   Scheduled  nicotine 1 patch Daily   potassium chloride 10 mEq Once   senna-docusate 8.6-50 mg 1 tablet BID   sodium chloride 0.9% 3 mL Q8H   PRN  acetaminophen 650 mg Q6H PRN   labetalol 10 mg Q4H PRN   magnesium oxide 800 mg PRN   magnesium oxide 800 mg PRN   potassium chloride in water 40 mEq PRN   And     potassium chloride in water 60 mEq PRN   And     potassium chloride in water 80 mEq PRN   potassium, sodium phosphates 2 packet PRN   potassium, sodium phosphates 2 packet PRN   potassium, sodium phosphates 2 packet PRN     Today I personally reviewed pertinent medications, lines/drains/airways, imaging, cardiology, lab results, microbiology results, notably:    Diet  Diet Dysphagia Mechanical Soft (IDDSI Level 5) Thin  Diet Dysphagia Mechanical Soft (IDDSI Level 5) Thin        Assessment/Plan:     Neuro   * ICH (intracerebral hemorrhage)    -- Continue Neuro checks q 1hr  -- Vascular Neurology consulted, appreciate assistance.   -- 05/12 CT brain and MRI brain obtained shows small focus of blood involving L occipital horn.   -- Repeat CT Head shows stable intraventricular hemorrhage within the posterior horn of the left lateral ventricle.  No interval detrimental change.   -- SBP goal < 160  -- Pt and family wish to be DC'd home with  hospice. Arrangements made.         Altered mental state    - Evaluated by Vascular Neurology, intraventricular hemorrhage unlikely the cause of her AMS  - UA significant for UTI  - Pt refusing medications and therapies        Cardiac/Vascular   Essential hypertension    -- Continue to monitor HR and BP   -- SBP goal < 160  -- 2D echo EF 60-65%, pulmonary htn  -- Pt refusing medications         Renal/   UTI (urinary tract infection)    - UA with 3+ leukocytes, +nitrtes, many bacteria   - Pt refusing medications        Other   Current smoker    - Nicotine patch            Prophylaxis:  Venous Thromboembolism: NA  Stress Ulcer: NA  Ventilator Pneumonia: not applicable     Activity Orders          Bed rest starting at 05/11 1943        DNR    Jonelle Pop MD  Neurocritical Care  [facility]

## 2018-05-13 NOTE — ASSESSMENT & PLAN NOTE
Sunita Zimmer is a 77 y.o. year old female on Coumadin who presents with small L occipital horn IVH.No acute hydrocephalus.      -- Family requesting hospice after discussion with [facility]  -- will sign off

## 2018-05-14 ENCOUNTER — TELEPHONE (OUTPATIENT)
Dept: NEUROSURGERY | Facility: CLINIC | Age: 78
End: 2018-05-14

## 2018-05-14 ENCOUNTER — HOSPITAL ENCOUNTER (INPATIENT)
Facility: HOSPITAL | Age: 78
LOS: 6 days | Discharge: HOME HEALTH | DRG: 871 | End: 2018-05-20
Attending: EMERGENCY MEDICINE | Admitting: INTERNAL MEDICINE
Payer: COMMERCIAL

## 2018-05-14 ENCOUNTER — PATIENT MESSAGE (OUTPATIENT)
Dept: NEUROSURGERY | Facility: CLINIC | Age: 78
End: 2018-05-14

## 2018-05-14 DIAGNOSIS — I49.9 ARRHYTHMIA: ICD-10-CM

## 2018-05-14 DIAGNOSIS — R41.0 DELIRIUM: Primary | ICD-10-CM

## 2018-05-14 DIAGNOSIS — G93.41 ACUTE METABOLIC ENCEPHALOPATHY: ICD-10-CM

## 2018-05-14 DIAGNOSIS — N30.00 ACUTE CYSTITIS WITHOUT HEMATURIA: ICD-10-CM

## 2018-05-14 DIAGNOSIS — R94.31 PROLONGED Q-T INTERVAL ON ECG: ICD-10-CM

## 2018-05-14 DIAGNOSIS — I16.1 HYPERTENSIVE EMERGENCY: ICD-10-CM

## 2018-05-14 DIAGNOSIS — Z86.79 HISTORY OF INTRACRANIAL HEMORRHAGE: ICD-10-CM

## 2018-05-14 PROBLEM — I16.0 HYPERTENSIVE URGENCY: Status: ACTIVE | Noted: 2018-05-14

## 2018-05-14 PROBLEM — E87.0 HYPERNATREMIA: Status: ACTIVE | Noted: 2018-05-14

## 2018-05-14 PROBLEM — F05 DELIRIUM DUE TO MULTIPLE ETIOLOGIES: Status: ACTIVE | Noted: 2018-05-14

## 2018-05-14 PROBLEM — G93.40 ACUTE ENCEPHALOPATHY: Status: ACTIVE | Noted: 2018-05-14

## 2018-05-14 LAB
ALBUMIN SERPL BCP-MCNC: 2.8 G/DL
ALP SERPL-CCNC: 116 U/L
ALT SERPL W/O P-5'-P-CCNC: 30 U/L
AMPHET+METHAMPHET UR QL: NEGATIVE
ANION GAP SERPL CALC-SCNC: 15 MMOL/L
APAP SERPL-MCNC: <3 UG/ML
AST SERPL-CCNC: 25 U/L
BACTERIA #/AREA URNS AUTO: ABNORMAL /HPF
BARBITURATES UR QL SCN>200 NG/ML: NEGATIVE
BASOPHILS # BLD AUTO: 0.04 K/UL
BASOPHILS NFR BLD: 0.3 %
BENZODIAZ UR QL SCN>200 NG/ML: NORMAL
BILIRUB SERPL-MCNC: 0.4 MG/DL
BILIRUB UR QL STRIP: NEGATIVE
BUN SERPL-MCNC: 17 MG/DL
BZE UR QL SCN: NEGATIVE
CALCIUM SERPL-MCNC: 9.1 MG/DL
CANNABINOIDS UR QL SCN: NEGATIVE
CHLORIDE SERPL-SCNC: 110 MMOL/L
CLARITY UR REFRACT.AUTO: ABNORMAL
CO2 SERPL-SCNC: 25 MMOL/L
COLOR UR AUTO: YELLOW
CREAT SERPL-MCNC: 0.8 MG/DL
CREAT UR-MCNC: 28 MG/DL
DIFFERENTIAL METHOD: ABNORMAL
EOSINOPHIL # BLD AUTO: 0.1 K/UL
EOSINOPHIL NFR BLD: 0.7 %
ERYTHROCYTE [DISTWIDTH] IN BLOOD BY AUTOMATED COUNT: 13.4 %
EST. GFR  (AFRICAN AMERICAN): >60 ML/MIN/1.73 M^2
EST. GFR  (NON AFRICAN AMERICAN): >60 ML/MIN/1.73 M^2
ETHANOL SERPL-MCNC: <10 MG/DL
GLUCOSE SERPL-MCNC: 106 MG/DL
GLUCOSE UR QL STRIP: NEGATIVE
GRAM STN SPEC: NORMAL
GRAM STN SPEC: NORMAL
HCT VFR BLD AUTO: 34.8 %
HGB BLD-MCNC: 11.3 G/DL
HGB UR QL STRIP: ABNORMAL
HYALINE CASTS UR QL AUTO: 0 /LPF
IMM GRANULOCYTES # BLD AUTO: 0.09 K/UL
IMM GRANULOCYTES NFR BLD AUTO: 0.8 %
INR PPP: 1
KETONES UR QL STRIP: NEGATIVE
LEUKOCYTE ESTERASE UR QL STRIP: ABNORMAL
LYMPHOCYTES # BLD AUTO: 1.2 K/UL
LYMPHOCYTES NFR BLD: 10.5 %
MCH RBC QN AUTO: 28.1 PG
MCHC RBC AUTO-ENTMCNC: 32.5 G/DL
MCV RBC AUTO: 87 FL
METHADONE UR QL SCN>300 NG/ML: NEGATIVE
MICROSCOPIC COMMENT: ABNORMAL
MONOCYTES # BLD AUTO: 0.8 K/UL
MONOCYTES NFR BLD: 6.6 %
NEUTROPHILS # BLD AUTO: 9.6 K/UL
NEUTROPHILS NFR BLD: 81.1 %
NITRITE UR QL STRIP: NEGATIVE
NRBC BLD-RTO: 0 /100 WBC
OPIATES UR QL SCN: NEGATIVE
PCP UR QL SCN>25 NG/ML: NEGATIVE
PH UR STRIP: 6 [PH] (ref 5–8)
PLATELET # BLD AUTO: 428 K/UL
PMV BLD AUTO: 9.4 FL
POTASSIUM SERPL-SCNC: 3.5 MMOL/L
PROT SERPL-MCNC: 7.2 G/DL
PROT UR QL STRIP: ABNORMAL
PROTHROMBIN TIME: 10.5 SEC
RBC # BLD AUTO: 4.02 M/UL
RBC #/AREA URNS AUTO: 0 /HPF (ref 0–4)
SALICYLATES SERPL-MCNC: <5 MG/DL
SODIUM SERPL-SCNC: 150 MMOL/L
SP GR UR STRIP: 1.01 (ref 1–1.03)
SQUAMOUS #/AREA URNS AUTO: 0 /HPF
TOXICOLOGY INFORMATION: NORMAL
TSH SERPL DL<=0.005 MIU/L-ACNC: 1.9 UIU/ML
URN SPEC COLLECT METH UR: ABNORMAL
UROBILINOGEN UR STRIP-ACNC: NEGATIVE EU/DL
WBC # BLD AUTO: 11.85 K/UL
WBC #/AREA URNS AUTO: >100 /HPF (ref 0–5)
WBC CLUMPS UR QL AUTO: ABNORMAL
YEAST UR QL AUTO: ABNORMAL

## 2018-05-14 PROCEDURE — 96374 THER/PROPH/DIAG INJ IV PUSH: CPT

## 2018-05-14 PROCEDURE — 87086 URINE CULTURE/COLONY COUNT: CPT

## 2018-05-14 PROCEDURE — 81001 URINALYSIS AUTO W/SCOPE: CPT

## 2018-05-14 PROCEDURE — 87077 CULTURE AEROBIC IDENTIFY: CPT | Mod: 59

## 2018-05-14 PROCEDURE — 96361 HYDRATE IV INFUSION ADD-ON: CPT

## 2018-05-14 PROCEDURE — 63600175 PHARM REV CODE 636 W HCPCS: Performed by: EMERGENCY MEDICINE

## 2018-05-14 PROCEDURE — 25000003 PHARM REV CODE 250: Performed by: EMERGENCY MEDICINE

## 2018-05-14 PROCEDURE — 93010 ELECTROCARDIOGRAM REPORT: CPT | Mod: ,,, | Performed by: INTERNAL MEDICINE

## 2018-05-14 PROCEDURE — 99285 EMERGENCY DEPT VISIT HI MDM: CPT | Mod: ,,, | Performed by: EMERGENCY MEDICINE

## 2018-05-14 PROCEDURE — 87040 BLOOD CULTURE FOR BACTERIA: CPT | Mod: 59

## 2018-05-14 PROCEDURE — 84443 ASSAY THYROID STIM HORMONE: CPT

## 2018-05-14 PROCEDURE — 85025 COMPLETE CBC W/AUTO DIFF WBC: CPT

## 2018-05-14 PROCEDURE — 96375 TX/PRO/DX INJ NEW DRUG ADDON: CPT

## 2018-05-14 PROCEDURE — 87186 SC STD MICRODIL/AGAR DIL: CPT | Mod: 59

## 2018-05-14 PROCEDURE — 85610 PROTHROMBIN TIME: CPT

## 2018-05-14 PROCEDURE — 83036 HEMOGLOBIN GLYCOSYLATED A1C: CPT

## 2018-05-14 PROCEDURE — 80329 ANALGESICS NON-OPIOID 1 OR 2: CPT

## 2018-05-14 PROCEDURE — 80307 DRUG TEST PRSMV CHEM ANLYZR: CPT

## 2018-05-14 PROCEDURE — 63600175 PHARM REV CODE 636 W HCPCS: Performed by: HOSPITALIST

## 2018-05-14 PROCEDURE — 99285 EMERGENCY DEPT VISIT HI MDM: CPT | Mod: 25

## 2018-05-14 PROCEDURE — 25000003 PHARM REV CODE 250: Performed by: HOSPITALIST

## 2018-05-14 PROCEDURE — 87088 URINE BACTERIA CULTURE: CPT

## 2018-05-14 PROCEDURE — 87205 SMEAR GRAM STAIN: CPT

## 2018-05-14 PROCEDURE — 80320 DRUG SCREEN QUANTALCOHOLS: CPT

## 2018-05-14 PROCEDURE — 12000002 HC ACUTE/MED SURGE SEMI-PRIVATE ROOM

## 2018-05-14 PROCEDURE — 80048 BASIC METABOLIC PNL TOTAL CA: CPT

## 2018-05-14 PROCEDURE — 80053 COMPREHEN METABOLIC PANEL: CPT

## 2018-05-14 RX ORDER — DEXTROSE MONOHYDRATE 50 MG/ML
INJECTION, SOLUTION INTRAVENOUS CONTINUOUS
Status: DISCONTINUED | OUTPATIENT
Start: 2018-05-14 | End: 2018-05-15

## 2018-05-14 RX ORDER — IBUPROFEN 200 MG
16 TABLET ORAL
Status: DISCONTINUED | OUTPATIENT
Start: 2018-05-14 | End: 2018-05-20 | Stop reason: HOSPADM

## 2018-05-14 RX ORDER — ENOXAPARIN SODIUM 100 MG/ML
40 INJECTION SUBCUTANEOUS EVERY 24 HOURS
Status: DISCONTINUED | OUTPATIENT
Start: 2018-05-15 | End: 2018-05-14

## 2018-05-14 RX ORDER — OLANZAPINE 2.5 MG/1
2.5 TABLET ORAL EVERY 8 HOURS PRN
Status: DISCONTINUED | OUTPATIENT
Start: 2018-05-15 | End: 2018-05-15

## 2018-05-14 RX ORDER — SODIUM CHLORIDE 9 MG/ML
INJECTION, SOLUTION INTRAVENOUS CONTINUOUS
Status: DISCONTINUED | OUTPATIENT
Start: 2018-05-14 | End: 2018-05-15

## 2018-05-14 RX ORDER — HEPARIN SODIUM 5000 [USP'U]/ML
5000 INJECTION, SOLUTION INTRAVENOUS; SUBCUTANEOUS EVERY 12 HOURS
Status: DISCONTINUED | OUTPATIENT
Start: 2018-05-15 | End: 2018-05-15

## 2018-05-14 RX ORDER — RAMIPRIL 5 MG/1
10 CAPSULE ORAL DAILY
Status: DISCONTINUED | OUTPATIENT
Start: 2018-05-14 | End: 2018-05-15

## 2018-05-14 RX ORDER — ACETAMINOPHEN 325 MG/1
650 TABLET ORAL EVERY 4 HOURS PRN
Status: DISCONTINUED | OUTPATIENT
Start: 2018-05-14 | End: 2018-05-20 | Stop reason: HOSPADM

## 2018-05-14 RX ORDER — OLANZAPINE 10 MG/2ML
2.5 INJECTION, POWDER, FOR SOLUTION INTRAMUSCULAR EVERY 8 HOURS PRN
Status: DISCONTINUED | OUTPATIENT
Start: 2018-05-15 | End: 2018-05-15

## 2018-05-14 RX ORDER — HYDRALAZINE HYDROCHLORIDE 20 MG/ML
5 INJECTION INTRAMUSCULAR; INTRAVENOUS EVERY 6 HOURS PRN
Status: DISCONTINUED | OUTPATIENT
Start: 2018-05-14 | End: 2018-05-15

## 2018-05-14 RX ORDER — GLUCAGON 1 MG
1 KIT INJECTION
Status: DISCONTINUED | OUTPATIENT
Start: 2018-05-14 | End: 2018-05-20 | Stop reason: HOSPADM

## 2018-05-14 RX ORDER — CEFTRIAXONE 1 G/1
1 INJECTION, POWDER, FOR SOLUTION INTRAMUSCULAR; INTRAVENOUS
Status: COMPLETED | OUTPATIENT
Start: 2018-05-14 | End: 2018-05-14

## 2018-05-14 RX ORDER — SODIUM CHLORIDE 0.9 % (FLUSH) 0.9 %
5 SYRINGE (ML) INJECTION
Status: DISCONTINUED | OUTPATIENT
Start: 2018-05-14 | End: 2018-05-20 | Stop reason: HOSPADM

## 2018-05-14 RX ORDER — IBUPROFEN 200 MG
24 TABLET ORAL
Status: DISCONTINUED | OUTPATIENT
Start: 2018-05-14 | End: 2018-05-20 | Stop reason: HOSPADM

## 2018-05-14 RX ADMIN — HYDRALAZINE HYDROCHLORIDE 5 MG: 20 INJECTION INTRAMUSCULAR; INTRAVENOUS at 11:05

## 2018-05-14 RX ADMIN — OLANZAPINE 2.5 MG: 2.5 TABLET, FILM COATED ORAL at 11:05

## 2018-05-14 RX ADMIN — SODIUM CHLORIDE: 0.9 INJECTION, SOLUTION INTRAVENOUS at 10:05

## 2018-05-14 RX ADMIN — ACETAMINOPHEN 650 MG: 325 TABLET ORAL at 11:05

## 2018-05-14 RX ADMIN — CEFTRIAXONE SODIUM 1 G: 1 INJECTION, POWDER, FOR SOLUTION INTRAMUSCULAR; INTRAVENOUS at 09:05

## 2018-05-14 RX ADMIN — RAMIPRIL 10 MG: 5 CAPSULE ORAL at 10:05

## 2018-05-14 NOTE — PLAN OF CARE
5/14/2018 9:20  Received multiple voicemail and also received an email from patient's son. [email or web address] also reached out via skype that family was trying to reach me as no one is returning their calls from Case Management. This CM has been off from 5/10 - 5/14. CM attempted to call the numbers left for her to call, however, their was no answer on the 's phone and the son's phone stated that they were not accepting calls at this time. Cm will attempt to reach out to family later today.
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
## 2018-05-14 NOTE — TELEPHONE ENCOUNTER
"I spoke to patient's daughter, Afua, who explained that over the weekend her mother was sent back to [facility] due to continued confusion/delirium.  She also explained that it was her mother's decision to be discharged home on hospice as she believed she was dying.  This was ultimately the patient's wish despite family members objections.  The daughter also shared that her mother has had similar "mental" issues long before the surgery, which had not been fully disclosed by/to the family.  The daughter explained that the patient's sister has schizophrenia and one daughter has bipolar disorder. I explained that the degree of delirium and/or psychosis appeared profound and out-of-proportion to the surgery and amount of pain meds initially given and shared my concern that the patient may also have an underlying psychiatric condition that could be managed with medication or therapy.      We also discussed restarting her coumadin.  I explained that we were carefully and slowly restarting to prevent bleeding in the lumbar surgery site.  The patient will need to close follow up with her PCP to monitor her INR levels.    I believe the patient should be seen by her PCP ASAP and be referred to psychiatry for evaluation of underlying psychiatric disorder.  "

## 2018-05-14 NOTE — ED PROVIDER NOTES
"Encounter Date: 5/14/2018    SCRIBE #1 NOTE: I, Griselda Howe, am scribing for, and in the presence of,  Dr. Michelle . I have scribed the entire note.       History     Chief Complaint   Patient presents with    Psychiatric Evaluation     family states pt may harm self, pt d/c last night s/p intraventricular hemorrage      Time patient was seen by the provider: 5:49 PM      The patient is a 77 y.o. female with co-morbidities including: CKD Stage III, back pain, CAD, COPD,  who was brought in by her family to the ED for psychiatric valuation.  The patient states that she has no desire to live anymore, but does not have a plan. Patient reports that, "She's been going through a lot lately". She states that she has being in and out of the hospital recently for lower back surgery. The patient was discharged yesterday from the hospital for intracranial hemorrhage. She is refusing to have a CT for evaluation of hemorrhagic bleed. Patient is currently living at home with her  and son. She denies any HA, focal weakness,  CP,SOB, and SI/HI.      The history is provided by the patient and medical records.     Review of patient's allergies indicates:   Allergen Reactions    Bacitracin Other (See Comments)     "can not take because of Warfarin"    Bactrim [sulfamethoxazole-trimethoprim] Other (See Comments)     "can not take because of Warfarin"     Past Medical History:   Diagnosis Date    Active smoker 6/4/2014    Anticoagulant long-term use     Apocrine adenocarcinoma     Back pain     lower back    Brain aneurysm     4 times    Central scotoma, 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    Centrilobular emphysema 5/7/2018    Chronic kidney disease (CKD), stage III (moderate)     COPD (chronic obstructive pulmonary disease)     Coronary artery disease     Current use of long term anticoagulation 5/9/2018    Coumadin for history of DVT    Encounter for blood transfusion     Essential hypertension 6/4/2014    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), 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    Peripheral vascular disease 6/4/2014    Polycystic kidney disease     Polycythemia vera     congential    Senile nuclear sclerosis 11/21/2017    Subclavian artery stenosis, right 7/22/2014     Past Surgical History:   Procedure Laterality Date    ABDOMINAL SURGERY      APPENDECTOMY      BRAIN SURGERY      relieve hemmorhages/had 4 surgeries    CARDIAC CATHETERIZATION      CORONARY ANGIOPLASTY      FRACTURE SURGERY      GALLBLADDER SURGERY      HYSTERECTOMY      illiac stent      JOINT REPLACEMENT      SKIN BIOPSY      skin repair      apocrine cancer    tibial repair      steel plate inserted    TONSILLECTOMY      TOTAL KNEE ARTHROPLASTY      VEIN BYPASS SURGERY       Family History   Problem Relation Age of Onset    Heart disease Brother     Blindness Neg Hx     Glaucoma Neg Hx     Macular degeneration Neg Hx     Retinal detachment Neg Hx      Social History   Substance Use Topics    Smoking status: Current Every Day Smoker     Packs/day: 1.00     Years: 60.00    Smokeless tobacco: Never Used    Alcohol use No     Review of Systems   Constitutional: Negative for fever.   HENT: Negative for sore throat.    Respiratory: Negative for shortness of breath.    Cardiovascular: Negative for chest pain.   Gastrointestinal: Negative for nausea.   Genitourinary: Negative for dysuria.   Musculoskeletal: Negative for back pain.   Skin: Negative for rash.   Neurological: Negative for weakness.   Hematological: Does not bruise/bleed easily.   Psychiatric/Behavioral: Negative for suicidal ideas.       Physical Exam     Initial Vitals [05/14/18 1540]   BP Pulse Resp Temp SpO2   (!) 180/78 110 18 97.7 °F (36.5 °C) 99 %      MAP       112         Physical Exam    Nursing note and vitals reviewed.  Constitutional: She appears well-developed and well-nourished.   HENT:   Head: Normocephalic and atraumatic.   Eyes: EOM are normal.  Pupils are equal, round, and reactive to light.   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    Neck: Normal range of motion. Neck supple.   Cardiovascular: Normal rate, regular rhythm and normal heart sounds. Exam reveals no gallop and no friction rub.    No murmur heard.  Pulmonary/Chest: Breath sounds normal. No respiratory distress. She has no wheezes. She has no rales.   Abdominal: Soft. Bowel sounds are normal. She exhibits no distension. There is no tenderness. There is no rebound.   Musculoskeletal: Normal range of motion. She exhibits no edema or tenderness.   5 cm    Neurological: She is alert and oriented to person, place, and time. She has normal strength.   Skin: Skin is warm and dry.   5 cm laceration mid lower break intact, clean, dry and no discharge.          ED Course   Procedures  Labs Reviewed   CBC W/ AUTO DIFFERENTIAL - Abnormal; Notable for the following:        Result Value    Hemoglobin 11.3 (*)     Hematocrit 34.8 (*)     Platelets 428 (*)     Immature Granulocytes 0.8 (*)     Gran # (ANC) 9.6 (*)     Immature Grans (Abs) 0.09 (*)     Gran% 81.1 (*)     Lymph% 10.5 (*)     All other components within normal limits   COMPREHENSIVE METABOLIC PANEL - Abnormal; Notable for the following:     Sodium 150 (*)     Albumin 2.8 (*)     All other components within normal limits   URINALYSIS - Abnormal; Notable for the following:     Appearance, UA Cloudy (*)     Protein, UA 2+ (*)     Occult Blood UA 1+ (*)     Leukocytes, UA 3+ (*)     All other components within normal limits   ACETAMINOPHEN LEVEL - Abnormal; Notable for the following:     Acetaminophen (Tylenol), Serum <3.0 (*)     All other components within normal limits   SALICYLATE LEVEL - Abnormal; Notable for the following:     Salicylate Lvl <5.0 (*)     All other components within normal limits   URINALYSIS MICROSCOPIC - Abnormal; Notable for the following:     WBC, UA >100 (*)     WBC Clumps, UA Few (*)     Yeast, UA Few (*)      All other components within normal limits   TSH   DRUG SCREEN PANEL, URINE EMERGENCY   ALCOHOL,MEDICAL (ETHANOL)        Imaging Results          CT Head Without Contrast (Final result)  Result time 05/14/18 21:29:16    Final result by Fei Ascencio MD (05/14/18 21:29:16)                 Impression:      Evolution of the previously identified blood products in the left occipital horn.  No new intraventricular hemorrhage or hydrocephalus.    No acute infarct or parenchymal hemorrhage.    Mild chronic microvascular ischemic changes.    Electronically signed by resident: Lito Saunders  Date:    05/14/2018  Time:    21:03    Electronically signed by: Fei Ascencio MD  Date:    05/14/2018  Time:    21:29             Narrative:    EXAMINATION:  CT HEAD WITHOUT CONTRAST    CLINICAL HISTORY:  Cerebral hemorrhage suspected;    TECHNIQUE:  Low dose axial CT images obtained throughout the head without intravenous contrast. Sagittal and coronal reconstructions were performed.    COMPARISON:  Head CT 05/12/2018 with priors; MRI brain 05/12/2018    FINDINGS:  Intracranial compartment:    Ventricles and sulci are normal in size for age without evidence of hydrocephalus.  Previously identified layering hyperdense focus in the left occipital horn is no longer visualized.  No new intraventricular hemorrhage.    No extra-axial blood or fluid collections.    There is mild generalized cerebral volume loss.  Remote left basal ganglia lacunar type infarct.  No acute intracranial infarct acute parenchymal hemorrhage.  Mild chronic microvascular ischemic change.  No abnormal edema or mass effect.    Skull/extracranial contents (limited evaluation): No fracture. Mastoid air cells and paranasal sinuses are essentially clear.                                   Medical Decision Making:   History:   Old Medical Records: I decided to obtain old medical records.  Initial Assessment:   77 y.o. female is alert and oriented. Patient is  component to make her own medical decisions. Pt declined medical evaluation of intracranial hemorrhage despite her current BP evaluation.    Clinical Tests:   Lab Tests: Ordered and Reviewed  The following lab test(s) were unremarkable: CBC, CMP and Urinalysis  Radiological Study: Ordered and Reviewed  Medical Tests: Ordered and Reviewed  ED Management:  Discussed with patient's sister stating that patient has been refused care including eating and drinking and refused to stand up and walk despite having no back pain or any other body pain. Patient appeared delirious at home per son and was stating that she wanted to die.  Psychiatry consulted for evaluation of competency and capacity of patient mental status.  Patient was seen and evaluated by Psychiatry.  Patient was determined to be in delirious state for is not incapacitated to care for herself.  Psychiatry recommended patient to be admitted to Medicine for the delirium workup and start patient on Zyprexa 2.5 mg IM QHS.  Patient was found to have ongoing UTI.  Rocephin IV started.  Patient was admitted to Medicine.  Other:   I have discussed this case with another health care provider.            Scribe Attestation:   Scribe #1: I performed the above scribed service and the documentation accurately describes the services I performed. I attest to the accuracy of the note.    Attending Attestation:           Physician Attestation for Scribe:      Comments: I, Dr. Vito Michelle, personally performed the services described in this documentation. All medical record entries made by the scribe were at my direction and in my presence.  I have reviewed the chart and agree that the record reflects my personal performance and is accurate and complete. Vito Michelle 7:45 PM 05/14/2018                 Clinical Impression:   The primary encounter diagnosis was Delirium. A diagnosis of Acute cystitis without hematuria was also pertinent to this visit.                           Vito RIVAS  MD Viviana  05/14/18 [id number]       Vito Michelle MD  05/14/18 [id number]

## 2018-05-15 PROBLEM — A41.51 SEPSIS DUE TO ESCHERICHIA COLI: Status: ACTIVE | Noted: 2018-05-15

## 2018-05-15 PROBLEM — E87.6 HYPOKALEMIA: Status: ACTIVE | Noted: 2018-05-15

## 2018-05-15 PROBLEM — I16.1 HYPERTENSIVE EMERGENCY: Status: ACTIVE | Noted: 2018-05-15

## 2018-05-15 PROBLEM — N30.01 ACUTE CYSTITIS WITH HEMATURIA: Status: ACTIVE | Noted: 2018-05-15

## 2018-05-15 LAB
ALBUMIN SERPL BCP-MCNC: 2.7 G/DL
ALP SERPL-CCNC: 109 U/L
ALT SERPL W/O P-5'-P-CCNC: 21 U/L
ANION GAP SERPL CALC-SCNC: 11 MMOL/L
ANION GAP SERPL CALC-SCNC: 13 MMOL/L
ANION GAP SERPL CALC-SCNC: 15 MMOL/L
ANION GAP SERPL CALC-SCNC: 15 MMOL/L
AST SERPL-CCNC: 17 U/L
BASOPHILS # BLD AUTO: 0.04 K/UL
BASOPHILS NFR BLD: 0.2 %
BILIRUB SERPL-MCNC: 0.7 MG/DL
BUN SERPL-MCNC: 19 MG/DL
BUN SERPL-MCNC: 21 MG/DL
CALCIUM SERPL-MCNC: 8.4 MG/DL
CALCIUM SERPL-MCNC: 8.7 MG/DL
CALCIUM SERPL-MCNC: 8.7 MG/DL
CALCIUM SERPL-MCNC: 8.9 MG/DL
CHLORIDE SERPL-SCNC: 108 MMOL/L
CHLORIDE SERPL-SCNC: 109 MMOL/L
CO2 SERPL-SCNC: 19 MMOL/L
CO2 SERPL-SCNC: 22 MMOL/L
CREAT SERPL-MCNC: 0.8 MG/DL
CREAT SERPL-MCNC: 0.9 MG/DL
DIFFERENTIAL METHOD: ABNORMAL
EOSINOPHIL # BLD AUTO: 0.1 K/UL
EOSINOPHIL NFR BLD: 0.3 %
ERYTHROCYTE [DISTWIDTH] IN BLOOD BY AUTOMATED COUNT: 13.5 %
ERYTHROCYTE [DISTWIDTH] IN BLOOD BY AUTOMATED COUNT: 13.5 %
ERYTHROCYTE [DISTWIDTH] IN BLOOD BY AUTOMATED COUNT: 13.6 %
EST. GFR  (AFRICAN AMERICAN): >60 ML/MIN/1.73 M^2
EST. GFR  (NON AFRICAN AMERICAN): >60 ML/MIN/1.73 M^2
ESTIMATED AVG GLUCOSE: 105 MG/DL
GLUCOSE SERPL-MCNC: 103 MG/DL
GLUCOSE SERPL-MCNC: 106 MG/DL
GLUCOSE SERPL-MCNC: 111 MG/DL
GLUCOSE SERPL-MCNC: 119 MG/DL
HBA1C MFR BLD HPLC: 5.3 %
HCT VFR BLD AUTO: 33.2 %
HCT VFR BLD AUTO: 34.9 %
HCT VFR BLD AUTO: 34.9 %
HGB BLD-MCNC: 10.7 G/DL
HGB BLD-MCNC: 11.1 G/DL
HGB BLD-MCNC: 11.1 G/DL
IMM GRANULOCYTES # BLD AUTO: 0.1 K/UL
IMM GRANULOCYTES NFR BLD AUTO: 0.6 %
LYMPHOCYTES # BLD AUTO: 1.1 K/UL
LYMPHOCYTES NFR BLD: 6.2 %
MAGNESIUM SERPL-MCNC: 1.6 MG/DL
MAGNESIUM SERPL-MCNC: 2.8 MG/DL
MCH RBC QN AUTO: 27.8 PG
MCH RBC QN AUTO: 27.8 PG
MCH RBC QN AUTO: 28 PG
MCHC RBC AUTO-ENTMCNC: 31.8 G/DL
MCHC RBC AUTO-ENTMCNC: 31.8 G/DL
MCHC RBC AUTO-ENTMCNC: 32.2 G/DL
MCV RBC AUTO: 87 FL
MONOCYTES # BLD AUTO: 0.5 K/UL
MONOCYTES NFR BLD: 3 %
NEUTROPHILS # BLD AUTO: 15.5 K/UL
NEUTROPHILS NFR BLD: 89.7 %
NRBC BLD-RTO: 0 /100 WBC
PHOSPHATE SERPL-MCNC: 2.6 MG/DL
PHOSPHATE SERPL-MCNC: 2.7 MG/DL
PLATELET # BLD AUTO: 371 K/UL
PLATELET # BLD AUTO: 467 K/UL
PLATELET # BLD AUTO: 485 K/UL
PMV BLD AUTO: 9.1 FL
PMV BLD AUTO: 9.3 FL
PMV BLD AUTO: 9.3 FL
POCT GLUCOSE: 121 MG/DL (ref 70–110)
POTASSIUM SERPL-SCNC: 2.9 MMOL/L
POTASSIUM SERPL-SCNC: 3 MMOL/L
POTASSIUM SERPL-SCNC: 3.4 MMOL/L
POTASSIUM SERPL-SCNC: 3.5 MMOL/L
PROT SERPL-MCNC: 6.6 G/DL
RBC # BLD AUTO: 3.82 M/UL
RBC # BLD AUTO: 4 M/UL
RBC # BLD AUTO: 4 M/UL
SODIUM SERPL-SCNC: 141 MMOL/L
SODIUM SERPL-SCNC: 141 MMOL/L
SODIUM SERPL-SCNC: 146 MMOL/L
SODIUM SERPL-SCNC: 146 MMOL/L
WBC # BLD AUTO: 17.27 K/UL
WBC # BLD AUTO: 21.55 K/UL
WBC # BLD AUTO: 21.82 K/UL

## 2018-05-15 PROCEDURE — 25000003 PHARM REV CODE 250: Performed by: HOSPITALIST

## 2018-05-15 PROCEDURE — 80048 BASIC METABOLIC PNL TOTAL CA: CPT | Mod: 91

## 2018-05-15 PROCEDURE — 36415 COLL VENOUS BLD VENIPUNCTURE: CPT

## 2018-05-15 PROCEDURE — 25000003 PHARM REV CODE 250: Performed by: INTERNAL MEDICINE

## 2018-05-15 PROCEDURE — 63600175 PHARM REV CODE 636 W HCPCS: Performed by: STUDENT IN AN ORGANIZED HEALTH CARE EDUCATION/TRAINING PROGRAM

## 2018-05-15 PROCEDURE — 80053 COMPREHEN METABOLIC PANEL: CPT

## 2018-05-15 PROCEDURE — 94640 AIRWAY INHALATION TREATMENT: CPT

## 2018-05-15 PROCEDURE — 94761 N-INVAS EAR/PLS OXIMETRY MLT: CPT

## 2018-05-15 PROCEDURE — 83735 ASSAY OF MAGNESIUM: CPT | Mod: 91

## 2018-05-15 PROCEDURE — 80048 BASIC METABOLIC PNL TOTAL CA: CPT

## 2018-05-15 PROCEDURE — 85027 COMPLETE CBC AUTOMATED: CPT | Mod: 91

## 2018-05-15 PROCEDURE — 25000003 PHARM REV CODE 250: Performed by: STUDENT IN AN ORGANIZED HEALTH CARE EDUCATION/TRAINING PROGRAM

## 2018-05-15 PROCEDURE — 99291 CRITICAL CARE FIRST HOUR: CPT | Mod: ,,, | Performed by: INTERNAL MEDICINE

## 2018-05-15 PROCEDURE — 84100 ASSAY OF PHOSPHORUS: CPT | Mod: 91

## 2018-05-15 PROCEDURE — 85025 COMPLETE CBC W/AUTO DIFF WBC: CPT

## 2018-05-15 PROCEDURE — 63600175 PHARM REV CODE 636 W HCPCS: Performed by: INTERNAL MEDICINE

## 2018-05-15 PROCEDURE — 20000000 HC ICU ROOM

## 2018-05-15 PROCEDURE — 63600175 PHARM REV CODE 636 W HCPCS: Performed by: HOSPITALIST

## 2018-05-15 PROCEDURE — 90792 PSYCH DIAG EVAL W/MED SRVCS: CPT | Mod: ,,, | Performed by: PSYCHIATRY & NEUROLOGY

## 2018-05-15 PROCEDURE — 84100 ASSAY OF PHOSPHORUS: CPT

## 2018-05-15 PROCEDURE — 83735 ASSAY OF MAGNESIUM: CPT

## 2018-05-15 PROCEDURE — 25000242 PHARM REV CODE 250 ALT 637 W/ HCPCS: Performed by: HOSPITALIST

## 2018-05-15 PROCEDURE — 25000003 PHARM REV CODE 250: Performed by: NURSE PRACTITIONER

## 2018-05-15 RX ORDER — LABETALOL HYDROCHLORIDE 5 MG/ML
20 INJECTION, SOLUTION INTRAVENOUS ONCE
Status: DISCONTINUED | OUTPATIENT
Start: 2018-05-15 | End: 2018-05-15

## 2018-05-15 RX ORDER — CLONIDINE 0.1 MG/24H
1 PATCH, EXTENDED RELEASE TRANSDERMAL
Status: DISCONTINUED | OUTPATIENT
Start: 2018-05-15 | End: 2018-05-17

## 2018-05-15 RX ORDER — LABETALOL HYDROCHLORIDE 5 MG/ML
5 INJECTION, SOLUTION INTRAVENOUS EVERY 6 HOURS PRN
Status: DISCONTINUED | OUTPATIENT
Start: 2018-05-15 | End: 2018-05-15

## 2018-05-15 RX ORDER — RAMIPRIL 10 MG/1
10 CAPSULE ORAL DAILY
Status: DISCONTINUED | OUTPATIENT
Start: 2018-05-15 | End: 2018-05-15

## 2018-05-15 RX ORDER — MAGNESIUM SULFATE HEPTAHYDRATE 40 MG/ML
2 INJECTION, SOLUTION INTRAVENOUS
Status: COMPLETED | OUTPATIENT
Start: 2018-05-15 | End: 2018-05-15

## 2018-05-15 RX ORDER — IPRATROPIUM BROMIDE AND ALBUTEROL SULFATE 2.5; .5 MG/3ML; MG/3ML
3 SOLUTION RESPIRATORY (INHALATION) EVERY 4 HOURS
Status: DISCONTINUED | OUTPATIENT
Start: 2018-05-15 | End: 2018-05-16

## 2018-05-15 RX ORDER — CARVEDILOL 6.25 MG/1
6.25 TABLET ORAL 2 TIMES DAILY
Status: DISCONTINUED | OUTPATIENT
Start: 2018-05-15 | End: 2018-05-15

## 2018-05-15 RX ORDER — HYDRALAZINE HYDROCHLORIDE 20 MG/ML
10 INJECTION INTRAMUSCULAR; INTRAVENOUS EVERY 4 HOURS PRN
Status: DISCONTINUED | OUTPATIENT
Start: 2018-05-15 | End: 2018-05-15

## 2018-05-15 RX ORDER — HYDRALAZINE HYDROCHLORIDE 20 MG/ML
INJECTION INTRAMUSCULAR; INTRAVENOUS
Status: DISPENSED
Start: 2018-05-15 | End: 2018-05-15

## 2018-05-15 RX ORDER — NICARDIPINE HYDROCHLORIDE 0.2 MG/ML
1 INJECTION INTRAVENOUS CONTINUOUS
Status: DISCONTINUED | OUTPATIENT
Start: 2018-05-15 | End: 2018-05-16

## 2018-05-15 RX ORDER — CLONIDINE 0.1 MG/24H
1 PATCH, EXTENDED RELEASE TRANSDERMAL
Status: DISCONTINUED | OUTPATIENT
Start: 2018-05-15 | End: 2018-05-15

## 2018-05-15 RX ORDER — NIFEDIPINE 30 MG/1
90 TABLET, EXTENDED RELEASE ORAL DAILY
Status: DISCONTINUED | OUTPATIENT
Start: 2018-05-16 | End: 2018-05-15

## 2018-05-15 RX ORDER — IPRATROPIUM BROMIDE AND ALBUTEROL SULFATE 2.5; .5 MG/3ML; MG/3ML
3 SOLUTION RESPIRATORY (INHALATION) EVERY 4 HOURS PRN
Status: DISCONTINUED | OUTPATIENT
Start: 2018-05-15 | End: 2018-05-15

## 2018-05-15 RX ORDER — DEXTROSE, SODIUM CHLORIDE, SODIUM LACTATE, POTASSIUM CHLORIDE, AND CALCIUM CHLORIDE 5; .6; .31; .03; .02 G/100ML; G/100ML; G/100ML; G/100ML; G/100ML
INJECTION, SOLUTION INTRAVENOUS CONTINUOUS
Status: DISCONTINUED | OUTPATIENT
Start: 2018-05-15 | End: 2018-05-15

## 2018-05-15 RX ORDER — OLANZAPINE 10 MG/2ML
2.5 INJECTION, POWDER, FOR SOLUTION INTRAMUSCULAR EVERY 12 HOURS PRN
Status: DISCONTINUED | OUTPATIENT
Start: 2018-05-15 | End: 2018-05-16

## 2018-05-15 RX ORDER — NICARDIPINE HYDROCHLORIDE 0.2 MG/ML
1 INJECTION INTRAVENOUS CONTINUOUS
Status: DISCONTINUED | OUTPATIENT
Start: 2018-05-15 | End: 2018-05-15

## 2018-05-15 RX ORDER — POTASSIUM CHLORIDE 20 MEQ/15ML
40 SOLUTION ORAL ONCE
Status: COMPLETED | OUTPATIENT
Start: 2018-05-15 | End: 2018-05-15

## 2018-05-15 RX ORDER — CEFTRIAXONE 1 G/1
1 INJECTION, POWDER, FOR SOLUTION INTRAMUSCULAR; INTRAVENOUS
Status: DISCONTINUED | OUTPATIENT
Start: 2018-05-15 | End: 2018-05-15

## 2018-05-15 RX ORDER — POTASSIUM CHLORIDE 20 MEQ/15ML
40 SOLUTION ORAL ONCE
Status: DISCONTINUED | OUTPATIENT
Start: 2018-05-15 | End: 2018-05-15

## 2018-05-15 RX ORDER — POTASSIUM CHLORIDE 7.45 MG/ML
10 INJECTION INTRAVENOUS
Status: DISCONTINUED | OUTPATIENT
Start: 2018-05-15 | End: 2018-05-15

## 2018-05-15 RX ORDER — NIFEDIPINE 60 MG/1
60 TABLET, EXTENDED RELEASE ORAL DAILY
Status: DISCONTINUED | OUTPATIENT
Start: 2018-05-15 | End: 2018-05-15

## 2018-05-15 RX ORDER — HYDRALAZINE HYDROCHLORIDE 20 MG/ML
10 INJECTION INTRAMUSCULAR; INTRAVENOUS EVERY 6 HOURS PRN
Status: DISCONTINUED | OUTPATIENT
Start: 2018-05-15 | End: 2018-05-15

## 2018-05-15 RX ORDER — POTASSIUM CHLORIDE 7.45 MG/ML
10 INJECTION INTRAVENOUS
Status: COMPLETED | OUTPATIENT
Start: 2018-05-15 | End: 2018-05-15

## 2018-05-15 RX ORDER — CLINDAMYCIN PHOSPHATE 600 MG/50ML
600 INJECTION, SOLUTION INTRAVENOUS
Status: DISCONTINUED | OUTPATIENT
Start: 2018-05-15 | End: 2018-05-15

## 2018-05-15 RX ADMIN — MAGNESIUM SULFATE IN WATER 2 G: 40 INJECTION, SOLUTION INTRAVENOUS at 12:05

## 2018-05-15 RX ADMIN — POTASSIUM CHLORIDE 10 MEQ: 7.46 INJECTION, SOLUTION INTRAVENOUS at 10:05

## 2018-05-15 RX ADMIN — IPRATROPIUM BROMIDE AND ALBUTEROL SULFATE 3 ML: .5; 3 SOLUTION RESPIRATORY (INHALATION) at 03:05

## 2018-05-15 RX ADMIN — VANCOMYCIN HYDROCHLORIDE 750 MG: 10 INJECTION, POWDER, LYOPHILIZED, FOR SOLUTION INTRAVENOUS at 04:05

## 2018-05-15 RX ADMIN — IPRATROPIUM BROMIDE AND ALBUTEROL SULFATE 3 ML: .5; 3 SOLUTION RESPIRATORY (INHALATION) at 11:05

## 2018-05-15 RX ADMIN — HYDRALAZINE HYDROCHLORIDE 10 MG: 20 INJECTION INTRAMUSCULAR; INTRAVENOUS at 05:05

## 2018-05-15 RX ADMIN — CLONIDINE 1 PATCH: 0.1 PATCH TRANSDERMAL at 10:05

## 2018-05-15 RX ADMIN — HYDRALAZINE HYDROCHLORIDE 10 MG: 20 INJECTION INTRAMUSCULAR; INTRAVENOUS at 12:05

## 2018-05-15 RX ADMIN — CLONIDINE 1 PATCH: 0.1 PATCH TRANSDERMAL at 04:05

## 2018-05-15 RX ADMIN — NICARDIPINE HYDROCHLORIDE 0.5 MG/HR: 0.2 INJECTION, SOLUTION INTRAVENOUS at 08:05

## 2018-05-15 RX ADMIN — PIPERACILLIN AND TAZOBACTAM 4.5 G: 4; .5 INJECTION, POWDER, LYOPHILIZED, FOR SOLUTION INTRAVENOUS; PARENTERAL at 06:05

## 2018-05-15 RX ADMIN — DEXTROSE MONOHYDRATE: 5 INJECTION, SOLUTION INTRAVENOUS at 12:05

## 2018-05-15 RX ADMIN — IPRATROPIUM BROMIDE AND ALBUTEROL SULFATE 3 ML: .5; 3 SOLUTION RESPIRATORY (INHALATION) at 08:05

## 2018-05-15 RX ADMIN — LABETALOL HYDROCHLORIDE 5 MG: 5 INJECTION, SOLUTION INTRAVENOUS at 02:05

## 2018-05-15 RX ADMIN — POTASSIUM CHLORIDE 40 MEQ: 20 SOLUTION ORAL at 11:05

## 2018-05-15 RX ADMIN — POTASSIUM PHOSPHATE, MONOBASIC AND POTASSIUM PHOSPHATE, DIBASIC 20 MMOL: 224; 236 INJECTION, SOLUTION INTRAVENOUS at 11:05

## 2018-05-15 RX ADMIN — Medication 1250 MG: at 10:05

## 2018-05-15 RX ADMIN — POTASSIUM CHLORIDE 10 MEQ: 7.46 INJECTION, SOLUTION INTRAVENOUS at 11:05

## 2018-05-15 RX ADMIN — MAGNESIUM SULFATE IN WATER 2 G: 40 INJECTION, SOLUTION INTRAVENOUS at 10:05

## 2018-05-15 RX ADMIN — POTASSIUM CHLORIDE 10 MEQ: 7.46 INJECTION, SOLUTION INTRAVENOUS at 06:05

## 2018-05-15 RX ADMIN — PIPERACILLIN AND TAZOBACTAM 4.5 G: 4; .5 INJECTION, POWDER, LYOPHILIZED, FOR SOLUTION INTRAVENOUS; PARENTERAL at 10:05

## 2018-05-15 RX ADMIN — POTASSIUM CHLORIDE 10 MEQ: 7.46 INJECTION, SOLUTION INTRAVENOUS at 04:05

## 2018-05-15 RX ADMIN — CARVEDILOL 6.25 MG: 6.25 TABLET, FILM COATED ORAL at 10:05

## 2018-05-15 NOTE — CARE UPDATE
Rapid Response Follow-up Note    Followed up with patient for proactive rounding. Called by bedside RN that patient is refusing medication, spitting it out.  Discussed with patient the importance of blood pressure control, the risks of having a stroke with such a high blood pressure, she verbalized understanding but again refused the medication and spit it out.  Dr. Rubio with [facility] called and made aware, BP did not respond to hydralazine, now 233/137.  Per Dr. Rubio, place NGT to administer medications and administer 20 mg labetalol.  Also, obtain blood pressures in both arms to verify VS.      0610 - right arm /71.  Labetalol IVP 20 mg order discontinued.  Goal -180.      Reviewed plan of care with primary RN and CN.  Please call Rapid Response RN with any questions or concerns at  [phone]

## 2018-05-15 NOTE — PHYSICIAN QUERY
PT Name: Sunita Zimmer  MR #: [id number]     Physician Query Form - Documentation Clarification      CDS/: Meli Schaffer RN, CDS               Contact information: [email or web address]    This form is a permanent document in the medical record.     Query Date: May 15, 2018    By submitting this query, we are merely seeking further clarification of documentation. Please utilize your independent clinical judgment when addressing the question(s) below.    The Medical record reflects the following:    Supporting Clinical Findings Location in Medical Record     --PMHx: HTN, CKD stage 3, polycythemia vera, CAD, Centrilobular emphysema, DVT    -- 2D echo EF 60-65%, pulmonary htn      ED provider Note       NCC Discharge Summary (Dr Laboy/Kiesha)       --Cardiomediastinal silhouette appears stable.  There are small bilateral pleural effusions, left greater than right with probable left basilar atelectasis/infiltrate grossly similar to prior       AP CXR 5/11                                                                            Doctor, Please specify diagnosis or diagnoses associated with above clinical findings.      Please further specify the type of Pulmonary HTN:       Provider Use Only    [     ] Primary pulmonary arterial hypertension    [     ] Secondary pulmonary hypertension due to  Centrilobular emphysema  [     ] Secondary pulmonary hypertension due to polycythemia vera  [     ] Secondary pulmonary hypertension due to: _______________  [     ] Secondary pulmonary hypertension, unspecified    [  x  ] Pulmonary Hypertension, unspecified                                                                                                                         [  ] Clinically undetermined

## 2018-05-15 NOTE — NURSING
Internal medicine team made aware of PEC paperwork needing to be signed for patient. Transfer orders placed for patient to go to ICU. Report given to Giovanna in ICU. Patient transferred to Stat CT by rapid response nurse. Nurse made aware of PEC paperwork requiring MD signature. Patient transferring to room 6068.

## 2018-05-15 NOTE — ASSESSMENT & PLAN NOTE
-BP up to 220s/90s. Likely 2/2 to medication noncompliance at home, given PO refusal, AMS in the setting of recent CVA, and hypernatremia suggesting dehydration. Transient improvement in SBP to 180s-190s with IV pushes of hydralazine and labetalol. Given  in R arm, 208 in L arm, aortic murmur and abdominal bruit on exam, concern for aortic dissection.  -Place NT tube for PO nifedipine 60 mg extended release. Place soft restraints. Continue PEC.  -Admit to ICU for IV cardene drip.  -Repeat stat CT head non-contrast to evaluate for hemorrhage.  -Continue home ramipril 10 mg daily.  -Continue hydralazine 10 mg IV prn.  -Continue labetalol 5 mg IV prn.

## 2018-05-15 NOTE — CARE UPDATE
Was informed by charge nurse that 6th floor house supervisor wants ICU to evaluate the patient prior to taking her to their floor for cardene drip.  ICU was contacted and consult placed.

## 2018-05-15 NOTE — HPI
78 y/o female with recent CVA (discharged 5/13/18), brain aneurysms, CAD, HTN, COPD, polycythemia vera, PVD, hx of DVTs (on coumadin), and PKD presents on 5/14/18 for delirium. She was recently discharged from [facility] on 5/13 for small intraventricular hemorrhage in the left occipital horn which originally presented as AMS. She was also found to have a UTI, which was not treated. It was documented she was refusing all medications and therapies, and she was ultimately made DNR and sent home with hospice after discussions were held with family. One day after discharge, they brought her back for AMS, thought to be due to the UTI.    Of note, pt lives at home with 87 y/o  and with son. It was reported that pt threatened to end her life and has guns at home. On presentation, she was PEC'd.

## 2018-05-15 NOTE — ASSESSMENT & PLAN NOTE
--severe asymptomatic hypertension without end organ damage  --In adults with severe asymptomatic hypertension, the shorter-term goal of management is to reduce the blood pressure to ?160/?100 mmHg. However, the mean arterial pressure should not be lowered by more than 25 to 30 percent over the first several hours  --goal MAP is therefore 100-105 for the first few hours  --based on prior experience with starting cardene drips in the ER and the parameters for turning off the drip not being closely followed, will manage her high blood pressures for now with PRN hydralazine pushes PRN SBP > 180.    --continue home ramipril

## 2018-05-15 NOTE — ASSESSMENT & PLAN NOTE
76 y/o female presened to [facility] ED with Altered mental status and collateral reported suicidal ideation. Patient recently admitted to [facility] for spinal Abscess removal 5/3/2018. Since the procedure the patient has been verbally and physically abusive, paranoid, experiencing visual hallucinations and having a waxing and waning mentation. She was discharged to SNF and brought back to [facility] for AMS and found to have CVA. Was discharged home on 5/13 with home hospice, however per collateral from patient's son she was refusing nutrition and care and continued to be violent as well as threatening self harm. Patient denies this on interview, states she is a picky eater and only likes eating certain foods. Patient has a family history of schizophrenia in her biological mother however she has no documented psychiatric history.   -Patient's current presentation likely secondary to delirium, may be overlying dementia; patient with recent CVA. Patient refused MOCA, will reattempt when more cooperative.  -Recommend scheduled Zyprexa 2.5 mg qHS PO crushed or IM if patient refuses, continue Zyprexa 2.5 mg PO/IM q8 hours PRN nonredirectable agitation. QTc 479 on 5/12, recommend repeat EKG to check QTc interval.   - Recommend daily weights, monitoring I/Os to monitor PO intake given concern from collateral pt is not eating  - Avoid benzodiazepines, anticholinergics, and anti-histaminics and minimize opiates when possible as they may worsen or exacerbate delirium.  - Ensure blinds are open with lights on during the day and that the shades are closed with lights off at night. Reorient pt through out the day. Encourage family to be present as much as possible.  - May use soft restraints but only if pt is not redirectable and doesn't respond to PRNs first. Utilize a 1:1 sitter (if no family can be present) if needed to minimize use of restraints. Physical restraints can worsen delirium.

## 2018-05-15 NOTE — NURSING
Labetalol administered, BP dropped to 182/80. Pt BP back up in 200's. Paging med team 2 awaiting a call back. Will continue to monitor patient.

## 2018-05-15 NOTE — PT/OT/SLP PROGRESS
Speech Language Pathology      Sunita Zimmer  MRN: [id number]    Orders received and chart reviewed.  Pt is being transferred to ICU for decline in medical status and need for higher level of care.  SLP Orders discontinued per protocol.  New evaluation orders are required.  Please reconsult Skilled Speech services when pt is medically appropriate.  Thank you.    Niecy Archuleta, CCC-SLP   5/15/2018

## 2018-05-15 NOTE — ASSESSMENT & PLAN NOTE
-78 y/o female presenting to [facility] ED with Altered mental status and collateral reported suicidal ideation  -Patient recently admitted to [facility] for spinal Abscess removal 5/3/2018. Since the procedure the patient has been verbally and physically abusive, paranoid, experiencing visual hallucinations and having a waxing and waning mentation.  -Per collateral from patient's son she has been refusing nutrition and care.  -Patient has a family history of schizophrenia in her biological mother however she has no documented psychiatric history.   -Strongly suspect patient's current presentation is secondary to delirium  -Recommend Admission to hospital medicine for delirium workup and CL psychiatry follow up.  -Recommend Zyprexa 2.5 mg PO crushed or IM if patient refuses.  -At this time the patient does not have capacity to refuse care.    - Avoid benzodiazepines, anticholinergics, and anti-histaminics and minimize opiates when possible as they may worsen or exacerbate delirium.  - Ensure blinds are open with lights on during the day and that the shades are closed with lights off at night. Reorient pt through out the day. Encourage family to be present as much as possible.  - May use soft restraints but only if pt is not redirectable and doesn't respond to PRNs first. Utilize a 1:1 sitter (if no family can be present) if needed to minimize use of restraints. Physical restraints can worsen delirium.

## 2018-05-15 NOTE — CARE UPDATE
RN Proactive Rounding Note  Time of Visit: 0500    Admit Date: 2018  LOS: 1  Code Status: Prior   Date of Visit: 05/15/2018  : 1940  Age: 77 y.o.  Sex: female  Bed: [facility] A:   MRN: [id number]  Was the patient discharged from an ICU this admission? no   Was the patient discharged from a PACU within last 24 hours? no  Did the patient receive conscious sedation/general anesthesia in last 24 hours? no  Was the patient in the ED within the past 24 hours? yes  Was the patient started on NIPPV within the past 24 hours? no  Attending Physician: Ruby Lunsford MD  Primary Service: [facility] MED 2      ASSESSMENT:     Abnormal Vital Signs: Blood Pressure 223/94  Clinical Issues: Circulatory     INTERVENTIONS/ RECOMMENDATIONS:     Received calls from CN regarding patient's blood pressure on admission from ED to floor with SBP 220s.  Admitted with hypertensive urgency, treated in ED with PO Altace and IV hydralazine IV.  BP had come down to SBP 160s-170s in ED, but upon arrival to floor, SBP > 200.  Primary team was made aware, given labetalol 5mg IVP, but only temporarily decreased BP.  Primary team consulted critical care medicine for evaluation of HTN urgency and if cardene is required.  Patient denies headache, LOC, AMS, blurry vision at this time.     Dr. Louis with CCM at bedside, SBP remains 210s-220s.  Will attempt PO nefidipine XL now and add hydralazine 10 mg IVP q4PRN.  Will assess effectiveness of medications over next few hours, if not responding, will reevaluate for upgrade to ICU.     Discussed plan of care with RN, Ashley (CN), Chasity (Primary RN)    PHYSICIAN ESCALATION:     Yes/No yes    Orders received and case discussed with Dr Louis     Disposition: remain on floor    FOLLOW-UP/CONTINGENCY:       Call back the Rapid Response Nurse at [phone]  for additional questions or concerns

## 2018-05-15 NOTE — ASSESSMENT & PLAN NOTE
--Free water deficit is about 1.8 L  --D5 gtt at 100 cc per hour  --goal is correction of no more than 6 to 8 per 24 hours.  --q4 BMP for sodium checks

## 2018-05-15 NOTE — SUBJECTIVE & OBJECTIVE
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    Centrilobular emphysema 5/7/2018    Chronic kidney disease (CKD), stage III (moderate)     COPD (chronic obstructive pulmonary disease)     Coronary artery disease     Current use of long term anticoagulation 5/9/2018    Coumadin for history of DVT    Encounter for blood transfusion     Essential hypertension 6/4/2014    NAION (non-arteritic anterior ischemic optic neuropathy), 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    Peripheral vascular disease 6/4/2014    Polycystic kidney disease     Polycythemia vera     congential    Senile nuclear sclerosis 11/21/2017    Subclavian artery stenosis, right 7/22/2014       Past Surgical History:   Procedure Laterality Date    ABDOMINAL SURGERY      APPENDECTOMY      BRAIN SURGERY      relieve hemmorhages/had 4 surgeries    CARDIAC CATHETERIZATION      CORONARY ANGIOPLASTY      FRACTURE SURGERY      GALLBLADDER SURGERY      HYSTERECTOMY      illiac stent      JOINT REPLACEMENT      SKIN BIOPSY      skin repair      apocrine cancer    tibial repair      steel plate inserted    TONSILLECTOMY      TOTAL KNEE ARTHROPLASTY      VEIN BYPASS SURGERY         Review of patient's allergies indicates:   Allergen Reactions    Bacitracin Other (See Comments)     "can not take because of Warfarin"    Bactrim [sulfamethoxazole-trimethoprim] Other (See Comments)     "can not take because of Warfarin"       No current facility-administered medications on file prior to encounter.      Current Outpatient Prescriptions on File Prior to Encounter   Medication Sig    acetaminophen-codeine 300-30mg (TYLENOL-CODEINE #3) 300-30 mg Tab Take by mouth.    albuterol (PROVENTIL) 4 MG Tab     alprazolam (XANAX) 0.5 MG tablet     cyclobenzaprine (FLEXERIL) 10 MG " tablet Take 10 mg by mouth 3 (three) times daily as needed for Muscle spasms.    diazepam (VALIUM) 5 MG tablet Take 5 mg by mouth every 6 (six) hours as needed for Anxiety.    FLUZONE HIGH-DOSE 2017-18, PF, 180 mcg/0.5 mL vaccine     hydrocodone-acetaminophen 7.5-500 mg (LORTAB) 7.5-500 mg per tablet Take 1 tablet by mouth every 6 (six) hours as needed for Pain.    ketorolac 0.5% (ACULAR) 0.5 % Drop Place 1 drop into the right eye 3 (three) times daily.    nitroGLYCERIN (NITROSTAT) 0.4 MG SL tablet Place 0.4 mg under the tongue every 5 (five) minutes as needed for Chest pain.    ofloxacin (OCUFLOX) 0.3 % ophthalmic solution 1 drop 4 (four) times daily.    polyethylene glycol (GLYCOLAX) 17 gram/dose powder Take 17 g by mouth.    prednisoLONE acetate (PRED FORTE) 1 % DrpS 1 drop 3 (three) times daily.    ramipril (ALTACE) 10 MG capsule Take 10 mg by mouth.     temazepam (RESTORIL) 15 mg Cap Take 15 mg by mouth.    umeclidinium 62.5 mcg/actuation DsDv Inhale 1 puff into the lungs.    VENTOLIN HFA 90 mcg/actuation inhaler      Family History     Problem Relation (Age of Onset)    Heart disease Brother        Social History Main Topics    Smoking status: Current Every Day Smoker     Packs/day: 1.00     Years: 60.00    Smokeless tobacco: Never Used    Alcohol use No    Drug use: No    Sexual activity: Not on file     Review of Systems   Constitutional: Negative for chills and fever.   HENT: Negative for sneezing and sore throat.    Eyes: Negative for pain and visual disturbance.   Respiratory: Positive for cough. Negative for shortness of breath and wheezing.    Cardiovascular: Negative for chest pain and leg swelling.   Gastrointestinal: Negative for abdominal pain, constipation, diarrhea and nausea.   Genitourinary: Positive for dysuria and frequency. Negative for urgency.   Allergic/Immunologic: Negative for environmental allergies.   Neurological: Negative for dizziness, light-headedness, numbness and  headaches.   Psychiatric/Behavioral: Negative for agitation. The patient is not nervous/anxious.      Objective:     Vital Signs (Most Recent):  Temp: 98.4 °F (36.9 °C) (05/14/18 2354)  Pulse: (!) 123 (05/15/18 0005)  Resp: (!) 29 (05/14/18 1805)  BP: (!) 166/75 (05/15/18 0002)  SpO2: 96 % (05/15/18 0002) Vital Signs (24h Range):  Temp:  [97.7 °F (36.5 °C)-98.7 °F (37.1 °C)] 98.4 °F (36.9 °C)  Pulse:  [109-123] 123  Resp:  [18-29] 29  SpO2:  [96 %-100 %] 96 %  BP: (166-225)/(74-98) 166/75     Weight: 49.4 kg (109 lb)  Body mass index is 19.94 kg/m².    Physical Exam   Constitutional: She is oriented to person, place, and time. She appears well-developed and well-nourished. No distress.   HENT:   Head: Normocephalic and atraumatic.   Nose: Nose normal.   Eyes: EOM are normal. No scleral icterus.   Neck: Normal range of motion. No tracheal deviation present.   Cardiovascular: Regular rhythm.  Exam reveals no gallop and no friction rub.    No murmur heard.  tachycardic   Pulmonary/Chest: Effort normal. No respiratory distress. She has no wheezes.   Wet cough, Rales at the RLL   Abdominal: Soft. Bowel sounds are normal.   Mild LUQ tenderness to deep palpation   Musculoskeletal: She exhibits no edema.   Neurological: She is alert and oriented to person, place, and time.   Skin: Skin is warm and dry.         CRANIAL NERVES     CN III, IV, VI   Extraocular motions are normal.        Significant Labs:   CBC:   Recent Labs  Lab 05/14/18  1838   WBC 11.85   HGB 11.3*   HCT 34.8*   *     CMP:   Recent Labs  Lab 05/13/18  0250 05/14/18  1838   * 150*   K 5.2* 3.5   * 110   CO2 18* 25    106   BUN 22 17   CREATININE 0.7 0.8   CALCIUM 8.0* 9.1   PROT 6.4 7.2   ALBUMIN 2.4* 2.8*   BILITOT 0.6 0.4   ALKPHOS 95 116   AST 35 25   ALT 34 30   ANIONGAP 12 15   EGFRNONAA >60.0 >60.0       Significant Imaging: I have reviewed all pertinent imaging results/findings within the past 24 hours.

## 2018-05-15 NOTE — NURSING
Patient BP trend in 200's, prn Labetalol administered, BP dropped to 182/80, but pt became hypotensive 20 minutes later. Team notified, Rapid response nurse called to bedside. PRN  Hydralazine also ordered by team, Med administered by charge nurse, BP still elevated. Procardia 60 mg, ordered by team, attempted to administer meds, Pt spat out medication twice. Rapid response nurse came to bedside to explain importance of taking meds to reduce blood pressure Pt verbalized understanding but still spat out meds. Orders placed to put in an NG tube for med administration. NG tube placed. Patient pulled out NG tube. Critical care team notified. Report given to day shift Nurse .

## 2018-05-15 NOTE — PLAN OF CARE
Von Mccullough MD  [address] / [place] [address]    [facility] - [place], [place] - [address]  [address]  [place] [address]  Phone: [phone] Fax: [phone]    [facility] - [place], [place] - [address]  [address]  [place] [address]  Phone: [phone] Fax: [phone]    [facility]  [address]  [facility] [address]  Phone: [phone] Fax: [phone]    Payor: [employer] [facility] / Plan: BCBS FEDERAL / Product Type: PPO /     Future Appointments  Date Time Provider Department Center   5/16/2018 11:00 AM Lilliana Escalante PA-C [facility]   6/18/2018 10:40 AM Jude Ramos,  [facility]     Extended Emergency Contact Information  Primary Emergency Contact: Afua Martins   [facility]  Home Phone: [phone]  Mobile Phone: [phone]  Relation: Daughter  Secondary Emergency Contact: Sushant Zimmer  Address: [address] [place] [address] [facility]  Home Phone: [phone]  Mobile Phone: [phone]  Relation: Spouse  Preferred language: English     05/15/18 1509   Discharge Assessment   Assessment Type Discharge Planning Assessment   Confirmed/corrected address and phone number on facesheet? Yes   Assessment information obtained from? Caregiver;Medical Record   Expected Length of Stay (days) 4   Communicated expected length of stay with patient/caregiver no   Prior to hospitilization cognitive status: Alert/Oriented   Prior to hospitalization functional status: Assistive Equipment;Needs Assistance;Partially Dependent   Current cognitive status: Not Oriented to Time   Current Functional Status: Needs Assistance;Partially Dependent;Assistive Equipment   Facility Arrived From: Home with [place] [facility]   Lives With spouse;child(keily), adult   Able to Return to Prior Arrangements unable to determine at this time (comments)   Who are  your caregiver(s) and their phone number(s)? Sushant (spouse) [phone]; Afua Martins (Daughter) [phone]; [phone]   Patient's perception of discharge disposition hospice/home   Readmission Within The Last 30 Days current reason for admission unrelated to previous admission   If yes, most recent facility name: [facility]   Patient currently being followed by outpatient case management? No   Patient currently receives any other outside agency services? Yes   Name and contact number of agency or person providing outside services [facility]   Equipment Currently Used at Home cane, straight;walker, rolling;wheelchair   Do you have any problems affording any of your prescribed medications? No   Is the patient taking medications as prescribed? no   If no, which medications is patient not taking? refusing meds   Does the patient have transportation home? Yes   Transportation Available family or friend will provide   Dialysis Name and Scheduled days N/A   Does the patient receive services at the [facility]? No   Discharge Plan A Hospice/home;Home with family   Discharge Plan B Inpatient Hospice   Patient/Family In Agreement With Plan unable to assess   Ashley Husain RN, BSN, CCM  Case Management  [facility]  Ext. [phone]

## 2018-05-15 NOTE — SUBJECTIVE & OBJECTIVE
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 -- Provider    Centrilobular emphysema 5/7/2018 -- Provider    Chronic kidney disease (CKD), stage III (moderate) -- -- Provider    COPD (chronic obstructive pulmonary disease) -- -- Provider    Coronary artery disease -- -- Provider    Current use of long term anticoagulation 5/9/2018 Coumadin for history of DVT Provider    Encounter for blood transfusion -- -- Provider    Essential hypertension 6/4/2014 -- Provider    NAION (non-arteritic anterior ischemic optic neuropathy), 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 -- Provider    Peripheral vascular disease 6/4/2014 -- Provider    Polycystic kidney disease -- -- Provider    Polycythemia vera -- congential Provider    Senile nuclear sclerosis 11/21/2017 -- Provider    Subclavian artery stenosis, right 7/22/2014 -- Provider          Pertinent Negatives     Diagnosis Date Noted Comments Source    AAA (abdominal aortic aneurysm) 7/22/2014 -- Provider    Acute coronary syndrome 7/22/2014 -- Provider    Asthma 7/22/2014 -- Provider    Atrial fibrillation 7/22/2014 -- Provider    Atrial flutter 7/22/2014 -- Provider    Cardiomyopathy 7/22/2014 -- Provider    Carotid artery occlusion 7/22/2014 -- Provider    CHF (congestive heart failure) 7/22/2014 -- Provider    Clotting disorder 7/22/2014 -- Provider    Diabetes mellitus 7/22/2014 -- Provider    Heart block 7/22/2014 -- Provider    Heart murmur 7/22/2014 -- Provider    History of psychiatric hospitalization 5/14/2018 -- Provider    Hx of psychiatric care 5/14/2018 -- Provider    Hyperlipidemia 7/22/2014 -- Provider    Malignant hyperthermia 11/20/2017 -- Provider    Rachel 5/14/2018 --  Provider    Psychiatric problem 5/14/2018 -- Provider    Seizures 9/4/2014 -- Provider    Sleep apnea 7/22/2014 -- Provider    Stenosis and insufficiency of lacrimal passages 7/22/2014 -- Provider    Stroke 7/22/2014 -- Provider    Suicide attempt 5/14/2018 -- Provider    Supraventricular tachycardia 7/22/2014 -- Provider    Syncope and collapse 7/22/2014 -- Provider    Therapy 5/14/2018 -- Provider    Thyroid disease 7/22/2014 -- Provider    Transfusion reaction 9/4/2014 -- Provider    Valvular regurgitation 7/22/2014 -- Provider    Ventricular tachycardia 7/22/2014 -- Provider                  Surgical as of 5/14/2018     Past Surgical History     Procedure Laterality Date Comments Source    CARDIAC CATHETERIZATION -- -- -- Provider    BRAIN SURGERY -- -- relieve hemmorhages/had 4 surgeries Provider    CORONARY ANGIOPLASTY -- -- -- Provider    HYSTERECTOMY -- -- -- Provider    GALLBLADDER SURGERY -- -- -- Provider    VEIN BYPASS SURGERY -- -- -- Provider    tibial repair [Other] -- -- steel plate inserted Provider    TOTAL KNEE ARTHROPLASTY -- -- -- Provider    illiac stent [Other] -- -- -- Provider    skin repair [Other] -- -- apocrine cancer Provider    ABDOMINAL SURGERY -- -- -- Provider    APPENDECTOMY -- -- -- Provider    FRACTURE SURGERY -- -- -- Provider    JOINT REPLACEMENT -- -- -- Provider    SKIN BIOPSY -- -- -- Provider    TONSILLECTOMY -- -- -- Provider                  Family as of 5/14/2018     Problem Relation Name Age of Onset Comments Source    Heart disease Brother -- -- -- Provider    Blindness Neg Hx -- -- -- Provider    Glaucoma Neg Hx -- -- -- Provider    Macular degeneration Neg Hx -- -- -- Provider    Retinal detachment Neg Hx -- -- -- Provider            Tobacco Use as of 5/14/2018     Smoking Status Smoking Start Date Smoking Quit Date Packs/day Years Used    Current Every Day Smoker -- -- 1.00 60.00    Types Comments Smokeless Tobacco Status Smokeless Tobacco Quit Date Source    -- --  Never Used -- Provider            Alcohol Use as of 5/14/2018     Alcohol Use Drinks/Week Alcohol/Week Comments Source    No -- -- -- Provider            Drug Use as of 5/14/2018     Drug Use Types Frequency Comments Source    No -- -- -- Provider            Sexual Activity as of 5/14/2018     Sexually Active Birth Control Partners Comments Source    -- -- -- -- Provider            Activities of Daily Living as of 5/14/2018     Activities of Daily Living Question Response Comments Source    Patient feels they ought to cut down on drinking/drug use Not Asked -- Provider    Patient annoyed by others criticizing their drinking/drug use Not Asked -- Provider    Patient has felt bad or guilty about drinking/drug use Not Asked -- Provider    Patient has had a drink/used drugs as an eye opener in the AM Not Asked -- Provider            Social Documentation as of 5/14/2018    **None**           Occupational as of 5/14/2018    **None**           Socioeconomic as of 5/14/2018     Marital Status Spouse Name Number of Children Years Education Preferred Language Ethnicity Race Source     -- -- -- English /White White --         Pertinent History Q A Comments    as of 5/14/2018 Lives with spouse     Place in Birth Order  5 brothers    Lives in home     Number of Siblings 5     Raised by biological parents     Legal Involvement none     Childhood Trauma uneventful     Criminal History of none     Financial Status homemaker     Highest Level of Education high school graduation     Does patient have access to a firearm? 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    Centrilobular emphysema  "5/7/2018    Chronic kidney disease (CKD), stage III (moderate)     COPD (chronic obstructive pulmonary disease)     Coronary artery disease     Current use of long term anticoagulation 5/9/2018    Coumadin for history of DVT    Encounter for blood transfusion     Essential hypertension 6/4/2014    NAION (non-arteritic anterior ischemic optic neuropathy), 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    Peripheral vascular disease 6/4/2014    Polycystic kidney disease     Polycythemia vera     congential    Senile nuclear sclerosis 11/21/2017    Subclavian artery stenosis, right 7/22/2014     Past Surgical History:   Procedure Laterality Date    ABDOMINAL SURGERY      APPENDECTOMY      BRAIN SURGERY      relieve hemmorhages/had 4 surgeries    CARDIAC CATHETERIZATION      CORONARY ANGIOPLASTY      FRACTURE SURGERY      GALLBLADDER SURGERY      HYSTERECTOMY      illiac stent      JOINT REPLACEMENT      SKIN BIOPSY      skin repair      apocrine cancer    tibial repair      steel plate inserted    TONSILLECTOMY      TOTAL KNEE ARTHROPLASTY      VEIN BYPASS SURGERY       Family History     Problem Relation (Age of Onset)    Heart disease Brother        Social History Main Topics    Smoking status: Current Every Day Smoker     Packs/day: 1.00     Years: 60.00    Smokeless tobacco: Never Used    Alcohol use No    Drug use: No    Sexual activity: Not on file     Review of patient's allergies indicates:   Allergen Reactions    Bacitracin Other (See Comments)     "can not take because of Warfarin"    Bactrim [sulfamethoxazole-trimethoprim] Other (See Comments)     "can not take because of Warfarin"       No current facility-administered medications on file prior to encounter.      Current Outpatient Prescriptions on File Prior to Encounter   Medication Sig    acetaminophen-codeine 300-30mg (TYLENOL-CODEINE #3) 300-30 mg Tab Take by mouth.    albuterol (PROVENTIL) 4 MG Tab     alprazolam (XANAX) 0.5 MG tablet     " cyclobenzaprine (FLEXERIL) 10 MG tablet Take 10 mg by mouth 3 (three) times daily as needed for Muscle spasms.    diazepam (VALIUM) 5 MG tablet Take 5 mg by mouth every 6 (six) hours as needed for Anxiety.    FLUZONE HIGH-DOSE 2017-18, PF, 180 mcg/0.5 mL vaccine     hydrocodone-acetaminophen 7.5-500 mg (LORTAB) 7.5-500 mg per tablet Take 1 tablet by mouth every 6 (six) hours as needed for Pain.    ketorolac 0.5% (ACULAR) 0.5 % Drop Place 1 drop into the right eye 3 (three) times daily.    nitroGLYCERIN (NITROSTAT) 0.4 MG SL tablet Place 0.4 mg under the tongue every 5 (five) minutes as needed for Chest pain.    ofloxacin (OCUFLOX) 0.3 % ophthalmic solution 1 drop 4 (four) times daily.    polyethylene glycol (GLYCOLAX) 17 gram/dose powder Take 17 g by mouth.    prednisoLONE acetate (PRED FORTE) 1 % DrpS 1 drop 3 (three) times daily.    ramipril (ALTACE) 10 MG capsule Take 10 mg by mouth.     temazepam (RESTORIL) 15 mg Cap Take 15 mg by mouth.    umeclidinium 62.5 mcg/actuation DsDv Inhale 1 puff into the lungs.    VENTOLIN HFA 90 mcg/actuation inhaler      Psychotherapeutics     None        Review of Systems    Objective:     Vital Signs (Most Recent):  Temp: 98.7 °F (37.1 °C) (05/14/18 1807)  Pulse: 109 (05/14/18 1805)  Resp: (!) 29 (05/14/18 1805)  BP: (!) 180/88 (05/14/18 1946)  SpO2: 100 % (05/14/18 1805) Vital Signs (24h Range):  Temp:  [97.7 °F (36.5 °C)-98.7 °F (37.1 °C)] 98.7 °F (37.1 °C)  Pulse:  [109-110] 109  Resp:  [18-29] 29  SpO2:  [97 %-100 %] 100 %  BP: (180-225)/(78-98) 180/88           There is no height or weight on file to calculate BMI.    No intake or output data in the 24 hours ending 05/14/18 2018    Physical Exam   Mental Status Exam:  Appearance: malnourished, lying in bed, thin & gaunt looking  Behavior/Cooperation: limited/ appropriate normal, cooperative  Speech: appropriate rate, volume and tone normal tone, normal rate, normal pitch, normal volume  Language: uses words  appropriately; NO aphasia or dysarthria  Mood: depressed  Affect:  congruent with mood and appropriate to situation/content   Thought Process: normal and logical  Thought Content: normal, no suicidality, no homicidality, delusions, or paranoia  Level of Consciousness: Alert and Oriented to person, place, and time. Disoriented to situation.  Memory:  Intact to recent events. Refusing to participate in register/recall  Attention/concentration: appropriate for age/education. Able to pass SAVEAHAART examination with no mistakes  Fund of Knowledge: appears adequate  Insight:  Limited  Judgment: Impaired     Significant Labs:   Last 24 Hours:   Recent Lab Results       05/14/18  1849 05/14/18  1848 05/14/18  1838      Benzodiazepines  Presumptive Positive      Methadone metabolites  Negative      Phencyclidine  Negative      Immature Granulocytes   0.8(H)     Immature Grans (Abs)   0.09  Comment:  Mild elevation in immature granulocytes is non specific and   can be seen in a variety of conditions including stress response,   acute inflammation, trauma and pregnancy. Correlation with other   laboratory and clinical findings is essential.  (H)     Albumin   2.8(L)     Alcohol, Medical, Serum   <10     Alkaline Phosphatase   116     ALT   30     Amphetamine Screen, Ur  Negative      Anion Gap   15     Appearance, UA Cloudy(A)       AST   25  Comment:  *Result may be interfered by visible hemolysis     Bacteria, UA Rare       Barbiturate Screen, Ur  Negative      Baso #   0.04     Basophil%   0.3     Bilirubin (UA) Negative       Total Bilirubin   0.4  Comment:  For infants and newborns, interpretation of results should be based  on gestational age, weight and in agreement with clinical  observations.  Premature Infant recommended reference ranges:  Up to 24 hours.............<8.0 mg/dL  Up to 48 hours............<12.0 mg/dL  3-5 days..................<15.0 mg/dL  6-29 days.................<15.0 mg/dL       BUN, Bld   17      Calcium   9.1     Chloride   110     CO2   25     Cocaine (Metab.)  Negative      Color, UA Yellow       Creatinine   0.8     Creatinine, Random Ur  28.0  Comment:  The random urine reference ranges provided were established   for 24 hour urine collections.  No reference ranges exist for  random urine specimens.  Correlate clinically.        Differential Method   Automated     eGFR if    >60.0     eGFR if non    >60.0  Comment:  Calculation used to obtain the estimated glomerular filtration  rate (eGFR) is the CKD-EPI equation.        Eos #   0.1     Eosinophil%   0.7     Glucose   106     Glucose, UA Negative       Gran # (ANC)   9.6(H)     Gran%   81.1(H)     Hematocrit   34.8(L)     Hemoglobin   11.3(L)     Hyaline Casts, UA 0       Ketones, UA Negative       Leukocytes, UA 3+(A)       Lymph #   1.2     Lymph%   10.5(L)     MCH   28.1     MCHC   32.5     MCV   87     Microscopic Comment SEE COMMENT  Comment:  Other formed elements not mentioned in the report are not   present in the microscopic examination.          Mono #   0.8     Mono%   6.6     MPV   9.4     Nitrite, UA Negative       nRBC   0     Occult Blood UA 1+(A)       Opiate Scrn, Ur  Negative      pH, UA 6.0       Platelets   428(H)     Potassium   3.5     Total Protein   7.2     Protein, UA 2+  Comment:  Recommend a 24 hour urine protein or a urine   protein/creatinine ratio if globulin induced proteinuria is  clinically suspected.  (A)       RBC   4.02     RBC, UA 0       RDW   13.4     Sodium   150(H)     Specific [place], UA 1.010       Specimen UA Urine, Catheterized       Squam Epithel, UA 0       Marijuana (THC) Metabolite  Negative      Toxicology Information  SEE COMMENT  Comment:  This screen includes the following classes of drugs at the   listed cut-off:  Benzodiazepines                  200 ng/ml  Methadone                        300 ng/ml  Cocaine metabolite               300 ng/ml  Opiates                           300 ng/ml  Barbiturates                     200 ng/ml  Amphetamines                    1000 ng/ml  Marijuana metabs (THC)            50 ng/ml  Phencyclidine (PCP)               25 ng/ml  High concentrations of Diphenhydramine may cross-react with  Phencyclidine PCP screening immunoassay giving a false   positive result.  High concentrations of Methylenedioxymethamphetamine (MDMA aka  Ectasy) and other structurally similar compounds may cross-   react with the Amphetamine/Methamphetamine screening   immunoassay giving a false positive result.  A metabolite of the anti-HIV drug Sustiva () may cause  false positive results in the Marijuana metabolite (THC)   screening assay.  Note: This exception list includes only more common   interferants in toxicology screen testing.  Because of many   cross-reactantspositive results on toxicology drug screens   should be confirmed whenever results do not correlate with   clinical presentation.  This report is intended for use in clinical monitoring and  management of patients. It is not intended for use in   employment related drug testing.  Because of any cross-reactants, positive results on toxicology  drug screens should be confirmed whenever results do not  correlate with clinical presentation.  Presumptive positive results are unconfirmed and may be used   only for medical purposes.        TSH   1.901     Urobilinogen, UA Negative       WBC Clumps, UA Few(A)       WBC, UA >100(H)       WBC   11.85     Yeast, UA Few(A)             Significant Imaging: I have reviewed all pertinent imaging results/findings within the past 24 hours.

## 2018-05-15 NOTE — CARE UPDATE
Reviewed most recent vital signs.  Labetalol has been given.  Spoke with charge nurse and advised she give me a call within an hour if blood pressures are still high, will start cardene drip if pressures remain elevated.  Patient will have to be moved to the 6th floor if drip is started.  CN will check on bed availability.

## 2018-05-15 NOTE — SUBJECTIVE & OBJECTIVE
"Interval History: see hospital course    Family History     Problem Relation (Age of Onset)    Heart disease Brother        Social History Main Topics    Smoking status: Current Every Day Smoker     Packs/day: 1.00     Years: 60.00    Smokeless tobacco: Never Used    Alcohol use No    Drug use: No    Sexual activity: Not on file     Psychotherapeutics     Start     Stop Route Frequency Ordered    05/15/18 1017  OLANZapine injection 2.5 mg      -- IM Every 12 hours PRN 05/15/18 0917           Review of Systems  Objective:     Vital Signs (Most Recent):  Temp: 98 °F (36.7 °C) (05/15/18 0820)  Pulse: (!) 122 (05/15/18 1100)  Resp: (!) 38 (05/15/18 1100)  BP: (!) 193/79 (05/15/18 1100)  SpO2: 100 % (05/15/18 1100) Vital Signs (24h Range):  Temp:  [97.4 °F (36.3 °C)-98.7 °F (37.1 °C)] 98 °F (36.7 °C)  Pulse:  [101-128] 122  Resp:  [18-42] 38  SpO2:  [92 %-100 %] 100 %  BP: (166-247)/() 193/79     Height: 5' 2" (157.5 cm)  Weight: 49.4 kg (109 lb)  Body mass index is 19.94 kg/m².      Intake/Output Summary (Last 24 hours) at 05/15/18 1224  Last data filed at 05/15/18 1037   Gross per 24 hour   Intake              350 ml   Output              500 ml   Net             -150 ml       Physical Exam    Mental Status Exam:  Appearance: malnourished, lying in bed, thin & gaunt looking, smiling  Behavior/Cooperation: cooperative  Speech: appropriate rate, volume and tone normal tone, normal rate, normal pitch, normal volume  Language: uses words appropriately; NO aphasia or dysarthria  Mood: "good"  Affect:  congruent with mood and appropriate to situation/content   Thought Process: normal and logical  Thought Content: normal, no suicidality, no homicidality, delusions, or paranoia  Level of Consciousness: Alert and Oriented to person, place, and time. Disoriented to situation.  Memory:  Intact to recent events  Attention/concentration: Able to pass [facility] examination with 1 mistake  Insight:  Limited  Judgment: " Impaired    Significant Labs:   Last 24 Hours:   Recent Lab Results       05/15/18  0545 05/14/18  2334 05/14/18  2243 05/14/18  1849 05/14/18  1848      Benzodiazepines     Presumptive Positive     Methadone metabolites     Negative     Phencyclidine     Negative     Immature Granulocytes          Immature Grans (Abs)          Acetaminophen (Tylenol), Serum          Albumin          Alcohol, Medical, Serum          Alkaline Phosphatase          ALT          Amphetamine Screen, Ur     Negative     Anion Gap 15 15        Appearance, UA    Cloudy(A)      AST          Bacteria, UA    Rare      Barbiturate Screen, Ur     Negative     Baso #          Basophil%          Bilirubin (UA)    Negative      Total Bilirubin          BUN, Bld 19 19        Calcium 8.7 8.9        Chloride 109 109        CO2 22(L) 22(L)        Cocaine (Metab.)     Negative     Color, UA    Yellow      Creatinine 0.9 0.9        Creatinine, Random Ur     28.0  Comment:  The random urine reference ranges provided were established   for 24 hour urine collections.  No reference ranges exist for  random urine specimens.  Correlate clinically.       Differential Method          eGFR if  >60.0 >60.0        eGFR if non  >60.0  Comment:  Calculation used to obtain the estimated glomerular filtration  rate (eGFR) is the CKD-EPI equation.    >60.0  Comment:  Calculation used to obtain the estimated glomerular filtration  rate (eGFR) is the CKD-EPI equation.           Eos #          Eosinophil%          Estimated Avg Glucose  105        Glucose 103 111(H)        Glucose, UA    Negative      Gram Stain Result    Few WBC's          Many Gram negative rods      Gran # (ANC)          Gran%          Hematocrit 34.9(L)          34.9(L)         Hemoglobin 11.1(L)          11.1(L)         Hemoglobin A1C  5.3  Comment:  According to ADA guidelines, hemoglobin A1c <7.0% represents  optimal control in non-pregnant diabetic patients.  Different  metrics may apply to specific patient populations.   Standards of Medical Care in Diabetes-2016.  For the purpose of screening for the presence of diabetes:  <5.7%     Consistent with the absence of diabetes  5.7-6.4%  Consistent with increasing risk for diabetes   (prediabetes)  >or=6.5%  Consistent with diabetes  Currently, no consensus exists for use of hemoglobin A1c  for diagnosis of diabetes for children.  This Hemoglobin A1c assay has significant interference with fetal   hemoglobin   (HbF). The results are invalid for patients with abnormal amounts of   HbF,   including those with known Hereditary Persistence   of Fetal Hemoglobin. Heterozygous hemoglobin variants (HbAS, HbAC,   HbAD, HbAE, HbA2) do not significantly interfere with this assay;   however, presence of multiple variants in a sample may impact the %   interference.          Hyaline Casts, UA    0      Coumadin Monitoring INR   1.0  Comment:  Coumadin Therapy:  2.0 - 3.0 for INR for all indicators except mechanical heart valves  and antiphospholipid syndromes which should use 2.5 - 3.5.         Ketones, UA    Negative      Leukocytes, UA    3+(A)      Lymph #          Lymph%          Magnesium 1.6         MCH 27.8          27.8         MCHC 31.8(L)          31.8(L)         MCV 87          87         Microscopic Comment    SEE COMMENT  Comment:  Other formed elements not mentioned in the report are not   present in the microscopic examination.         Mono #          Mono%          MPV 9.1(L)          9.3         Nitrite, UA    Negative      nRBC          Occult Blood UA    1+(A)      Opiate Scrn, Ur     Negative     pH, UA    6.0      Phosphorus 2.6(L)         Platelets 485(H)          467(H)         Potassium 2.9(L) 3.0(L)        Total Protein          Protein, UA    2+  Comment:  Recommend a 24 hour urine protein or a urine   protein/creatinine ratio if globulin induced proteinuria is  clinically suspected.  (A)      Protime   10.5        RBC 4.00          4.00         RBC, UA    0      RDW 13.6          13.5         Salicylate Lvl          Sodium 146(H) 146(H)        Specific [place], UA    1.010      Specimen UA    Urine, Catheterized      Squam Epithel, UA    0      Marijuana (THC) Metabolite     Negative     Toxicology Information     SEE COMMENT  Comment:  This screen includes the following classes of drugs at the   listed cut-off:  Benzodiazepines                  200 ng/ml  Methadone                        300 ng/ml  Cocaine metabolite               300 ng/ml  Opiates                          300 ng/ml  Barbiturates                     200 ng/ml  Amphetamines                    1000 ng/ml  Marijuana metabs (THC)            50 ng/ml  Phencyclidine (PCP)               25 ng/ml  High concentrations of Diphenhydramine may cross-react with  Phencyclidine PCP screening immunoassay giving a false   positive result.  High concentrations of Methylenedioxymethamphetamine (MDMA aka  Ectasy) and other structurally similar compounds may cross-   react with the Amphetamine/Methamphetamine screening   immunoassay giving a false positive result.  A metabolite of the anti-HIV drug Sustiva () may cause  false positive results in the Marijuana metabolite (THC)   screening assay.  Note: This exception list includes only more common   interferants in toxicology screen testing.  Because of many   cross-reactantspositive results on toxicology drug screens   should be confirmed whenever results do not correlate with   clinical presentation.  This report is intended for use in clinical monitoring and  management of patients. It is not intended for use in   employment related drug testing.  Because of any cross-reactants, positive results on toxicology  drug screens should be confirmed whenever results do not  correlate with clinical presentation.  Presumptive positive results are unconfirmed and may be used   only for medical purposes.       TSH           Urobilinogen, UA    Negative      WBC Clumps, UA    Few(A)      WBC, UA    >100(H)      WBC 21.82(H)          21.55(H)         Yeast, UA    Few(A)                  05/14/18  1838      Benzodiazepines      Methadone metabolites      Phencyclidine      Immature Granulocytes 0.8(H)     Immature Grans (Abs) 0.09  Comment:  Mild elevation in immature granulocytes is non specific and   can be seen in a variety of conditions including stress response,   acute inflammation, trauma and pregnancy. Correlation with other   laboratory and clinical findings is essential.  (H)     Acetaminophen (Tylenol), Serum <3.0  Comment:  Toxic Levels:  Adults (4 hr post-ingestion).........>150 ug/mL  Adults (12 hr post-ingestion)........>40 ug/mL  Peds (2 hr post-ingestion, liquid)...>225 ug/mL  (L)     Albumin 2.8(L)     Alcohol, Medical, Serum <10     Alkaline Phosphatase 116     ALT 30     Amphetamine Screen, Ur      Anion Gap 15     Appearance, UA      AST 25  Comment:  *Result may be interfered by visible hemolysis     Bacteria, UA      Barbiturate Screen, Ur      Baso # 0.04     Basophil% 0.3     Bilirubin (UA)      Total Bilirubin 0.4  Comment:  For infants and newborns, interpretation of results should be based  on gestational age, weight and in agreement with clinical  observations.  Premature Infant recommended reference ranges:  Up to 24 hours.............<8.0 mg/dL  Up to 48 hours............<12.0 mg/dL  3-5 days..................<15.0 mg/dL  6-29 days.................<15.0 mg/dL       BUN, Bld 17     Calcium 9.1     Chloride 110     CO2 25     Cocaine (Metab.)      Color, UA      Creatinine 0.8     Creatinine, Random Ur      Differential Method Automated     eGFR if African American >60.0     eGFR if non  >60.0  Comment:  Calculation used to obtain the estimated glomerular filtration  rate (eGFR) is the CKD-EPI equation.        Eos # 0.1     Eosinophil% 0.7     Estimated Avg Glucose      Glucose 106     Glucose,  UA      Gram Stain Result      Gran # (ANC) 9.6(H)     Gran% 81.1(H)     Hematocrit 34.8(L)     Hemoglobin 11.3(L)     Hemoglobin A1C      Hyaline Casts, UA      Coumadin Monitoring INR      Ketones, UA      Leukocytes, UA      Lymph # 1.2     Lymph% 10.5(L)     Magnesium      MCH 28.1     MCHC 32.5     MCV 87     Microscopic Comment      Mono # 0.8     Mono% 6.6     MPV 9.4     Nitrite, UA      nRBC 0     Occult Blood UA      Opiate Scrn, Ur      pH, UA      Phosphorus      Platelets 428(H)     Potassium 3.5     Total Protein 7.2     Protein, UA      Protime      RBC 4.02     RBC, UA      RDW 13.4     Salicylate Lvl <5.0  Comment:  Toxic:  30.0 - 70.0 mg/dl  Lethal: >70.0 mg/dl  (L)     Sodium 150(H)     Specific [place], UA      Specimen UA      Squam Epithel, UA      Marijuana (THC) Metabolite      Toxicology Information      TSH 1.901     Urobilinogen, UA      WBC Clumps, UA      WBC, UA      WBC 11.85     Yeast, UA            Significant Imaging: I have reviewed all pertinent imaging results/findings within the past 24 hours.

## 2018-05-15 NOTE — PHYSICIAN QUERY
PT Name: Sunita Zimmer  MR #: [id number]    Physician Query Form - Neurological Condition Clarification       CDS/: Meli Schaffer RN, CDS               Contact information: [email or web address]    This form is a permanent document in the medical record.     Query Date: May 15, 2018    By submitting this query, we are merely seeking further clarification of documentation. Please utilize your independent clinical judgment when addressing the question(s) below.    The Medical record contains the following:   Indicators   Supporting Clinical Findings Location in Medical Record   x AMS, Confusion, LOC, etc. --She presents to the ED via EMS for AMS          -for new onset difficulty to arouse. According to EMS, patient normally responds to voice, but since this morning patient has  only been responsive to sternal rub.    ---- patient has had a cough for about 2 weeks, possibly infectious encephalopathy    ED provider Note          Vas Neuro Note 5/12     x Acute / Chronic Illness --She had baseline dementia    --ICH (intracerebral hemorrhage)            -CT head revealed a small IVH in the left occipital horn of the ventricles.          -This is unlikely the cause of her AMS.    --- UA significant for UTI    NeuroSx C/S 5/12    Vas neuro Note 5/12          NCC DS 5/13 (Dr francis/Kiesha)      Radiology Findings      Electrolyte Imbalance      Medication     x Treatment --Pt refusing all medications and therapies. Discussions with patient and family, they would like to go home with hospice   NCC DS 5/13 (Dr francis/Kiesha)    Other       Provider, please specify the diagnosis or diagnoses associated with above clinical findings.        [  ] Metabolic Encephalopathy    [  ] Other Encephalopathy    [x  ] Unspecified Encephalopathy    [  ] Other (please specify): _____________________________________    [  ] Clinically Undetermined      Please document in your progress notes daily for the duration of  treatment until resolved, and  include in your discharge summary.

## 2018-05-15 NOTE — SUBJECTIVE & OBJECTIVE
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    Centrilobular emphysema 5/7/2018    Chronic kidney disease (CKD), stage III (moderate)     COPD (chronic obstructive pulmonary disease)     Coronary artery disease     Current use of long term anticoagulation 5/9/2018    Coumadin for history of DVT    Encounter for blood transfusion     Essential hypertension 6/4/2014    NAION (non-arteritic anterior ischemic optic neuropathy), 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    Peripheral vascular disease 6/4/2014    Polycystic kidney disease     Polycythemia vera     congential    Senile nuclear sclerosis 11/21/2017    Subclavian artery stenosis, right 7/22/2014       Past Surgical History:   Procedure Laterality Date    ABDOMINAL SURGERY      APPENDECTOMY      BRAIN SURGERY      relieve hemmorhages/had 4 surgeries    CARDIAC CATHETERIZATION      CORONARY ANGIOPLASTY      FRACTURE SURGERY      GALLBLADDER SURGERY      HYSTERECTOMY      illiac stent      JOINT REPLACEMENT      SKIN BIOPSY      skin repair      apocrine cancer    tibial repair      steel plate inserted    TONSILLECTOMY      TOTAL KNEE ARTHROPLASTY      VEIN BYPASS SURGERY       Review of patient's allergies indicates:   Allergen Reactions    Bacitracin Other (See Comments)     "can not take because of Warfarin"    Bactrim [sulfamethoxazole-trimethoprim] Other (See Comments)     "can not take because of Warfarin"     Family History     Problem Relation (Age of Onset)    Heart disease Brother        Social History Main Topics    Smoking status: Current Every Day Smoker     Packs/day: 1.00     Years: 60.00    Smokeless tobacco: Never Used    Alcohol use No    Drug use: No    Sexual activity: Not on file      Review of Systems   Constitutional: Negative for chills and " fever.   HENT: Negative for sneezing and sore throat.    Eyes: Negative for pain and visual disturbance.   Respiratory: Positive for cough. Negative for chest tightness, shortness of breath and wheezing.    Cardiovascular: Negative for chest pain, palpitations and leg swelling.   Gastrointestinal: Positive for abdominal pain. Negative for anal bleeding, blood in stool, constipation, diarrhea, nausea and vomiting.   Endocrine: Negative for polydipsia and polyuria.   Genitourinary: Positive for dysuria and frequency. Negative for urgency.   Allergic/Immunologic: Negative for environmental allergies.   Neurological: Negative for dizziness, light-headedness, numbness and headaches.   Psychiatric/Behavioral: Negative for agitation. The patient is not nervous/anxious.      Objective:     Vital Signs (Most Recent):  Temp: 97.4 °F (36.3 °C) (05/15/18 0213)  Pulse: (!) 122 (05/15/18 0559)  Resp: 20 (05/15/18 0332)  BP: (!) 181/71 (05/15/18 0559)  SpO2: (!) 93 % (05/15/18 0222) Vital Signs (24h Range):  Temp:  [97.4 °F (36.3 °C)-98.7 °F (37.1 °C)] 97.4 °F (36.3 °C)  Pulse:  [101-128] 122  Resp:  [18-29] 20  SpO2:  [92 %-100 %] 93 %  BP: (166-233)/() 181/71   Weight: 49.4 kg (109 lb)  Body mass index is 19.94 kg/m².    Intake/Output Summary (Last 24 hours) at 05/15/18 0612  Last data filed at 05/15/18 0406   Gross per 24 hour   Intake                0 ml   Output              500 ml   Net             -500 ml     Physical Exam   Constitutional: She is oriented to person, place, and time. She appears well-developed and well-nourished. No distress.   HENT:   Head: Normocephalic and atraumatic.   Nose: Nose normal.   Eyes: EOM are normal. No scleral icterus.   Neck: Normal range of motion. No tracheal deviation present.   Cardiovascular: Regular rhythm and intact distal pulses.  Tachycardia present.  Exam reveals no gallop and no friction rub.    Murmur heard.   Systolic murmur is present with a grade of 4/6    Pulmonary/Chest: Effort normal and breath sounds normal. No respiratory distress. She has no wheezes. She has no rhonchi. She has no rales.   Wet cough   Abdominal: Soft. Bowel sounds are normal. There is tenderness in the left upper quadrant and left lower quadrant. There is no rigidity, no guarding and no CVA tenderness.   Pulsatile mass periumbilical   Musculoskeletal: She exhibits no edema.   Neurological: She is alert and oriented to person, place, and time.   Skin: Skin is warm, dry and intact. She is not diaphoretic. No pallor.        Lines/Drains/Airways     Peripheral Intravenous Line                 Peripheral IV - Single Lumen 05/14/18 1813 Right Hand less than 1 day              Significant Labs:    CBC/Anemia Profile:    Recent Labs  Lab 05/14/18 1838   WBC 11.85   HGB 11.3*   HCT 34.8*   *   MCV 87   RDW 13.4     Chemistries:    Recent Labs  Lab 05/14/18  1838 05/14/18  2334   * 146*   K 3.5 3.0*    109   CO2 25 22*   BUN 17 19   CREATININE 0.8 0.9   CALCIUM 9.1 8.9   ALBUMIN 2.8*  --    PROT 7.2  --    BILITOT 0.4  --    ALKPHOS 116  --    ALT 30  --    AST 25  --      All pertinent labs within the past 24 hours have been reviewed.    Significant Imaging: I have reviewed and interpreted all pertinent imaging results/findings within the past 24 hours.   CXR 5/14/18:  -Consolidation and small effusion at the left base.  This could represent pneumonia or aspiration with associated small effusion.  -Small right effusion.

## 2018-05-15 NOTE — PHYSICIAN QUERY
PT Name: Sunita Zimmer  MR #: [id number]     Physician Query Form - Documentation Clarification      CDS/: Meli Schaffer RN, CDS               Contact information: [email or web address]    This form is a permanent document in the medical record.     Query Date: May 15, 2018    By submitting this query, we are merely seeking further clarification of documentation. Please utilize your independent clinical judgment when addressing the question(s) below.    The Medical record reflects the following:    Supporting Clinical Findings Location in Medical Record     --/85. Will order 5 mg IV metoprolol        - Per neuro ICU recommendations will order 10 mg IV hydralazine and Altace PO    --patient resting comfortably. /81. . Despite 10 mg IV hydralazine and PO Altace.                   -Neurosurgery ordered IV Cardene infusion     ED Provider Note        ED Provider Note     BP: 202/85->214/112->185/79->206/88->168/71->145/67->       VS 5/11->5/13                                                                            Doctor, Please specify diagnosis or diagnoses associated with above clinical findings.    Provider Use Only      [  ] Hypertensive Emergency    [x  ] Hypertensive Urgency    [  ] Hypertensive Crisis, unspecified    [  ] Severe Essential Hypertension    [  ] Other (please specify): _______________                                                                                                                 [  ] Clinically undetermined

## 2018-05-15 NOTE — CONSULTS
Consult received. Please see progress note dated 5/15/18 for recommendations.    Ilana Aguila MD  Psychiatry, [facility] PGY2

## 2018-05-15 NOTE — NURSING
Patient arrived from ED, on stretcher, with transport and a sitter. Pt vitals unstable, BP in 200's. Will notify MD and administered PRN labetalol. Will continue to monitor.

## 2018-05-15 NOTE — HOSPITAL COURSE
"05/15/2018: Per chart review, patient received PRN Zyprexa PO @ 2341, overnight was spitting out PO meds given by RN for elevated BP. Patient seen with medical student. On interview, pt reports she was admitted for cyst on lower spine to be treated with surgery. Patient denies depressed mood, decreased energy, moderate appetite for food she likes (avocados, berries), good concentration. Endorses difficulty falling asleep, feels cold, takes 30-45 mins to fall asleep. Denies SI/HI/AVH.   05/16/2018: Per chart review, no PRN Zyprexa given. Per primary team note: "Encephalopathy resolved. Pt awake and alert this AM , orienting appropriately to self, person, time and place. Patient notes to feeling much better this AM. She is sipping on liquids this AM and stated that will gladly take PO pills/meds." On interview, patient alert and awake though does perseverate at times. Discussed with patient's  and son the importance of removing all guns from the home and removing any old medications patient is not currently taking.  and son feel patient is "back to her old self," and in a good mood. They do not think she requires inpatient psychiatric hospitalization and hope to take her home soon.     "

## 2018-05-15 NOTE — CONSULTS
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 She was recently discharged from [facility] on 5/13 for small intraventricular hemorrhage in the left occipital horn which originally presented as AMS. She was also found to have a UTI, which was not treated. It was documented she was refusing all medications and therapies, and she was ultimately made DNR and sent home with hospice after discussions were held with family. One day after discharge, they brought her back for AMS, thought to be due to the UTI.    Of note, pt lives at home with 89 y/o  and with son. It was reported that pt threatened to end her life and has guns at home. On presentation, she was PEC'd.    Hospital/ICU Course:  Pt was initially found to be hypertensive to 220s/90s and tachycardic to 1teens. 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 Exam notable for harsh systolic murmur in RUSB, clear lungs, and some L sided abdominal tenderness. CXR showed consolidation and small effusion at the left base and small right effusion. CT head showed evolution of the previously identified blood products in the left occipital horn but no acute infarct, parenchymal hemorrhage, new intraventricular hemorrhage, or hydrocephalus. Pt was given home ramipril, IV hydralazine 10 mg x 1 and IV labetalol 5  "mg x 1 with temporary improvement in SBP to 180s-190s, immediately followed by rebound to 220s-230s. Pt refused PO meds subsequently. ICU was consulted for IV cardene drip for hypertensive urgency.    Past Medical History:   Diagnosis Date    Active smoker 6/4/2014    Anticoagulant long-term use     Apocrine adenocarcinoma     Back pain     lower back    Brain aneurysm     4 times    Central scotoma, 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    Centrilobular emphysema 5/7/2018    Chronic kidney disease (CKD), stage III (moderate)     COPD (chronic obstructive pulmonary disease)     Coronary artery disease     Current use of long term anticoagulation 5/9/2018    Coumadin for history of DVT    Encounter for blood transfusion     Essential hypertension 6/4/2014    NAION (non-arteritic anterior ischemic optic neuropathy), 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    Peripheral vascular disease 6/4/2014    Polycystic kidney disease     Polycythemia vera     congential    Senile nuclear sclerosis 11/21/2017    Subclavian artery stenosis, right 7/22/2014       Past Surgical History:   Procedure Laterality Date    ABDOMINAL SURGERY      APPENDECTOMY      BRAIN SURGERY      relieve hemmorhages/had 4 surgeries    CARDIAC CATHETERIZATION      CORONARY ANGIOPLASTY      FRACTURE SURGERY      GALLBLADDER SURGERY      HYSTERECTOMY      illiac stent      JOINT REPLACEMENT      SKIN BIOPSY      skin repair      apocrine cancer    tibial repair      steel plate inserted    TONSILLECTOMY      TOTAL KNEE ARTHROPLASTY      VEIN BYPASS SURGERY       Review of patient's allergies indicates:   Allergen Reactions    Bacitracin Other (See Comments)     "can not take because of Warfarin"    Bactrim [sulfamethoxazole-trimethoprim] Other (See Comments)     "can not take because of Warfarin"     Family History     Problem Relation (Age of Onset)    Heart disease Brother        Social History Main Topics    Smoking status: Current Every Day Smoker     " Packs/day: 1.00     Years: 60.00    Smokeless tobacco: Never Used    Alcohol use No    Drug use: No    Sexual activity: Not on file      Review of Systems   Constitutional: Negative for chills and fever.   HENT: Negative for sneezing and sore throat.    Eyes: Negative for pain and visual disturbance.   Respiratory: Positive for cough. Negative for chest tightness, shortness of breath and wheezing.    Cardiovascular: Negative for chest pain, palpitations and leg swelling.   Gastrointestinal: Positive for abdominal pain. Negative for anal bleeding, blood in stool, constipation, diarrhea, nausea and vomiting.   Endocrine: Negative for polydipsia and polyuria.   Genitourinary: Positive for dysuria and frequency. Negative for urgency.   Allergic/Immunologic: Negative for environmental allergies.   Neurological: Negative for dizziness, light-headedness, numbness and headaches.   Psychiatric/Behavioral: Negative for agitation. The patient is not nervous/anxious.      Objective:     Vital Signs (Most Recent):  Temp: 97.4 °F (36.3 °C) (05/15/18 0213)  Pulse: (!) 122 (05/15/18 0559)  Resp: 20 (05/15/18 0332)  BP: (!) 181/71 (05/15/18 0559)  SpO2: (!) 93 % (05/15/18 0222) Vital Signs (24h Range):  Temp:  [97.4 °F (36.3 °C)-98.7 °F (37.1 °C)] 97.4 °F (36.3 °C)  Pulse:  [101-128] 122  Resp:  [18-29] 20  SpO2:  [92 %-100 %] 93 %  BP: (166-233)/() 181/71   Weight: 49.4 kg (109 lb)  Body mass index is 19.94 kg/m².    Intake/Output Summary (Last 24 hours) at 05/15/18 0612  Last data filed at 05/15/18 0406   Gross per 24 hour   Intake                0 ml   Output              500 ml   Net             -500 ml     Physical Exam   Constitutional: She is oriented to person, place, and time. She appears well-developed and well-nourished. No distress.   HENT:   Head: Normocephalic and atraumatic.   Nose: Nose normal.   Eyes: EOM are normal. No scleral icterus.   Neck: Normal range of motion. No tracheal deviation present.    Cardiovascular: Regular rhythm and intact distal pulses.  Tachycardia present.  Exam reveals no gallop and no friction rub.    Murmur heard.   Systolic murmur is present with a grade of 4/6   Pulmonary/Chest: Effort normal and breath sounds normal. No respiratory distress. She has no wheezes. She has no rhonchi. She has no rales.   Wet cough   Abdominal: Soft. Bowel sounds are normal. There is tenderness in the left upper quadrant and left lower quadrant. There is no rigidity, no guarding and no CVA tenderness.   Pulsatile mass periumbilical   Musculoskeletal: She exhibits no edema.   Neurological: She is alert and oriented to person, place, and time.   Skin: Skin is warm, dry and intact. She is not diaphoretic. No pallor.        Lines/Drains/Airways     Peripheral Intravenous Line                 Peripheral IV - Single Lumen 05/14/18 1813 Right Hand less than 1 day              Significant Labs:    CBC/Anemia Profile:    Recent Labs  Lab 05/14/18  1838   WBC 11.85   HGB 11.3*   HCT 34.8*   *   MCV 87   RDW 13.4     Chemistries:    Recent Labs  Lab 05/14/18  1838 05/14/18  2334   * 146*   K 3.5 3.0*    109   CO2 25 22*   BUN 17 19   CREATININE 0.8 0.9   CALCIUM 9.1 8.9   ALBUMIN 2.8*  --    PROT 7.2  --    BILITOT 0.4  --    ALKPHOS 116  --    ALT 30  --    AST 25  --      All pertinent labs within the past 24 hours have been reviewed.    Significant Imaging: I have reviewed and interpreted all pertinent imaging results/findings within the past 24 hours.   CXR 5/14/18:  -Consolidation and small effusion at the left base.  This could represent pneumonia or aspiration with associated small effusion.  -Small right effusion.    Assessment/Plan:     Cardiac/Vascular   Hypertensive urgency    -BP up to 220s/90s. Likely 2/2 to medication noncompliance at home, given PO refusal, AMS in the setting of recent CVA, and hypernatremia suggesting dehydration. Transient improvement in SBP to 180s-190s with IV  pushes of hydralazine and labetalol. Given  in R arm, 208 in L arm, aortic murmur and abdominal bruit on exam, concern for aortic dissection. No signs/sx of pulmonary edema or PATTI.  -Place NT tube for PO nifedipine 60 mg extended release. Place soft restraints. Continue PEC.  -Admit to ICU for IV cardene drip.  -Repeat stat CT head non-contrast to evaluate for hemorrhage.  -Continue home ramipril 10 mg daily.  -Continue hydralazine 10 mg IV prn.  -Continue labetalol 5 mg IV prn.            Critical Care Daily Checklist:    A: Awake: RASS Goal/Actual Goal:    Actual:     B: Spontaneous Breathing Trial Performed?     C: SAT & SBT Coordinated?  Does not apply                   D: Delirium: CAM-ICU     E: Early Mobility Performed? No   F: Feeding Goal:    Status:     Current Diet Order   Procedures    Diet NPO      AS: Analgesia/Sedation None   T: Thromboembolic Prophylaxis Contraindicated   H: HOB > 300 Yes   U: Stress Ulcer Prophylaxis (if needed) No   G: Glucose Control None   B: Bowel Function     I: Indwelling Catheter (Lines & Corbett) Necessity No   D: De-escalation of Antimicrobials/Pharmacotherapies No    Plan for the day/ETD Admit to ICU    Code Status:  Family/Goals of Care: Prior         Critical secondary to Patient has a condition that poses threat to life and bodily function: hypertensive urgency.     Critical care was time spent personally by me on the following activities: development of treatment plan with patient or surrogate and bedside caregivers, discussions with consultants, evaluation of patient's response to treatment, examination of patient, ordering and performing treatments and interventions, ordering and review of laboratory studies, ordering and review of radiographic studies, pulse oximetry, re-evaluation of patient's condition. This critical care time did not overlap with that of any other provider or involve time for any procedures.    Thank you for your consult. I will follow-up  with patient. Please contact us if you have any additional questions.     Westley Louis MD  Critical Care Medicine  [facility]

## 2018-05-15 NOTE — ASSESSMENT & PLAN NOTE
-BP up to 220s/90s. Likely 2/2 to medication noncompliance at home, given PO refusal, AMS in the setting of recent CVA, and hypernatremia suggesting dehydration. Transient improvement in SBP to 180s-190s with IV pushes of hydralazine and labetalol. Given  in R arm, 208 in L arm, aortic murmur and abdominal bruit on exam, concern for aortic dissection. No signs/sx of pulmonary edema or PATTI.  -Place NT tube for PO nifedipine 60 mg extended release. Place soft restraints. Continue PEC.  -Admit to ICU for IV cardene drip.  -Repeat stat CT head non-contrast to evaluate for hemorrhage.  -Continue home ramipril 10 mg daily.  -Continue hydralazine 10 mg IV prn.  -Continue labetalol 5 mg IV prn.

## 2018-05-15 NOTE — HOSPITAL COURSE
Pt was initially found to be hypertensive to 220s/90s and tachycardic to 1teens. 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 Exam notable for harsh systolic murmur in RUSB, clear lungs, and some L sided abdominal tenderness. CXR showed consolidation and small effusion at the left base and small right effusion. CT head showed evolution of the previously identified blood products in the left occipital horn but no acute infarct, parenchymal hemorrhage, new intraventricular hemorrhage, or hydrocephalus. Pt was given home ramipril, IV hydralazine 10 mg x 1 and IV labetalol 5 mg x 1 with temporary improvement in SBP to 180s-190s, immediately followed by rebound to 220s-230s. Pt refused PO meds subsequently. Patient was subsequently admitted to ICU for IV cardene drip for hypertensive urgency. Encephalopathy was likely 2/2 sepsis and/ or HTN emergency. Sepsis sources - likely UTI and possibly HCAP, pt started on vanco and zosyn. Many WBCs in urine. BPs improved with cardene as well as clonidine patch, and cardene was weaned off. Encephalopathy improved / resolved with treatment of sepsis and HTN. PO regimen was optimized with decrease in BPs to 150s. On 5/16 -5/17 over night, BP increased back to 200, not responsive to IV hydralazine , was placed back on cardene gtt. Adjustments were made to PO medication again and she remained off of cardene for the past day. Resumed coumadin, and continued on ceftriaxone. Stepped down to the hospital medicine on 5/19.

## 2018-05-15 NOTE — CARE UPDATE
Admit orders written.  On arrival to bedside, patient is lethargic, arouses to verbal stimuli, she is conversant but confused. GCS- 12. Non-labored respirations, equal chest rise and fall. Patient transported to CT of head then transported to ICU with continuous telemetry monitor, SpO2, and NIBP. Patient tolerated CT and transport without complications.
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
## 2018-05-15 NOTE — ASSESSMENT & PLAN NOTE
--likely secondary to infection, multiple hospitalizations, probably home medications contributing (benzos on UDS).  --UA suspicious for infection, will continue rocephin for now.  No UCx collected previous admission.    --rales auscultate in the RLL, CXR pending  --follow urine culture, gram stain, blood culture, procal  --Psych has evaluated the patient and deemed she does not have capacity to refuse care  --PRN PO/IM Zyprexa  --Delirium, fall, aspiration precautions  --NPO until swallow eval  --hold home sedating meds

## 2018-05-15 NOTE — ASSESSMENT & PLAN NOTE
History of ICH, with brain aneurysms likely from PKD  --she did not undergo the full neurosurgical work up per prior admissions notes due to previously refusing care  --CT head (5/14) - Previously identified layering hyperdense focus in the left occipital horn is no longer visualized.  No new intraventricular hemorrhage.  --unclear if warfarin should be resumed at this point given recent ICH  --based off the information in this paper (https://www.ncbi.nlm.nih.gov/pmc/articles/KEF0627591/), will start heparin 5000 BID  --The American Heart Association suggests that, in patients with a very high risk of thromboembolism for whom restarting warfarin is considered, warfarin may be restarted 7 to 10 days after ICH onset
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
## 2018-05-15 NOTE — H&P
"[facility] Hospital Medicine  History & Physical    Patient Name: Sunita Zimmer  MRN: [id number]  Admission Date: 5/14/2018  Attending Physician: Vito Michelle MD   Primary Care Provider: Von Mccullough MD    [facility] Medicine Team: [facility] MED 2 Carmela Hathaway MD     Patient information was obtained from patient, past medical records and ER records.     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  She was recently discharged from [facility] on 5/13 for small intraventricular hemorrhage in the left occipital horn which originally presented as AMS.  She was also found to have a UTI however it was not treated.  It was documented she was refusing all medications and therapies, and she was ultimately sent home with hospice after discussions were held with family.  They felt her AMS was more likely to be due to the UTI.      Upon my interview, patient states she doesn't know why she's here but that she is sad she's not home and wants to go home.  When asked about how she's been eating and drinking at home, she says she has still been eating but not as much as everybody else.  The ER consulted psych.  Per their note, the patient states that she has no desire to live anymore, but does not have a plan. "I don't want to die but I only like eating what I like eating." Patient was aware that she had recently been in the hospital to have a spinal abscess removed but she was unable to recall any other recent events regarding other hospitalizations.  She reports that she does have multiple guns at home but she has never attempted to hurt herself.      Psychiatry called her son.  " Reports that the patient was admitted to the hospital 5/4/18 for a spinal abscess. After her abscess was removed the patient experienced delirium in the hospital. He reports that she was paranoid and accusing the staff of trying to hurt her and attempting to sexually assault her son. The patient was also experiencing visual hallucinations in the hospital. He reports that she was discharged after a week with the plan to go to a skilled nursing facility. When she arrived to the facility they told her family that she was too altered in order to come to the facility and she was taken back to [facility] for altered mental status. During the patient's second admission she was very resistant to treatment but the family decided that they would like to take her home and have her receive home hospice. Since the patient has been back home she has been refusing to eat and take medications. She has also been violent and verbally abusive with family. Sushant reports that the patient also threatened to harm herself multiple times and stated that she was going to shoot herself with her 357 which the son confirms that she does own and he was unable to find it and remove it. At this time he is concerned that the patient is a danger to hurting herself, and with her living with his 90 plus year old father he is even more concerned for both of their safety.    Psych felt on admission the patient does not have capacity to refuse care.  She is PEC'd.  Blood pressures in the ER were 220s/90s, HR 110s, though she was asymptomatic.  She received her home PO ramipril for this.  She was afebrile.  WBC 11.85.  UA revealed >100 WBC and rare bacteria.  CT head showed that previously identified layering hyperdense focus in the left occipital horn was no longer visualized.  Sodium was 150.  UDS was positive for benzos.  Home meds include xanax, valium.        Past Medical History:   Diagnosis Date    Active smoker 6/4/2014    Anticoagulant long-term  "use     Apocrine adenocarcinoma     Back pain     lower back    Brain aneurysm     4 times    Central scotoma, 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    Centrilobular emphysema 5/7/2018    Chronic kidney disease (CKD), stage III (moderate)     COPD (chronic obstructive pulmonary disease)     Coronary artery disease     Current use of long term anticoagulation 5/9/2018    Coumadin for history of DVT    Encounter for blood transfusion     Essential hypertension 6/4/2014    NAION (non-arteritic anterior ischemic optic neuropathy), 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    Peripheral vascular disease 6/4/2014    Polycystic kidney disease     Polycythemia vera     congential    Senile nuclear sclerosis 11/21/2017    Subclavian artery stenosis, right 7/22/2014       Past Surgical History:   Procedure Laterality Date    ABDOMINAL SURGERY      APPENDECTOMY      BRAIN SURGERY      relieve hemmorhages/had 4 surgeries    CARDIAC CATHETERIZATION      CORONARY ANGIOPLASTY      FRACTURE SURGERY      GALLBLADDER SURGERY      HYSTERECTOMY      illiac stent      JOINT REPLACEMENT      SKIN BIOPSY      skin repair      apocrine cancer    tibial repair      steel plate inserted    TONSILLECTOMY      TOTAL KNEE ARTHROPLASTY      VEIN BYPASS SURGERY         Review of patient's allergies indicates:   Allergen Reactions    Bacitracin Other (See Comments)     "can not take because of Warfarin"    Bactrim [sulfamethoxazole-trimethoprim] Other (See Comments)     "can not take because of Warfarin"       No current facility-administered medications on file prior to encounter.      Current Outpatient Prescriptions on File Prior to Encounter   Medication Sig    acetaminophen-codeine 300-30mg (TYLENOL-CODEINE #3) 300-30 mg Tab Take by mouth.    albuterol (PROVENTIL) 4 MG Tab     alprazolam (XANAX) 0.5 MG tablet     cyclobenzaprine (FLEXERIL) 10 MG tablet Take 10 mg by mouth 3 (three) times daily as needed for Muscle spasms.    diazepam (VALIUM) " 5 MG tablet Take 5 mg by mouth every 6 (six) hours as needed for Anxiety.    FLUZONE HIGH-DOSE 2017-18, PF, 180 mcg/0.5 mL vaccine     hydrocodone-acetaminophen 7.5-500 mg (LORTAB) 7.5-500 mg per tablet Take 1 tablet by mouth every 6 (six) hours as needed for Pain.    ketorolac 0.5% (ACULAR) 0.5 % Drop Place 1 drop into the right eye 3 (three) times daily.    nitroGLYCERIN (NITROSTAT) 0.4 MG SL tablet Place 0.4 mg under the tongue every 5 (five) minutes as needed for Chest pain.    ofloxacin (OCUFLOX) 0.3 % ophthalmic solution 1 drop 4 (four) times daily.    polyethylene glycol (GLYCOLAX) 17 gram/dose powder Take 17 g by mouth.    prednisoLONE acetate (PRED FORTE) 1 % DrpS 1 drop 3 (three) times daily.    ramipril (ALTACE) 10 MG capsule Take 10 mg by mouth.     temazepam (RESTORIL) 15 mg Cap Take 15 mg by mouth.    umeclidinium 62.5 mcg/actuation DsDv Inhale 1 puff into the lungs.    VENTOLIN HFA 90 mcg/actuation inhaler      Family History     Problem Relation (Age of Onset)    Heart disease Brother        Social History Main Topics    Smoking status: Current Every Day Smoker     Packs/day: 1.00     Years: 60.00    Smokeless tobacco: Never Used    Alcohol use No    Drug use: No    Sexual activity: Not on file     Review of Systems   Constitutional: Negative for chills and fever.   HENT: Negative for sneezing and sore throat.    Eyes: Negative for pain and visual disturbance.   Respiratory: Positive for cough. Negative for shortness of breath and wheezing.    Cardiovascular: Negative for chest pain and leg swelling.   Gastrointestinal: Negative for abdominal pain, constipation, diarrhea and nausea.   Genitourinary: Positive for dysuria and frequency. Negative for urgency.   Allergic/Immunologic: Negative for environmental allergies.   Neurological: Negative for dizziness, light-headedness, numbness and headaches.   Psychiatric/Behavioral: Negative for agitation. The patient is not nervous/anxious.       Objective:     Vital Signs (Most Recent):  Temp: 98.4 °F (36.9 °C) (05/14/18 2354)  Pulse: (!) 123 (05/15/18 0005)  Resp: (!) 29 (05/14/18 1805)  BP: (!) 166/75 (05/15/18 0002)  SpO2: 96 % (05/15/18 0002) Vital Signs (24h Range):  Temp:  [97.7 °F (36.5 °C)-98.7 °F (37.1 °C)] 98.4 °F (36.9 °C)  Pulse:  [109-123] 123  Resp:  [18-29] 29  SpO2:  [96 %-100 %] 96 %  BP: (166-225)/(74-98) 166/75     Weight: 49.4 kg (109 lb)  Body mass index is 19.94 kg/m².    Physical Exam   Constitutional: She is oriented to person, place, and time. She appears well-developed and well-nourished. No distress.   HENT:   Head: Normocephalic and atraumatic.   Nose: Nose normal.   Eyes: EOM are normal. No scleral icterus.   Neck: Normal range of motion. No tracheal deviation present.   Cardiovascular: Regular rhythm.  Exam reveals no gallop and no friction rub.    No murmur heard.  tachycardic   Pulmonary/Chest: Effort normal. No respiratory distress. She has no wheezes.   Wet cough, Rales at the RLL   Abdominal: Soft. Bowel sounds are normal.   Mild LUQ tenderness to deep palpation   Musculoskeletal: She exhibits no edema.   Neurological: She is alert and oriented to person, place, and time.   Skin: Skin is warm and dry.         CRANIAL NERVES     CN III, IV, VI   Extraocular motions are normal.        Significant Labs:   CBC:   Recent Labs  Lab 05/14/18  1838   WBC 11.85   HGB 11.3*   HCT 34.8*   *     CMP:   Recent Labs  Lab 05/13/18  0250 05/14/18  1838   * 150*   K 5.2* 3.5   * 110   CO2 18* 25    106   BUN 22 17   CREATININE 0.7 0.8   CALCIUM 8.0* 9.1   PROT 6.4 7.2   ALBUMIN 2.4* 2.8*   BILITOT 0.6 0.4   ALKPHOS 95 116   AST 35 25   ALT 34 30   ANIONGAP 12 15   EGFRNONAA >60.0 >60.0       Significant Imaging: I have reviewed all pertinent imaging results/findings within the past 24 hours.    Assessment/Plan:     * Acute encephalopathy    --likely secondary to infection, multiple hospitalizations, probably  home medications contributing (benzos on UDS).  --UA suspicious for infection, will continue rocephin for now.  No UCx collected previous admission.    --rales auscultate in the RLL, CXR pending  --follow urine culture, gram stain, blood culture, procal  --Psych has evaluated the patient and deemed she does not have capacity to refuse care  --PRN PO/IM Zyprexa  --Delirium, fall, aspiration precautions  --NPO until swallow eval  --hold home sedating meds        Hypertensive urgency    --severe asymptomatic hypertension without end organ damage  --In adults with severe asymptomatic hypertension, the shorter-term goal of management is to reduce the blood pressure to ?160/?100 mmHg. However, the mean arterial pressure should not be lowered by more than 25 to 30 percent over the first several hours  --goal MAP is therefore 100-105 for the first few hours  --based on prior experience with starting cardene drips in the ER and the parameters for turning off the drip not being closely followed, will manage her high blood pressures for now with PRN hydralazine pushes PRN SBP > 180.    --continue home ramipril          Hypernatremia    --Free water deficit is about 1.8 L  --D5 gtt at 100 cc per hour  --goal is correction of no more than 6 to 8 per 24 hours.  --q4 BMP for sodium checks        History of intracranial hemorrhage    History of ICH, with brain aneurysms likely from PKD  --she did not undergo the full neurosurgical work up per prior admissions notes due to previously refusing care  --CT head (5/14) - Previously identified layering hyperdense focus in the left occipital horn is no longer visualized.  No new intraventricular hemorrhage.  --unclear if warfarin should be resumed at this point given recent ICH  --based off the information in this paper (https://www.ncbi.nlm.nih.gov/pmc/articles/VIH8397146/), will start heparin 5000 BID  --The American Heart Association suggests that, in patients with a very high risk of  thromboembolism for whom restarting warfarin is considered, warfarin may be restarted 7 to 10 days after ICH onset        Personal history of DVT (deep vein thrombosis)    --see history of ICH above regarding anticoagulation          VTE Risk Mitigation         Ordered     heparin (porcine) injection 5,000 Units  Every 12 hours      05/14/18 2357     Place ANUJ hose  Until discontinued      05/14/18 2255     Place sequential compression device  Until discontinued      05/14/18 2255     IP VTE HIGH RISK PATIENT  Once      05/14/18 2255             Carmela Hathaway MD  Department of Hospital Medicine   [facility]

## 2018-05-15 NOTE — HPI
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  She was recently discharged from [facility] on 5/13 for small intraventricular hemorrhage in the left occipital horn which originally presented as AMS.  She was also found to have a UTI however it was not treated.  It was documented she was refusing all medications and therapies, and she was ultimately sent home with hospice after discussions were held with family.  They felt her AMS was more likely to be due to the UTI.      Upon my interview, patient states she doesn't know why she's here but that she is sad she's not home and wants to go home.  When asked about how she's been eating and drinking at home, she says she has still been eating but not as much as everybody else.  The ER consulted psych.  Per their note, the patient states that she has no desire to live anymore, but does not have a plan. "I don't want to die but I only like eating what I like eating." Patient was aware that she had recently been in the hospital to have a spinal abscess removed but she was unable to recall any other recent events regarding other hospitalizations.  She reports that she does have multiple guns at home but she has never attempted to hurt herself.      Psychiatry called her son.  Reports that the patient was admitted to the hospital 5/4/18 for a spinal abscess. After her abscess was removed the patient experienced delirium in the hospital. He reports that she was paranoid and accusing the staff of trying to hurt her and attempting to sexually assault her son. The patient was also experiencing visual hallucinations in the hospital. He reports that she was discharged after a week with the plan to go to a skilled nursing facility. When she arrived to the facility they told her family that she was too altered in order to come to the facility and she was taken back to [facility] for altered mental status. " During the patient's second admission she was very resistant to treatment but the family decided that they would like to take her home and have her receive home hospice. Since the patient has been back home she has been refusing to eat and take medications. She has also been violent and verbally abusive with family. Sushant reports that the patient also threatened to harm herself multiple times and stated that she was going to shoot herself with her 357 which the son confirms that she does own and he was unable to find it and remove it. At this time he is concerned that the patient is a danger to hurting herself, and with her living with his 90 plus year old father he is even more concerned for both of their safety.    Psych felt on admission the patient does not have capacity to refuse care.  She is PEC'd.  Blood pressures in the ER were 220s/90s, HR 110s, though she was asymptomatic.  She received her home PO ramipril for this.  She was afebrile.  WBC 11.85.  UA revealed >100 WBC and rare bacteria.  CT head showed that previously identified layering hyperdense focus in the left occipital horn was no longer visualized.  Sodium was 150.  UDS was positive for benzos.  Home meds include xanax, valium.

## 2018-05-15 NOTE — MEDICAL/APP STUDENT
"***john    Per primary service notes:   Patient is a 77 y.o. *** *** with past psychiatric history of *** and a past medical history significant for ***, who presented ***voluntarily, complaining of ***. In the Ed/on the floor***, ***. Patient was PEC'd for ***. Psychiatry was consulted for ***.      Per chart review:  ***    On interview, pt reports she was admitted for cyst on lower spine to be treated with surgery.       Patient does not have depressed mood, decreased energy, moderate appetite for food she likes (avocados, berries), good concentration. No SI/HI/AVH. Difficulty falling asleep, feels cold, takes 30-45 mins to fall asleep.     Collateral: Sushant Goran (spouse 67 yrs; 3 children)  ***     Medications:  Home Meds: ***  Current scheduled Meds:    albuterol-ipratropium 2.5mg-0.5mg/3mL  3 mL Nebulization Q4H    cloNIDine 0.1 mg/24 hr td ptwk  1 patch Transdermal Q7 Days    hydrALAZINE        lactated ringers  1,000 mL Intravenous Once    magnesium sulfate IVPB  2 g Intravenous Q2H    piperacillin-tazobactam (ZOSYN) IVPB  4.5 g Intravenous Q8H    potassium chloride in water  10 mEq Intravenous Q1H    potassium chloride 10%  40 mEq Per NG tube Once    vancomycin (VANCOCIN) IVPB  1,250 mg Intravenous Once    [START ON 5/16/2018] vancomycin (VANCOCIN) IVPB  750 mg Intravenous Q24H     PRN Meds: acetaminophen, dextrose 50%, dextrose 50%, glucagon (human recombinant), glucose, glucose, hydrALAZINE, OLANZapine, sodium chloride 0.9%       Allergies:  Review of patient's allergies indicates:   Allergen Reactions    Bacitracin Other (See Comments)     "can not take because of Warfarin"    Bactrim [sulfamethoxazole-trimethoprim] Other (See Comments)     "can not take because of Warfarin"          Past Medical History:  PCP: ***  ***head trauma  ***seizures  Past Medical History:   Diagnosis Date    Active smoker 6/4/2014    Anticoagulant long-term use     Apocrine adenocarcinoma     Back pain     " "lower back    Brain aneurysm     4 times    Central scotoma, 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    Centrilobular emphysema 5/7/2018    Chronic kidney disease (CKD), stage III (moderate)     COPD (chronic obstructive pulmonary disease)     Coronary artery disease     Current use of long term anticoagulation 5/9/2018    Coumadin for history of DVT    Encounter for blood transfusion     Essential hypertension 6/4/2014    NAION (non-arteritic anterior ischemic optic neuropathy), 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    Peripheral vascular disease 6/4/2014    Polycystic kidney disease     Polycythemia vera     congential    Senile nuclear sclerosis 11/21/2017    Subclavian artery stenosis, right 7/22/2014           Past Psychiatric History:  Previous Medication Trials: ***  Previous Psychiatric Hospitalizations: ***  Previous Suicide Attempts: ***  History of Violence: ***  Outpatient psychiatrist: ***        Social History:  Marital Status: {GEN; MARITAL STATUS:63875}  Children: ***  Employment Status/Info: {Employment:83151::"supple, symmetrical, trachea midline"}  Education: {Education:32225}  Special Ed: ***  Housing Status: {Living arrangements:73246}  History of phys/sexual abuse: ***  Access to gun: ***        Substance Use:  Recreational Drugs: ***  Use of Alcohol: ***  Tobacco Use: ***  Rehab History: ***        Legal History:  Past Charges/Incarcerations: {YES/NO/WILD CARDS:72632}  Pending charges: {YES/NO/WILD CARDS:52405} ***opp        Family Psychiatric History:  *** psychosis, mood disorders, anxiety  *** family suicide attempts  "
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
## 2018-05-15 NOTE — PHYSICIAN QUERY
PT Name: Sunita Zimmer  MR #: [id number]     Physician Query Form - Medication-Correlation for Diagnosis      CDS/: Meli Schaffer RN, CDS               Contact information: [email or web address]    This form is a permanent document in the medical record.     Query Date: May 15, 2018      By submitting this query, we are merely seeking further clarification of documentation.  Please utilize your independent clinical judgment when addressing the question(s) below.    The medical record contains the following:  The patient has an order for the following medication(s):    potassium chloride 10 mEq in 100 mL IVPB  :  Dose 80 mEq   X 1     potassium, sodium phosphates 280-160-250 mg packet 2 packet  :  Dose 2 packet   x 1       (pt/family refused)       Please provider the corresponding diagnosis or diagnoses that support(s) the use of the medication(s)   Physician Use Only    hypokalemia

## 2018-05-16 PROBLEM — R33.9 URINARY RETENTION: Status: ACTIVE | Noted: 2018-05-16

## 2018-05-16 PROBLEM — E87.6 HYPOKALEMIA: Status: RESOLVED | Noted: 2018-05-15 | Resolved: 2018-05-16

## 2018-05-16 PROBLEM — J18.9 HCAP (HEALTHCARE-ASSOCIATED PNEUMONIA): Status: ACTIVE | Noted: 2018-05-16

## 2018-05-16 PROBLEM — G93.41 ACUTE METABOLIC ENCEPHALOPATHY: Status: ACTIVE | Noted: 2018-05-14

## 2018-05-16 PROBLEM — E87.0 HYPERNATREMIA: Status: RESOLVED | Noted: 2018-05-14 | Resolved: 2018-05-16

## 2018-05-16 PROBLEM — M54.9 BACK PAIN: Status: RESOLVED | Noted: 2018-05-04 | Resolved: 2018-05-16

## 2018-05-16 PROBLEM — R41.82 ALTERED MENTAL STATUS, UNSPECIFIED: Status: RESOLVED | Noted: 2018-05-11 | Resolved: 2018-05-16

## 2018-05-16 PROBLEM — N39.0 UTI (URINARY TRACT INFECTION): Status: RESOLVED | Noted: 2018-05-13 | Resolved: 2018-05-16

## 2018-05-16 PROBLEM — R94.31 PROLONGED Q-T INTERVAL ON ECG: Status: ACTIVE | Noted: 2018-05-16

## 2018-05-16 PROBLEM — R40.0 SOMNOLENCE: Status: RESOLVED | Noted: 2018-05-12 | Resolved: 2018-05-16

## 2018-05-16 LAB
ANION GAP SERPL CALC-SCNC: 10 MMOL/L
ANION GAP SERPL CALC-SCNC: 12 MMOL/L
ANION GAP SERPL CALC-SCNC: 14 MMOL/L
BACTERIA BLD CULT: NORMAL
BACTERIA BLD CULT: NORMAL
BUN SERPL-MCNC: 17 MG/DL
BUN SERPL-MCNC: 18 MG/DL
BUN SERPL-MCNC: 20 MG/DL
CALCIUM SERPL-MCNC: 8 MG/DL
CALCIUM SERPL-MCNC: 8.1 MG/DL
CALCIUM SERPL-MCNC: 8.6 MG/DL
CHLORIDE SERPL-SCNC: 110 MMOL/L
CHLORIDE SERPL-SCNC: 111 MMOL/L
CHLORIDE SERPL-SCNC: 112 MMOL/L
CO2 SERPL-SCNC: 20 MMOL/L
CO2 SERPL-SCNC: 21 MMOL/L
CO2 SERPL-SCNC: 22 MMOL/L
CREAT SERPL-MCNC: 0.8 MG/DL
CREAT SERPL-MCNC: 0.9 MG/DL
CREAT SERPL-MCNC: 0.9 MG/DL
ERYTHROCYTE [DISTWIDTH] IN BLOOD BY AUTOMATED COUNT: 13.6 %
EST. GFR  (AFRICAN AMERICAN): >60 ML/MIN/1.73 M^2
EST. GFR  (NON AFRICAN AMERICAN): >60 ML/MIN/1.73 M^2
GLUCOSE SERPL-MCNC: 114 MG/DL
GLUCOSE SERPL-MCNC: 115 MG/DL
GLUCOSE SERPL-MCNC: 124 MG/DL
HCT VFR BLD AUTO: 35.2 %
HGB BLD-MCNC: 11.4 G/DL
MCH RBC QN AUTO: 27.9 PG
MCHC RBC AUTO-ENTMCNC: 32.4 G/DL
MCV RBC AUTO: 86 FL
PLATELET # BLD AUTO: 491 K/UL
PMV BLD AUTO: 9.4 FL
POTASSIUM SERPL-SCNC: 3.4 MMOL/L
POTASSIUM SERPL-SCNC: 3.8 MMOL/L
POTASSIUM SERPL-SCNC: 4.6 MMOL/L
RBC # BLD AUTO: 4.09 M/UL
SODIUM SERPL-SCNC: 142 MMOL/L
SODIUM SERPL-SCNC: 145 MMOL/L
SODIUM SERPL-SCNC: 145 MMOL/L
WBC # BLD AUTO: 19.64 K/UL

## 2018-05-16 PROCEDURE — 92610 EVALUATE SWALLOWING FUNCTION: CPT

## 2018-05-16 PROCEDURE — 25000003 PHARM REV CODE 250: Performed by: INTERNAL MEDICINE

## 2018-05-16 PROCEDURE — 76937 US GUIDE VASCULAR ACCESS: CPT

## 2018-05-16 PROCEDURE — 99233 SBSQ HOSP IP/OBS HIGH 50: CPT | Mod: ,,, | Performed by: INTERNAL MEDICINE

## 2018-05-16 PROCEDURE — 63600175 PHARM REV CODE 636 W HCPCS: Performed by: INTERNAL MEDICINE

## 2018-05-16 PROCEDURE — 25000003 PHARM REV CODE 250: Performed by: HOSPITALIST

## 2018-05-16 PROCEDURE — 94640 AIRWAY INHALATION TREATMENT: CPT

## 2018-05-16 PROCEDURE — 94761 N-INVAS EAR/PLS OXIMETRY MLT: CPT

## 2018-05-16 PROCEDURE — 93005 ELECTROCARDIOGRAM TRACING: CPT

## 2018-05-16 PROCEDURE — 25000003 PHARM REV CODE 250: Performed by: STUDENT IN AN ORGANIZED HEALTH CARE EDUCATION/TRAINING PROGRAM

## 2018-05-16 PROCEDURE — 36569 INSJ PICC 5 YR+ W/O IMAGING: CPT

## 2018-05-16 PROCEDURE — 85027 COMPLETE CBC AUTOMATED: CPT

## 2018-05-16 PROCEDURE — 80048 BASIC METABOLIC PNL TOTAL CA: CPT | Mod: 91

## 2018-05-16 PROCEDURE — 93010 ELECTROCARDIOGRAM REPORT: CPT | Mod: ,,, | Performed by: INTERNAL MEDICINE

## 2018-05-16 PROCEDURE — 25000242 PHARM REV CODE 250 ALT 637 W/ HCPCS: Performed by: HOSPITALIST

## 2018-05-16 PROCEDURE — 63600175 PHARM REV CODE 636 W HCPCS: Performed by: STUDENT IN AN ORGANIZED HEALTH CARE EDUCATION/TRAINING PROGRAM

## 2018-05-16 PROCEDURE — G8996 SWALLOW CURRENT STATUS: HCPCS | Mod: CI

## 2018-05-16 PROCEDURE — 20000000 HC ICU ROOM

## 2018-05-16 PROCEDURE — G8997 SWALLOW GOAL STATUS: HCPCS | Mod: CH

## 2018-05-16 PROCEDURE — C1751 CATH, INF, PER/CENT/MIDLINE: HCPCS

## 2018-05-16 RX ORDER — OLANZAPINE 2.5 MG/1
5 TABLET ORAL NIGHTLY PRN
Status: DISCONTINUED | OUTPATIENT
Start: 2018-05-16 | End: 2018-05-16

## 2018-05-16 RX ORDER — CEFTRIAXONE 1 G/1
1 INJECTION, POWDER, FOR SOLUTION INTRAMUSCULAR; INTRAVENOUS
Status: DISCONTINUED | OUTPATIENT
Start: 2018-05-16 | End: 2018-05-16

## 2018-05-16 RX ORDER — OLANZAPINE 10 MG/2ML
2.5 INJECTION, POWDER, FOR SOLUTION INTRAMUSCULAR NIGHTLY
Status: DISCONTINUED | OUTPATIENT
Start: 2018-05-16 | End: 2018-05-16

## 2018-05-16 RX ORDER — TIOTROPIUM BROMIDE 18 UG/1
1 CAPSULE ORAL; RESPIRATORY (INHALATION) DAILY
Status: DISCONTINUED | OUTPATIENT
Start: 2018-05-16 | End: 2018-05-20 | Stop reason: HOSPADM

## 2018-05-16 RX ORDER — TAMSULOSIN HYDROCHLORIDE 0.4 MG/1
0.4 CAPSULE ORAL DAILY
Status: DISCONTINUED | OUTPATIENT
Start: 2018-05-16 | End: 2018-05-20 | Stop reason: HOSPADM

## 2018-05-16 RX ORDER — CARVEDILOL 12.5 MG/1
12.5 TABLET ORAL 2 TIMES DAILY
Status: DISCONTINUED | OUTPATIENT
Start: 2018-05-16 | End: 2018-05-16

## 2018-05-16 RX ORDER — CARVEDILOL 25 MG/1
25 TABLET ORAL 2 TIMES DAILY
Status: DISCONTINUED | OUTPATIENT
Start: 2018-05-16 | End: 2018-05-20 | Stop reason: HOSPADM

## 2018-05-16 RX ORDER — IPRATROPIUM BROMIDE AND ALBUTEROL SULFATE 2.5; .5 MG/3ML; MG/3ML
3 SOLUTION RESPIRATORY (INHALATION) EVERY 6 HOURS PRN
Status: DISCONTINUED | OUTPATIENT
Start: 2018-05-16 | End: 2018-05-20 | Stop reason: HOSPADM

## 2018-05-16 RX ORDER — OLANZAPINE 2.5 MG/1
2.5 TABLET ORAL NIGHTLY
Status: DISCONTINUED | OUTPATIENT
Start: 2018-05-16 | End: 2018-05-16

## 2018-05-16 RX ORDER — POTASSIUM CHLORIDE 20 MEQ/15ML
40 SOLUTION ORAL ONCE
Status: COMPLETED | OUTPATIENT
Start: 2018-05-16 | End: 2018-05-16

## 2018-05-16 RX ORDER — RAMELTEON 8 MG/1
8 TABLET ORAL NIGHTLY PRN
Status: DISCONTINUED | OUTPATIENT
Start: 2018-05-16 | End: 2018-05-20 | Stop reason: HOSPADM

## 2018-05-16 RX ORDER — WARFARIN 1 MG/1
2 TABLET ORAL DAILY
Status: DISCONTINUED | OUTPATIENT
Start: 2018-05-16 | End: 2018-05-20 | Stop reason: HOSPADM

## 2018-05-16 RX ORDER — HYDRALAZINE HYDROCHLORIDE 20 MG/ML
10 INJECTION INTRAMUSCULAR; INTRAVENOUS EVERY 8 HOURS PRN
Status: DISCONTINUED | OUTPATIENT
Start: 2018-05-16 | End: 2018-05-17

## 2018-05-16 RX ORDER — GUAIFENESIN 100 MG/5ML
200 SOLUTION ORAL EVERY 6 HOURS PRN
Status: DISCONTINUED | OUTPATIENT
Start: 2018-05-16 | End: 2018-05-20 | Stop reason: HOSPADM

## 2018-05-16 RX ORDER — NIFEDIPINE 10 MG/1
10 CAPSULE ORAL EVERY 8 HOURS
Status: DISCONTINUED | OUTPATIENT
Start: 2018-05-17 | End: 2018-05-16

## 2018-05-16 RX ORDER — CARVEDILOL 6.25 MG/1
6.25 TABLET ORAL 2 TIMES DAILY
Status: DISCONTINUED | OUTPATIENT
Start: 2018-05-16 | End: 2018-05-16

## 2018-05-16 RX ORDER — RAMIPRIL 10 MG/1
10 CAPSULE ORAL DAILY
Status: DISCONTINUED | OUTPATIENT
Start: 2018-05-16 | End: 2018-05-20 | Stop reason: HOSPADM

## 2018-05-16 RX ORDER — NIFEDIPINE 30 MG/1
60 TABLET, EXTENDED RELEASE ORAL DAILY
Status: DISCONTINUED | OUTPATIENT
Start: 2018-05-17 | End: 2018-05-17

## 2018-05-16 RX ORDER — OLANZAPINE 2.5 MG/1
2.5 TABLET ORAL EVERY 8 HOURS PRN
Status: DISCONTINUED | OUTPATIENT
Start: 2018-05-16 | End: 2018-05-20 | Stop reason: HOSPADM

## 2018-05-16 RX ADMIN — NICARDIPINE HYDROCHLORIDE 3 MG/HR: 0.2 INJECTION, SOLUTION INTRAVENOUS at 09:05

## 2018-05-16 RX ADMIN — POTASSIUM CHLORIDE 40 MEQ: 20 SOLUTION ORAL at 07:05

## 2018-05-16 RX ADMIN — TIOTROPIUM BROMIDE 18 MCG: 18 CAPSULE ORAL; RESPIRATORY (INHALATION) at 03:05

## 2018-05-16 RX ADMIN — TAMSULOSIN HYDROCHLORIDE 0.4 MG: 0.4 CAPSULE ORAL at 09:05

## 2018-05-16 RX ADMIN — HYDRALAZINE HYDROCHLORIDE 10 MG: 20 INJECTION INTRAMUSCULAR; INTRAVENOUS at 03:05

## 2018-05-16 RX ADMIN — PIPERACILLIN AND TAZOBACTAM 4.5 G: 4; .5 INJECTION, POWDER, LYOPHILIZED, FOR SOLUTION INTRAVENOUS; PARENTERAL at 05:05

## 2018-05-16 RX ADMIN — PIPERACILLIN AND TAZOBACTAM 4.5 G: 4; .5 INJECTION, POWDER, LYOPHILIZED, FOR SOLUTION INTRAVENOUS; PARENTERAL at 11:05

## 2018-05-16 RX ADMIN — CARVEDILOL 6.25 MG: 6.25 TABLET, FILM COATED ORAL at 09:05

## 2018-05-16 RX ADMIN — HYDRALAZINE HYDROCHLORIDE 10 MG: 20 INJECTION INTRAMUSCULAR; INTRAVENOUS at 08:05

## 2018-05-16 RX ADMIN — CARVEDILOL 25 MG: 25 TABLET, FILM COATED ORAL at 07:05

## 2018-05-16 RX ADMIN — PIPERACILLIN AND TAZOBACTAM 4.5 G: 4; .5 INJECTION, POWDER, LYOPHILIZED, FOR SOLUTION INTRAVENOUS; PARENTERAL at 03:05

## 2018-05-16 RX ADMIN — WARFARIN SODIUM 2 MG: 2 TABLET ORAL at 05:05

## 2018-05-16 RX ADMIN — RAMIPRIL 10 MG: 10 CAPSULE ORAL at 09:05

## 2018-05-16 RX ADMIN — IPRATROPIUM BROMIDE AND ALBUTEROL SULFATE 3 ML: .5; 3 SOLUTION RESPIRATORY (INHALATION) at 03:05

## 2018-05-16 RX ADMIN — IPRATROPIUM BROMIDE AND ALBUTEROL SULFATE 3 ML: .5; 3 SOLUTION RESPIRATORY (INHALATION) at 08:05

## 2018-05-16 RX ADMIN — IPRATROPIUM BROMIDE AND ALBUTEROL SULFATE 3 ML: .5; 3 SOLUTION RESPIRATORY (INHALATION) at 10:05

## 2018-05-16 NOTE — PLAN OF CARE
CM received call from Afua Martins (patient's daughter) inquiring about events surrounding last discharge and patient still having hospice equipment at home.  CM listened to Mrs. Martins and understood that family is requesting palliative care consult in patient, however remains unsure if patient will d/c back to home hospice as one of her surgeons from previous admission feels it may be an inappropriate placement.  CM provided guidance for family member to contact [facility] to communicate patient is now admitted inpatient and ask for their process on equipment return, etc.  CM agreed to request Palliative Care Consult from physician team.  CM communicated patient is not appropriate for d/c at this time and that medical team/CM team will work with family to ensure safe and appropriate d/c when patient becomes medically appropriate.  CM will continue to follow.    Ashley Husain RN, BSN, CCM  Case Management  [facility]  Ext. [phone]

## 2018-05-16 NOTE — ASSESSMENT & PLAN NOTE
- on admit, encephalopathy likely 2/2 sepsis 2/2 UTI/ HCAP. Also, Na of 150 on admit, however, unlikely causing AMS. Recent small intraventricular hemorrhage in the left occipital horn on 5/11. Also with delirium and SI/HI thoughts on admit, prompting psychiatry to PEC the patient  - see delirium  - encephalopathy improving with sepsis and HTN treatment ,significant improvement noted on 5/16
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
## 2018-05-16 NOTE — CONSULTS
Single lumen 18g x 8cm midline placed to right basilic vein. Max dwell date 6/14/18, Lot# [id number].  Needle advanced into the vessel under real time ultrasound guidance.  Image recorded and saved.

## 2018-05-16 NOTE — ASSESSMENT & PLAN NOTE
- sepsis with encephalopathy on admit. WBC of 20k. UA with evidence of UTI, GNR in urine. Also concern for HCAP on CXR and 2L supplemental O2  - abx optimized to IV zosyn and vancomycin in the ICU- for UTI and HCAP  - continue zosyn at this time, consider stopping vancomycin as cx are unlikely to grown MRSA  - await resp cx and blood cx results

## 2018-05-16 NOTE — ASSESSMENT & PLAN NOTE
76 y/o female presened to [facility] ED with Altered mental status and collateral reported suicidal ideation. Patient recently admitted to [facility] for spinal Abscess removal 5/3/2018. Since the procedure the patient has been verbally and physically abusive, paranoid, experiencing visual hallucinations and having a waxing and waning mentation. She was discharged to SNF and brought back to [facility] for AMS and found to have CVA. Was discharged home on 5/13 with home hospice, however per collateral from patient's son she was refusing nutrition and care and continued to be violent as well as threatening self harm. Patient denies this on interview, states she is a picky eater and only likes eating certain foods. Patient has a family history of schizophrenia in her biological mother however she has no documented psychiatric history.   -Patient's admission presentation likely secondary to delirium as has quickly resolved with tx of UTI, may be underlying dementia; patient with recent CVA. Patient refused MOCA. No need for inpatient psychiatry admission at this time, recommend rescind PEC/CEC.  -Recommend continue Zyprexa 2.5 mg PO/IM q8 hours PRN nonredirectable agitation. QTc 479 on 5/12, recommend repeat EKG to check QTc interval if further PRN medication given.   - Recommend daily weights, monitoring I/Os to monitor PO intake given concern from collateral pt is not eating  - Avoid benzodiazepines, anticholinergics, and anti-histaminics and minimize opiates when possible as they may worsen or exacerbate delirium.  - Ensure blinds are open with lights on during the day and that the shades are closed with lights off at night. Reorient pt through out the day. Encourage family to be present as much as possible.  - May use soft restraints but only if pt is not redirectable and doesn't respond to PRNs first. Utilize a 1:1 sitter (if no family can be present) if needed to minimize use of restraints. Physical restraints can worsen delirium.

## 2018-05-16 NOTE — PLAN OF CARE
"Problem: Patient Care Overview  Goal: Plan of Care Review  Outcome: Ongoing (interventions implemented as appropriate)  AAO x4, DYER, FC. Generalized weakness, walks with assistance.  HR ST. BP being controlled with cardene infusion. Patient is afebrile.  O2 sat 100% on RA. RR elevated, patient denies SOB. Cough is congested.  Abdomen is soft, appetite poor. Swallowing without difficulty. No BM this shift.  Patient unable to void spontaneously despite effort. Patient states has not felt the sensation to void in many years. However "reminds" herself to void regularly and is usually able.  In and out cath this  ml.  Lumbar incision intact with steri strips.  Monitor vital signs and control blood pressure. Monitor labs and renal function and replace as appropriate. Continue antibiotics. Improve mobility and nutrition. Monitor mental status. Sitter to remain at bedside while under PEC order.  Anticipate line placement and possible frye insertion for retention. MD to restart PO blood pressure medication to wean cardene infusion. Plan of care reviewed with patient all questions answered.           "

## 2018-05-16 NOTE — PROGRESS NOTES
Dr. Bledsoe aware of SBP in 190s despite 10mg Hydralazine IVP earlier in the evening.Md also notified of K 3.4.  MD to place appropriate orders.

## 2018-05-16 NOTE — PLAN OF CARE
Our team discussed with NSG regarding timing of when to re-start anti-coagulation given history of IVH earlier this month. Okay to re-start at this time.      Hayley Elena MD  Pulmonary/Critical Care Fellow  [phone]

## 2018-05-16 NOTE — ASSESSMENT & PLAN NOTE
- on admit, BP up to 220s/90s. Likely 2/2 to medication noncompliance at home, given PO refusal. Also, hx of polycystic kidney dz, with BPs chronically in 180s. pt has multiple benzos on her home med list, rising concern for possible withdrawal as a cause for HTN emergency, however, pt stated that she has not been taking them. Started on nicardipine gtt on 5/15. Nicardipine was weaned off and restarted back on AM of 5/16.   - encephalopathy resolved, and can now take PO meds  - start carvedilol 6.25 BID  - ramipril 10mg daily  - clonidine 0.1mg/24 hr patch  - attempt to wean off nicardipine gtt and optimize PO regimen.

## 2018-05-16 NOTE — PT/OT/SLP EVAL
Speech Language Pathology Evaluation  Bedside Swallow    Patient Name:  Sunita Zimmer   MRN:  [id number]  Admitting Diagnosis: Hypertensive emergency    Recommendations:                 General Recommendations:  Dysphagia therapy  Diet recommendations:  Regular, Thin   Aspiration Precautions: 1 bite/sip at a time, HOB to 90 degrees, Meds whole buried in puree, Monitor for s/s of aspiration, No straws, Small bites/sips and Standard aspiration precautions   General Precautions: Standard, aspiration  Communication strategies:  none    History:     Past Medical History:   Diagnosis Date    Active smoker 6/4/2014    Anticoagulant long-term use     Apocrine adenocarcinoma     Back pain     lower back    Brain aneurysm     4 times    Central scotoma, 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    Centrilobular emphysema 5/7/2018    Chronic kidney disease (CKD), stage III (moderate)     COPD (chronic obstructive pulmonary disease)     Coronary artery disease     Current use of long term anticoagulation 5/9/2018    Coumadin for history of DVT    Encounter for blood transfusion     Essential hypertension 6/4/2014    NAION (non-arteritic anterior ischemic optic neuropathy), 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    Peripheral vascular disease 6/4/2014    Polycystic kidney disease     Polycythemia vera     congential    Senile nuclear sclerosis 11/21/2017    Subclavian artery stenosis, right 7/22/2014       Past Surgical History:   Procedure Laterality Date    ABDOMINAL SURGERY      APPENDECTOMY      BRAIN SURGERY      relieve hemmorhages/had 4 surgeries    CARDIAC CATHETERIZATION      CORONARY ANGIOPLASTY      FRACTURE SURGERY      GALLBLADDER SURGERY      HYSTERECTOMY      illiac stent      JOINT REPLACEMENT      SKIN BIOPSY      skin repair      apocrine cancer    tibial repair      steel plate inserted    TONSILLECTOMY      TOTAL KNEE ARTHROPLASTY      VEIN BYPASS SURGERY     ST consult placed for swallow evaluation/treat per  "team.     Prior diet: Regular/thin; pt/family deny and h/o difficulty swallowing.  Pt reports she is selective with what she eats.      Subjective     "I eat it if I like it" (pt stated )    Pain/Comfort:  · Pain Rating 1: 0/10  · Pain Rating Post-Intervention 1: 0/10    Objective:     Oral Musculature Evaluation  · Oral Musculature: WFL  · Dentition: upper and lower dentures  · Mandibular Strength and Mobility: WFL  · Oral Labial Strength and Mobility: WFL  · Lingual Strength and Mobility: WFL  · Volitional Cough: adequate  · Voice Prior to PO Intake: WFL    Bedside Swallow Eval:   Consistencies Assessed:  · Thin liquids (cup/straw - approximately 4 oz)  · Puree (x7)  · Solids (fruit x1; refused other trials)   · Pills administered by nursing    Oral Phase:   · Appeared WFL for liquid, puree and solid trials.   · Pt with some difficulty moving pills posterior in oral cavity.  Successful with burying pills in puree.     Pharyngeal Phase:   · No significant delay  · Fair laryngeal elevation suspected  · Delayed cough noted x1 post straw; no other s/s of aspiration observed once straw removed or with other consistencies.    Treatment: Education provided to pt regarding ST role, swallow precautions/compensatory strategies, s/s of aspiration to monitor for, and selecting softer solids for ease of intake.  Will leave on regular diet in hopes of increasing pt willingness to eat given reported selectiveness with eating.  Results /recs reviewed with nursing post session. Whiteboard updated.    Assessment:     Sunita Zimmer is a 77 y.o. female with an SLP diagnosis of mild Dysphagia.      Goals:    SLP Goals        Problem: SLP Goal    Goal Priority Disciplines Outcome   SLP Goal     SLP    Description:  Speech Language Pathology Goals  Goals expected to be met by 5/23  1.  Pt will tolerate regular diet with thin liquids and no overt s/s of aspiration.  2. Pt/family education regarding swallow precautions to reduce risk " for aspiration.                        Plan:     · Patient to be seen:  4 x/week   · Plan of Care expires:     · Plan of Care reviewed with:      · SLP Follow-Up:  Yes       Discharge recommendations:   (TBD)     Time Tracking:     SLP Treatment Date:   05/16/18  Speech Start Time:  0935  Speech Stop Time:  1000     Speech Total Time (min):  25 min    Billable Minutes: Eval Swallow and Oral Function 25    ELLIE Torres, CCC-SLP  Speech Language Pathologist  [phone]  5/16/2018

## 2018-05-16 NOTE — MEDICAL/APP STUDENT
"Patient Name: Sunita Zimmer  MRN: [id number]   Code Status: Prior  Admission Date: 5/14/2018  Hospital Length of Stay: 2 days  Expected Discharge Date:   Attending Physician: Gregorio Mendez MD  Primary Care Provider: Von Mccullough MD     Current Legal Status: PEC        Subjective:      Principal Problem:Hypertensive emergency     Chief Complaint: delirium     HPI: Per ED Triage: Pt unaware why she is at [facility]. She is alert and oriented.      Per ED Provider: The patient is a 77 y.o. female with co-morbidities including: CKD Stage III, back pain, CAD, COPD,  who was brought in by her family to the ED for psychiatric valuation.  The patient states that she has no desire to live anymore, but does not have a plan. Patient reports that, "She's been going through a lot lately". She states that she has being in and out of the hospital recently for lower back surgery. The patient was discharged yesterday from the hospital for intracranial hemorrhage. She is refusing to have a CT for evaluation of hemorrhagic bleed. Patient is currently living at home with her  and son. She denies any HA, focal weakness,  CP,SOB, and SI/HI.     Per Discharge Summary 5/11/18: A 77-year-old female with medical comorbidities significant for HTN, CAD, polycythemia vera, COPD, polycystic kidney disease, CKD stage 3 presents to the ED with a chief complaint of back pain. Pain began 3 days ago after lifting a full gallon of milk. Pt hurt her back while twisting away from the refrigerator.  Patient reports pain that she describes as a fist in the middle of my low back".  Patient has a history of chronic low back pain after a fall resulting in multiple lumbar fractures 6 years ago.  She reports that the pain occasionally radiates down bilateral lower extremities, however this is her baseline and not worse over the past 3 days after this new injury. Today, patient reports an episode of urinary incontinence which is not typical for " her.  Patient reports noticing that she had urinated on herself and did not feel any sensation.  She denies any lower extremity weakness, numbness, saddle anesthesia, loss of bowel function.     Per Discharge Summary 5/13/18: History of Present Illness: Ms. Zimmer is a 77 year old female  with significant pmhx of CAD, Cardiac Angioplasty, Brain aneurysm x 4, Smoker, CKD, Coumadin for DVT, HTN, and PVD who presents to [facility] for small intraventricular hemorrhage in the left occipital horn. The patient initially presented to [facility] ED for AMS and difficulty to arouse. CT brain and MRI brain obtained shows small focus of blood involving L occipital horn.         Hospital Course by Event: 05/12: Admit to [facility], CTH, MRI. CT head showed stable intraventricular hemorrhage, no interval change.   5/13: Pt refusing all medications and therapies. Discussions with patient and family, they would like to go home with hospice.  Arrangements made with / on call.  Palliative Care consulted. Plan for discharge home with hospice today.      My Interview: Patient was seen sitting upright in bed with  Sushant, son, nurse, sitter, and speech therapist present. Patient was cleared by speech therapist for adequate swallowing function. Patient was able to eat fruit brought by family without difficulty. Patient endorses a full night's rest without interruption, is feeling well overall, and does not have any major medical complaints. She mentions UTI-related concerns and has intermittent catheterization.  Patient denies ICU-CAM. Patient should be discharged to step-down later today.     Psychotherapeutics:     Olanzapine injection 2.5mg IM q12hr    Review of Systems  Objective:      Vital Signs (Most Recent):  Temp: 98 °F (36.7 °C) (05/16/18 700)  Pulse: (!) 114 (05/16/18 0700)  Resp: (!) 38 (05/16/18 0700)  BP: (!) 209/86 (05/16/18 0700)  SpO2: 100 % (05/16/18 0700) Vital Signs (24h Range):  Temp:  [97.4 °F (36.3  °C)-98.7 °F (37.1 °C)] 98 °F (36.7 °C)  Pulse:  [] 98   Resp:  [28-38] 34  BP: (1147-209)/(55-86) 179/78  SpO2:  [92 %-100 %] 100 %         Assessment/Plan:          Acute encephalopathy     76 y/o female presened to [facility] with Altered mental status and collateral reported suicidal ideation. Patient's current presentation likely secondary to delirium, may be overlying dementia; patient with recent CVA. Patient refused ICU-CAM, will reattempt when more cooperative.  -Recommend scheduled Zyprexa 2.5 mg qHS PO crushed or IM if patient refuses, continue Zyprexa 2.5 mg PO/IM q8 hours PRN nonredirectable agitation. QTc 479 on 5/12, recommend repeat EKG to check QTc interval.   - Recommend daily weights, monitoring I/Os   - Avoid benzodiazepines, anticholinergics, and anti-histaminics and minimize opiates when possible as they may worsen or exacerbate delirium.  - Ensure blinds are open with lights on during the day and that the shades are closed with lights off at night. Reorient pt through out the day. Encourage family to be present as much as possible.  - May use soft restraints but only if pt is not redirectable and doesn't respond to PRNs first. Utilize a 1:1 sitter (if no family can be present) if needed to minimize use of restraints. Physical restraints can worsen delirium.

## 2018-05-16 NOTE — PLAN OF CARE
Problem: Patient Care Overview  Goal: Individualization & Mutuality  Outcome: Ongoing (interventions implemented as appropriate)  Pt remained free from falls and injuries this shift. Skin remains intact to heels and sacrum. Pt pleasantly confused intermittently. Hypertension throughout shift and PRN hydralazine given. UOP adequate. Pt positions/repositions independently. Pressure points protected. POC reviewed with pt and her family. Will continue to monitor.

## 2018-05-16 NOTE — PLAN OF CARE
"Problem: Patient Care Overview  Goal: Plan of Care Review  Outcome: Ongoing (interventions implemented as appropriate)  Dx: Hypertensive Emergency    Shift Events: Pt. Initially disoriented on admission, but after re-orienting pt. AAO X4. Pt. Cardene gtt started to keep systolic BP >180 and <200. Abx administered as ordered for UTI.     Neuro: AAO x4, Arouses to voice, Moves all extremities and Follows commands    Vital Signs: BP (!) 184/76   Pulse 108   Temp 97.5 °F (36.4 °C) (Oral)   Resp (!) 33   Ht 5' 2" (1.575 m)   Wt 49.4 kg (109 lb)   SpO2 98%   Breastfeeding? No   BMI 19.94 kg/m²     Intake/Gtts/Diet: Cardene started PRN. Abx. Administered. See flowsheets for further info.    Output: No output this shift. Recent bladder scan showed 921 cc of urine. 730 emptied after straight cath.      Labs: Potassium and Mag replaced as ordered.     Skin: Incision on back. Heels protected with foam. Elbows show bruising but free from breakdown.         "

## 2018-05-16 NOTE — SUBJECTIVE & OBJECTIVE
"Interval History: see hospital course    Family History     Problem Relation (Age of Onset)    Heart disease Brother        Social History Main Topics    Smoking status: Current Every Day Smoker     Packs/day: 1.00     Years: 60.00    Smokeless tobacco: Never Used    Alcohol use No    Drug use: No    Sexual activity: Not on file     Psychotherapeutics     Start     Stop Route Frequency Ordered    05/16/18 1133  OLANZapine tablet 2.5 mg      -- Oral Every 8 hours PRN 05/16/18 1035    05/16/18 0915  ramelteon tablet 8 mg      -- Oral Nightly PRN 05/16/18 0815           Review of Systems    Objective:     Vital Signs (Most Recent):  Temp: 98.3 °F (36.8 °C) (05/16/18 1100)  Pulse: 100 (05/16/18 1546)  Resp: (!) 29 (05/16/18 1546)  BP: (!) 179/78 (05/16/18 1400)  SpO2: 97 % (05/16/18 1546) Vital Signs (24h Range):  Temp:  [97.7 °F (36.5 °C)-99.5 °F (37.5 °C)] 98.3 °F (36.8 °C)  Pulse:  [] 100  Resp:  [11-39] 29  SpO2:  [96 %-100 %] 97 %  BP: (147-226)/(55-99) 179/78     Height: 5' 2" (157.5 cm)  Weight: 49.4 kg (109 lb)  Body mass index is 19.94 kg/m².      Intake/Output Summary (Last 24 hours) at 05/16/18 1550  Last data filed at 05/16/18 1400   Gross per 24 hour   Intake              945 ml   Output             2220 ml   Net            -1275 ml       Physical Exam        Mental Status Exam:  Appearance: malnourished, lying in bed, thin & gaunt looking, smiling  Behavior/Cooperation: cooperative  Speech: appropriate rate, volume and tone normal tone, normal rate, normal pitch, normal volume  Language: uses words appropriately; NO aphasia or dysarthria  Mood: "good"  Affect:  congruent with mood and appropriate to situation/content   Thought Process: normal and logical  Thought Content: normal, no suicidality, no homicidality, delusions, or paranoia  Level of Consciousness: Alert and Oriented to person, place, and time.  Memory:  Intact to recent events  Attention/concentration: CAM ICU negative  Insight:  " Improving  Judgment: Appropriate for circumstances    Significant Labs:   Last 24 Hours:   Recent Lab Results       05/16/18  1405 05/16/18  0216 05/15/18  2118 05/15/18  2113 05/15/18  1633      Immature Granulocytes     0.6(H)     Immature Grans (Abs)     0.10  Comment:  Mild elevation in immature granulocytes is non specific and   can be seen in a variety of conditions including stress response,   acute inflammation, trauma and pregnancy. Correlation with other   laboratory and clinical findings is essential.  (H)     Albumin   2.7(L)       Alkaline Phosphatase   109       ALT   21       Anion Gap 10 14 11       AST   17       Baso #     0.04     Basophil%     0.2     Total Bilirubin   0.7  Comment:  For infants and newborns, interpretation of results should be based  on gestational age, weight and in agreement with clinical  observations.  Premature Infant recommended reference ranges:  Up to 24 hours.............<8.0 mg/dL  Up to 48 hours............<12.0 mg/dL  3-5 days..................<15.0 mg/dL  6-29 days.................<15.0 mg/dL         BUN, Bld 17 20 21       Calcium 8.1(L) 8.6(L) 8.4(L)       Chloride 110 111(H) 108       CO2 22(L) 20(L) 22(L)       Creatinine 0.9 0.9 0.9       Differential Method     Automated     eGFR if  >60.0 >60.0 >60.0       eGFR if non  >60.0  Comment:  Calculation used to obtain the estimated glomerular filtration  rate (eGFR) is the CKD-EPI equation.    >60.0  Comment:  Calculation used to obtain the estimated glomerular filtration  rate (eGFR) is the CKD-EPI equation.    >60.0  Comment:  Calculation used to obtain the estimated glomerular filtration  rate (eGFR) is the CKD-EPI equation.          Eos #     0.1     Eosinophil%     0.3     Glucose 124(H) 114(H) 119(H)       Gran # (ANC)     15.5(H)     Gran%     89.7(H)     Hematocrit  35.2(L)   33.2(L)     Hemoglobin  11.4(L)   10.7(L)     Lymph #     1.1     Lymph%     6.2(L)     Magnesium    2.8(H)       MCH  27.9   28.0     MCHC  32.4   32.2     MCV  86   87     Mono #     0.5     Mono%     3.0(L)     MPV  9.4   9.3     nRBC     0     Phosphorus   2.7       Platelets  491(H)   371(H)     POCT Glucose    121(H)      Potassium 3.8 4.6 3.5       Total Protein   6.6       RBC  4.09   3.82(L)     RDW  13.6   13.5     Sodium 142 145 141       WBC  19.64(H)   17.27(H)                     Significant Imaging: I have reviewed all pertinent imaging results/findings within the past 24 hours.

## 2018-05-16 NOTE — PROGRESS NOTES
Dr. Diggs notified of  with no PRN antihypertensive. MD also notified of new onset tall peaked T waves. MD to come to bedside to assess and place appropriate orders. Will carry out and continue to monitor closely.

## 2018-05-16 NOTE — ASSESSMENT & PLAN NOTE
- in setting of Polycythemia vera, highly hypercoagulative with  Hx of mult DVTs. Chronically on warfarin therapy.    - therapeutic anticoagulation has been held 2/2 small intraventricular hemorrhage in the left occipital horn on 5/11. However, scans on 5/15 showed resolution of hemorrhage  - plan to contact neurosurgery in regards to restarting therapeutic anticoagulation. Would consider warfarin with heparin/ lovenox bridge

## 2018-05-16 NOTE — PROGRESS NOTES
Report received from Katie [facility]. Pt transported to SICU 6036 with sitter. Pt connected to ICU monitor. Critical care resident called and made aware of patient arrival. New orders received and implemented. Pt assessed, immediate needs met. Pt. Initially disoriented but calm and followed commands. Family brought to bedside, updated on the patient's current condition and PoC for remainder of shift. Family also given ICU Welcome packet and educated on visiting hours. All questions answered, emotional support provided.

## 2018-05-16 NOTE — ASSESSMENT & PLAN NOTE
- sepsis with encephalopathy on admit. WBC of 20k. UA with evidence of UTI, GNR in urine. Also concern for HCAP on CXR and 2L supplemental O2  - pt received CTX for UTI  - abx optimized to IV zosyn and vancomycin in the ICU- for UTI and HCAP  - await final Ucx speciation   - continue zosyn at this time, stopping vancomycin as cx are unlikely to grown MRSA  - await resp cx and blood cx results

## 2018-05-16 NOTE — PROGRESS NOTES
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 She was recently discharged from [facility] on 5/13 for small intraventricular hemorrhage in the left occipital horn which originally presented as AMS. She was also found to have a UTI, which was not treated. It was documented she was refusing all medications and therapies, and she was ultimately made DNR and sent home with hospice after discussions were held with family. One day after discharge, they brought her back for AMS, thought to be due to the UTI.    Of note, pt lives at home with 87 y/o  and with son. It was reported that pt threatened to end her life and has guns at home. On presentation, she was PEC'd.    Hospital/ICU Course:  Pt was initially found to be hypertensive to 220s/90s and tachycardic to 1teens. 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 Exam notable for harsh systolic murmur in RUSB, clear lungs, and some L sided abdominal tenderness. CXR showed consolidation and small effusion at the left base and small right effusion. CT head showed evolution of the previously identified blood products in the left occipital horn but no acute infarct, parenchymal hemorrhage, new intraventricular hemorrhage, or hydrocephalus. Pt was given home ramipril, IV hydralazine 10 mg x 1 and IV labetalol 5 mg x 1 with temporary improvement in SBP to 180s-190s, immediately followed by rebound to 220s-230s. Pt refused PO  meds subsequently. Patient was subsequently admitted to ICU for IV cardene drip for hypertensive urgency. Encephalopathy was likely 2/2 sepsis and/ or HTN emergency. Sepsis sources - likely UTI and possibly HCAP, pt started on vanco and zosyn. Many WBCs in urine. BPs improved with cardene as well as clonidine patch, and cardene was weaned off. Encephalopathy improved / resolved with treatment of sepsis and HTN.     Interval History/Significant Events:     nicardipine gtt was weaned off on 5/15 PM and over night, however, BP increased back to 220s and gtt was restarted. Encephalopathy resolved. Pt awake and alert this AM , orienting appropriately to self, person, time and place. Patient notes to feeling much better this AM. She is sipping on liquids this AM and stated that will gladly take PO pills/meds. GNR in urine, cx pending. WBC remains elevated at 20k. Off supplemental O2. Afebrile. Lytes normalized.      carvedilol  6.25 mg Oral BID    cloNIDine 0.1 mg/24 hr td ptwk  1 patch Transdermal Q7 Days    piperacillin-tazobactam (ZOSYN) IVPB  4.5 g Intravenous Q8H    ramipril  10 mg Oral Daily    tamsulosin  0.4 mg Oral Daily    vancomycin (VANCOCIN) IVPB  750 mg Intravenous Q24H      nicardipine 7.5 mg/hr (05/16/18 0800)     acetaminophen, albuterol-ipratropium 2.5mg-0.5mg/3mL, dextrose 50%, dextrose 50%, glucagon (human recombinant), glucose, glucose, OLANZapine, ramelteon, sodium chloride 0.9%      Review of Systems   Constitutional: Negative for activity change, appetite change, fatigue, fever and unexpected weight change.   HENT: Negative for congestion, postnasal drip, sinus pressure and sore throat.    Eyes: Negative for visual disturbance.   Respiratory: Negative for cough and wheezing.    Cardiovascular: Negative for chest pain, palpitations and leg swelling.   Gastrointestinal: Negative for abdominal distention, nausea and vomiting.   Genitourinary: Negative for decreased urine volume, difficulty  urinating and frequency.        Urinary retention    Musculoskeletal: Negative for arthralgias and myalgias.   Neurological: Negative for dizziness, syncope and weakness.   Psychiatric/Behavioral: Negative for decreased concentration and dysphoric mood.     Objective:     Vital Signs (Most Recent):  Temp: 97.9 °F (36.6 °C) (05/16/18 0300)  Pulse: 101 (05/16/18 0800)  Resp: (!) 38 (05/16/18 0800)  BP: (!) 189/84 (05/16/18 0800)  SpO2: 100 % (05/16/18 0800) Vital Signs (24h Range):  Temp:  [97.3 °F (36.3 °C)-99.5 °F (37.5 °C)] 97.9 °F (36.6 °C)  Pulse:  [] 101  Resp:  [11-44] 38  SpO2:  [96 %-100 %] 100 %  BP: (138-247)/() 189/84   Weight: 49.4 kg (109 lb)  Body mass index is 19.94 kg/m².      Intake/Output Summary (Last 24 hours) at 05/16/18 0822  Last data filed at 05/16/18 0500   Gross per 24 hour   Intake             1295 ml   Output             1330 ml   Net              -35 ml       Physical Exam   Constitutional: She is oriented to person, place, and time. She appears well-developed and well-nourished. No distress.   Awake, alert, significantly improved.    HENT:   Head: Normocephalic and atraumatic.   Nose: Nose normal.   Eyes: EOM are normal. No scleral icterus.   Neck: Normal range of motion.   Cardiovascular: Normal rate, regular rhythm and intact distal pulses.  Exam reveals no gallop and no friction rub.    No murmur heard.  Pulmonary/Chest: Effort normal and breath sounds normal. No respiratory distress. She has no wheezes. She has no rhonchi. She has no rales.   Decreased breath sounds bilat, no wheezes   Abdominal: Soft. Bowel sounds are normal. She exhibits no distension. There is no tenderness. There is no rigidity, no guarding and no CVA tenderness.   Musculoskeletal: Normal range of motion. She exhibits no edema, tenderness or deformity.   Neurological: She is alert and oriented to person, place, and time. No cranial nerve deficit.   Skin: Skin is warm, dry and intact. She is not  diaphoretic. No pallor.   Psychiatric: She has a normal mood and affect.       Vents:     Lines/Drains/Airways     Peripheral Intravenous Line                 Peripheral IV - Single Lumen 05/16/18 0330 Right Forearm less than 1 day         Peripheral IV - Single Lumen 05/16/18 0555 Left Wrist less than 1 day              Significant Labs:    CBC/Anemia Profile:    Recent Labs  Lab 05/15/18  0545 05/15/18  1633 05/16/18  0216   WBC 21.82*  21.55* 17.27* 19.64*   HGB 11.1*  11.1* 10.7* 11.4*   HCT 34.9*  34.9* 33.2* 35.2*   *  467* 371* 491*   MCV 87  87 87 86   RDW 13.6  13.5 13.5 13.6        Chemistries:    Recent Labs  Lab 05/14/18  1838  05/15/18  0545 05/15/18  1140 05/15/18  2118 05/16/18  0216   *  < > 146* 141 141 145   K 3.5  < > 2.9* 3.4* 3.5 4.6     < > 109 109 108 111*   CO2 25  < > 22* 19* 22* 20*   BUN 17  < > 19 19 21 20   CREATININE 0.8  < > 0.9 0.8 0.9 0.9   CALCIUM 9.1  < > 8.7 8.7 8.4* 8.6*   ALBUMIN 2.8*  --   --   --  2.7*  --    PROT 7.2  --   --   --  6.6  --    BILITOT 0.4  --   --   --  0.7  --    ALKPHOS 116  --   --   --  109  --    ALT 30  --   --   --  21  --    AST 25  --   --   --  17  --    MG  --   --  1.6  --  2.8*  --    PHOS  --   --  2.6*  --  2.7  --    < > = values in this interval not displayed.    Blood Culture:   Recent Labs  Lab 05/14/18  8935   LABBLOO No Growth to date  No Growth to date  No Growth to date  No Growth to date     Lactic Acid: No results for input(s): LACTATE in the last 48 hours.  Respiratory Culture: No results for input(s): GSRESP, RESPIRATORYC in the last 48 hours.  Urine Culture: No results for input(s): LABURIN in the last 48 hours.  Urine Studies:   Recent Labs  Lab 05/14/18  1849   COLORU Yellow   APPEARANCEUA Cloudy*   PHUR 6.0   SPECGRAV 1.010   PROTEINUA 2+*   GLUCUA Negative   KETONESU Negative   BILIRUBINUA Negative   OCCULTUA 1+*   NITRITE Negative   UROBILINOGEN Negative   LEUKOCYTESUR 3+*   RBCUA 0   WBCUA >100*    BACTERIA Rare   SQUAMEPITHEL 0   HYALINECASTS 0     Urine culture [[id number]] Collected: 05/14/18 1849   Order Status: Completed Specimen: Urine from Catheterized Updated: 05/16/18 0849    Urine Culture, Routine --    GRAM NEGATIVE ROSE MARY, LACTOSE    >100,000 cfu/ml   Identification and susceptibility pending        Significant Imaging:  CT: I have reviewed all pertinent results/findings within the past 24 hours and my personal findings are:  stable  CXR: I have reviewed all pertinent results/findings within the past 24 hours and my personal findings are:  stable    Assessment/Plan:     Neuro   Acute encephalopathy    - on admit, encephalopathy likely 2/2 sepsis 2/2 UTI/ HCAP. Also, Na of 150 on admit, however, unlikely causing AMS. Recent small intraventricular hemorrhage in the left occipital horn on 5/11. Also with delirium and SI/HI thoughts on admit, prompting psychiatry to PEC the patient  - see delirium  - encephalopathy improving with sepsis and HTN treatment ,significant improvement noted on 5/16        Delirium    - per psychiatry notes, Patient [place] [facility] [place]wannoma, not abscess] removal 5/3/2018. Since the procedure the patient has been verbally and physically abusive, paranoid, experiencing visual hallucinations and having a waxing and waning mentation. She was discharged to SNF and brought back [place] [facility] [place] and found to have CVA. Was discharged home on 5/13 with home hospice, however per collateral from patient's son she was refusing nutrition and care and continued to be violent as well as threatening self harm  - psychiatry following  - scheduling olanzapine 2.5 mg QHS, with daily EKGs for QTC prolongation monitoring  - PEC'ed at this time, will defer to psychiatry in regards to lifting PEC  - delirium and encephalopathy improving on 5/16 with treatment of HTN and sepsis  - see encephalopathy        History of intracranial hemorrhage    - initially with small  "intraventricular hemorrhage in the left occipital horn on 5/11. CT head 5/15 with "No significant change from prior.  Specifically without evidence for acute intracranial hemorrhage or new abnormal parenchymal attenuation.Continued scattered sulcal calcifications likely vascular." likely initially occurred 2/2 uncontrolled BP and ongoing anticoagulation   - therapeutic anticoagulation has been held  - plan to contact neurosurgery in regards to restarting therapeutic anticoagulation. Would consider warfarin with heparin/ lovenox bridge         Pulmonary   HCAP (healthcare-associated pneumonia)    - sepsis with encephalopathy on admit. WBC of 20k. UA with evidence of UTI, GNR in urine. Also concern for HCAP on CXR and 2L supplemental O2  - abx optimized to IV zosyn and vancomycin in the ICU- for UTI and HCAP  - continue zosyn at this time, consider stopping vancomycin as cx are unlikely to grown MRSA  - await resp cx and blood cx results        Acute respiratory failure with hypoxia    - hx of COPD. Required 2 L NC on 5/15 admit. Treated for HCAP . Now 5/16 off O2   - spiriva daily  - duonebs prn  - monitor off O2        Centrilobular emphysema    - spiriva started on 5/16  - duo nebs prn        Cardiac/Vascular   * Hypertensive emergency    - on admit, BP up to 220s/90s. Likely 2/2 to medication noncompliance at home, given PO refusal. Also, hx of polycystic kidney dz, with BPs chronically in 180s. pt has multiple benzos on her home med list, rising concern for possible withdrawal as a cause for HTN emergency, however, pt stated that she has not been taking them. Started on nicardipine gtt on 5/15. Nicardipine was weaned off and restarted back on AM of 5/16.   - encephalopathy resolved, and can now take PO meds  - start carvedilol 6.25 BID  - ramipril 10mg daily  - clonidine 0.1mg/24 hr patch  - attempt to wean off nicardipine gtt and optimize PO regimen.         Prolonged Q-T interval on ECG    - scheduled " olanzapine 2.5 mg QHS per psych recs  - daily EKGs to monitor QTc          Renal/   Urinary retention    - has chronic hx of retention. Also with a UTI now  - in/out caths. Will place a frye if requiring too many prn caths  - flomax started on 5/16        Acute cystitis with hematuria    - see sepsis 2/2 E coli        Polycystic kidney disease    - with associated HTN  - Cr of <1  - monitor  - control BP        ID   Sepsis due to presumed Escherichia coli    - sepsis with encephalopathy on admit. WBC of 20k. UA with evidence of UTI, GNR in urine. Also concern for HCAP on CXR and 2L supplemental O2  - pt received CTX for UTI  - abx optimized to IV zosyn and vancomycin in the ICU- for UTI and HCAP  - await final Ucx speciation   - continue zosyn at this time, stopping vancomycin as cx are unlikely to grown MRSA  - await resp cx and blood cx results        Hematology   Current use of long term anticoagulation    - as above        Personal history of DVT (deep vein thrombosis)    - in setting of Polycythemia vera, highly hypercoagulative with  Hx of mult DVTs. Chronically on warfarin therapy.    - therapeutic anticoagulation has been held 2/2 small intraventricular hemorrhage in the left occipital horn on 5/11. However, scans on 5/15 showed resolution of hemorrhage  - plan to contact neurosurgery in regards to restarting therapeutic anticoagulation. Would consider warfarin with heparin/ lovenox bridge         Oncology   Polycythemia vera    - with hx of DVTs. Considering restarting anticoagulation shortly          Critical secondary to Patient has a condition that poses threat to life and bodily function: Psychiatric Illness with threat to self and others and HTN emergency       Critical care was time spent personally by me on the following activities: development of treatment plan with patient or surrogate and bedside caregivers, discussions with consultants, evaluation of patient's response to treatment,  examination of patient, ordering and performing treatments and interventions, ordering and review of laboratory studies, ordering and review of radiographic studies, pulse oximetry, re-evaluation of patient's condition. This critical care time did not overlap with that of any other provider or involve time for any procedures.      Monitor OFF Cardene  Optimize PO BP regimen  Cont zosyn  F/u psych recs - especially in regards to PEC  F/u anticoagulation recs from neurosurgery  Possible step down to hosp med floor 5/16PM       Karen Burton MD  Critical Care Medicine  [facility]

## 2018-05-16 NOTE — ASSESSMENT & PLAN NOTE
- has chronic hx of retention. Also with a UTI now  - in/out caths. Will place a frye if requiring too many prn caths  - flomax started on 5/16

## 2018-05-16 NOTE — SUBJECTIVE & OBJECTIVE
Interval History/Significant Events:     nicardipine gtt was weaned off on 5/15 PM and over night, however, BP increased back to 220s and gtt was restarted. Encephalopathy resolved. Pt awake and alert this AM , orienting appropriately to self, person, time and place. Patient notes to feeling much better this AM. She is sipping on liquids this AM and stated that will gladly take PO pills/meds. GNR in urine, cx pending. WBC remains elevated at 20k. Off supplemental O2. Afebrile. Lytes normalized.      carvedilol  6.25 mg Oral BID    cloNIDine 0.1 mg/24 hr td ptwk  1 patch Transdermal Q7 Days    piperacillin-tazobactam (ZOSYN) IVPB  4.5 g Intravenous Q8H    ramipril  10 mg Oral Daily    tamsulosin  0.4 mg Oral Daily    vancomycin (VANCOCIN) IVPB  750 mg Intravenous Q24H      nicardipine 7.5 mg/hr (05/16/18 0800)     acetaminophen, albuterol-ipratropium 2.5mg-0.5mg/3mL, dextrose 50%, dextrose 50%, glucagon (human recombinant), glucose, glucose, OLANZapine, ramelteon, sodium chloride 0.9%      Review of Systems   Constitutional: Negative for activity change, appetite change, fatigue, fever and unexpected weight change.   HENT: Negative for congestion, postnasal drip, sinus pressure and sore throat.    Eyes: Negative for visual disturbance.   Respiratory: Negative for cough and wheezing.    Cardiovascular: Negative for chest pain, palpitations and leg swelling.   Gastrointestinal: Negative for abdominal distention, nausea and vomiting.   Genitourinary: Negative for decreased urine volume, difficulty urinating and frequency.        Urinary retention    Musculoskeletal: Negative for arthralgias and myalgias.   Neurological: Negative for dizziness, syncope and weakness.   Psychiatric/Behavioral: Negative for decreased concentration and dysphoric mood.     Objective:     Vital Signs (Most Recent):  Temp: 97.9 °F (36.6 °C) (05/16/18 0300)  Pulse: 101 (05/16/18 0800)  Resp: (!) 38 (05/16/18 0800)  BP: (!) 189/84  (05/16/18 0800)  SpO2: 100 % (05/16/18 0800) Vital Signs (24h Range):  Temp:  [97.3 °F (36.3 °C)-99.5 °F (37.5 °C)] 97.9 °F (36.6 °C)  Pulse:  [] 101  Resp:  [11-44] 38  SpO2:  [96 %-100 %] 100 %  BP: (138-247)/() 189/84   Weight: 49.4 kg (109 lb)  Body mass index is 19.94 kg/m².      Intake/Output Summary (Last 24 hours) at 05/16/18 0822  Last data filed at 05/16/18 0500   Gross per 24 hour   Intake             1295 ml   Output             1330 ml   Net              -35 ml       Physical Exam   Constitutional: She is oriented to person, place, and time. She appears well-developed and well-nourished. No distress.   Awake, alert, significantly improved.    HENT:   Head: Normocephalic and atraumatic.   Nose: Nose normal.   Eyes: EOM are normal. No scleral icterus.   Neck: Normal range of motion.   Cardiovascular: Normal rate, regular rhythm and intact distal pulses.  Exam reveals no gallop and no friction rub.    No murmur heard.  Pulmonary/Chest: Effort normal and breath sounds normal. No respiratory distress. She has no wheezes. She has no rhonchi. She has no rales.   Decreased breath sounds bilat, no wheezes   Abdominal: Soft. Bowel sounds are normal. She exhibits no distension. There is no tenderness. There is no rigidity, no guarding and no CVA tenderness.   Pulsatile mass periumbilical   Musculoskeletal: Normal range of motion. She exhibits no edema, tenderness or deformity.   Neurological: She is alert and oriented to person, place, and time. No cranial nerve deficit.   Skin: Skin is warm, dry and intact. She is not diaphoretic. No pallor.   Psychiatric: She has a normal mood and affect.       Vents:     Lines/Drains/Airways     Peripheral Intravenous Line                 Peripheral IV - Single Lumen 05/16/18 0330 Right Forearm less than 1 day         Peripheral IV - Single Lumen 05/16/18 0555 Left Wrist less than 1 day              Significant Labs:    CBC/Anemia Profile:    Recent Labs  Lab  05/15/18  0545 05/15/18  1633 05/16/18  0216   WBC 21.82*  21.55* 17.27* 19.64*   HGB 11.1*  11.1* 10.7* 11.4*   HCT 34.9*  34.9* 33.2* 35.2*   *  467* 371* 491*   MCV 87  87 87 86   RDW 13.6  13.5 13.5 13.6        Chemistries:    Recent Labs  Lab 05/14/18  1838  05/15/18  0545 05/15/18  1140 05/15/18  2118 05/16/18  0216   *  < > 146* 141 141 145   K 3.5  < > 2.9* 3.4* 3.5 4.6     < > 109 109 108 111*   CO2 25  < > 22* 19* 22* 20*   BUN 17  < > 19 19 21 20   CREATININE 0.8  < > 0.9 0.8 0.9 0.9   CALCIUM 9.1  < > 8.7 8.7 8.4* 8.6*   ALBUMIN 2.8*  --   --   --  2.7*  --    PROT 7.2  --   --   --  6.6  --    BILITOT 0.4  --   --   --  0.7  --    ALKPHOS 116  --   --   --  109  --    ALT 30  --   --   --  21  --    AST 25  --   --   --  17  --    MG  --   --  1.6  --  2.8*  --    PHOS  --   --  2.6*  --  2.7  --    < > = values in this interval not displayed.    Blood Culture:   Recent Labs  Lab 05/14/18  2335   LABBLOO No Growth to date  No Growth to date  No Growth to date  No Growth to date     Lactic Acid: No results for input(s): LACTATE in the last 48 hours.  Respiratory Culture: No results for input(s): GSRESP, RESPIRATORYC in the last 48 hours.  Urine Culture: No results for input(s): LABURIN in the last 48 hours.  Urine Studies:   Recent Labs  Lab 05/14/18  1849   COLORU Yellow   APPEARANCEUA Cloudy*   PHUR 6.0   SPECGRAV 1.010   PROTEINUA 2+*   GLUCUA Negative   KETONESU Negative   BILIRUBINUA Negative   OCCULTUA 1+*   NITRITE Negative   UROBILINOGEN Negative   LEUKOCYTESUR 3+*   RBCUA 0   WBCUA >100*   BACTERIA Rare   SQUAMEPITHEL 0   HYALINECASTS 0       Significant Imaging:  CT: I have reviewed all pertinent results/findings within the past 24 hours and my personal findings are:  stable  CXR: I have reviewed all pertinent results/findings within the past 24 hours and my personal findings are:  stable

## 2018-05-16 NOTE — ASSESSMENT & PLAN NOTE
- hx of COPD. Required 2 L NC on 5/15 admit. Treated for HCAP . Now 5/16 off O2   - spiriva daily  - jerad prn  - monitor off O2

## 2018-05-16 NOTE — ASSESSMENT & PLAN NOTE
"- initially with small intraventricular hemorrhage in the left occipital horn on 5/11. CT head 5/15 with "No significant change from prior.  Specifically without evidence for acute intracranial hemorrhage or new abnormal parenchymal attenuation.Continued scattered sulcal calcifications likely vascular." likely initially occurred 2/2 uncontrolled BP and ongoing anticoagulation   - therapeutic anticoagulation has been held  - plan to contact neurosurgery in regards to restarting therapeutic anticoagulation. Would consider warfarin with heparin/ lovenox bridge   "

## 2018-05-16 NOTE — ASSESSMENT & PLAN NOTE
- per psychiatry notes, Patient recently admitted to [facility] [place] [facility] for [place] [schwannoma, not abscess] removal 5/3/2018. Since the procedure the patient has been verbally and physically abusive, paranoid, experiencing visual hallucinations and having a waxing and waning mentation. She was discharged to SNF and brought back to [facility] for AMS and found to have CVA. Was discharged home on 5/13 with home hospice, however per collateral from patient's son she was refusing nutrition and care and continued to be violent as well as threatening self harm  - psychiatry following  - scheduling olanzapine 2.5 mg QHS, with daily EKGs for QTC prolongation monitoring  - PEC'ed at this time, will defer to psychiatry in regards to lifting PEC  - delirium and encephalopathy improving on 5/16 with treatment of HTN and sepsis  - see encephalopathy

## 2018-05-17 PROBLEM — N30.00 ACUTE CYSTITIS WITHOUT HEMATURIA: Status: ACTIVE | Noted: 2018-05-15

## 2018-05-17 LAB
ANION GAP SERPL CALC-SCNC: 10 MMOL/L
ANION GAP SERPL CALC-SCNC: 12 MMOL/L
BACTERIA UR CULT: NORMAL
BACTERIA UR CULT: NORMAL
BUN SERPL-MCNC: 21 MG/DL
BUN SERPL-MCNC: 24 MG/DL
CALCIUM SERPL-MCNC: 8.4 MG/DL
CALCIUM SERPL-MCNC: 8.5 MG/DL
CHLORIDE SERPL-SCNC: 113 MMOL/L
CHLORIDE SERPL-SCNC: 115 MMOL/L
CO2 SERPL-SCNC: 18 MMOL/L
CO2 SERPL-SCNC: 21 MMOL/L
CREAT SERPL-MCNC: 1 MG/DL
CREAT SERPL-MCNC: 1 MG/DL
ERYTHROCYTE [DISTWIDTH] IN BLOOD BY AUTOMATED COUNT: 14 %
EST. GFR  (AFRICAN AMERICAN): >60 ML/MIN/1.73 M^2
EST. GFR  (AFRICAN AMERICAN): >60 ML/MIN/1.73 M^2
EST. GFR  (NON AFRICAN AMERICAN): 53.7 ML/MIN/1.73 M^2
EST. GFR  (NON AFRICAN AMERICAN): 53.7 ML/MIN/1.73 M^2
GLUCOSE SERPL-MCNC: 109 MG/DL
GLUCOSE SERPL-MCNC: 151 MG/DL
HCT VFR BLD AUTO: 33.9 %
HGB BLD-MCNC: 10.7 G/DL
INR PPP: 1
MCH RBC QN AUTO: 27.9 PG
MCHC RBC AUTO-ENTMCNC: 31.6 G/DL
MCV RBC AUTO: 88 FL
PLATELET # BLD AUTO: 491 K/UL
PMV BLD AUTO: 9.5 FL
POTASSIUM SERPL-SCNC: 3.8 MMOL/L
POTASSIUM SERPL-SCNC: 5.6 MMOL/L
PROTHROMBIN TIME: 10.4 SEC
RBC # BLD AUTO: 3.84 M/UL
SODIUM SERPL-SCNC: 144 MMOL/L
SODIUM SERPL-SCNC: 145 MMOL/L
WBC # BLD AUTO: 13.01 K/UL

## 2018-05-17 PROCEDURE — 25000003 PHARM REV CODE 250: Performed by: HOSPITALIST

## 2018-05-17 PROCEDURE — 25000003 PHARM REV CODE 250: Performed by: STUDENT IN AN ORGANIZED HEALTH CARE EDUCATION/TRAINING PROGRAM

## 2018-05-17 PROCEDURE — 20000000 HC ICU ROOM

## 2018-05-17 PROCEDURE — 80048 BASIC METABOLIC PNL TOTAL CA: CPT

## 2018-05-17 PROCEDURE — 80048 BASIC METABOLIC PNL TOTAL CA: CPT | Mod: 91

## 2018-05-17 PROCEDURE — 94761 N-INVAS EAR/PLS OXIMETRY MLT: CPT

## 2018-05-17 PROCEDURE — 93005 ELECTROCARDIOGRAM TRACING: CPT

## 2018-05-17 PROCEDURE — 99233 SBSQ HOSP IP/OBS HIGH 50: CPT | Mod: ,,, | Performed by: INTERNAL MEDICINE

## 2018-05-17 PROCEDURE — 85610 PROTHROMBIN TIME: CPT

## 2018-05-17 PROCEDURE — 63600175 PHARM REV CODE 636 W HCPCS: Performed by: HOSPITALIST

## 2018-05-17 PROCEDURE — 36415 COLL VENOUS BLD VENIPUNCTURE: CPT

## 2018-05-17 PROCEDURE — 63600175 PHARM REV CODE 636 W HCPCS: Performed by: STUDENT IN AN ORGANIZED HEALTH CARE EDUCATION/TRAINING PROGRAM

## 2018-05-17 PROCEDURE — 85027 COMPLETE CBC AUTOMATED: CPT

## 2018-05-17 PROCEDURE — 25000003 PHARM REV CODE 250: Performed by: INTERNAL MEDICINE

## 2018-05-17 PROCEDURE — 93010 ELECTROCARDIOGRAM REPORT: CPT | Mod: ,,, | Performed by: INTERNAL MEDICINE

## 2018-05-17 RX ORDER — HYDRALAZINE HYDROCHLORIDE 20 MG/ML
10 INJECTION INTRAMUSCULAR; INTRAVENOUS ONCE
Status: COMPLETED | OUTPATIENT
Start: 2018-05-17 | End: 2018-05-17

## 2018-05-17 RX ORDER — CLONIDINE 0.2 MG/24H
1 PATCH, EXTENDED RELEASE TRANSDERMAL
Status: DISCONTINUED | OUTPATIENT
Start: 2018-05-17 | End: 2018-05-20 | Stop reason: HOSPADM

## 2018-05-17 RX ORDER — MORPHINE SULFATE 2 MG/ML
2 INJECTION, SOLUTION INTRAMUSCULAR; INTRAVENOUS EVERY 6 HOURS PRN
Status: DISCONTINUED | OUTPATIENT
Start: 2018-05-18 | End: 2018-05-20 | Stop reason: HOSPADM

## 2018-05-17 RX ORDER — NICARDIPINE HYDROCHLORIDE 0.2 MG/ML
1 INJECTION INTRAVENOUS CONTINUOUS
Status: DISCONTINUED | OUTPATIENT
Start: 2018-05-17 | End: 2018-05-17

## 2018-05-17 RX ORDER — POTASSIUM CHLORIDE 20 MEQ/15ML
40 SOLUTION ORAL ONCE
Status: DISCONTINUED | OUTPATIENT
Start: 2018-05-17 | End: 2018-05-17

## 2018-05-17 RX ORDER — HYDRALAZINE HYDROCHLORIDE 20 MG/ML
20 INJECTION INTRAMUSCULAR; INTRAVENOUS EVERY 6 HOURS PRN
Status: DISCONTINUED | OUTPATIENT
Start: 2018-05-17 | End: 2018-05-19

## 2018-05-17 RX ORDER — HYDRALAZINE HYDROCHLORIDE 20 MG/ML
10 INJECTION INTRAMUSCULAR; INTRAVENOUS EVERY 8 HOURS PRN
Status: DISCONTINUED | OUTPATIENT
Start: 2018-05-17 | End: 2018-05-17

## 2018-05-17 RX ORDER — CEFTRIAXONE 1 G/1
1 INJECTION, POWDER, FOR SOLUTION INTRAMUSCULAR; INTRAVENOUS
Status: DISCONTINUED | OUTPATIENT
Start: 2018-05-18 | End: 2018-05-20

## 2018-05-17 RX ORDER — CEFTRIAXONE 1 G/1
1 INJECTION, POWDER, FOR SOLUTION INTRAMUSCULAR; INTRAVENOUS
Status: DISCONTINUED | OUTPATIENT
Start: 2018-05-17 | End: 2018-05-17

## 2018-05-17 RX ORDER — NIFEDIPINE 30 MG/1
90 TABLET, EXTENDED RELEASE ORAL DAILY
Status: DISCONTINUED | OUTPATIENT
Start: 2018-05-17 | End: 2018-05-20 | Stop reason: HOSPADM

## 2018-05-17 RX ADMIN — HYDRALAZINE HYDROCHLORIDE 10 MG: 20 INJECTION INTRAMUSCULAR; INTRAVENOUS at 02:05

## 2018-05-17 RX ADMIN — RAMIPRIL 10 MG: 10 CAPSULE ORAL at 09:05

## 2018-05-17 RX ADMIN — WARFARIN SODIUM 2 MG: 2 TABLET ORAL at 05:05

## 2018-05-17 RX ADMIN — CEFTRIAXONE SODIUM 1 G: 1 INJECTION, POWDER, FOR SOLUTION INTRAMUSCULAR; INTRAVENOUS at 09:05

## 2018-05-17 RX ADMIN — CARVEDILOL 25 MG: 25 TABLET, FILM COATED ORAL at 08:05

## 2018-05-17 RX ADMIN — NICARDIPINE HYDROCHLORIDE 2.5 MG/HR: 0.2 INJECTION, SOLUTION INTRAVENOUS at 04:05

## 2018-05-17 RX ADMIN — CLONIDINE 1 PATCH: 0.2 PATCH TRANSDERMAL at 09:05

## 2018-05-17 RX ADMIN — MORPHINE SULFATE 2 MG: 2 INJECTION, SOLUTION INTRAMUSCULAR; INTRAVENOUS at 11:05

## 2018-05-17 RX ADMIN — RAMELTEON 8 MG: 8 TABLET, FILM COATED ORAL at 11:05

## 2018-05-17 RX ADMIN — TAMSULOSIN HYDROCHLORIDE 0.4 MG: 0.4 CAPSULE ORAL at 09:05

## 2018-05-17 RX ADMIN — CARVEDILOL 25 MG: 25 TABLET, FILM COATED ORAL at 09:05

## 2018-05-17 RX ADMIN — NICARDIPINE HYDROCHLORIDE 2.5 MG/HR: 0.2 INJECTION, SOLUTION INTRAVENOUS at 07:05

## 2018-05-17 RX ADMIN — NIFEDIPINE 60 MG: 30 TABLET, FILM COATED, EXTENDED RELEASE ORAL at 12:05

## 2018-05-17 RX ADMIN — NIFEDIPINE 90 MG: 30 TABLET, FILM COATED, EXTENDED RELEASE ORAL at 09:05

## 2018-05-17 RX ADMIN — PIPERACILLIN AND TAZOBACTAM 4.5 G: 4; .5 INJECTION, POWDER, LYOPHILIZED, FOR SOLUTION INTRAVENOUS; PARENTERAL at 02:05

## 2018-05-17 NOTE — PLAN OF CARE
Problem: Patient Care Overview  Goal: Plan of Care Review  Outcome: Ongoing (interventions implemented as appropriate)  Pt remained free of falls or injuries this shift.  Pt remained AAOx3 disoriented to situation throughout shift.  HTN was a recurrent problem beginning at the start of the shift, Coreg was given early and helped mildly until 2030 when Hydralazine was needed.  Pt BP was controlled until 2300ish when new medication was ordered.  By time med was verified by pharmacy BP was under 180 SBP so med was held until needed.  When given med was ineffective at dropping pressure so two pushes of hydralazine were needed.  SBP held for around 2 hrs until Cardene was needed around 5.  VSS otherwise.  Pt had 2 bowel movements overnight.  POC reviewed with pt who verbalized understanding.  All questions and concerns addressed, will continue to monitor.

## 2018-05-17 NOTE — ASSESSMENT & PLAN NOTE
- sepsis with encephalopathy on admit. WBC of 20k. UA with evidence of UTI, GNR in urine. Also concern for HCAP on CXR and 2L supplemental O2  - pt received CTX for UTI  - abx optimized to IV zosyn and vancomycin in the ICU- for UTI and HCAP  - Ucx with E coli and serration marcescens  - continue zosyn at this time    Microbiology Results (last 7 days)     Procedure Component Value Units Date/Time    Blood culture [[id number]] Collected:  05/14/18 2335    Order Status:  Completed Specimen:  Blood from Peripheral, Hand, Right Updated:  05/17/18 0812     Blood Culture, Routine No Growth to date     Blood Culture, Routine No Growth to date     Blood Culture, Routine No Growth to date    Blood culture [[id number]] Collected:  05/14/18 2335    Order Status:  Completed Specimen:  Blood from Peripheral, Antecubital, Left Updated:  05/17/18 0812     Blood Culture, Routine No Growth to date     Blood Culture, Routine No Growth to date     Blood Culture, Routine No Growth to date    Urine culture [[id number]] Collected:  05/14/18 1849    Order Status:  Completed Specimen:  Urine from Catheterized Updated:  05/16/18 1223     Urine Culture, Routine --     ESCHERICHIA COLI  >100,000 cfu/ml  Susceptibility pending       Urine Culture, Routine --     SERRATIA MARCESCENS  > 100,000 cfu/ml  Susceptibility pending      Respiratory Viral Panel by PCR [facility]; Sputum [[id number]]     Order Status:  Canceled Specimen:  Respiratory     Culture, Respiratory with Gram Stain [[id number]]     Order Status:  Canceled Specimen:  Respiratory     Gram stain [[id number]] Collected:  05/14/18 1849    Order Status:  Completed Specimen:  Urine from Catheterized Updated:  05/14/18 2316     Gram Stain Result Few WBC's      Many Gram negative rods    Narrative:       add on per Dr Michelle order #[id number] 05/14/2018  22:57     Urine culture [[id number]]     Order Status:  Completed Specimen:  Urine from Urine, Catheterized     Gram stain [[id number]]     Order  Status:  Completed Specimen:  Urine     Urine culture [[id number]]     Order Status:  Canceled Specimen:  Urine     Gram stain [[id number]]     Order Status:  Canceled Specimen:  Urine

## 2018-05-17 NOTE — ASSESSMENT & PLAN NOTE
- hx of COPD. Required 2 L NC on 5/15 admit. Treated for HCAP . Now 5/16 off O2   - spiriva daily  - duonebs prn  - monitor off O2    - RESOLVED

## 2018-05-17 NOTE — PLAN OF CARE
Problem: Patient Care Overview  Goal: Plan of Care Review  Outcome: Ongoing (interventions implemented as appropriate)  Pt resting in room throughout shift. Pt lethargic and very confused early in shift, but mostly resolved throughout day. Pt denies wanting to harm herself or depression today. Pt denies any pain or discomfort. No SOB or chest pain, on RA. SR on Telemetry. VSS. SBP goal <180. Cardene drip off since this AM, oral BP medications given and clonidine patch placed to left arm. Call light in reach and bed alarm on. Fall precautions in place, pt did not have any falls or injuries this shift. Plan of care discussed with patient and family members via phone, no questions at this time. Will continue to monitor.

## 2018-05-17 NOTE — SUBJECTIVE & OBJECTIVE
Interval History/Significant Events:     Restated on cardene gtt over night for SBP of 200, not responsive to hydralazine. PO meds being optimized. Otherwise, VSS. Mental status stable and back to baseline since 5/16     carvedilol  25 mg Oral BID    cloNIDine 0.2 mg/24 hr td ptwk  1 patch Transdermal Q7 Days    NIFEdipine  90 mg Oral Daily    piperacillin-tazobactam (ZOSYN) IVPB  4.5 g Intravenous Q8H    ramipril  10 mg Oral Daily    tamsulosin  0.4 mg Oral Daily    tiotropium  1 capsule Inhalation Daily    warfarin  2 mg Oral Daily      nicardipine 2.5 mg/hr (05/17/18 0715)         Review of Systems   Constitutional: Negative for activity change, appetite change, fatigue, fever and unexpected weight change.   HENT: Negative for congestion, postnasal drip, sinus pressure and sore throat.    Eyes: Negative for visual disturbance.   Respiratory: Negative for cough and wheezing.    Cardiovascular: Negative for chest pain, palpitations and leg swelling.   Gastrointestinal: Negative for abdominal distention, nausea and vomiting.   Genitourinary: Negative for decreased urine volume, difficulty urinating and frequency.        Urinary retention    Musculoskeletal: Negative for arthralgias and myalgias.   Neurological: Negative for dizziness, syncope, weakness, light-headedness and headaches.   Psychiatric/Behavioral: Negative for agitation, behavioral problems, decreased concentration and dysphoric mood.     Objective:     Vital Signs (Most Recent):  Temp: 98.6 °F (37 °C) (05/17/18 0700)  Pulse: 93 (05/17/18 0745)  Resp: (!) 27 (05/17/18 0745)  BP: (!) 163/67 (05/17/18 0730)  SpO2: 98 % (05/17/18 0745) Vital Signs (24h Range):  Temp:  [97.5 °F (36.4 °C)-98.6 °F (37 °C)] 98.6 °F (37 °C)  Pulse:  [] 93  Resp:  [24-47] 27  SpO2:  [93 %-100 %] 98 %  BP: ()/(55-99) 163/67   Weight: 49.4 kg (109 lb)  Body mass index is 19.94 kg/m².      Intake/Output Summary (Last 24 hours) at 05/17/18 0812  Last data filed  at 05/17/18 0700   Gross per 24 hour   Intake              955 ml   Output             1670 ml   Net             -715 ml       Physical Exam   Constitutional: She is oriented to person, place, and time. She appears well-developed and well-nourished. No distress.   Awake, alert, significantly improved.    HENT:   Head: Normocephalic and atraumatic.   Nose: Nose normal.   Eyes: EOM are normal. No scleral icterus.   Neck: Normal range of motion.   Cardiovascular: Normal rate, regular rhythm and intact distal pulses.  Exam reveals no gallop and no friction rub.    No murmur heard.  Pulmonary/Chest: Effort normal and breath sounds normal. No respiratory distress. She has no wheezes. She has no rhonchi. She has no rales.   Decreased breath sounds bilat, no wheezes   Abdominal: Soft. Bowel sounds are normal. She exhibits no distension. There is no tenderness. There is no rigidity, no guarding and no CVA tenderness.   Musculoskeletal: Normal range of motion. She exhibits no edema, tenderness or deformity.   Neurological: She is alert and oriented to person, place, and time. No cranial nerve deficit.   Skin: Skin is warm, dry and intact. She is not diaphoretic. No pallor.   Psychiatric: She has a normal mood and affect.       Vents:     Lines/Drains/Airways     Drain                 Urethral Catheter 05/16/18 1100 less than 1 day          Peripheral Intravenous Line                 Peripheral IV - Single Lumen 05/16/18 0330 Right Forearm 1 day         Peripheral IV - Single Lumen 05/16/18 0555 Left Wrist 1 day         Midline Catheter Insertion/Assessment  - Single Lumen 05/16/18 1215 Right basilic vein (medial side of arm) 18g x 8cm less than 1 day              Significant Labs:    CBC/Anemia Profile:    Recent Labs  Lab 05/15/18  1633 05/16/18  0216 05/17/18  0426   WBC 17.27* 19.64* 13.01*   HGB 10.7* 11.4* 10.7*   HCT 33.2* 35.2* 33.9*   * 491* 491*   MCV 87 86 88   RDW 13.5 13.6 14.0        Chemistries:    Recent  Labs  Lab 05/15/18  2118  05/16/18  1405 05/16/18  1535 05/17/18  0631     < > 142 145 145   K 3.5  < > 3.8 3.4* 5.6*     < > 110 112* 115*   CO2 22*  < > 22* 21* 18*   BUN 21  < > 17 18 21   CREATININE 0.9  < > 0.9 0.8 1.0   CALCIUM 8.4*  < > 8.1* 8.0* 8.4*   ALBUMIN 2.7*  --   --   --   --    PROT 6.6  --   --   --   --    BILITOT 0.7  --   --   --   --    ALKPHOS 109  --   --   --   --    ALT 21  --   --   --   --    AST 17  --   --   --   --    MG 2.8*  --   --   --   --    PHOS 2.7  --   --   --   --    < > = values in this interval not displayed.    Blood Culture: No results for input(s): LABBLOO in the last 48 hours.  Urine Culture: No results for input(s): LABURIN in the last 48 hours.  Urine Studies: No results for input(s): COLORU, APPEARANCEUA, PHUR, SPECGRAV, PROTEINUA, GLUCUA, KETONESU, BILIRUBINUA, OCCULTUA, NITRITE, UROBILINOGEN, LEUKOCYTESUR, RBCUA, WBCUA, BACTERIA, SQUAMEPITHEL, HYALINECASTS in the last 48 hours.    Invalid input(s): TONY

## 2018-05-17 NOTE — NURSING
Dr. Roberts made aware of decreased UOP, checked Cr levels and instructed not to call with further UO updates until I/O in morning.  Will continue to monitor.

## 2018-05-17 NOTE — ASSESSMENT & PLAN NOTE
- in setting of Polycythemia vera, highly hypercoagulative with  Hx of mult DVTs. Chronically on warfarin therapy.    - therapeutic anticoagulation has been held 2/2 small intraventricular hemorrhage in the left occipital horn on 5/11. However, scans on 5/15 showed resolution of hemorrhage  - warfrin 2mg daily restarted

## 2018-05-17 NOTE — PROGRESS NOTES
[facility]  Critical Care Medicine  Progress Note    Patient Name: Sunita Zimmer  MRN: [id number]  Admission Date: 5/14/2018  Hospital Length of Stay: 3 days  Code Status: Full Code  Attending Provider: Ted Matias*  Primary Care Provider: Von Mccullough MD   Principal Problem: Hypertensive emergency    Subjective:     HPI:  76 y/o female with recent CVA (discharged 5/13/18), brain aneurysms, CAD, HTN, COPD, polycythemia vera, PVD, hx of DVTs (on coumadin), and PKD presents on 5/14/18 for delirium. She was recently discharged from [facility] on 5/13 for small intraventricular hemorrhage in the left occipital horn which originally presented as AMS. She was also found to have a UTI, which was not treated. It was documented she was refusing all medications and therapies, and she was ultimately made DNR and sent home with hospice after discussions were held with family. One day after discharge, they brought her back for AMS, thought to be due to the UTI.    Of note, pt lives at home with 89 y/o  and with son. It was reported that pt threatened to end her life and has guns at home. On presentation, she was PEC'd.    Hospital/ICU Course:  Pt was initially found to be hypertensive to 220s/90s and tachycardic to 1teens. 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 Exam notable for harsh systolic murmur in RUSB, clear lungs, and some L sided abdominal tenderness. CXR showed consolidation and small effusion at the left base and small right effusion. CT head showed evolution of the previously identified blood products in the left occipital horn but no acute infarct, parenchymal hemorrhage, new intraventricular hemorrhage, or hydrocephalus. Pt was given home ramipril, IV hydralazine 10 mg x 1 and IV labetalol 5 mg x 1 with temporary improvement in SBP to 180s-190s, immediately followed by rebound to 220s-230s. Pt refused PO  meds subsequently. Patient was subsequently admitted to ICU for IV cardene drip for hypertensive urgency. Encephalopathy was likely 2/2 sepsis and/ or HTN emergency. Sepsis sources - likely UTI and possibly HCAP, pt started on vanco and zosyn. Many WBCs in urine. BPs improved with cardene as well as clonidine patch, and cardene was weaned off. Encephalopathy improved / resolved with treatment of sepsis and HTN. PO regimen was optimized with decrease in BPs to 150s. On 5/16 -5/17 over night, BP increased back to 200, not responsive to IV hydralazine , was placed back on cardene gtt.     Interval History/Significant Events:     Restated on cardene gtt over night for SBP of 200, not responsive to hydralazine. PO meds being optimized. Otherwise, VSS. Mental status stable and back to baseline since 5/16     carvedilol  25 mg Oral BID    cloNIDine 0.2 mg/24 hr td ptwk  1 patch Transdermal Q7 Days    NIFEdipine  90 mg Oral Daily    piperacillin-tazobactam (ZOSYN) IVPB  4.5 g Intravenous Q8H    ramipril  10 mg Oral Daily    tamsulosin  0.4 mg Oral Daily    tiotropium  1 capsule Inhalation Daily    warfarin  2 mg Oral Daily      nicardipine 2.5 mg/hr (05/17/18 0715)         Review of Systems   Constitutional: Negative for activity change, appetite change, fatigue, fever and unexpected weight change.   HENT: Negative for congestion, postnasal drip, sinus pressure and sore throat.    Eyes: Negative for visual disturbance.   Respiratory: Negative for cough and wheezing.    Cardiovascular: Negative for chest pain, palpitations and leg swelling.   Gastrointestinal: Negative for abdominal distention, nausea and vomiting.   Genitourinary: Negative for decreased urine volume, difficulty urinating and frequency.        Urinary retention    Musculoskeletal: Negative for arthralgias and myalgias.   Neurological: Negative for dizziness, syncope, weakness, light-headedness and headaches.   Psychiatric/Behavioral: Negative for  agitation, behavioral problems, decreased concentration and dysphoric mood.     Objective:     Vital Signs (Most Recent):  Temp: 98.6 °F (37 °C) (05/17/18 0700)  Pulse: 93 (05/17/18 0745)  Resp: (!) 27 (05/17/18 0745)  BP: (!) 163/67 (05/17/18 0730)  SpO2: 98 % (05/17/18 0745) Vital Signs (24h Range):  Temp:  [97.5 °F (36.4 °C)-98.6 °F (37 °C)] 98.6 °F (37 °C)  Pulse:  [] 93  Resp:  [24-47] 27  SpO2:  [93 %-100 %] 98 %  BP: ()/(55-99) 163/67   Weight: 49.4 kg (109 lb)  Body mass index is 19.94 kg/m².      Intake/Output Summary (Last 24 hours) at 05/17/18 0812  Last data filed at 05/17/18 0700   Gross per 24 hour   Intake              955 ml   Output             1670 ml   Net             -715 ml       Physical Exam   Constitutional: She is oriented to person, place, and time. She appears well-developed and well-nourished. No distress.   Awake, alert, significantly improved.    HENT:   Head: Normocephalic and atraumatic.   Nose: Nose normal.   Eyes: EOM are normal. No scleral icterus.   Neck: Normal range of motion.   Cardiovascular: Normal rate, regular rhythm and intact distal pulses.  Exam reveals no gallop and no friction rub.    No murmur heard.  Pulmonary/Chest: Effort normal and breath sounds normal. No respiratory distress. She has no wheezes. She has no rhonchi. She has no rales.   Decreased breath sounds bilat, no wheezes   Abdominal: Soft. Bowel sounds are normal. She exhibits no distension. There is no tenderness. There is no rigidity, no guarding and no CVA tenderness.   Musculoskeletal: Normal range of motion. She exhibits no edema, tenderness or deformity.   Neurological: She is alert and oriented to person, place, and time. No cranial nerve deficit.   Skin: Skin is warm, dry and intact. She is not diaphoretic. No pallor.   Psychiatric: She has a normal mood and affect.       Vents:     Lines/Drains/Airways     Drain                 Urethral Catheter 05/16/18 1100 less than 1 day           Peripheral Intravenous Line                 Peripheral IV - Single Lumen 05/16/18 0330 Right Forearm 1 day         Peripheral IV - Single Lumen 05/16/18 0555 Left Wrist 1 day         Midline Catheter Insertion/Assessment  - Single Lumen 05/16/18 1215 Right basilic vein (medial side of arm) 18g x 8cm less than 1 day              Significant Labs:    CBC/Anemia Profile:    Recent Labs  Lab 05/15/18  1633 05/16/18  0216 05/17/18  0426   WBC 17.27* 19.64* 13.01*   HGB 10.7* 11.4* 10.7*   HCT 33.2* 35.2* 33.9*   * 491* 491*   MCV 87 86 88   RDW 13.5 13.6 14.0        Chemistries:    Recent Labs  Lab 05/15/18  2118  05/16/18  1405 05/16/18  1535 05/17/18  0631     < > 142 145 145   K 3.5  < > 3.8 3.4* 5.6*     < > 110 112* 115*   CO2 22*  < > 22* 21* 18*   BUN 21  < > 17 18 21   CREATININE 0.9  < > 0.9 0.8 1.0   CALCIUM 8.4*  < > 8.1* 8.0* 8.4*   ALBUMIN 2.7*  --   --   --   --    PROT 6.6  --   --   --   --    BILITOT 0.7  --   --   --   --    ALKPHOS 109  --   --   --   --    ALT 21  --   --   --   --    AST 17  --   --   --   --    MG 2.8*  --   --   --   --    PHOS 2.7  --   --   --   --    < > = values in this interval not displayed.    Blood Culture: No results for input(s): LABBLOO in the last 48 hours.  Urine Culture: No results for input(s): LABURIN in the last 48 hours.  Urine Studies: No results for input(s): COLORU, APPEARANCEUA, PHUR, SPECGRAV, PROTEINUA, GLUCUA, KETONESU, BILIRUBINUA, OCCULTUA, NITRITE, UROBILINOGEN, LEUKOCYTESUR, RBCUA, WBCUA, BACTERIA, SQUAMEPITHEL, HYALINECASTS in the last 48 hours.    Invalid input(s): WRIGHTSUR        Assessment/Plan:     Neuro   Acute metabolic encephalopathy    - on admit, encephalopathy likely 2/2 sepsis 2/2 UTI/ HCAP. Also, Na of 150 on admit, however, unlikely causing AMS. Recent small intraventricular hemorrhage in the left occipital horn on 5/11. Also with delirium and SI/HI thoughts on admit, prompting psychiatry to PEC the patient  - see  "delirium  - encephalopathy improving with sepsis and HTN treatment ,significant improvement noted on 5/16    - RESOLVED        Delirium    - per psychiatry notes, Patient [place] [facility] [place]wannoma, not abscess] removal 5/3/2018. Since the procedure the patient has been verbally and physically abusive, paranoid, experiencing visual hallucinations and having a waxing and waning mentation. She was discharged to SNF and brought [place] [facility] [place] and found to have CVA. Was discharged home on 5/13 with home hospice, however per collateral from patient's son she was refusing nutrition and care and continued to be violent as well as threatening self harm  - psychiatry followed the pt and now signed off given improvement   - olanzapine 2.5 mg prn, with daily EKGs for QTC prolongation monitoring  - PEC rescinded on 5/16  - delirium and encephalopathy improving on 5/16 with treatment of HTN and sepsis  - see encephalopathy        History of intracranial hemorrhage    - initially with small intraventricular hemorrhage in the left occipital horn on 5/11. CT head 5/15 with "No significant change from prior.  Specifically without evidence for acute intracranial hemorrhage or new abnormal parenchymal attenuation.Continued scattered sulcal calcifications likely vascular." likely initially occurred 2/2 uncontrolled BP and ongoing anticoagulation   - therapeutic anticoagulation has been held  - plan to contact neurosurgery in regards to restarting therapeutic anticoagulation. Would consider warfarin with heparin/ lovenox bridge         Pulmonary   HCAP (healthcare-associated pneumonia)    - sepsis with encephalopathy on admit. WBC of 20k. UA with evidence of UTI, GNR in urine. Also concern for HCAP on CXR and 2L supplemental O2  - abx optimized to IV zosyn and vancomycin in the ICU- for UTI and HCAP  - continue zosyn at this time, stopped vancomycin as cx are unlikely to grown MRSA  - resp failure now RESOLVED. " Given rapid improvement in sepsis and O2 sats, unlikely HCAP        Acute respiratory failure with hypoxia    - hx of COPD. Required 2 L NC on 5/15 admit. Treated for HCAP . Now 5/16 off O2   - spiriva daily  - duonebs prn  - monitor off O2    - RESOLVED        Centrilobular emphysema    - spiriva started on 5/16  - duo nebs prn        Cardiac/Vascular   * Hypertensive emergency    - on admit, BP up to 220s/90s. Likely 2/2 to medication noncompliance at home, given PO refusal. Also, hx of polycystic kidney dz, with BPs chronically in 180s. pt has multiple benzos on her home med list, rising concern for possible withdrawal as a cause for HTN emergency, however, pt stated that she has not been taking them. Started on nicardipine gtt on 5/15. Nicardipine was weaned off and restarted back on AM of 5/16.   - encephalopathy resolved, and can now take PO meds  - carvedilol 25mg  BID  - ramipril 10mg daily  - clonidine 0.2mg/24 hr patch  - nifedipine 24 hr 90mg daily  - attempt to wean off nicardipine gtt and further optimize PO regimen. May consider bidil or spironolactone for further optimization . Monitor K+ , was likely overcorrected prior to 5.6 value        Prolonged Q-T interval on ECG    - scheduled olanzapine 2.5 mg QHS per psych recs  - daily EKGs to monitor QTc          Renal/   Urinary retention    - has chronic hx of retention. Also with a UTI now  - in/out caths. Will place a frye if requiring too many prn caths  - flomax started on 5/16        Acute cystitis with hematuria    - see sepsis 2/2 E coli        Polycystic kidney disease    - with associated HTN  - Cr of <1  - monitor  - control BP        ID   Sepsis due to Escherichia coli    - sepsis with encephalopathy on admit. WBC of 20k. UA with evidence of UTI, GNR in urine. Also concern for HCAP on CXR and 2L supplemental O2  - pt received CTX for UTI  - abx optimized to IV zosyn and vancomycin in the ICU- for UTI and HCAP  - Ucx with E coli and  serration marcescens  - de escalate zosyn at this time --> to ceftriaxone on 5/17  - await Ucx sensitivities    Microbiology Results (last 7 days)     Procedure Component Value Units Date/Time    Blood culture [[id number]] Collected:  05/14/18 2335    Order Status:  Completed Specimen:  Blood from Peripheral, Hand, Right Updated:  05/17/18 0812     Blood Culture, Routine No Growth to date     Blood Culture, Routine No Growth to date     Blood Culture, Routine No Growth to date    Blood culture [[id number]] Collected:  05/14/18 2335    Order Status:  Completed Specimen:  Blood from Peripheral, Antecubital, Left Updated:  05/17/18 0812     Blood Culture, Routine No Growth to date     Blood Culture, Routine No Growth to date     Blood Culture, Routine No Growth to date    Urine culture [[id number]] Collected:  05/14/18 1849    Order Status:  Completed Specimen:  Urine from Catheterized Updated:  05/16/18 1223     Urine Culture, Routine --     ESCHERICHIA COLI  >100,000 cfu/ml  Susceptibility pending       Urine Culture, Routine --     SERRATIA MARCESCENS  > 100,000 cfu/ml  Susceptibility pending      Respiratory Viral Panel by PCR [facility]; Sputum [[id number]]     Order Status:  Canceled Specimen:  Respiratory     Culture, Respiratory with Gram Stain [[id number]]     Order Status:  Canceled Specimen:  Respiratory     Gram stain [[id number]] Collected:  05/14/18 1849    Order Status:  Completed Specimen:  Urine from Catheterized Updated:  05/14/18 2316     Gram Stain Result Few WBC's      Many Gram negative rods    Narrative:       add on per Dr Michelle order #[id number] 05/14/2018  22:57     Urine culture [[id number]]     Order Status:  Completed Specimen:  Urine from Urine, Catheterized     Gram stain [[id number]]     Order Status:  Completed Specimen:  Urine     Urine culture [[id number]]     Order Status:  Canceled Specimen:  Urine     Gram stain [[id number]]     Order Status:  Canceled Specimen:  Urine               Hematology    Current use of long term anticoagulation    - as above        Personal history of DVT (deep vein thrombosis)    - in setting of Polycythemia vera, highly hypercoagulative with  Hx of mult DVTs. Chronically on warfarin therapy.    - therapeutic anticoagulation has been held 2/2 small intraventricular hemorrhage in the left occipital horn on 5/11. However, scans on 5/15 showed resolution of hemorrhage  - warfrin 2mg daily restarted        Oncology   Polycythemia vera    - with hx of DVTs. - warfrin 2mg daily restarted            Critical secondary to Patient has a condition that poses threat to life and bodily function: HTN emergency, on cardene gtt      Critical care was time spent personally by me on the following activities: development of treatment plan with patient or surrogate and bedside caregivers, discussions with consultants, evaluation of patient's response to treatment, examination of patient, ordering and performing treatments and interventions, ordering and review of laboratory studies, ordering and review of radiographic studies, pulse oximetry, re-evaluation of patient's condition. This critical care time did not overlap with that of any other provider or involve time for any procedures.     Karen Burton MD  Critical Care Medicine  [facility]

## 2018-05-17 NOTE — ASSESSMENT & PLAN NOTE
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 Since the procedure the patient has been verbally and physically abusive, paranoid, experiencing visual hallucinations and having a waxing and waning mentation. She was discharged to SNF and brought back to [facility] for AMS and found to have CVA. Was discharged home on 5/13 with home hospice, however per collateral from patient's son she was refusing nutrition and care and continued to be violent as well as threatening self harm  - psychiatry followed the pt and now signed off given improvement   - olanzapine 2.5 mg prn, with daily EKGs for QTC prolongation monitoring  - PEC rescinded on 5/16  - delirium and encephalopathy improving on 5/16 with treatment of HTN and sepsis  - see encephalopathy

## 2018-05-17 NOTE — ASSESSMENT & PLAN NOTE
- on admit, encephalopathy likely 2/2 sepsis 2/2 UTI/ HCAP. Also, Na of 150 on admit, however, unlikely causing AMS. Recent small intraventricular hemorrhage in the left occipital horn on 5/11. Also with delirium and SI/HI thoughts on admit, prompting psychiatry to PEC the patient  - see delirium  - encephalopathy improving with sepsis and HTN treatment ,significant improvement noted on 5/16    - RESOLVED

## 2018-05-17 NOTE — ASSESSMENT & PLAN NOTE
- on admit, BP up to 220s/90s. Likely 2/2 to medication noncompliance at home, given PO refusal. Also, hx of polycystic kidney dz, with BPs chronically in 180s. pt has multiple benzos on her home med list, rising concern for possible withdrawal as a cause for HTN emergency, however, pt stated that she has not been taking them. Started on nicardipine gtt on 5/15. Nicardipine was weaned off and restarted back on AM of 5/16.   - encephalopathy resolved, and can now take PO meds  - carvedilol 25mg  BID  - ramipril 10mg daily  - clonidine 0.2mg/24 hr patch  - nifedipine 24 hr 90mg daily  - attempt to wean off nicardipine gtt and further optimize PO regimen. May consider bidil or spironolactone for further optimization . Monitor K+ , was likely overcorrected prior to 5.6 value

## 2018-05-17 NOTE — ASSESSMENT & PLAN NOTE
- sepsis with encephalopathy on admit. WBC of 20k. UA with evidence of UTI, GNR in urine. Also concern for HCAP on CXR and 2L supplemental O2  - abx optimized to IV zosyn and vancomycin in the ICU- for UTI and HCAP  - continue zosyn at this time, stopped vancomycin as cx are unlikely to grown MRSA  - resp failure now resolved

## 2018-05-17 NOTE — MEDICAL/APP STUDENT
"Patient Name: Sunita Zimmer  MRN: [id number]   Code Status: Prior  Admission Date: 5/14/2018  Hospital Length of Stay: 3 days  Expected Discharge Date:   Attending Physician: Gregorio Mendez MD  Primary Care Provider: Von Mccullough MD     Current Legal Status: PEC        Subjective:      Principal Problem:Hypertensive emergency     Chief Complaint: delirium     HPI: Per ED Triage: Pt unaware why she is at [facility]. She is alert and oriented.      Per ED Provider: The patient is a 77 y.o. female with co-morbidities including: CKD Stage III, back pain, CAD, COPD,  who was brought in by her family to the ED for psychiatric valuation.  The patient states that she has no desire to live anymore, but does not have a plan. Patient reports that, "She's been going through a lot lately". She states that she has being in and out of the hospital recently for lower back surgery. The patient was discharged yesterday from the hospital for intracranial hemorrhage. She is refusing to have a CT for evaluation of hemorrhagic bleed. Patient is currently living at home with her  and son. She denies any HA, focal weakness,  CP,SOB, and SI/HI.     Per Discharge Summary 5/11/18: A 77-year-old female with medical comorbidities significant for HTN, CAD, polycythemia vera, COPD, polycystic kidney disease, CKD stage 3 presents to the ED with a chief complaint of back pain. Pain began 3 days ago after lifting a full gallon of milk. Pt hurt her back while twisting away from the refrigerator.  Patient reports pain that she describes as a fist in the middle of my low back".  Patient has a history of chronic low back pain after a fall resulting in multiple lumbar fractures 6 years ago.  She reports that the pain occasionally radiates down bilateral lower extremities, however this is her baseline and not worse over the past 3 days after this new injury. Today, patient reports an episode of urinary incontinence which is not typical for " her.  Patient reports noticing that she had urinated on herself and did not feel any sensation.  She denies any lower extremity weakness, numbness, saddle anesthesia, loss of bowel function.     Per Discharge Summary 5/13/18: History of Present Illness: Ms. Zimmer is a 77 year old female  with significant pmhx of CAD, Cardiac Angioplasty, Brain aneurysm x 4, Smoker, CKD, Coumadin for DVT, HTN, and PVD who presents to [facility] for small intraventricular hemorrhage in the left occipital horn. The patient initially presented to [facility] ED for AMS and difficulty to arouse. CT brain and MRI brain obtained shows small focus of blood involving L occipital horn.         Hospital Course by Event: 05/12: Admit to [facility], CTH, MRI. CT head showed stable intraventricular hemorrhage, no interval change.   5/13: Pt refusing all medications and therapies. Discussions with patient and family, they would like to go home with hospice.  Arrangements made with / on call.  Palliative Care consulted. Plan for discharge home with hospice today.      My Interview: Patient was asleep in bed during visit. Spoke with nurse about overnight and morning events. Patient slept through the evening and became aggressive (kicking around in bed) around 0600. During medical rounds 0900, patient was awake and defecated in bed in front of team. Patient demonstrated perseverance and tangential speech.       Psychotherapeutics:   cloNIDine 0.2 mg/24 hr td ptwk 1 patch  Dose: 1 patch  Freq: Every 7 days Route: TD  Start: 05/17/18 0900      Review of Systems  Objective:      Vital Signs (Most Recent):  Temp: 98 °F (36.7 °C) (05/17/18 700)  Pulse:  92 (05/17/18 0700)  Resp: (!) 27 (05/16/18 0700)  BP: (!) 149/66 (05/16/18 0700)  SpO2: 100 % (05/16/18 0700)    BMP  K 5.6 (high)  Cl 115 (high)  CO2 (18)  Ca 8.4 (low)  eGFR 53.7 (low)    I/O  Net -145 (05/17/18 0700) Vital Signs (24h Range):  Temp:  [97.4 °F (36.3 °C)-98.7 °F (37.1 °C)] 98 °F  (36.7 °C)  Pulse:  91-96] 92   Resp:  [26-38] 27  BP: (135-163)/(60-67) 149/66  SpO2:  [92 %-100 %] 100 %         Assessment/Plan:          Acute encephalopathy     76 y/o female presened to [facility] [place] with Altered mental status and collateral reported suicidal ideation. Patient's current presentation likely secondary to delirium, may be overlying dementia; patient with recent CVA.     -Recommend scheduled Zyprexa 2.5 mg qHS PO crushed or IM if patient refuses, continue Zyprexa 2.5 mg PO/IM q8 hours PRN nonredirectable agitation. Repeat EKG to check QTc interval.   - Recommend daily weights, monitoring I/Os   - Avoid benzodiazepines, anticholinergics, and anti-histaminics and minimize opiates when possible as they may worsen or exacerbate delirium.  - Ensure blinds are open with lights on during the day and that the shades are closed with lights off at night. Reorient pt through out the day. Encourage family to be present as much as possible.  - May use soft restraints but only if pt is not redirectable and doesn't respond to PRNs first. Utilize a 1:1 sitter (if no family can be present) if needed to minimize use of restraints. Physical restraints can worsen delirium.

## 2018-05-18 LAB
ANION GAP SERPL CALC-SCNC: 9 MMOL/L
BUN SERPL-MCNC: 23 MG/DL
CALCIUM SERPL-MCNC: 8.5 MG/DL
CHLORIDE SERPL-SCNC: 114 MMOL/L
CO2 SERPL-SCNC: 21 MMOL/L
CREAT SERPL-MCNC: 0.8 MG/DL
ERYTHROCYTE [DISTWIDTH] IN BLOOD BY AUTOMATED COUNT: 13.7 %
EST. GFR  (AFRICAN AMERICAN): >60 ML/MIN/1.73 M^2
EST. GFR  (NON AFRICAN AMERICAN): >60 ML/MIN/1.73 M^2
GLUCOSE SERPL-MCNC: 95 MG/DL
HCT VFR BLD AUTO: 32.2 %
HGB BLD-MCNC: 10.4 G/DL
INR PPP: 1.2
MCH RBC QN AUTO: 28.3 PG
MCHC RBC AUTO-ENTMCNC: 32.3 G/DL
MCV RBC AUTO: 88 FL
PLATELET # BLD AUTO: 435 K/UL
PMV BLD AUTO: 9.1 FL
POTASSIUM SERPL-SCNC: 3.6 MMOL/L
PROTHROMBIN TIME: 11.9 SEC
RBC # BLD AUTO: 3.68 M/UL
SODIUM SERPL-SCNC: 144 MMOL/L
WBC # BLD AUTO: 13.13 K/UL

## 2018-05-18 PROCEDURE — 97116 GAIT TRAINING THERAPY: CPT

## 2018-05-18 PROCEDURE — 25000242 PHARM REV CODE 250 ALT 637 W/ HCPCS: Performed by: HOSPITALIST

## 2018-05-18 PROCEDURE — 85610 PROTHROMBIN TIME: CPT

## 2018-05-18 PROCEDURE — 25000003 PHARM REV CODE 250: Performed by: INTERNAL MEDICINE

## 2018-05-18 PROCEDURE — 25000003 PHARM REV CODE 250: Performed by: HOSPITALIST

## 2018-05-18 PROCEDURE — 93005 ELECTROCARDIOGRAM TRACING: CPT

## 2018-05-18 PROCEDURE — 85027 COMPLETE CBC AUTOMATED: CPT

## 2018-05-18 PROCEDURE — 20000000 HC ICU ROOM

## 2018-05-18 PROCEDURE — 97535 SELF CARE MNGMENT TRAINING: CPT

## 2018-05-18 PROCEDURE — 94761 N-INVAS EAR/PLS OXIMETRY MLT: CPT

## 2018-05-18 PROCEDURE — 63600175 PHARM REV CODE 636 W HCPCS: Performed by: HOSPITALIST

## 2018-05-18 PROCEDURE — 99233 SBSQ HOSP IP/OBS HIGH 50: CPT | Mod: ,,, | Performed by: INTERNAL MEDICINE

## 2018-05-18 PROCEDURE — 25000003 PHARM REV CODE 250: Performed by: STUDENT IN AN ORGANIZED HEALTH CARE EDUCATION/TRAINING PROGRAM

## 2018-05-18 PROCEDURE — 93010 ELECTROCARDIOGRAM REPORT: CPT | Mod: ,,, | Performed by: INTERNAL MEDICINE

## 2018-05-18 PROCEDURE — 97161 PT EVAL LOW COMPLEX 20 MIN: CPT

## 2018-05-18 PROCEDURE — 97165 OT EVAL LOW COMPLEX 30 MIN: CPT

## 2018-05-18 PROCEDURE — 80048 BASIC METABOLIC PNL TOTAL CA: CPT

## 2018-05-18 PROCEDURE — 63600175 PHARM REV CODE 636 W HCPCS: Performed by: STUDENT IN AN ORGANIZED HEALTH CARE EDUCATION/TRAINING PROGRAM

## 2018-05-18 RX ORDER — POTASSIUM CHLORIDE 20 MEQ/15ML
40 SOLUTION ORAL ONCE
Status: COMPLETED | OUTPATIENT
Start: 2018-05-18 | End: 2018-05-18

## 2018-05-18 RX ADMIN — CEFTRIAXONE SODIUM 1 G: 1 INJECTION, POWDER, FOR SOLUTION INTRAMUSCULAR; INTRAVENOUS at 10:05

## 2018-05-18 RX ADMIN — MORPHINE SULFATE 2 MG: 2 INJECTION, SOLUTION INTRAMUSCULAR; INTRAVENOUS at 10:05

## 2018-05-18 RX ADMIN — NIFEDIPINE 90 MG: 30 TABLET, FILM COATED, EXTENDED RELEASE ORAL at 08:05

## 2018-05-18 RX ADMIN — WARFARIN SODIUM 2 MG: 2 TABLET ORAL at 04:05

## 2018-05-18 RX ADMIN — POTASSIUM CHLORIDE 40 MEQ: 20 SOLUTION ORAL at 06:05

## 2018-05-18 RX ADMIN — CARVEDILOL 25 MG: 25 TABLET, FILM COATED ORAL at 08:05

## 2018-05-18 RX ADMIN — TAMSULOSIN HYDROCHLORIDE 0.4 MG: 0.4 CAPSULE ORAL at 08:05

## 2018-05-18 RX ADMIN — RAMELTEON 8 MG: 8 TABLET, FILM COATED ORAL at 08:05

## 2018-05-18 RX ADMIN — TIOTROPIUM BROMIDE 18 MCG: 18 CAPSULE ORAL; RESPIRATORY (INHALATION) at 08:05

## 2018-05-18 RX ADMIN — RAMIPRIL 10 MG: 10 CAPSULE ORAL at 08:05

## 2018-05-18 NOTE — SUBJECTIVE & OBJECTIVE
Interval History/Significant Events: naeon. BP improved, off cardene gtt since yesterday. Likely stepdown today.     Review of Systems  Objective:     Vital Signs (Most Recent):  Temp: 97.4 °F (36.3 °C) (05/18/18 0700)  Pulse: 81 (05/18/18 0800)  Resp: (!) 28 (05/18/18 0800)  BP: (!) 146/62 (05/18/18 0800)  SpO2: 100 % (05/18/18 0800) Vital Signs (24h Range):  Temp:  [97.4 °F (36.3 °C)-98 °F (36.7 °C)] 97.4 °F (36.3 °C)  Pulse:  [78-96] 81  Resp:  [16-56] 28  SpO2:  [96 %-100 %] 100 %  BP: (120-180)/(51-98) 146/62   Weight: 49.4 kg (109 lb)  Body mass index is 19.94 kg/m².      Intake/Output Summary (Last 24 hours) at 05/18/18 0810  Last data filed at 05/18/18 0800   Gross per 24 hour   Intake              845 ml   Output              991 ml   Net             -146 ml       Physical Exam   Constitutional: She is oriented to person, place, and time. She appears well-developed and well-nourished. No distress.   Awake, alert, significantly improved.    HENT:   Head: Normocephalic and atraumatic.   Nose: Nose normal.   Eyes: EOM are normal. No scleral icterus.   Neck: Normal range of motion.   Cardiovascular: Normal rate, regular rhythm and intact distal pulses.  Exam reveals no gallop and no friction rub.    No murmur heard.  Pulmonary/Chest: Effort normal and breath sounds normal. No respiratory distress. She has no wheezes. She has no rhonchi. She has no rales.   Decreased breath sounds bilat, no wheezes   Abdominal: Soft. Bowel sounds are normal. She exhibits no distension. There is no tenderness. There is no rigidity, no guarding and no CVA tenderness.   Musculoskeletal: Normal range of motion. She exhibits no edema, tenderness or deformity.   Neurological: She is alert and oriented to person, place, and time. No cranial nerve deficit.   Skin: Skin is warm, dry and intact. She is not diaphoretic. No pallor.   Psychiatric: She has a normal mood and affect.       Vents:     Lines/Drains/Airways     Drain                  Urethral Catheter 05/16/18 1100 1 day          Peripheral Intravenous Line                 Peripheral IV - Single Lumen 05/16/18 0330 Right Forearm 2 days         Midline Catheter Insertion/Assessment  - Single Lumen 05/16/18 1215 Right basilic vein (medial side of arm) 18g x 8cm 1 day              Significant Labs:    CBC/Anemia Profile:    Recent Labs  Lab 05/17/18  0426 05/18/18  0423   WBC 13.01* 13.13*   HGB 10.7* 10.4*   HCT 33.9* 32.2*   * 435*   MCV 88 88   RDW 14.0 13.7        Chemistries:    Recent Labs  Lab 05/17/18  0631 05/17/18  1300 05/18/18  0423    144 144   K 5.6* 3.8 3.6   * 113* 114*   CO2 18* 21* 21*   BUN 21 24* 23   CREATININE 1.0 1.0 0.8   CALCIUM 8.4* 8.5* 8.5*         Significant Imaging:  I have reviewed and interpreted all pertinent imaging results/findings within the past 24 hours.

## 2018-05-18 NOTE — PLAN OF CARE
Problem: Patient Care Overview  Goal: Plan of Care Review  Outcome: Ongoing (interventions implemented as appropriate)  Pt resting in room throughout shift. Pt AAOx4 throughout shift. Pt denies wanting to harm herself or depression today. Pt denies any pain or discomfort. No SOB or chest pain, on RA. SR on Telemetry. VSS. SBP goal <180. Clonidine patch placed to left arm. Call light in reach and bed alarm on. Fall precautions in place, pt did not have any falls or injuries this shift. Transfer orders placed, awaiting bed. Plan of care discussed with patient and family members via phone, no questions at this time. Will continue to monitor.

## 2018-05-18 NOTE — ASSESSMENT & PLAN NOTE
- on admit, BP up to 220s/90s. Likely 2/2 to medication noncompliance at home, given PO refusal. Also, hx of polycystic kidney dz, with BPs chronically in 180s. pt has multiple benzos on her home med list, rising concern for possible withdrawal as a cause for HTN emergency, however, pt stated that she has not been taking them. Started on nicardipine gtt on 5/15. Nicardipine was weaned off and restarted back on AM of 5/16.   - encephalopathy resolved, and can now take PO meds  - carvedilol 25mg  BID  - ramipril 10mg daily  - clonidine 0.2mg/24 hr patch  - nifedipine 24 hr 90mg daily  - Weaned off nacardipine gtt yesterday. BP controlled overnight.

## 2018-05-18 NOTE — RESIDENT HANDOFF
Handoff     Primary Team: [facility] CRITICAL CARE MEDICINE Room Number: [id number] A     Patient Name: Sunita Zimmer MRN: [id number]     Date of Birth: [id number] Allergies: Bacitracin and Bactrim [sulfamethoxazole-trimethoprim]     Age: 77 y.o. Admit Date: 5/14/2018     Sex: female  BMI: Body mass index is 19.94 kg/m².     Code Status: Full Code        Illness Level (current clinical status): Watcher - No    Reason for Admission: Hypertensive emergency    Brief HPI   78 y/o female with recent CVA (discharged 5/13/18), brain aneurysms, CAD, HTN, COPD, polycythemia vera, PVD, hx of DVTs (on coumadin), and PKD presents on 5/14/18 for delirium. She was recently discharged from [facility] on 5/13 for small intraventricular hemorrhage in the left occipital horn which originally presented as AMS. She was also found to have a UTI, which was not treated. It was documented she was refusing all medications and therapies, and she was ultimately made DNR and sent home with hospice after discussions were held with family. One day after discharge, they brought her back for AMS, thought to be due to the UTI.     Of note, pt lives at home with 89 y/o  and with son. It was reported that pt threatened to end her life and has guns at home. On presentation, she was PEC'd.    Procedure Date: none    Hospital Course   Admitted with HTN emergency and UTI associated with  encephalopathy . Both treated. Resolved on 5/17. Step down on 5/18      Hypertensive emergency- controlled   -on admit, BP up to 220s/90s. Likely 2/2 to medication noncompliance at home, given PO refusal. In settng of sepsis and untreated infection, with concurrent delirium. Also, hx of polycystic kidney dz, with BPs chronically in 180s. pt has multiple benzos on her home med list, rising concern for possible withdrawal as a cause for HTN emergency, however, pt stated that she has not been taking them. Started on nicardipine gtt on 5/15. Nicardipine was weaned off and restarted  back on AM of 5/16.   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 Since the procedure the patient has been verbally and physically abusive, paranoid, experiencing visual hallucinations and having a waxing and waning mentation. She was discharged to SNF and brought back to [facility] for AMS and found to have CVA. Was discharged home on 5/13 with home hospice, however per collateral from patient's son she was refusing nutrition and care and continued to be violent as well as threatening self harm  - psychiatry followed the pt and now signed off given improvement - was given olanzapine intermittently   - encephalopathy improving with sepsis and HTN treatment ,significant improvement noted on 5/16  - PEC rescinded on 5/16      Sepsis due to Escherichia coli  Acute cystitis   Urinary retention  - previously discharged with untreated UTI. Per chart review, pt refused treatment but was also delirium at the time with likely lacking capacity  - sepsis with encephalopathy on admit. WBC of 20k. UA with evidence of UTI, GNR in urine. Also concern for HCAP on CXR and 2L supplemental O2  - abx optimized to IV zosyn and vancomycin in the ICU- for UTI and HCAP  - Ucx with E coli and serration marcescens - pansenstive   - de escalate zosyn --> to ceftriaxone on 5/17. vanco was stopped 5/16  - may de-escalate to PO abx  - frye was placed (unfortunately) for ongoing retention     Personal history of DVT (deep vein thrombosis)  Polycythemia vera  Current use of long term anticoagulation  History of intracranial hemorrhage  - in setting of Polycythemia vera, highly hypercoagulative with  Hx of mult DVTs. Chronically on warfarin therapy.   -  initially with small intraventricular hemorrhage in the left  "occipital horn on 5/11. CT head 5/15 with "No significant change from prior.  Specifically without evidence for acute intracranial hemorrhage or new abnormal parenchymal attenuation.Continued scattered sulcal calcifications likely vascular." likely initially occurred 2/2 uncontrolled BP and ongoing anticoagulation   - therapeutic anticoagulation has been held 2/2 small intraventricular hemorrhage in the left occipital horn on 5/11. However, scans on 5/15 showed resolution of hemorrhage  - warfrin 2mg daily restarted 5/17, after clearance from neurosurg      Acute respiratory failure with hypoxia  Centrilobular emphysema  HCAP  - ?  - hx of COPD. Required 2 L NC on 5/15 admit. Treated for HCAP . 5/16 off O2   - started on IV vanco and zosyn fro concern for HCAP, given CXR findings and encephalopathy   - spiriva daily  - duonebs prn  - now on CTX. Unlikely had HCAP given quick clinical improvement in mental status with HTN and UTI rx    Polycystic kidney disease  - with HTN emergency . Cr of <1  - as above      Tasks /Contingency Plan   - cont Rx for cystitis (complicated). May de escalate to PO likely . E coli and serratia- pan sensitive. Blood cx negative  - cont warfarin, did not require bridge as without acute VTE  - voiding trial without frye  - PT/ OT  - placement       "

## 2018-05-18 NOTE — PROGRESS NOTES
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 She was recently discharged from [facility] on 5/13 for small intraventricular hemorrhage in the left occipital horn which originally presented as AMS. She was also found to have a UTI, which was not treated. It was documented she was refusing all medications and therapies, and she was ultimately made DNR and sent home with hospice after discussions were held with family. One day after discharge, they brought her back for AMS, thought to be due to the UTI.    Of note, pt lives at home with 87 y/o  and with son. It was reported that pt threatened to end her life and has guns at home. On presentation, she was PEC'd.    Hospital/ICU Course:  Pt was initially found to be hypertensive to 220s/90s and tachycardic to 1teens. 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 Exam notable for harsh systolic murmur in RUSB, clear lungs, and some L sided abdominal tenderness. CXR showed consolidation and small effusion at the left base and small right effusion. CT head showed evolution of the previously identified blood products in the left occipital horn but no acute infarct, parenchymal hemorrhage, new intraventricular hemorrhage, or hydrocephalus. Pt was given home ramipril, IV hydralazine 10 mg x 1 and IV labetalol 5 mg x 1 with temporary improvement in SBP to 180s-190s, immediately followed by rebound to 220s-230s. Pt refused PO  meds subsequently. Patient was subsequently admitted to ICU for IV cardene drip for hypertensive urgency. Encephalopathy was likely 2/2 sepsis and/ or HTN emergency. Sepsis sources - likely UTI and possibly HCAP, pt started on vanco and zosyn. Many WBCs in urine. BPs improved with cardene as well as clonidine patch, and cardene was weaned off. Encephalopathy improved / resolved with treatment of sepsis and HTN. PO regimen was optimized with decrease in BPs to 150s. On 5/16 -5/17 over night, BP increased back to 200, not responsive to IV hydralazine , was placed back on cardene gtt. Overnight there were no events and patient's BP was controlled. Very pleasant morning of 5/18, will be stepped down to hospital medicine.     Interval History/Significant Events: naeon. BP improved, off cardene gtt since yesterday. Likely stepdown today.     Review of Systems  Objective:     Vital Signs (Most Recent):  Temp: 97.4 °F (36.3 °C) (05/18/18 0700)  Pulse: 81 (05/18/18 0800)  Resp: (!) 28 (05/18/18 0800)  BP: (!) 146/62 (05/18/18 0800)  SpO2: 100 % (05/18/18 0800) Vital Signs (24h Range):  Temp:  [97.4 °F (36.3 °C)-98 °F (36.7 °C)] 97.4 °F (36.3 °C)  Pulse:  [78-96] 81  Resp:  [16-56] 28  SpO2:  [96 %-100 %] 100 %  BP: (120-180)/(51-98) 146/62   Weight: 49.4 kg (109 lb)  Body mass index is 19.94 kg/m².      Intake/Output Summary (Last 24 hours) at 05/18/18 0810  Last data filed at 05/18/18 0800   Gross per 24 hour   Intake              845 ml   Output              991 ml   Net             -146 ml       Physical Exam   Constitutional: She is oriented to person, place, and time. She appears well-developed and well-nourished. No distress.   Awake, alert, significantly improved.    HENT:   Head: Normocephalic and atraumatic.   Nose: Nose normal.   Eyes: EOM are normal. No scleral icterus.   Neck: Normal range of motion.   Cardiovascular: Normal rate, regular rhythm and intact distal pulses.  Exam reveals no gallop and no friction  rub.    No murmur heard.  Pulmonary/Chest: Effort normal and breath sounds normal. No respiratory distress. She has no wheezes. She has no rhonchi. She has no rales.   Decreased breath sounds bilat, no wheezes   Abdominal: Soft. Bowel sounds are normal. She exhibits no distension. There is no tenderness. There is no rigidity, no guarding and no CVA tenderness.   Musculoskeletal: Normal range of motion. She exhibits no edema, tenderness or deformity.   Neurological: She is alert and oriented to person, place, and time. No cranial nerve deficit.   Skin: Skin is warm, dry and intact. She is not diaphoretic. No pallor.   Psychiatric: She has a normal mood and affect.       Vents:     Lines/Drains/Airways     Drain                 Urethral Catheter 05/16/18 1100 1 day          Peripheral Intravenous Line                 Peripheral IV - Single Lumen 05/16/18 0330 Right Forearm 2 days         Midline Catheter Insertion/Assessment  - Single Lumen 05/16/18 1215 Right basilic vein (medial side of arm) 18g x 8cm 1 day              Significant Labs:    CBC/Anemia Profile:    Recent Labs  Lab 05/17/18  0426 05/18/18  0423   WBC 13.01* 13.13*   HGB 10.7* 10.4*   HCT 33.9* 32.2*   * 435*   MCV 88 88   RDW 14.0 13.7        Chemistries:    Recent Labs  Lab 05/17/18  0631 05/17/18  1300 05/18/18  0423    144 144   K 5.6* 3.8 3.6   * 113* 114*   CO2 18* 21* 21*   BUN 21 24* 23   CREATININE 1.0 1.0 0.8   CALCIUM 8.4* 8.5* 8.5*         Significant Imaging:  I have reviewed and interpreted all pertinent imaging results/findings within the past 24 hours.    Assessment/Plan:     Neuro   Acute metabolic encephalopathy    - on admit, encephalopathy likely 2/2 sepsis 2/2 UTI/ HCAP. Also, Na of 150 on admit, however, unlikely causing AMS. Recent small intraventricular hemorrhage in the left occipital horn on 5/11. Also with delirium and SI/HI thoughts on admit, prompting psychiatry to PEC the patient  - see delirium  -  "encephalopathy improving with sepsis and HTN treatment ,significant improvement noted on 5/16    - RESOLVED        Delirium    - per psychiatry notes, Patient recently admitted to [facility] for spinal [schwannoma, not abscess] removal 5/3/2018. Since the procedure the patient has been verbally and physically abusive, paranoid, experiencing visual hallucinations and having a waxing and waning mentation. She was discharged to SNF and brought back to [facility] for AMS and found to have CVA. Was discharged home on 5/13 with home hospice, however per collateral from patient's son she was refusing nutrition and care and continued to be violent as well as threatening self harm  - psychiatry followed the pt and now signed off given improvement   - olanzapine 2.5 mg prn, with daily EKGs for QTC prolongation monitoring  - PEC rescinded on 5/16  - delirium and encephalopathy improving on 5/16 with treatment of HTN and sepsis  - see encephalopathy        History of intracranial hemorrhage    - initially with small intraventricular hemorrhage in the left occipital horn on 5/11. CT head 5/15 with "No significant change from prior.  Specifically without evidence for acute intracranial hemorrhage or new abnormal parenchymal attenuation.Continued scattered sulcal calcifications likely vascular." likely initially occurred 2/2 uncontrolled BP and ongoing anticoagulation   - therapeutic anticoagulation has been held  - plan to contact neurosurgery in regards to restarting therapeutic anticoagulation. Would consider warfarin with heparin/ lovenox bridge         Pulmonary   HCAP (healthcare-associated pneumonia)    - sepsis with encephalopathy on admit. WBC of 20k. UA with evidence of UTI, GNR in urine. Also concern for HCAP on CXR and 2L supplemental O2  - On rocephin 1g QD (5/18)  - resp failure now resolved        Acute respiratory failure with hypoxia    - hx of COPD. Required 2 L NC on 5/15 admit. Treated for HCAP . Now 5/16 off O2   - spiriva " daily  - duonebs prn  - monitor off O2    - RESOLVED        Centrilobular emphysema    - spiriva started on 5/16  - duo nebs prn        Cardiac/Vascular   * Hypertensive emergency    - on admit, BP up to 220s/90s. Likely 2/2 to medication noncompliance at home, given PO refusal. Also, hx of polycystic kidney dz, with BPs chronically in 180s. pt has multiple benzos on her home med list, rising concern for possible withdrawal as a cause for HTN emergency, however, pt stated that she has not been taking them. Started on nicardipine gtt on 5/15. Nicardipine was weaned off and restarted back on AM of 5/16.   - encephalopathy resolved, and can now take PO meds  - carvedilol 25mg  BID  - ramipril 10mg daily  - clonidine 0.2mg/24 hr patch  - nifedipine 24 hr 90mg daily  - Weaned off nacardipine gtt yesterday. BP controlled overnight.         Prolonged Q-T interval on ECG    - scheduled olanzapine 2.5 mg QHS per psych recs  - daily EKGs to monitor QTc          Renal/   Urinary retention    - has chronic hx of retention. Also with a UTI now  - in/out caths. Will place a frye if requiring too many prn caths  - flomax started on 5/16        Acute cystitis without hematuria    - see sepsis 2/2 E coli        Polycystic kidney disease    - with associated HTN  - Cr of <1  - monitor  - control BP        ID   Sepsis due to Escherichia coli    - sepsis with encephalopathy on admit. WBC of 20k. UA with evidence of UTI, GNR in urine. Also concern for HCAP on CXR and 2L supplemental O2  - pt received CTX for UTI  - abx optimized to IV zosyn and vancomycin in the ICU- for UTI and HCAP  - Ucx with E coli and serration marcescens  - continue zosyn at this time        Hematology   Current use of long term anticoagulation    - as above        Personal history of DVT (deep vein thrombosis)    - in setting of Polycythemia vera, highly hypercoagulative with  Hx of mult DVTs. Chronically on warfarin therapy.    - therapeutic anticoagulation  has been held 2/2 small intraventricular hemorrhage in the left occipital horn on 5/11. However, scans on 5/15 showed resolution of hemorrhage  - warfrin 2mg daily restarted        Oncology   Polycythemia vera    - with hx of DVTs. - warfrin 2mg daily restarted          Critical secondary to Patient has a condition that poses threat to life and bodily function: Severe Respiratory Distress and AMS      Critical care was time spent personally by me on the following activities: development of treatment plan with patient or surrogate and bedside caregivers, discussions with consultants, evaluation of patient's response to treatment, examination of patient, ordering and performing treatments and interventions, ordering and review of laboratory studies, ordering and review of radiographic studies, pulse oximetry, re-evaluation of patient's condition. This critical care time did not overlap with that of any other provider or involve time for any procedures.     Óscar Epps MD  Critical Care Medicine  [facility]

## 2018-05-18 NOTE — ASSESSMENT & PLAN NOTE
- sepsis with encephalopathy on admit. WBC of 20k. UA with evidence of UTI, GNR in urine. Also concern for HCAP on CXR and 2L supplemental O2  - On rocephin 1g QD (5/18)  - resp failure now resolved

## 2018-05-18 NOTE — ASSESSMENT & PLAN NOTE
- sepsis with encephalopathy on admit. WBC of 20k. UA with evidence of UTI, GNR in urine. Also concern for HCAP on CXR and 2L supplemental O2  - pt received CTX for UTI  - abx optimized to IV zosyn and vancomycin in the ICU- for UTI and HCAP  - Ucx with E coli and serration marcescens  - continue zosyn at this time

## 2018-05-18 NOTE — PLAN OF CARE
Problem: Occupational Therapy Goal  Goal: Occupational Therapy Goal  Goals to be met by: 5/28/18     Patient will increase functional independence with ADLs by performing:    Feeding with Modified [place].  UE Dressing with SBA.  LE Dressing with SBA.  Grooming while standing at sink with SBA.  Toileting from bedside commode with Stand-by Assistance for hygiene and clothing management.   Toilet transfer to bedside commode with Stand-by Assistance.    Outcome: Ongoing (interventions implemented as appropriate)  OT eval completed, and above goals established. WHIT Quan  5/18/2018

## 2018-05-18 NOTE — PT/OT/SLP EVAL
Physical Therapy Evaluation    Patient Name:  Sunita Zimmer   MRN:  [id number]    Recommendations:     Discharge Recommendations:  home health PT   Discharge Equipment Recommendations: none   Barriers to discharge: None    Assessment:     Sunita Zimmer is a 77 y.o. female admitted with a medical diagnosis of Hypertensive emergency.  She presents with the following impairments/functional limitations:  impaired functional mobilty, gait instability, decreased safety awareness pt edilia treatment well and will benefit from cont skilled PT to address deficits. Pt will be able to discharge home when medically stable and will benefit from HHPT.  Pt was admitted with dx of acute encephalopathy and was transferred to ICU 5/15 with dx of hypotensive emergency. .    Rehab Prognosis:  good; patient would benefit from acute skilled PT services to address these deficits and reach maximum level of function.      Recent Surgery: * No surgery found *      Plan:     During this hospitalization, patient to be seen 4 x/week to address the above listed problems via gait training, therapeutic activities, therapeutic exercises  · Plan of Care Expires:  06/15/18   Plan of Care Reviewed with: patient    Subjective     Communicated with nurse prior to session.  Patient found sitting in chair upon PT entry to room, agreeable to evaluation.      Chief Complaint: pt had multiple complaints about not eating and not being able to get out of bed.   Patient comments/goals: to get better and go home.   Pain/Comfort:  · Pain Rating 1: 0/10  · Pain Rating Post-Intervention 1: 0/10    Patients cultural, spiritual, Temple conflicts given the current situation: none    Living Environment:  Pt lives with her  and son. Pt son is able to assist. Pt lives in 1 story with slab.   Prior to admission, patients level of function was modified Independent. Pt used SC..  Patient has the following equipment: cane, straight, walker, rolling,  wheelchair, bedside commode, bath bench.  DME owned (not currently used): none.  Upon discharge, patient will have assistance from son.    Objective:     Patient found with: telemetry, pulse ox (continuous), blood pressure cuff (hep lock IV)     General Precautions: Standard, fall   Orthopedic Precautions:    Braces:       Exams:  · Cognitive Exam:  Patient is oriented to Person, Place, Time and Situation   ·   · RLE ROM: WFL  · RLE Strength: WFL  · LLE ROM: WFL  · LLE Strength: WFL    Functional Mobility:  · Transfers:     · Sit to Stand:  contact guard assistance with no AD  ·   · Gait: 24 ft with CGA. pt stood at sink and performed ADLS with OT.    AM-PAC 6 CLICK MOBILITY  Total Score:          Patient left up in chair with all lines intact and call button in reach.    GOALS:    Physical Therapy Goals        Problem: Physical Therapy Goal    Goal Priority Disciplines Outcome Goal Variances Interventions   Physical Therapy Goal     PT/OT, PT      Description:  Goals to be met by: 18    Patient will increase functional independence with mobility by performin. Supine to sit with Stand-by Assistance -not met  2. Sit to stand transfer with Stand-by Assistance -not met  3. Gait  x 220 feet with Contact Guard Assistance using Single-point Cane -not met.   4. Lower extremity exercise program x15 reps  with supervision -not met                      History:     Past Medical History:   Diagnosis Date    Active smoker 2014    Anticoagulant long-term use     Apocrine adenocarcinoma     Back pain     lower back    Brain aneurysm     4 times    Central scotoma, right eye 2017    Centrilobular emphysema 2018    Chronic kidney disease (CKD), stage III (moderate)     COPD (chronic obstructive pulmonary disease)     Coronary artery disease     Current use of long term anticoagulation 2018    Coumadin for history of DVT    Encounter for blood transfusion     Essential hypertension 2014     NAION (non-arteritic anterior ischemic optic neuropathy), 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    Peripheral vascular disease 6/4/2014    Polycystic kidney disease     Polycythemia vera     congential    Senile nuclear sclerosis 11/21/2017    Subclavian artery stenosis, right 7/22/2014       Past Surgical History:   Procedure Laterality Date    ABDOMINAL SURGERY      APPENDECTOMY      BRAIN SURGERY      relieve hemmorhages/had 4 surgeries    CARDIAC CATHETERIZATION      CORONARY ANGIOPLASTY      FRACTURE SURGERY      GALLBLADDER SURGERY      HYSTERECTOMY      illiac stent      JOINT REPLACEMENT      SKIN BIOPSY      skin repair      apocrine cancer    tibial repair      steel plate inserted    TONSILLECTOMY      TOTAL KNEE ARTHROPLASTY      VEIN BYPASS SURGERY         Clinical Decision Making:     History  Co-morbidities and personal factors that may impact the plan of care Examination  Body Structures and Functions, activity limitations and participation restrictions that may impact the plan of care Clinical Presentation   Decision Making/ Complexity Score   Co-morbidities:   [] Time since onset of injury / illness / exacerbation  [] Status of current condition  []Patient's cognitive status and safety concerns    [] Multiple Medical Problems (see med hx)  Personal Factors:   [] Patient's age  [] Prior Level of function   [] Patient's home situation (environment and family support)  [] Patient's level of motivation  [] Expected progression of patient      HISTORY:(criteria)    [] 76123 - no personal factors/history    [] 04099 - has 1-2 personal factor/comorbidity     [] 89903 - has >3 personal factor/comorbidity     Body Regions:  [] Objective examination findings  [] Head     []  Neck  [] Trunk   [] Upper Extremity  [] Lower Extremity    Body Systems:  [] For communication ability, affect, cognition, language, and learning style: the assessment of the ability to make needs known, consciousness, orientation  (person, place, and time), expected emotional /behavioral responses, and learning preferences (eg, learning barriers, education  needs)  [] For the neuromuscular system: a general assessment of gross coordinated movement (eg, balance, gait, locomotion, transfers, and transitions) and motor function  (motor control and motor learning)  [] For the musculoskeletal system: the assessment of gross symmetry, gross range of motion, gross strength, height, and weight  [] For the integumentary system: the assessment of pliability(texture), presence of scar formation, skin color, and skin integrity  [] For cardiovascular/pulmonary system: the assessment of heart rate, respiratory rate, blood pressure, and edema     Activity limitations:    [] Patient's cognitive status and saf ety concerns          [] Status of current condition      [] Weight bearing restriction  [] Cardiopulmunary Restriction    Participation Restrictions:   [] Goals and goal agreement with the patient     [] Rehab potential (prognosis) and probable outcome      Examination of Body System: (criteria)    [] 64862 - addressing 1-2 elements    [] 24508 - addressing a total of 3 or more elements     [] 14844 -  Addressing a total of 4 or more elements         Clinical Presentation: (criteria)  Choose one     On examination of body system using standardized tests and measures patient presents with (CHOOSE ONE) elements from any of the following: body structures and functions, activity limitations, and/or participation restrictions.  Leading to a clinical presentation that is considered (CHOOSE ONE)                              Clinical Decision Making  (Eval Complexity):  Choose One     Time Tracking:     PT Received On: 05/18/18  PT Start Time: 0826     PT Stop Time: 0848  PT Total Time (min): 22 min     Billable Minutes: Evaluation 10 min and Gait Training 12 min      Eugenia Rivera, PT  05/18/2018

## 2018-05-18 NOTE — PLAN OF CARE
Problem: Patient Care Overview  Goal: Plan of Care Review  Outcome: Ongoing (interventions implemented as appropriate)  Pt remained free of falls or injuries this shift.  Pt remained AAOx3 disoriented to situation throughout shift. VSS stable, no acute events overnight.  Pt had 1 bowel movements overnight.  POC reviewed with pt who verbalized understanding.  All questions and concerns addressed, will continue to monitor.

## 2018-05-18 NOTE — ASSESSMENT & PLAN NOTE
- per psychiatry notes, Patient recently admitted to [facility] for spinal [schwannoma, not abscess] removal 5/3/2018. Since the procedure the patient has been verbally and physically abusive, paranoid, experiencing visual hallucinations and having a waxing and waning mentation. She was discharged to SNF and brought back to [facility] for AMS and found to have CVA. Was discharged home on 5/13 with home hospice, however per collateral from patient's son she was refusing nutrition and care and continued to be violent as well as threatening self harm  - psychiatry followed the pt and now signed off given improvement   - olanzapine 2.5 mg prn, with daily EKGs for QTC prolongation monitoring  - PEC rescinded on 5/16  - delirium and encephalopathy improving on 5/16 with treatment of HTN and sepsis  - see encephalopathy

## 2018-05-18 NOTE — PT/OT/SLP EVAL
Occupational Therapy   Evaluation    Name: Sunita Zimmer  MRN: [id number]  Admitting Diagnosis:  Hypertensive emergency      Recommendations:     Discharge Recommendations: home with home health  Discharge Equipment Recommendations:  none  Barriers to discharge:  None    History:     Occupational Profile:  Living Environment: lives with spouse and son in [facility] with no KU[place]  Previous level of function: Mod (I) with ADL and ambulation using SC  Roles and Routines: spouse, mother; homemaker  Equipment Owned:  cane, straight, walker, rolling, wheelchair, bedside commode, bath bench  Assistance upon Discharge: son can assist    Past Medical History:   Diagnosis Date    Active smoker 6/4/2014    Anticoagulant long-term use     Apocrine adenocarcinoma     Back pain     lower back    Brain aneurysm     4 times    Central scotoma, 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    Centrilobular emphysema 5/7/2018    Chronic kidney disease (CKD), stage III (moderate)     COPD (chronic obstructive pulmonary disease)     Coronary artery disease     Current use of long term anticoagulation 5/9/2018    Coumadin for history of DVT    Encounter for blood transfusion     Essential hypertension 6/4/2014    NAION (non-arteritic anterior ischemic optic neuropathy), 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    Peripheral vascular disease 6/4/2014    Polycystic kidney disease     Polycythemia vera     congential    Senile nuclear sclerosis 11/21/2017    Subclavian artery stenosis, right 7/22/2014       Past Surgical History:   Procedure Laterality Date    ABDOMINAL SURGERY      APPENDECTOMY      BRAIN SURGERY      relieve hemmorhages/had 4 surgeries    CARDIAC CATHETERIZATION      CORONARY ANGIOPLASTY      FRACTURE SURGERY      GALLBLADDER SURGERY      HYSTERECTOMY      illiac stent      JOINT REPLACEMENT      SKIN BIOPSY      skin repair      apocrine cancer    tibial repair      steel plate inserted    TONSILLECTOMY      TOTAL KNEE ARTHROPLASTY       "VEIN BYPASS SURGERY         Subjective     Chief Complaint: "They are treating me like a child."  Patient/Family stated goals: to get better  Communicated with: RN prior to session.  Pain/Comfort:  · Pain Rating 1: 0/10  · Pain Rating Post-Intervention 1: 0/10    Patients cultural, spiritual, Yazidi conflicts given the current situation: none reported    Objective:     Patient found with: blood pressure cuff, pulse ox (continuous), telemetry, PICC line, peripheral IV, frye catheter    General Precautions: Standard, fall   Orthopedic Precautions:    Braces:       Occupational Performance:    Bed Mobility:    · Patient completed Rolling/Turning to Right with contact guard assistance  · Patient completed Scooting/Bridging with contact guard assistance  · Patient completed Supine to Sit with contact guard assistance    Functional Mobility/Transfers:  · Patient completed Sit <> Stand Transfer with contact guard assistance  with  no assistive device   · Patient completed Bed <> Chair Transfer using Step Transfer technique with contact guard assistance with no assistive device  · Functional Mobility: CGA to/from sink    Activities of Daily Living:  · Grooming: contact guard assistance standing at sink  · UB Dressing: minimum assistance    · LB Dressing: minimum assistance    · Toileting: minimum assistance      Cognitive/Visual Perceptual:  Oriented to: Person, Place, Time and Situation  Follows Commands/attention: Follows multistep  commands  Communication: clear/fluent  Memory:  perseverates  Safety awareness/insight to disability: impaired  Coping skills/emotional control: Appropriate to situation    Physical Exam:  Postural examination/scapula alignment:    -       No postural abnormalities identified  Sensation:    -       Intact  Upper Extremity Range of Motion:     -       Right Upper Extremity: WFL  -       Left Upper Extremity: WFL  Upper Extremity Strength:    -       Right Upper Extremity: WFL  -       Left " "Upper Extremity: WFL   Strength:    -       Right Upper Extremity: WFL  -       Left Upper Extremity: WFL  Fine Motor Coordination:    -       Intact  Gross motor coordination:   WFL    Patient left up in chair with all lines intact, call button in reach and Rn notified    [facility] 6 Click:  [facility] Total Score: 15    Treatment & Education:  Pt ed on OT POC  Pt performed functional mobility and self-care as above  Education:    Assessment:     Sunita Zimmer is a 77 y.o. female with a medical diagnosis of Hypertensive emergency.   Performance deficits affecting function are weakness, impaired self care skills, impaired balance, impaired functional mobilty, impaired endurance, visual deficits, gait instability, decreased safety awareness, impaired cardiopulmonary response to activity.      Rehab Prognosis:  Good; patient would benefit from acute skilled OT services to address these deficits and reach maximum level of function.         Clinical Decision Makin.  OT Low:  "Pt evaluation falls under low complexity for evaluation coding due to performance deficits noted in 1-3 areas as stated above and 0 co-morbities affecting current functional status. Data obtained from problem focused assessments. No modifications or assistance was required for completion of evaluation. Only brief occupational profile and history review completed."     Plan:     Patient to be seen 4 x/week to address the above listed problems via self-care/home management, therapeutic activities, therapeutic exercises  · Plan of Care Expires: 18  · Plan of Care Reviewed with: patient    This Plan of care has been discussed with the patient who was involved in its development and understands and is in agreement with the identified goals and treatment plan    GOALS:    Occupational Therapy Goals        Problem: Occupational Therapy Goal    Goal Priority Disciplines Outcome Interventions   Occupational Therapy Goal     OT, PT/OT Ongoing " (interventions implemented as appropriate)    Description:  Goals to be met by: 5/28/18     Patient will increase functional independence with ADLs by performing:    Feeding with Modified [place].  UE Dressing with Supervision.  LE Dressing with Supervision.  Grooming while standing at sink with Supervision.  Toileting from bedside commode with Stand-by Assistance for hygiene and clothing management.   Toilet transfer to bedside commode with Stand-by Assistance.                      Time Tracking:     OT Date of Treatment: 05/18/18  OT Start Time: 0821  OT Stop Time: 0844  OT Total Time (min): 23 min    Billable Minutes:Evaluation 10  Self Care/Home Management 10    WHIT Quan  5/18/2018

## 2018-05-19 LAB
ANION GAP SERPL CALC-SCNC: 11 MMOL/L
BUN SERPL-MCNC: 20 MG/DL
CALCIUM SERPL-MCNC: 8.8 MG/DL
CHLORIDE SERPL-SCNC: 114 MMOL/L
CO2 SERPL-SCNC: 21 MMOL/L
CREAT SERPL-MCNC: 0.9 MG/DL
ERYTHROCYTE [DISTWIDTH] IN BLOOD BY AUTOMATED COUNT: 13.9 %
EST. GFR  (AFRICAN AMERICAN): >60 ML/MIN/1.73 M^2
EST. GFR  (NON AFRICAN AMERICAN): >60 ML/MIN/1.73 M^2
GLUCOSE SERPL-MCNC: 110 MG/DL
HCT VFR BLD AUTO: 33.6 %
HGB BLD-MCNC: 10.9 G/DL
INR PPP: 1.5
MCH RBC QN AUTO: 28.2 PG
MCHC RBC AUTO-ENTMCNC: 32.4 G/DL
MCV RBC AUTO: 87 FL
PLATELET # BLD AUTO: 457 K/UL
PMV BLD AUTO: 9.1 FL
POTASSIUM SERPL-SCNC: 4.1 MMOL/L
PROTHROMBIN TIME: 14.7 SEC
RBC # BLD AUTO: 3.87 M/UL
SODIUM SERPL-SCNC: 146 MMOL/L
WBC # BLD AUTO: 12.24 K/UL

## 2018-05-19 PROCEDURE — 63600175 PHARM REV CODE 636 W HCPCS: Performed by: STUDENT IN AN ORGANIZED HEALTH CARE EDUCATION/TRAINING PROGRAM

## 2018-05-19 PROCEDURE — 25000003 PHARM REV CODE 250: Performed by: HOSPITALIST

## 2018-05-19 PROCEDURE — 80048 BASIC METABOLIC PNL TOTAL CA: CPT

## 2018-05-19 PROCEDURE — 94761 N-INVAS EAR/PLS OXIMETRY MLT: CPT

## 2018-05-19 PROCEDURE — 63600175 PHARM REV CODE 636 W HCPCS: Performed by: HOSPITALIST

## 2018-05-19 PROCEDURE — 85027 COMPLETE CBC AUTOMATED: CPT

## 2018-05-19 PROCEDURE — 25000003 PHARM REV CODE 250: Performed by: INTERNAL MEDICINE

## 2018-05-19 PROCEDURE — 25000242 PHARM REV CODE 250 ALT 637 W/ HCPCS: Performed by: HOSPITALIST

## 2018-05-19 PROCEDURE — 85610 PROTHROMBIN TIME: CPT

## 2018-05-19 PROCEDURE — 11000001 HC ACUTE MED/SURG PRIVATE ROOM

## 2018-05-19 PROCEDURE — 99233 SBSQ HOSP IP/OBS HIGH 50: CPT | Mod: ,,, | Performed by: INTERNAL MEDICINE

## 2018-05-19 RX ORDER — HYDRALAZINE HYDROCHLORIDE 50 MG/1
50 TABLET, FILM COATED ORAL EVERY 8 HOURS PRN
Status: DISCONTINUED | OUTPATIENT
Start: 2018-05-19 | End: 2018-05-20 | Stop reason: HOSPADM

## 2018-05-19 RX ADMIN — CEFTRIAXONE SODIUM 1 G: 1 INJECTION, POWDER, FOR SOLUTION INTRAMUSCULAR; INTRAVENOUS at 09:05

## 2018-05-19 RX ADMIN — TAMSULOSIN HYDROCHLORIDE 0.4 MG: 0.4 CAPSULE ORAL at 09:05

## 2018-05-19 RX ADMIN — CARVEDILOL 25 MG: 25 TABLET, FILM COATED ORAL at 09:05

## 2018-05-19 RX ADMIN — NIFEDIPINE 90 MG: 30 TABLET, FILM COATED, EXTENDED RELEASE ORAL at 09:05

## 2018-05-19 RX ADMIN — RAMELTEON 8 MG: 8 TABLET, FILM COATED ORAL at 09:05

## 2018-05-19 RX ADMIN — WARFARIN SODIUM 2 MG: 2 TABLET ORAL at 06:05

## 2018-05-19 RX ADMIN — TIOTROPIUM BROMIDE 18 MCG: 18 CAPSULE ORAL; RESPIRATORY (INHALATION) at 09:05

## 2018-05-19 RX ADMIN — MORPHINE SULFATE 2 MG: 2 INJECTION, SOLUTION INTRAMUSCULAR; INTRAVENOUS at 07:05

## 2018-05-19 RX ADMIN — RAMIPRIL 10 MG: 10 CAPSULE ORAL at 09:05

## 2018-05-19 RX ADMIN — MORPHINE SULFATE 2 MG: 2 INJECTION, SOLUTION INTRAMUSCULAR; INTRAVENOUS at 04:05

## 2018-05-19 NOTE — PROGRESS NOTES
[facility]  Critical Care Medicine  Progress Note    Patient Name: Sunita Zimmer  MRN: [id number]  Admission Date: 5/14/2018  Hospital Length of Stay: 5 days  Code Status: Full Code  Attending Provider: Jenny Fontaine MD  Primary Care Provider: Von Mccullough MD   Principal Problem: Hypertensive emergency    Subjective:     HPI:  76 y/o female with recent CVA (discharged 5/13/18), brain aneurysms, CAD, HTN, COPD, polycythemia vera, PVD, hx of DVTs (on coumadin), and PKD presents on 5/14/18 for delirium. She was recently discharged from [facility] on 5/13 for small intraventricular hemorrhage in the left occipital horn which originally presented as AMS. She was also found to have a UTI, which was not treated. It was documented she was refusing all medications and therapies, and she was ultimately made DNR and sent home with hospice after discussions were held with family. One day after discharge, they brought her back for AMS, thought to be due to the UTI.    Of note, pt lives at home with 89 y/o  and with son. It was reported that pt threatened to end her life and has guns at home. On presentation, she was PEC'd.    Hospital/ICU Course:  Pt was initially found to be hypertensive to 220s/90s and tachycardic to 1teens. 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 Exam notable for harsh systolic murmur in RUSB, clear lungs, and some L sided abdominal tenderness. CXR showed consolidation and small effusion at the left base and small right effusion. CT head showed evolution of the previously identified blood products in the left occipital horn but no acute infarct, parenchymal hemorrhage, new intraventricular hemorrhage, or hydrocephalus. Pt was given home ramipril, IV hydralazine 10 mg x 1 and IV labetalol 5 mg x 1 with temporary improvement in SBP to 180s-190s, immediately followed by rebound to 220s-230s. Pt refused PO meds  subsequently. Patient was subsequently admitted to ICU for IV cardene drip for hypertensive urgency. Encephalopathy was likely 2/2 sepsis and/ or HTN emergency. Sepsis sources - likely UTI and possibly HCAP, pt started on vanco and zosyn. Many WBCs in urine. BPs improved with cardene as well as clonidine patch, and cardene was weaned off. Encephalopathy improved / resolved with treatment of sepsis and HTN. PO regimen was optimized with decrease in BPs to 150s. On 5/16 -5/17 over night, BP increased back to 200, not responsive to IV hydralazine , was placed back on cardene gtt. Adjustments were made to PO medication again and she remained off of cardene for the past day. Resumed coumadin, and continued on ceftriaxone. Stepped down to the hospital medicine on 5/19.    Interval History/Significant Events: no major events overnight. Remained off of cardene gtt. stepped down today    Review of Systems   Constitutional: Negative for chills, fatigue and fever.   HENT: Negative for congestion and trouble swallowing.    Eyes: Negative for visual disturbance.   Respiratory: Negative for cough, shortness of breath and wheezing.    Cardiovascular: Negative for chest pain, palpitations and leg swelling.   Gastrointestinal: Negative for abdominal pain, constipation, diarrhea, nausea and vomiting.   Genitourinary: Negative for decreased urine volume, dysuria, frequency and hematuria.   Musculoskeletal: Positive for back pain. Negative for arthralgias and myalgias.   Neurological: Negative for dizziness, syncope, weakness and headaches.   Psychiatric/Behavioral: Negative for agitation and confusion. The patient is not nervous/anxious.      Objective:     Vital Signs (Most Recent):  Temp: 98.4 °F (36.9 °C) (05/19/18 1211)  Pulse: 83 (05/19/18 1517)  Resp: 18 (05/19/18 1211)  BP: (!) 140/64 (05/19/18 1211)  SpO2: 95 % (05/19/18 1211) Vital Signs (24h Range):  Temp:  [97.5 °F (36.4 °C)-98.4 °F (36.9 °C)] 98.4 °F (36.9 °C)  Pulse:   [71-88] 83  Resp:  [15-52] 18  SpO2:  [95 %-100 %] 95 %  BP: (130-177)/(58-89) 140/64   Weight: 49.4 kg (109 lb)  Body mass index is 19.94 kg/m².      Intake/Output Summary (Last 24 hours) at 05/19/18 1804  Last data filed at 05/19/18 1000   Gross per 24 hour   Intake              720 ml   Output                0 ml   Net              720 ml       Physical Exam   Constitutional: She is oriented to person, place, and time. She is cooperative. She does not appear ill. No distress.   HENT:   Head: Normocephalic and atraumatic.   Mouth/Throat: Mucous membranes are normal.   Eyes: Conjunctivae and EOM are normal. Pupils are equal, round, and reactive to light. No scleral icterus.   Neck: Normal range of motion. Neck supple.   Cardiovascular: Normal rate and regular rhythm.  Exam reveals no gallop.    Murmur heard.   Systolic (at RSB) murmur is present with a grade of 3/6   Pulses:       Radial pulses are 2+ on the right side, and 2+ on the left side.   Pulmonary/Chest: Effort normal and breath sounds normal. No tachypnea. No respiratory distress. She has no decreased breath sounds. She has no wheezes. She has no rales.   Abdominal: Soft. Bowel sounds are normal. She exhibits no distension. There is no tenderness. There is no rebound and no guarding.   Musculoskeletal: Normal range of motion. She exhibits no edema or tenderness.   Neurological: She is alert and oriented to person, place, and time. She displays no tremor. She exhibits normal muscle tone.   Skin: Skin is warm. She is not diaphoretic. No erythema. There is pallor.   Psychiatric: She has a normal mood and affect. Her speech is normal and behavior is normal.   Vitals reviewed.      Vents:     Lines/Drains/Airways     Peripheral Intravenous Line                 Midline Catheter Insertion/Assessment  - Single Lumen 05/16/18 1215 Right basilic vein (medial side of arm) 18g x 8cm 3 days         Peripheral IV - Single Lumen 05/18/18 0600 Left Wrist 1 day               Significant Labs:    CBC/Anemia Profile:    Recent Labs  Lab 05/18/18  0423 05/19/18  0307   WBC 13.13* 12.24   HGB 10.4* 10.9*   HCT 32.2* 33.6*   * 457*   MCV 88 87   RDW 13.7 13.9        Chemistries:    Recent Labs  Lab 05/18/18  0423 05/19/18  0307    146*   K 3.6 4.1   * 114*   CO2 21* 21*   BUN 23 20   CREATININE 0.8 0.9   CALCIUM 8.5* 8.8       Coagulation:   Recent Labs  Lab 05/19/18  0307   INR 1.5*       Significant Imaging:  I have reviewed and interpreted all pertinent imaging results/findings within the past 24 hours.    Assessment/Plan:     Neuro   Acute metabolic encephalopathy    - encephalopathy improved with sepsis and HTN treatment ,significant improvement noted     - RESOLVED        Pulmonary   HCAP (healthcare-associated pneumonia)    - sepsis with encephalopathy on admit. WBC of 20k. UA with evidence of UTI, GNR in urine. Also concern for HCAP on CXR and 2L supplemental O2  - On rocephin 1g QD (5/18)  - WBC trending down to normal levels, 12K today  - resp failure now resolved        Acute respiratory failure with hypoxia    - hx of COPD. Required 2 L NC on 5/15 admit. Treated for HCAP . Now 5/16 off O2   - spiriva daily  - duonebs prn  - O2 sat WNL on room air    - RESOLVED        Centrilobular emphysema    - spiriva started on 5/16  - duo nebs prn + scheduled spereva        Cardiac/Vascular   * Hypertensive emergency    - carvedilol 25mg  BID  - ramipril 10mg daily  - clonidine 0.2mg/24 hr patch  - nifedipine 24 hr 90mg daily  - Weaned off nacardipine gtt   - BP ~150-170s/60-70s        Renal/   Urinary retention    - has chronic hx of retention. Also with a UTI now  - in/out caths. Will place a fyre if requiring too many prn caths  - flomax started on 5/16  - urinating spontaneously     Hematology   Current use of long term anticoagulation    - as above        Personal history of DVT (deep vein thrombosis)    - in setting of Polycythemia vera, highly hypercoagulative  with  Hx of mult DVTs. Chronically on warfarin therapy.    - therapeutic anticoagulation has been held 2/2 small intraventricular hemorrhage in the left occipital horn on 5/11. However, scans on 5/15 showed resolution of hemorrhage  - warfrin 2mg daily restarted  - INR trending up, 1.5 today        Oncology   Polycythemia vera    - with hx of DVTs.   - warfrin 2mg daily restarted           Critical care was time spent personally by me on the following activities: development of treatment plan with patient or surrogate and bedside caregivers, discussions with consultants, evaluation of patient's response to treatment, examination of patient, ordering and performing treatments and interventions, ordering and review of laboratory studies, ordering and review of radiographic studies, pulse oximetry, re-evaluation of patient's condition. This critical care time did not overlap with that of any other provider or involve time for any procedures.     Donna Soliz MD  Critical Care Medicine  [facility]

## 2018-05-19 NOTE — ASSESSMENT & PLAN NOTE
- encephalopathy improved with sepsis and HTN treatment ,significant improvement noted     - RESOLVED

## 2018-05-19 NOTE — ASSESSMENT & PLAN NOTE
- in setting of Polycythemia vera, highly hypercoagulative with  Hx of mult DVTs. Chronically on warfarin therapy.    - therapeutic anticoagulation has been held 2/2 small intraventricular hemorrhage in the left occipital horn on 5/11. However, scans on 5/15 showed resolution of hemorrhage  - warfrin 2mg daily restarted  - INR trending up, 1.5 today

## 2018-05-19 NOTE — ASSESSMENT & PLAN NOTE
- hx of COPD. Required 2 L NC on 5/15 admit. Treated for HCAP . Now 5/16 off O2   - spiriva daily  - duonebs prn  - O2 sat WNL on room air    - RESOLVED

## 2018-05-19 NOTE — ASSESSMENT & PLAN NOTE
- carvedilol 25mg  BID  - ramipril 10mg daily  - clonidine 0.2mg/24 hr patch  - nifedipine 24 hr 90mg daily  - Weaned off nacardipine gtt   - BP ~150-170s/60-70s

## 2018-05-19 NOTE — SUBJECTIVE & OBJECTIVE
Interval History/Significant Events: no major events overnight. Remained off of cardene gtt. stepped down today    Review of Systems   Constitutional: Negative for chills, fatigue and fever.   HENT: Negative for congestion and trouble swallowing.    Eyes: Negative for visual disturbance.   Respiratory: Negative for cough, shortness of breath and wheezing.    Cardiovascular: Negative for chest pain, palpitations and leg swelling.   Gastrointestinal: Negative for abdominal pain, constipation, diarrhea, nausea and vomiting.   Genitourinary: Negative for decreased urine volume, dysuria, frequency and hematuria.   Musculoskeletal: Positive for back pain. Negative for arthralgias and myalgias.   Neurological: Negative for dizziness, syncope, weakness and headaches.   Psychiatric/Behavioral: Negative for agitation and confusion. The patient is not nervous/anxious.      Objective:     Vital Signs (Most Recent):  Temp: 98.4 °F (36.9 °C) (05/19/18 1211)  Pulse: 83 (05/19/18 1517)  Resp: 18 (05/19/18 1211)  BP: (!) 140/64 (05/19/18 1211)  SpO2: 95 % (05/19/18 1211) Vital Signs (24h Range):  Temp:  [97.5 °F (36.4 °C)-98.4 °F (36.9 °C)] 98.4 °F (36.9 °C)  Pulse:  [71-88] 83  Resp:  [15-52] 18  SpO2:  [95 %-100 %] 95 %  BP: (130-177)/(58-89) 140/64   Weight: 49.4 kg (109 lb)  Body mass index is 19.94 kg/m².      Intake/Output Summary (Last 24 hours) at 05/19/18 1804  Last data filed at 05/19/18 1000   Gross per 24 hour   Intake              720 ml   Output                0 ml   Net              720 ml       Physical Exam   Constitutional: She is oriented to person, place, and time. She is cooperative. She does not appear ill. No distress.   HENT:   Head: Normocephalic and atraumatic.   Mouth/Throat: Mucous membranes are normal.   Eyes: Conjunctivae and EOM are normal. Pupils are equal, round, and reactive to light. No scleral icterus.   Neck: Normal range of motion. Neck supple.   Cardiovascular: Normal rate and regular rhythm.   Exam reveals no gallop.    Murmur heard.   Systolic (at RSB) murmur is present with a grade of 3/6   Pulses:       Radial pulses are 2+ on the right side, and 2+ on the left side.   Pulmonary/Chest: Effort normal and breath sounds normal. No tachypnea. No respiratory distress. She has no decreased breath sounds. She has no wheezes. She has no rales.   Abdominal: Soft. Bowel sounds are normal. She exhibits no distension. There is no tenderness. There is no rebound and no guarding.   Musculoskeletal: Normal range of motion. She exhibits no edema or tenderness.   Neurological: She is alert and oriented to person, place, and time. She displays no tremor. She exhibits normal muscle tone.   Skin: Skin is warm. She is not diaphoretic. No erythema. There is pallor.   Psychiatric: She has a normal mood and affect. Her speech is normal and behavior is normal.   Vitals reviewed.      Vents:     Lines/Drains/Airways     Peripheral Intravenous Line                 Midline Catheter Insertion/Assessment  - Single Lumen 05/16/18 1215 Right basilic vein (medial side of arm) 18g x 8cm 3 days         Peripheral IV - Single Lumen 05/18/18 0600 Left Wrist 1 day              Significant Labs:    CBC/Anemia Profile:    Recent Labs  Lab 05/18/18  0423 05/19/18  0307   WBC 13.13* 12.24   HGB 10.4* 10.9*   HCT 32.2* 33.6*   * 457*   MCV 88 87   RDW 13.7 13.9        Chemistries:    Recent Labs  Lab 05/18/18  0423 05/19/18  0307    146*   K 3.6 4.1   * 114*   CO2 21* 21*   BUN 23 20   CREATININE 0.8 0.9   CALCIUM 8.5* 8.8       Coagulation:   Recent Labs  Lab 05/19/18  0307   INR 1.5*       Significant Imaging:  I have reviewed and interpreted all pertinent imaging results/findings within the past 24 hours.

## 2018-05-19 NOTE — PLAN OF CARE
"Problem: Patient Care Overview  Goal: Plan of Care Review  Outcome: Ongoing (interventions implemented as appropriate)  Dx: HTN emergency    Neuro: AAO x4, Moves all extremities and Follows commands, Pt. Does not verbalize any self-harm ideation during shift    Vital Signs: BP (!) 171/74 (BP Location: Left arm, Patient Position: Lying)   Pulse 82   Temp 98 °F (36.7 °C) (Oral)   Resp (!) 28   Ht 5' 2" (1.575 m)   Wt 49.4 kg (109 lb)   SpO2 100%   Breastfeeding? No   BMI 19.94 kg/m²     Intake/Gtts/Diet: No gtts; Cardiac diet    Output: Pt. Bladder scanned, Pt. Brought to toilet post bladder scan, Voided/had 1 BM in toilet    Pain Management: Pt. Complains of lower back pain, given PRN morphine    Labs: Daily labs sent    Skin: No redness or breakdown noted on sacrum, heels, or elbows. Pt. Able to reposition in bed independently. Will continue to monitor.         "

## 2018-05-19 NOTE — ASSESSMENT & PLAN NOTE
- has chronic hx of retention. Also with a UTI now  - in/out caths. Will place a frye if requiring too many prn caths  - flomax started on 5/16  - urinating spontaneously

## 2018-05-19 NOTE — ASSESSMENT & PLAN NOTE
- sepsis with encephalopathy on admit. WBC of 20k. UA with evidence of UTI, GNR in urine. Also concern for HCAP on CXR and 2L supplemental O2  - On rocephin 1g QD (5/18)  - WBC trending down to normal levels, 12K today  - resp failure now resolved

## 2018-05-19 NOTE — NURSING TRANSFER
Nursing Transfer Note      5/19/2018     Transfer To: [id number]    Transfer via bed    Transfer with cardiac monitoring    Transported by Giovanna, RN and Teetee, RN    Medicines sent: Yes, Spiriva inhaler    Chart send with patient: Yes    Notified: daughter (left voicemail)    Patient reassessed at: 1100 5/19    Upon arrival to floor: cardiac monitor applied, patient oriented to room, call bell in reach and bed in lowest position

## 2018-05-20 VITALS
HEIGHT: 62 IN | DIASTOLIC BLOOD PRESSURE: 63 MMHG | TEMPERATURE: 98 F | BODY MASS INDEX: 20.06 KG/M2 | WEIGHT: 109 LBS | OXYGEN SATURATION: 97 % | RESPIRATION RATE: 23 BRPM | HEART RATE: 85 BPM | SYSTOLIC BLOOD PRESSURE: 129 MMHG

## 2018-05-20 PROBLEM — G93.41 ACUTE METABOLIC ENCEPHALOPATHY: Status: RESOLVED | Noted: 2018-05-14 | Resolved: 2018-05-20

## 2018-05-20 PROBLEM — R41.0 DELIRIUM: Status: ACTIVE | Noted: 2018-05-20

## 2018-05-20 PROBLEM — R33.9 URINARY RETENTION: Status: RESOLVED | Noted: 2018-05-16 | Resolved: 2018-05-20

## 2018-05-20 PROBLEM — A41.51 SEPSIS DUE TO ESCHERICHIA COLI: Status: RESOLVED | Noted: 2018-05-15 | Resolved: 2018-05-20

## 2018-05-20 PROBLEM — J96.01 ACUTE RESPIRATORY FAILURE WITH HYPOXIA: Status: RESOLVED | Noted: 2018-05-09 | Resolved: 2018-05-20

## 2018-05-20 PROBLEM — I16.1 HYPERTENSIVE EMERGENCY: Status: RESOLVED | Noted: 2018-05-15 | Resolved: 2018-05-20

## 2018-05-20 PROBLEM — R41.0 DELIRIUM: Status: RESOLVED | Noted: 2018-05-14 | Resolved: 2018-05-20

## 2018-05-20 LAB
ALBUMIN SERPL BCP-MCNC: 2.7 G/DL
ALP SERPL-CCNC: 119 U/L
ALT SERPL W/O P-5'-P-CCNC: 39 U/L
ANION GAP SERPL CALC-SCNC: 9 MMOL/L
ANION GAP SERPL CALC-SCNC: 9 MMOL/L
AST SERPL-CCNC: 34 U/L
BACTERIA BLD CULT: NORMAL
BACTERIA BLD CULT: NORMAL
BILIRUB SERPL-MCNC: 0.2 MG/DL
BUN SERPL-MCNC: 17 MG/DL
BUN SERPL-MCNC: 19 MG/DL
CALCIUM SERPL-MCNC: 8.7 MG/DL
CALCIUM SERPL-MCNC: 9 MG/DL
CHLORIDE SERPL-SCNC: 109 MMOL/L
CHLORIDE SERPL-SCNC: 112 MMOL/L
CO2 SERPL-SCNC: 24 MMOL/L
CO2 SERPL-SCNC: 24 MMOL/L
CREAT SERPL-MCNC: 0.8 MG/DL
CREAT SERPL-MCNC: 0.9 MG/DL
ERYTHROCYTE [DISTWIDTH] IN BLOOD BY AUTOMATED COUNT: 13.8 %
EST. GFR  (AFRICAN AMERICAN): >60 ML/MIN/1.73 M^2
EST. GFR  (AFRICAN AMERICAN): >60 ML/MIN/1.73 M^2
EST. GFR  (NON AFRICAN AMERICAN): >60 ML/MIN/1.73 M^2
EST. GFR  (NON AFRICAN AMERICAN): >60 ML/MIN/1.73 M^2
GLUCOSE SERPL-MCNC: 140 MG/DL
GLUCOSE SERPL-MCNC: 93 MG/DL
HCT VFR BLD AUTO: 34.6 %
HGB BLD-MCNC: 10.9 G/DL
INR PPP: 1.5
MCH RBC QN AUTO: 28.2 PG
MCHC RBC AUTO-ENTMCNC: 31.5 G/DL
MCV RBC AUTO: 89 FL
PLATELET # BLD AUTO: 383 K/UL
PMV BLD AUTO: 9.1 FL
POTASSIUM SERPL-SCNC: 3.9 MMOL/L
POTASSIUM SERPL-SCNC: 4.8 MMOL/L
PROT SERPL-MCNC: 6.3 G/DL
PROTHROMBIN TIME: 14.8 SEC
RBC # BLD AUTO: 3.87 M/UL
SODIUM SERPL-SCNC: 142 MMOL/L
SODIUM SERPL-SCNC: 145 MMOL/L
WBC # BLD AUTO: 11.85 K/UL

## 2018-05-20 PROCEDURE — 25000242 PHARM REV CODE 250 ALT 637 W/ HCPCS: Performed by: HOSPITALIST

## 2018-05-20 PROCEDURE — 99239 HOSP IP/OBS DSCHRG MGMT >30: CPT | Mod: ,,, | Performed by: HOSPITALIST

## 2018-05-20 PROCEDURE — 25000003 PHARM REV CODE 250: Performed by: HOSPITALIST

## 2018-05-20 PROCEDURE — 80048 BASIC METABOLIC PNL TOTAL CA: CPT

## 2018-05-20 PROCEDURE — 36415 COLL VENOUS BLD VENIPUNCTURE: CPT

## 2018-05-20 PROCEDURE — 80053 COMPREHEN METABOLIC PANEL: CPT

## 2018-05-20 PROCEDURE — 93005 ELECTROCARDIOGRAM TRACING: CPT

## 2018-05-20 PROCEDURE — 63600175 PHARM REV CODE 636 W HCPCS: Performed by: HOSPITALIST

## 2018-05-20 PROCEDURE — 93010 ELECTROCARDIOGRAM REPORT: CPT | Mod: ,,, | Performed by: INTERNAL MEDICINE

## 2018-05-20 PROCEDURE — 85027 COMPLETE CBC AUTOMATED: CPT

## 2018-05-20 PROCEDURE — 25000003 PHARM REV CODE 250: Performed by: INTERNAL MEDICINE

## 2018-05-20 PROCEDURE — 85610 PROTHROMBIN TIME: CPT

## 2018-05-20 RX ORDER — CLONIDINE 0.2 MG/24H
1 PATCH, EXTENDED RELEASE TRANSDERMAL
Qty: 4 PATCH | Refills: 11 | Status: SHIPPED | OUTPATIENT
Start: 2018-05-24 | End: 2019-05-24

## 2018-05-20 RX ORDER — DEXTROSE MONOHYDRATE 50 MG/ML
INJECTION, SOLUTION INTRAVENOUS CONTINUOUS
Status: DISCONTINUED | OUTPATIENT
Start: 2018-05-20 | End: 2018-05-20 | Stop reason: HOSPADM

## 2018-05-20 RX ORDER — CARVEDILOL 25 MG/1
25 TABLET ORAL 2 TIMES DAILY
Qty: 60 TABLET | Refills: 11 | Status: SHIPPED | OUTPATIENT
Start: 2018-05-20 | End: 2019-05-20

## 2018-05-20 RX ORDER — HYDRALAZINE HYDROCHLORIDE 50 MG/1
50 TABLET, FILM COATED ORAL EVERY 8 HOURS PRN
Qty: 90 TABLET | Refills: 2 | Status: SHIPPED | OUTPATIENT
Start: 2018-05-20 | End: 2018-05-20

## 2018-05-20 RX ORDER — NIFEDIPINE 90 MG/1
90 TABLET, EXTENDED RELEASE ORAL DAILY
Qty: 30 TABLET | Refills: 11 | Status: SHIPPED | OUTPATIENT
Start: 2018-05-21 | End: 2019-05-21

## 2018-05-20 RX ORDER — CARVEDILOL 25 MG/1
25 TABLET ORAL 2 TIMES DAILY
Qty: 60 TABLET | Refills: 11 | Status: SHIPPED | OUTPATIENT
Start: 2018-05-20 | End: 2018-05-20

## 2018-05-20 RX ORDER — NIFEDIPINE 90 MG/1
90 TABLET, EXTENDED RELEASE ORAL DAILY
Qty: 30 TABLET | Refills: 11 | Status: SHIPPED | OUTPATIENT
Start: 2018-05-21 | End: 2018-05-20

## 2018-05-20 RX ORDER — HYDRALAZINE HYDROCHLORIDE 50 MG/1
25 TABLET, FILM COATED ORAL EVERY 8 HOURS PRN
Qty: 90 TABLET | Refills: 2 | Status: SHIPPED | OUTPATIENT
Start: 2018-05-20 | End: 2018-05-20

## 2018-05-20 RX ORDER — HYDRALAZINE HYDROCHLORIDE 50 MG/1
50 TABLET, FILM COATED ORAL EVERY 8 HOURS PRN
Qty: 90 TABLET | Refills: 2 | Status: SHIPPED | OUTPATIENT
Start: 2018-05-20 | End: 2019-05-20

## 2018-05-20 RX ORDER — WARFARIN 2 MG/1
2 TABLET ORAL DAILY
Qty: 30 TABLET | Refills: 11 | Status: SHIPPED | OUTPATIENT
Start: 2018-05-20 | End: 2019-05-20

## 2018-05-20 RX ORDER — CEFDINIR 300 MG/1
300 CAPSULE ORAL 2 TIMES DAILY
Qty: 10 CAPSULE | Refills: 0 | Status: SHIPPED | OUTPATIENT
Start: 2018-05-21 | End: 2018-05-26

## 2018-05-20 RX ORDER — IPRATROPIUM BROMIDE AND ALBUTEROL SULFATE 2.5; .5 MG/3ML; MG/3ML
3 SOLUTION RESPIRATORY (INHALATION) EVERY 6 HOURS PRN
Qty: 1 BOX | Refills: 0 | Status: SHIPPED | OUTPATIENT
Start: 2018-05-20 | End: 2019-05-20

## 2018-05-20 RX ORDER — ALBUTEROL SULFATE 90 UG/1
2 AEROSOL, METERED RESPIRATORY (INHALATION) EVERY 6 HOURS PRN
Qty: 1 INHALER | Refills: 2 | Status: SHIPPED | OUTPATIENT
Start: 2018-05-20

## 2018-05-20 RX ADMIN — WARFARIN SODIUM 2 MG: 2 TABLET ORAL at 05:05

## 2018-05-20 RX ADMIN — CARVEDILOL 25 MG: 25 TABLET, FILM COATED ORAL at 09:05

## 2018-05-20 RX ADMIN — CEFTRIAXONE SODIUM 2 G: 2 INJECTION, POWDER, FOR SOLUTION INTRAMUSCULAR; INTRAVENOUS at 10:05

## 2018-05-20 RX ADMIN — RAMIPRIL 10 MG: 10 CAPSULE ORAL at 09:05

## 2018-05-20 RX ADMIN — TAMSULOSIN HYDROCHLORIDE 0.4 MG: 0.4 CAPSULE ORAL at 09:05

## 2018-05-20 RX ADMIN — DEXTROSE: 5 SOLUTION INTRAVENOUS at 10:05

## 2018-05-20 RX ADMIN — NIFEDIPINE 90 MG: 30 TABLET, FILM COATED, EXTENDED RELEASE ORAL at 09:05

## 2018-05-20 RX ADMIN — TIOTROPIUM BROMIDE 18 MCG: 18 CAPSULE ORAL; RESPIRATORY (INHALATION) at 09:05

## 2018-05-20 NOTE — PLAN OF CARE
"[facility]    HOME HEALTH ORDERS  FACE TO FACE ENCOUNTER    Patient Name: Sunita Zimmer  YOB: 1940    PCP: Von Mccullough MD   PCP Address: [address] / [place] [address]  PCP Phone Number: [phone]  PCP Fax: [phone]    Encounter Date: 05/20/2018    Admit to Home Health    Diagnoses:  Active Hospital Problems    Diagnosis  POA    *Hypertensive emergency [I16.1]  Yes    Prolonged Q-T interval on ECG [R94.31]  Yes    HCAP (healthcare-associated pneumonia) [J18.9]  Yes    Urinary retention [R33.9]  Yes    Sepsis due to Escherichia coli [A41.51]  Yes    Acute cystitis without hematuria [N30.00]  Yes    Delirium [R41.0]  Yes    Acute metabolic encephalopathy [G93.41]  Yes    History of intracranial hemorrhage [Z86.79]  Not Applicable    Acute respiratory failure with hypoxia [J96.01]  Yes    Current use of long term anticoagulation [Z79.01]  Not Applicable     Coumadin for history of DVT      Centrilobular emphysema [J43.2]  Yes    Subclavian artery stenosis, right [I77.1]  Yes    Personal history of DVT (deep vein thrombosis) [Z86.718]  Not Applicable    Polycythemia vera [D45]  Yes    Polycystic kidney disease [Q61.3]  Not Applicable      Resolved Hospital Problems    Diagnosis Date Resolved POA    Hypokalemia [E87.6] 05/16/2018 Yes    Hypernatremia [E87.0] 05/16/2018 Yes       Future Appointments  Date Time Provider Department Center   6/15/2018 10:40 AM Jude Ramos, DO [facility] NEUROS7 [address]           I have seen and examined this patient face to face today. My clinical findings that support the need for the home health skilled services and home bound status are the following:  Weakness/numbness causing balance and gait disturbance due to Weakness/Debility making it taxing to leave home.    Allergies:  Review of patient's allergies indicates:   Allergen Reactions    Bacitracin Other (See Comments)     "can not take because of " "Warfarin"    Bactrim [sulfamethoxazole-trimethoprim] Other (See Comments)     "can not take because of Warfarin"       Diet: cardiac diet    Activities: activity as tolerated    Nursing:   SN to complete comprehensive assessment including routine vital signs. Instruct on disease process and s/s of complications to report to MD. Review/verify medication list sent home with the patient at time of discharge  and instruct patient/caregiver as needed. Frequency may be adjusted depending on start of care date.    Notify MD if SBP > 160 or < 90; DBP > 90 or < 50; HR > 120 or < 50; Temp > 101;       CONSULTS:    Physical Therapy to evaluate and treat. Evaluate for home safety and equipment needs; Establish/upgrade home exercise program. Perform / instruct on therapeutic exercises, gait training, transfer training, and Range of Motion.  Occupational Therapy to evaluate and treat. Evaluate home environment for safety and equipment needs. Perform/Instruct on transfers, ADL training, ROM, and therapeutic exercises.  Aide to provide assistance with personal care, ADLs, and vital signs.    MISCELLANEOUS CARE:  Labs  PT/INR  on tuesday.INR goal 2 to 3      WOUND CARE ORDERS  n/a      Medications: Review discharge medications with patient and family and provide education.     Sunita Zimmer   Home Medication Instructions RL:[id number]    Printed on:05/20/18 2317   Medication Information                      albuterol-ipratropium (DUO-NEB) 2.5 mg-0.5 mg/3 mL nebulizer solution  Take 3 mLs by nebulization every 6 (six) hours as needed for Wheezing or Shortness of Breath. Rescue             carvedilol (COREG) 25 MG tablet  Take 1 tablet (25 mg total) by mouth 2 (two) times daily. Hold for SNP < 130mm HG             cefdinir (OMNICEF) 300 MG capsule  Take 1 capsule (300 mg total) by mouth 2 (two) times daily.             cloNIDine 0.2 mg/24 hr td ptwk (CATAPRES) 0.2 mg/24 hr  Place 1 patch onto the skin every 7 days.           "   hydrALAZINE (APRESOLINE) 50 MG tablet  Take 1 tablet (50 mg total) by mouth every 8 (eight) hours as needed (SBP>180). Hold for SBP < 130mm HG             NIFEdipine (PROCARDIA-XL) 90 MG (OSM) 24 hr tablet  Take 1 tablet (90 mg total) by mouth once daily. Hold for SBP < 130mm HG             nitroGLYCERIN (NITROSTAT) 0.4 MG SL tablet  Place 0.4 mg under the tongue every 5 (five) minutes as needed for Chest pain.             polyethylene glycol (GLYCOLAX) 17 gram/dose powder  Take 17 g by mouth.             ramipril (ALTACE) 10 MG capsule  Take 10 mg by mouth.              umeclidinium 62.5 mcg/actuation DsDv  Inhale 1 puff into the lungs.             VENTOLIN HFA 90 mcg/actuation inhaler  Inhale 2 puffs into the lungs every 6 (six) hours as needed for Wheezing.             warfarin (COUMADIN) 2 MG tablet  Take 1 tablet (2 mg total) by mouth Daily.               I certify that this patient is confined to her home and needs intermittent skilled nursing care, physical therapy and occupational therapy.
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
## 2018-05-20 NOTE — NURSING
Pt with orders to d/c IV and d/c home.  Tolerated d/c of IV.   Awake, alert, and oriented with no acute distress noted. Spouse and daughter at bedside Reviewed discharge orders including ;medicine orders, prescriptions, followup appts, and patient education materials for diet and diagnosis.  Belongings packed for transport to home. Ambulating out per wheelchair at time of discharge.

## 2018-05-20 NOTE — PLAN OF CARE
Problem: Patient Care Overview  Goal: Individualization & Mutuality   05/19/18 1906   Individualization   Patient Specific Preferences No ice in water

## 2018-05-20 NOTE — PLAN OF CARE
Pt being discharged home with [facility].  Put in call to on call nurse who stated they would be able to see patient tomorrow.  Pt's family is providing transportation home.  CM put in call to [facility], to let them know patient will be discharging and does not want to go back on hospice.  Also, they will need to make arrangement with family to pickup equipment.     Patient ready to discharge home after MD reviews labs.

## 2018-05-20 NOTE — PLAN OF CARE
Problem: Fall Risk (Adult)  Goal: Identify Related Risk Factors and Signs and Symptoms  Related risk factors and signs and symptoms are identified upon initiation of Human Response Clinical Practice Guideline (CPG)   Outcome: Outcome(s) achieved Date Met: 05/20/18 05/20/18 1747   Fall Risk   Related Risk Factors (Fall Risk) age-related changes;fatigue/slow reaction;gait/mobility problems;impaired vision;environment unfamiliar   Signs and Symptoms (Fall Risk) presence of risk factors     Goal: Absence of Falls  Patient will demonstrate the desired outcomes by discharge/transition of care.   Outcome: Outcome(s) achieved Date Met: 05/20/18 05/20/18 1747   Fall Risk (Adult)   Absence of Falls achieves outcome       Problem: Patient Care Overview  Goal: Plan of Care Review  Outcome: Outcome(s) achieved Date Met: 05/20/18  Pt turns and repositions self with prompting of staff. No skin breakdown noted. Pt pain and safety monitored q 1-2 hrs this shift. Dressing intact to lower back.  Bed locked and in lowest position. Rails elevated x 3. Brakes on. Call light and personal belongings in reach. Ready for discharge with  and daughter at bedside..   05/20/18 1747   Coping/Psychosocial   Plan Of Care Reviewed With patient;spouse;daughter       Problem: Pressure Ulcer Risk (George Scale) (Adult,Obstetrics,Pediatric)  Goal: Identify Related Risk Factors and Signs and Symptoms  Related risk factors and signs and symptoms are identified upon initiation of Human Response Clinical Practice Guideline (CPG)   Outcome: Outcome(s) achieved Date Met: 05/20/18 05/20/18 1747   Pressure Ulcer Risk (George Scale)   Related Risk Factors (Pressure Ulcer Risk (George Scale)) age extremes;critical care admission;mobility impaired     Goal: Skin Integrity  Patient will demonstrate the desired outcomes by discharge/transition of care.   Outcome: Outcome(s) achieved Date Met: 05/20/18 05/20/18 1747   Pressure Ulcer Risk (George Scale)  (Adult,Obstetrics,Pediatric)   Skin Integrity achieves outcome       Problem: Infection, Risk/Actual (Adult)  Goal: Identify Related Risk Factors and Signs and Symptoms  Related risk factors and signs and symptoms are identified upon initiation of Human Response Clinical Practice Guideline (CPG)   Outcome: Outcome(s) achieved Date Met: 05/20/18 05/20/18 1747   Infection, Risk/Actual   Related Risk Factors (Infection, Risk/Actual) age extremes;chronic illness/condition;surgery/procedure;skin integrity impairment     Goal: Infection Prevention/Resolution  Patient will demonstrate the desired outcomes by discharge/transition of care.   Outcome: Outcome(s) achieved Date Met: 05/20/18 05/20/18 1747   Infection, Risk/Actual (Adult)   Infection Prevention/Resolution achieves outcome
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
## 2018-05-21 ENCOUNTER — PATIENT MESSAGE (OUTPATIENT)
Dept: NEUROSURGERY | Facility: CLINIC | Age: 78
End: 2018-05-21

## 2018-05-21 ENCOUNTER — NURSE TRIAGE (OUTPATIENT)
Dept: ADMINISTRATIVE | Facility: CLINIC | Age: 78
End: 2018-05-21

## 2018-05-21 ENCOUNTER — ANTI-COAG VISIT (OUTPATIENT)
Dept: CARDIOLOGY | Facility: CLINIC | Age: 78
End: 2018-05-21

## 2018-05-21 DIAGNOSIS — R41.0 DELIRIUM: ICD-10-CM

## 2018-05-21 NOTE — PROGRESS NOTES
78yo F recently admitted from 5/14 through 5/20.  Enrollment sent to the [facility] in error.  She lives in [place] and was enrolled under the name of [place] Dr. Von Mccullough.  (We cannot accept her for monitoring without the name of an [facility] Staff physician who is willing to sign off on her orders.)  Regardless, I spoke with Ady at the pt's PCP's office.  They will handle monitoring her warfarin and know to be on the lookout for her INR from today.  Evonne with [facility] notified to forward INRs to them.  Discharge from Clinic.

## 2018-05-21 NOTE — TELEPHONE ENCOUNTER
"    Reason for Disposition   Health Information question, no triage required and triager able to answer question    Answer Assessment - Initial Assessment Questions  1. REASON FOR CALL or QUESTION: "What is your reason for calling today?" or "How can I best help you?" or "What question do you have that I can help answer?"      Patient's daughter called to verify instruction for carvedilol.    Protocols used: ST INFORMATION ONLY CALL-A-AH      "

## 2018-05-22 ENCOUNTER — PATIENT OUTREACH (OUTPATIENT)
Dept: ADMINISTRATIVE | Facility: CLINIC | Age: 78
End: 2018-05-22

## 2018-05-22 RX ORDER — TRAMADOL HYDROCHLORIDE 50 MG/1
25 TABLET ORAL EVERY 8 HOURS PRN
Qty: 20 TABLET | Refills: 0 | Status: SHIPPED | OUTPATIENT
Start: 2018-05-22 | End: 2018-06-16 | Stop reason: SDUPTHER

## 2018-05-22 NOTE — TELEPHONE ENCOUNTER
Patient had questions about her pain medications. Home care advice given    Reason for Disposition   Caller has medication question, adult has minor symptoms, caller declines triage, and triager answers question    Protocols used: ST MEDICATION QUESTION CALL-A-AH

## 2018-05-22 NOTE — PT/OT/SLP DISCHARGE
Occupational Therapy Discharge Summary    Sunita Zimmer  MRN: [id number]   Principal Problem: Hypertensive emergency      Patient Discharged from acute Occupational Therapy on 5/21/18.      Assessment:      Patient was discharged unexpectedly.  Information required to complete an accurate discharge summary is unknown.  Refer to therapy initial evaluation and last progress note for initial and most recent functional status and goal achievement.  Recommendations made may be found in medical record.    Objective:     GOALS:    Occupational Therapy Goals        Problem: Occupational Therapy Goal    Goal Priority Disciplines Outcome Interventions   Occupational Therapy Goal     OT, PT/OT Ongoing (interventions implemented as appropriate)    Description:  Goals to be met by: 5/28/18     Patient will increase functional independence with ADLs by performing:    Feeding with Modified [place].  UE Dressing with Supervision.  LE Dressing with Supervision.  Grooming while standing at sink with Supervision.  Toileting from bedside commode with Stand-by Assistance for hygiene and clothing management.   Toilet transfer to bedside commode with Stand-by Assistance.                      Reasons for Discontinuation of Therapy Services  Transfer to alternate level of care.      Plan:     Patient Discharged to: Home with Home Health Service    WHIT Quan  5/22/2018

## 2018-05-22 NOTE — PATIENT INSTRUCTIONS
Established High Blood Pressure    High blood pressure (hypertension) is a chronic disease. Often, healthcare providers dont know what causes it. But it can be caused by certain health conditions and medicines.  If you have high blood pressure, you may not have any symptoms. If you do have symptoms, they may include headache, dizziness, changes in your vision, chest pain, and shortness of breath. But even without symptoms, high blood pressure thats not treated raises your risk for heart attack and stroke. High blood pressure is a serious health risk and shouldnt be ignored.  A blood pressure reading is made up of two numbers: a higher number over a lower number. The top number is the systolic pressure. The bottom number is the diastolic pressure. A normal blood pressure is a systolic pressure of  less than 120 over a diastolic pressure of less than 80. You will see your blood pressure readings written together. For example, a person with a systolic pressure of 188 and a diastolic pressure of 78 will have 118/78 written in the medical record.  High blood pressure is when either the top number is 140 or higher, or the bottom number is 90 or higher. This must be the result when taking your blood pressure a number of times. The blood pressures between normal and high are called prehypertension.  Home care  If you have high blood pressure, you should do what is listed below to lower your blood pressure. If you are taking medicines for high blood pressure, these methods may reduce or end your need for medicines in the future.  · Begin a weight-loss program if you are overweight.  · Cut back on how much salt you get in your diet. Heres how to do this:  ¨ Dont eat foods that have a lot of salt. These include olives, pickles, smoked meats, and salted potato chips.  ¨ Dont add salt to your food at the table.  ¨ Use only small amounts of salt when cooking.  · Start an exercise program. Talk with your healthcare  provider about the type of exercise program that would be best for you. It doesn't have to be hard. Even brisk walking for 20 minutes 3 times a week is a good form of exercise.  · Dont take medicines that stimulate the heart. This includes many over-the-counter cold and sinus decongestant pills and sprays, as well as diet pills. Check the warnings about hypertension on the label. Before buying any over-the-counter medicines or supplements, always ask the pharmacist about the product's potential interaction with your high blood pressure and your high blood pressure medicines.  · Stimulants such as amphetamine or cocaine could be deadly for someone with high blood pressure. Never take these.  · Limit how much caffeine you get in your diet. Switch to caffeine-free products.  · Stop smoking. If you are a long-time smoker, this can be hard. Talk to your healthcare provider about medicines and nicotine replacement options to help you. Also, enroll in a stop-smoking program to make it more likely that you will quit for good.  · Learn how to handle stress. This is an important part of any program to lower blood pressure. Learn about relaxation methods like meditation, yoga, or biofeedback.  · If your provider prescribed medicines, take them exactly as directed. Missing doses may cause your blood pressure get out of control.  · If you miss a dose or doses, check with your healthcare provider or pharmacist about what to do.  · Consider buying an automatic blood pressure machine. Ask your provider for a recommendation. You can get one of these at most pharmacies.     The American Heart Association recommends the following guidelines for home blood pressure monitoring:  · Don't smoke or drink coffee for 30 minutes before taking your blood pressure.  · Go to the bathroom before the test.  · Relax for 5 minutes before taking the measurement.  · Sit with your back supported (don't sit on a couch or soft chair); keep your feet on  the floor uncrossed. Place your arm on a solid flat surface (like a table) with the upper part of the arm at heart level. Place the middle of the cuff directly above the eye of the elbow. Check the monitor's instruction manual for an illustration.  · Take multiple readings. When you measure, take 2 to 3 readings one minute apart and record all of the results.  · Take your blood pressure at the same time every day, or as your healthcare provider recommends.  · Record the date, time, and blood pressure reading.  · Take the record with you to your next medical appointment. If your blood pressure monitor has a built-in memory, simply take the monitor with you to your next appointment.  · Call your provider if you have several high readings. Don't be frightened by a single high blood pressure reading, but if you get several high readings, check in with your healthcare provider.  · Note: When blood pressure reaches a systolic (top number) of 180 or higher OR diastolic (bottom number) of 110 or higher, seek emergency medical treatment.  Follow-up care  You will need to see your healthcare provider regularly. This is to check your blood pressure and to make changes to your medicines. Make a follow-up appointment as directed. Bring the record of your home blood pressure readings to the appointment.  When to seek medical advice  Call your healthcare provider right away if any of these occur:  · Blood pressure reaches a systolic (upper number) of 180 or higher OR a diastolic (bottom number) of 110 or higher  · Chest pain or shortness of breath  · Severe headache  · Throbbing or rushing sound in the ears  · Nosebleed  · Sudden severe pain in your belly (abdomen)  · Extreme drowsiness, confusion, or fainting  · Dizziness or spinning sensation (vertigo)  · Weakness of an arm or leg or one side of the face  · You have problems speaking or seeing   Date Last Reviewed: 12/1/2016  © 8377-3734 [employer]. [address] [place] [address]  [place] [address]. All rights reserved. This information is not intended as a substitute for professional medical care. Always follow your healthcare professional's instructions.

## 2018-05-22 NOTE — PROGRESS NOTES
Spoke with patient's, daughter Afua. States that patient does not have f/u appt with PCP. Advised daughter to call to see if PCP would like to see patient, due to PCP being Non-[facility] provider.

## 2018-05-22 NOTE — PT/OT/SLP DISCHARGE
Physical Therapy Discharge Summary    Name: Sunita Zimmer  MRN: [id number]   Principal Problem: Hypertensive emergency     Patient Discharged from acute Physical Therapy on 18.  Please refer to prior PT noted date on 18 for functional status.     Assessment:     Patient has not met goals.    Objective:     GOALS:    Physical Therapy Goals        Problem: Physical Therapy Goal    Goal Priority Disciplines Outcome Goal Variances Interventions   Physical Therapy Goal     PT/OT, PT      Description:  Goals to be met by: 18    Patient will increase functional independence with mobility by performin. Supine to sit with Stand-by Assistance -not met  2. Sit to stand transfer with Stand-by Assistance -not met  3. Gait  x 220 feet with Contact Guard Assistance using Single-point Cane -not met.   4. Lower extremity exercise program x15 reps  with supervision -not met                      Reasons for Discontinuation of Therapy Services  Transfer to alternate level of care.      Plan:     Patient Discharged to: home.    Eugenia Rivera, PT  2018

## 2018-05-23 NOTE — OP NOTE
DATE OF SURGERY: 5/4/18     PREOPERATIVE DIAGNOSIS:  1.  Cauda equina syndrome.  2.  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  3.  Low back pain with bilateral S1 radiculopathy.     POSTOPERATIVE DIAGNOSIS:  Same.     PROCEDURE PERFORMED:  1. L3, L4 and L5 laminectomies for resection of intradural extramedullary spinal tumor  2. Use of operative microscope for microdissection  3. Use of intraoperative neuromonitoring with MEPs  4. Use of intraoperative flouroscopy     PRIMARY SURGEON: Jude Ramos D.O.     CO-SURGEON: Placido Rob M.D.    ASSISTANT: VIKKI Steward (a qualified resident was not available for the entirety of the case)     ANESTHESIA: GETA     ESTIMATED BLOOD LOSS: 50mL     COMPLICATIONS: None     DRAINS: None.     SPECIMENS SENT: Intradural spinal tumor for permanent pathology.     FINDINGS: Benign appearing nerve sheath tumor on gross inspection     INDICATIONS:     See Dr. Ramos's separate operative note.    PROCEDURE:     For details about patient intubation, anesthesia induction, positioning, and localization please see Dr. Ramos's separate operative note.  My involvement in this case began at the time of the incision.     A midline linear incision was made with a 10 blade from approximately L3-L5.  Supra and subfascial dissection was carried out in the midline with Bovie electrocautery.  Subperiosteal dissection was carried out with Bovie electrocautery and Campos elevators to expose the posterior elements from L3-L5.  Levels were confirmed on AP and lateral x-ray.  The laminectomy was then performed at L3, L4 and L5, using the high speed drill, rongeurs and Kerrison punches.   The lamina was removed piecemeal and care was taken to preserve the inter and intraspinous ligaments so as not to disrupt the posterior tension band.  The dura was then inspected and found to be intact and normal in appearance.  The microscope was then brought into the field for microdissection.     A linear dural opening  was made with a 15 blade from L3-L5, and the dural leaflets were tacked up with 4-0 Nurolon sutures.  The arachnoid plane was opened with an 11 blade.  We encountered a well encapsulated solid grey tumor that was intimately involved with a dorsal nerve root, and was consistent with nerve sheath tumor on gross inspection. Using microsurgical technique we performed a sharp arachnoid dissection around the tumor capsule, and then identified and isolated a single nerve root that entered and exited the tumor. The tumor appeared to splay the nerve root diffusely and a single tract could not be identified. We stimulated this nerve root with the neuromonitoring probe and there was a small response in the left lateral vastus medialis but there was no sustained EMG activity. An attempt was made to separate tumor from the nerve root, but after this was unsuccessful we sacrificed the entering and exiting nerve root near the tumor interface. A gross total tumor resection was achieved, and specimen was sent for permanent pathology.     The resection cavity was inspected, and hemostasis was achieved with bipolar electrocautery and FloSeal.  The subarachnoid space was copiously irrigated with sterile normal saline.  The dura was closed in a watertight fashion with a running 4-0 Nurilon suture.  Duraseal fibrin glue was placed over top.The microscope was taken out of the field.     This concludes my involvement in this case.  For details about wound closure, drain placement, patient extubation and OR disposition please see Dr. Ramos's separate operative note.  During my involvement the patient appeared to tolerate the procedure well from a hemodynamic and neuro monitoring standpoint, and all counts were correct.       JUSTIFICATION OF CO-SURGEON:  This was a complex intradural spinal tumor case in an elderly patient. Two attendings were felt necessary to reduce operative times, blood loss and improve patient outcomes.

## 2018-06-05 ENCOUNTER — TELEPHONE (OUTPATIENT)
Dept: NEUROSURGERY | Facility: CLINIC | Age: 78
End: 2018-06-05

## 2018-06-05 NOTE — TELEPHONE ENCOUNTER
Spoke with pt to inform her that she didn't make her post-op appt on 6/1/18 as it was cancelled on 5/31/18 via text message. Last post op appt on 5/16/18 was cancelled as pt was still in the hospital. Pt stated that she doesn't want to come to Dr. Ramos's office in [place] only on [facility]. appt scheduled for 6/15/18 @ 9:40am. Called daughter and left message to return call.

## 2018-06-15 ENCOUNTER — PATIENT MESSAGE (OUTPATIENT)
Dept: NEUROSURGERY | Facility: CLINIC | Age: 78
End: 2018-06-15

## 2018-06-18 RX ORDER — TRAMADOL HYDROCHLORIDE 50 MG/1
TABLET ORAL
Qty: 20 TABLET | Refills: 0 | Status: SHIPPED | OUTPATIENT
Start: 2018-06-18 | End: 2018-07-23 | Stop reason: SDUPTHER

## 2018-06-24 ENCOUNTER — PATIENT MESSAGE (OUTPATIENT)
Dept: NEUROSURGERY | Facility: CLINIC | Age: 78
End: 2018-06-24

## 2018-07-17 ENCOUNTER — TELEPHONE (OUTPATIENT)
Dept: NEUROSURGERY | Facility: CLINIC | Age: 78
End: 2018-07-17

## 2018-07-17 NOTE — TELEPHONE ENCOUNTER
Spoke with pt to find out if she's able to come to her appt around 10am. Pt daughter brings her to all her appts and she will give us a call back once she finds out.

## 2018-07-20 ENCOUNTER — OFFICE VISIT (OUTPATIENT)
Dept: NEUROSURGERY | Facility: CLINIC | Age: 78
End: 2018-07-20
Payer: COMMERCIAL

## 2018-07-20 VITALS
BODY MASS INDEX: 15.47 KG/M2 | SYSTOLIC BLOOD PRESSURE: 124 MMHG | TEMPERATURE: 98 F | HEART RATE: 77 BPM | DIASTOLIC BLOOD PRESSURE: 55 MMHG | WEIGHT: 84.63 LBS

## 2018-07-20 DIAGNOSIS — G95.89 INTRADURAL MASS: Primary | ICD-10-CM

## 2018-07-20 DIAGNOSIS — G89.29 CHRONIC BILATERAL LOW BACK PAIN, WITH SCIATICA PRESENCE UNSPECIFIED: ICD-10-CM

## 2018-07-20 DIAGNOSIS — R41.0 DELIRIUM: ICD-10-CM

## 2018-07-20 DIAGNOSIS — M54.5 CHRONIC BILATERAL LOW BACK PAIN, WITH SCIATICA PRESENCE UNSPECIFIED: ICD-10-CM

## 2018-07-20 PROBLEM — M54.50 CHRONIC BILATERAL LOW BACK PAIN: Status: ACTIVE | Noted: 2018-07-20

## 2018-07-20 PROCEDURE — 99024 POSTOP FOLLOW-UP VISIT: CPT | Mod: S$GLB,,, | Performed by: NEUROLOGICAL SURGERY

## 2018-07-20 PROCEDURE — 99999 PR PBB SHADOW E&M-EST. PATIENT-LVL III: CPT | Mod: PBBFAC,,, | Performed by: NEUROLOGICAL SURGERY

## 2018-07-20 RX ORDER — ALPRAZOLAM 0.25 MG/1
TABLET ORAL
COMMUNITY
Start: 2018-07-16

## 2018-07-20 RX ORDER — DIAZEPAM 5 MG/1
TABLET ORAL
COMMUNITY
Start: 2018-06-23

## 2018-07-20 NOTE — PROGRESS NOTES
"CHIEF COMPLAINT:  Postop f/u    HPI:    Sunita Zimmer is a 78 y.o.-year-old female who presents today for post-operative follow-up s/p L3-5 laminectomies for resection of intradural schwannoma on 5/5/18.    Although the surgery went well without any perioperative complications, the patient developed delirium as an inpatient and continue to struggle once discharged.   Shortly after discharge she returned to the emergency room with worsening delirium and confusion and has been struggling with her cognitive function since.  She presents today with her  and her daughter but was confused and slightly agitated.  She was not a reliable historian and was difficult to follow.     patient's  reports that she complains of pain in various areas including the low back all the time, for which she takes narcotics and Xanax.   states that she has had pain for greater than 10 years and has taken pain meds throughout that duration.  Since surgery she has continued to worsen in regards to her cognitive function and delirium.  She stated that her family members were calling her a "drug addict" and that she believed that I gave them that idea.      According to the patient's family, her wound has healed well with the exception of some small irritation at the inferior aspect.  There is no leakage or drainage, or any signs of infection.  She denies any new weakness or numbness.    ROS:    As stated in the above HPI    PE:  Vitals:    07/20/18 1041   BP: (!) 124/55   Pulse: 77   Temp: 97.9 °F (36.6 °C)     Awake, alert, confused (making delusional type statements), agitated  CN grossly intact  Strength grossly 5/5 BUE and BLE    Incision well healed with mild erythema/irritation at inferior aspect.  No fluid collections or drainage.    IMAGING:  No new spine imaging    ASSESSMENT/PLAN:   1. S/p L3-5 unilateral lamis for resection of intradural schwannoma for cauda equina syndrome:  Gross total resection, healed " "well.  Still has baseline chronic low back pain.    - Cont pain meds as prescribed by PCP  - Rec ibuprofen and tylenol as patient very sensitive to narcotics  - Return to clinic in 6 months with repeat lumbar MRI    2. Confusion (delirium vs dementia):  Persistent from postop admission. Unclear etiology (per family has had "mental health" issues for many years).    - F/u PCP re: general medical condition and confusion  - May benefit from outpatient psych eval for confusion                "

## 2018-07-23 RX ORDER — TRAMADOL HYDROCHLORIDE 50 MG/1
TABLET ORAL
Qty: 20 TABLET | Refills: 0 | Status: SHIPPED | OUTPATIENT
Start: 2018-07-23 | End: 2018-07-24 | Stop reason: SDUPTHER

## 2018-07-24 ENCOUNTER — TELEPHONE (OUTPATIENT)
Dept: NEUROSURGERY | Facility: CLINIC | Age: 78
End: 2018-07-24

## 2018-07-24 RX ORDER — TRAMADOL HYDROCHLORIDE 50 MG/1
TABLET ORAL
Qty: 20 TABLET | Refills: 0 | Status: SHIPPED | OUTPATIENT
Start: 2018-07-24 | End: 2018-07-25 | Stop reason: SDUPTHER

## 2018-07-24 NOTE — TELEPHONE ENCOUNTER
Spoke with pt regarding RX. Pt stated that she's currently taking 0.2 mg clonidine patches and wants to know if she can take either the 0.1mg or 0.3 tablets. Pt stated that before she went into the hospital she was prescribed 0.3mg tablets by her cardiologist. Informed pt not to continue to take 0.2 mg tablets and not to combine it with the tablets. Pt verbalized understanding.

## 2018-07-24 NOTE — TELEPHONE ENCOUNTER
----- Message from Lilliana Escalante PA-C sent at 7/24/2018 10:59 AM CDT -----  Contact: Patient @ [phone]  It sounds like she was maybe given the wrong strength at the pharmacy?? If that is the case, she should go back to the pharmacy. Otherwise, yes, call pcp       ----- Message -----  From: Talya Prater MA  Sent: 7/24/2018  10:40 AM  To: Lilliana Escalante PA-C    This was prescribed by a different provider, does she need to see her primary care doctor for this?    ----- Message -----  From: Ady Ortiz  Sent: 7/24/2018  10:14 AM  To: Richard Roque Staff    Patient is requesting a return call concerning medication (cloNIDine ? mg/24 hr td ptwk (CATAPRES) ? mg/24 hr ) she was given a different strength and needs clarity from Dr Ramos, pls call

## 2018-07-25 RX ORDER — TRAMADOL HYDROCHLORIDE 50 MG/1
TABLET ORAL
Qty: 20 TABLET | Refills: 0 | Status: SHIPPED | OUTPATIENT
Start: 2018-07-25

## 2018-08-06 ENCOUNTER — TELEPHONE (OUTPATIENT)
Dept: NEUROSURGERY | Facility: CLINIC | Age: 78
End: 2018-08-06

## 2018-08-06 NOTE — TELEPHONE ENCOUNTER
----- Message from Bruno Fernandes sent at 8/6/2018 11:28 AM CDT -----  Needs Advice    Reason for call: Pt is calling to confirm if her drivers license and social card were left at the clinic off [facility], during appt on 07/20.      Communication Preference: [phone]  Additional Information:

## 2018-08-06 NOTE — TELEPHONE ENCOUNTER
Called spoke to Tressa in registration on 7th floor where patient was checked in 07/20. Tressa states she doesn't recall needing any ID's from patient, checked lost and found as well no items found. Attempt to call patient unsuccessful, will try again later.

## 2019-02-18 ENCOUNTER — PATIENT MESSAGE (OUTPATIENT)
Dept: NEUROSURGERY | Facility: CLINIC | Age: 79
End: 2019-02-18

## 2019-05-02 ENCOUNTER — PATIENT MESSAGE (OUTPATIENT)
Dept: NEUROSURGERY | Facility: CLINIC | Age: 79
End: 2019-05-02

## 2023-08-08 NOTE — PROVIDER TRANSFER
ICU Acceptance note    Date of Admit: 5/14/2018  Date of Transfer / Stepdown: 5/18/2018    Brief History of Present Illness and hospital course:      Sunita Zimmer is a 77 y.o. 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 The patient presented to [facility] on 5/14/2018 with a primary complaint of Psychiatric Evaluation (family states pt may harm self, pt d/c last night s/p intraventricular hemorrage )    Patient admitted to [facility] ([facility]) for acute encephalopathy and hypertensive urgency. She was recently discharged from [facility] on 5/13 for small intraventricular hemorrhage in the left occipital horn which originally presented as AMS. She was also found to have a UTI, which was not treated. It was documented she was refusing all medications and therapies, and she was ultimately made DNR and sent home with hospice after discussions were held with family. One day after discharge, they brought her back for AMS, thought to be due to the UTI.    Of note, pt lives at home with 87 y/o  and with son. It was reported that pt threatened to end her life and has guns at home. On presentation, she was PEC'd.    Patient transferred to medical ICU 5/15/18 for HTN emergency and UTI associated with  encephalopathy. Started on IV cardene  drip for hypertensive urgency and resolved on 5/17.     Patient stepped down to hospital medicine 5/18/18 for ongoing management of HTN, acute encephalopathy, and sepsis due to E.Coli and acute cystitis. Patient currently on ceftriaxone daily (started 5/17).  Blood Cultures 5/14 NGTD (prelim) and UCx ESCHERICHIA COLI and SERRATIA MARCESCENS (both sensitive to CTX). Disposition difficult as noted that patient was discharged 5/13 with home hospice, however per collateral from patient's son she was refusing nutrition and care and continued to be violent as well as threatening self harm. Psychiatry was consulted and 5/16 recommend rescind PEC/CEC. Recommend continue Zyprexa 2.5 mg PO/IM q8 hours PRN nonredirectable agitation. QTc 479 on 5/12, recommend repeat EKG to check QTc interval if further PRN medication given. Psychiatry has signed off.  Patient noted to have polycythemia vera, highly hypercoagulative with h/o mult DVTs and recommend indefinite warfarin therapy. However, therapeutic anticoagulation was held 2/2 small intraventricular hemorrhage in the left occipital horn on 5/11. Scans on 5/15 showed resolution of hemorrhage. Warfarin 2 mg daily resumed 5/17 after clearance from NSGY. INR 1.2 (monitor daily).  PT/OT eval 5/18 recommending home with home health. Will likely need CM/SW to assist with discharge plan.     Consultants and Procedures:     Consultants:  Psychiatry    Procedures:    None    Transfer Information:     Diet:  Cardiac    Physical Activity:  PT/OT    To Do / Pending Studies / Follow ups:  CM/SW to assist with disposition. PT/OT recommending home with . Will need to involve family given recent discharge and bounceback.   Per ICU:   - cont Rx for cystitis (complicated). May de escalate to PO likely . E coli and serratia- pan sensitive. Blood cx negative  - cont warfarin, did not require bridge as without acute VTE  - voiding trial without frye  - PT/ OT  - placement     Desirae Pedraza  STELLA  [facility]       Detail Level: Generalized Detail Level: Detailed Detail Level: Zone

## (undated) DEVICE — DRESSING AQUACEL FOAM 5 X 5

## (undated) DEVICE — SEE L#120831

## (undated) DEVICE — DRESSING TRANS 4X4 TEGADERM

## (undated) DEVICE — KIT SURGIFLO EVITHROM

## (undated) DEVICE — TUBE FRAZIER 5MM 2FT SOFT TIP

## (undated) DEVICE — SEE MEDLINE ITEM 146417

## (undated) DEVICE — CORD BIPOLAR 12 FOOT

## (undated) DEVICE — DRAPE C ARM 42 X 120 10/BX

## (undated) DEVICE — BLADE 4IN EDGE INSULATED

## (undated) DEVICE — PROBE SIMULATOR KRAFF

## (undated) DEVICE — PACK OPHTHALMIC

## (undated) DEVICE — SEE MEDLINE ITEM 157128

## (undated) DEVICE — SPONGE NEURO 1/4X1/4

## (undated) DEVICE — SUT 2-0 12-18IN SILK

## (undated) DEVICE — SEE MEDLINE ITEM 156905

## (undated) DEVICE — CANNULA ANTERIOR CHAMBER 30G

## (undated) DEVICE — SPONGE WEC CEL SPEARS

## (undated) DEVICE — SOL BSS BALANCED SALT

## (undated) DEVICE — DRAPE ABDOMINAL TIBURON 14X11

## (undated) DEVICE — GARTER EYE ADULT

## (undated) DEVICE — GLOVE BIOGEL ECLIPSE SZ 6.5

## (undated) DEVICE — DRESSING AQUACEL SACRAL 9 X 9

## (undated) DEVICE — BUR BONE CUT MICRO TPS 3X3.8MM

## (undated) DEVICE — ELECTRODE REM PLYHSV RETURN 9

## (undated) DEVICE — MARKER SKIN STND TIP BLUE BARR

## (undated) DEVICE — SUT SILK 2-0 PS 18IN BLACK

## (undated) DEVICE — GAUZE SPONGE 4X4 12PLY

## (undated) DEVICE — SOL BETADINE 5%

## (undated) DEVICE — SEE MEDLINE ITEM 157150

## (undated) DEVICE — SPONGE LAP 4X18 PREWASHED

## (undated) DEVICE — DRAPE C-ARMOR EQUIPMENT COVER

## (undated) DEVICE — SYR 3CC LUER LOC

## (undated) DEVICE — KIT SPINAL PATIENT CARE JACK

## (undated) DEVICE — SUT CTD VICRYL 2-0 CR/CT-2

## (undated) DEVICE — SYS DURASEAL SPINE

## (undated) DEVICE — SUT VICRYL PLUS 0 CT1 18IN

## (undated) DEVICE — PACK EYE CUSTOM COVINGTON.

## (undated) DEVICE — COVER BACK TABLE 72X21

## (undated) DEVICE — APPLICATOR CHLORAPREP ORN 26ML

## (undated) DEVICE — SOL IRR STRL WATER 500ML

## (undated) DEVICE — WARMER DRAPE STERILE LF

## (undated) DEVICE — DIFFUSER

## (undated) DEVICE — TRAY FOLEY 16FR INFECTION CONT

## (undated) DEVICE — SPONGE GAUZE 16PLY 4X4

## (undated) DEVICE — SUT STRATAFIX 1 PDS CT-1

## (undated) DEVICE — NDL SPINAL 18GX3.5 SPINOCAN

## (undated) DEVICE — SYR LUER LOCK 1CC

## (undated) DEVICE — CARTRIDGE OIL

## (undated) DEVICE — SYS CLSR DERMABOND PRINEO 22CM

## (undated) DEVICE — DRAPE STERI-DRAPE 1000 17X11IN

## (undated) DEVICE — SEE MEDLINE ITEM 157116

## (undated) DEVICE — DRESSING AQUACEL FOAM 3 X 3

## (undated) DEVICE — SOL IRR BSS OPHTH 500ML STRL

## (undated) DEVICE — SEE MEDLINE ITEM 146347

## (undated) DEVICE — DRESSING MEPILEX BORDER 4 X 4

## (undated) DEVICE — Device

## (undated) DEVICE — DRESSING TELFA STRL 4X3 LF